# Patient Record
Sex: MALE | Race: WHITE | NOT HISPANIC OR LATINO | URBAN - METROPOLITAN AREA
[De-identification: names, ages, dates, MRNs, and addresses within clinical notes are randomized per-mention and may not be internally consistent; named-entity substitution may affect disease eponyms.]

---

## 2020-01-15 ENCOUNTER — HISTORICAL (OUTPATIENT)
Dept: INTENSIVE CARE | Facility: HOSPITAL | Age: 70
End: 2020-01-15

## 2022-04-10 ENCOUNTER — HISTORICAL (OUTPATIENT)
Dept: ADMINISTRATIVE | Facility: HOSPITAL | Age: 72
End: 2022-04-10

## 2022-04-25 VITALS
HEIGHT: 73 IN | DIASTOLIC BLOOD PRESSURE: 78 MMHG | BODY MASS INDEX: 29.08 KG/M2 | SYSTOLIC BLOOD PRESSURE: 143 MMHG | WEIGHT: 219.38 LBS

## 2023-02-14 DIAGNOSIS — M54.50 LUMBAR PAIN: Primary | ICD-10-CM

## 2023-02-15 ENCOUNTER — TELEPHONE (OUTPATIENT)
Dept: NEUROSURGERY | Facility: CLINIC | Age: 73
End: 2023-02-15
Payer: MEDICARE

## 2023-02-15 NOTE — TELEPHONE ENCOUNTER
Received referral. Called [facility] to see if they can mail MRI disc to us, they requested I fax a request form. Sent myself reminder to f/u on this.

## 2023-02-22 ENCOUNTER — TELEPHONE (OUTPATIENT)
Dept: NEUROSURGERY | Facility: CLINIC | Age: 73
End: 2023-02-22
Payer: MEDICARE

## 2023-02-22 NOTE — TELEPHONE ENCOUNTER
Spoke with Vanita in Radiology. States she cannot tell me if the disc has been mailed out because everyone is gone for the day and usually the front girls handle that so she advised for me to call back tomorrow morning.

## 2023-02-22 NOTE — TELEPHONE ENCOUNTER
----- Message from Trish Santacruz MA sent at 2/20/2023  2:02 PM CST -----  Regarding: see if we have recieved disc  From [facility]. Faxed request on 2/15/23. Then proceed w calling pt for symptoms.

## 2023-02-23 ENCOUNTER — TELEPHONE (OUTPATIENT)
Dept: NEUROSURGERY | Facility: CLINIC | Age: 73
End: 2023-02-23
Payer: MEDICARE

## 2023-02-23 NOTE — TELEPHONE ENCOUNTER
----- Message from Trish Santacruz MA sent at 2/22/2023  4:18 PM CST -----  Regarding: see if we have received  From [facility]. Faxed request on 2/15/23. Then proceed w calling pt for symptoms. Call tmr morning and see if the girls mailed out    
Spoke with someone in radiology, she states it was mailed out on the 16th. Advised her I will continue to look out for it.  
Yes - the patient is able to be screened

## 2023-02-24 ENCOUNTER — TELEPHONE (OUTPATIENT)
Dept: NEUROSURGERY | Facility: CLINIC | Age: 73
End: 2023-02-24
Payer: MEDICARE

## 2023-02-24 NOTE — TELEPHONE ENCOUNTER
Received imaging. Tried to call patient to inquire about symptoms, he requests to call me back because he is about to go in for a MD apt.

## 2023-02-24 NOTE — TELEPHONE ENCOUNTER
Patient returned my call. He is C/O an aching, burning, and shooting lower back pain that radiates to both legs and buttocks, states is more prevalent on the R side. This pain has been going on for years and is not due to any accident or fall. The pain does not wake him up at night. He rates the pain a 6/10 on a good day and a 8/10 on a bad day. He is taking Gabapentin prescribed by Dr. Demarco. Patient has not had any recent testing besides MRI from 9/2022. Denies seeing any other doctors for this. States he has been getting injections at [facility] and has had PT with Dr. Orr. Advised I will need these notes prior to apt so I will e-mail release form and he will e-mail back.

## 2023-02-24 NOTE — TELEPHONE ENCOUNTER
Dr. Sotomayor, next avaiable.  The MRI was done in 9/2022.  If greater than 6 months when he will be seen, I need to see the patient first.  Lumbar x-rays with flex/ext views if seeing me, next available.

## 2023-02-24 NOTE — TELEPHONE ENCOUNTER
"----- Message from Trish Santacruz MA sent at 2/24/2023 11:28 AM CST -----  Regarding: REFERRAL PROCESS- lumbar  This patient is being referred to Dr. Sotomayor by Dr. Mack for lumbar pain. Revewing MRI report, "L4-5 level demonstrates severe central canal narrowing with severe left neural foraminal narrowing. The disc is contacting and compressing the L4 nerve root." Please review imaging and advise on scheduling. Patient has not had any surgeries on his back or neck.    "

## 2023-02-28 NOTE — TELEPHONE ENCOUNTER
Tried to call patient to schedule again, no answer and was unable to leave a vm due to mailbox full.

## 2023-03-02 NOTE — TELEPHONE ENCOUNTER
Tried to call patient to schedule again, no answer and was unable to leave a vm due to mailbox full

## 2023-03-06 NOTE — TELEPHONE ENCOUNTER
Spoke with patient. Scheduled him with Dr. Bran lomeli 4/27/23 at 3:45. 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 He also request I e-mail release form again because he states he never got it. Will e-mail him again and send myself a reminder to f/u on this apt.

## 2023-04-24 NOTE — TELEPHONE ENCOUNTER
Spoke with patient regarding his apt with Dr. Sotomayor on 4/27/23, he stated that date will not work. So I r/s him to 8/22/23 at 10:00. Patient verbalized understanding and requests I e-mail him the apt info.
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
## 2024-03-22 DIAGNOSIS — R41.3 MEMORY LOSS: ICD-10-CM

## 2024-04-26 ENCOUNTER — HOSPITAL ENCOUNTER (EMERGENCY)
Facility: HOSPITAL | Age: 74
Discharge: HOME | End: 2024-04-26
Attending: EMERGENCY MEDICINE
Payer: MEDICARE

## 2024-04-26 VITALS
OXYGEN SATURATION: 100 % | WEIGHT: 230 LBS | DIASTOLIC BLOOD PRESSURE: 89 MMHG | BODY MASS INDEX: 30.48 KG/M2 | TEMPERATURE: 98 F | SYSTOLIC BLOOD PRESSURE: 155 MMHG | HEIGHT: 73 IN | RESPIRATION RATE: 25 BRPM | HEART RATE: 83 BPM

## 2024-04-26 DIAGNOSIS — R73.9 HYPERGLYCEMIA: ICD-10-CM

## 2024-04-26 DIAGNOSIS — D61.818 PANCYTOPENIA: ICD-10-CM

## 2024-04-26 DIAGNOSIS — R41.0 TRANSIENT CONFUSION: ICD-10-CM

## 2024-04-26 LAB
ALBUMIN SERPL-MCNC: 3.5 G/DL (ref 3.4–4.8)
ALBUMIN/GLOB SERPL: 1.2 RATIO (ref 1.1–2)
ALP SERPL-CCNC: 153 UNIT/L (ref 40–150)
ALT SERPL-CCNC: 36 UNIT/L (ref 0–55)
AST SERPL-CCNC: 38 UNIT/L (ref 5–34)
BASOPHILS # BLD AUTO: 0.01 X10(3)/MCL
BASOPHILS NFR BLD AUTO: 0.3 %
BILIRUB SERPL-MCNC: 0.6 MG/DL
BUN SERPL-MCNC: 19.1 MG/DL (ref 8.4–25.7)
CALCIUM SERPL-MCNC: 9 MG/DL (ref 8.8–10)
CHLORIDE SERPL-SCNC: 112 MMOL/L (ref 98–107)
CO2 SERPL-SCNC: 20 MMOL/L (ref 23–31)
CREAT SERPL-MCNC: 1.16 MG/DL (ref 0.73–1.18)
EOSINOPHIL # BLD AUTO: 0.13 X10(3)/MCL (ref 0–0.9)
EOSINOPHIL NFR BLD AUTO: 4.4 %
ERYTHROCYTE [DISTWIDTH] IN BLOOD BY AUTOMATED COUNT: 13.6 % (ref 11.5–17)
GFR SERPLBLD CREATININE-BSD FMLA CKD-EPI: >60 MLS/MIN/1.73/M2
GLOBULIN SER-MCNC: 3 GM/DL (ref 2.4–3.5)
GLUCOSE SERPL-MCNC: 210 MG/DL (ref 82–115)
HCT VFR BLD AUTO: 33.2 % (ref 42–52)
HGB BLD-MCNC: 11.1 G/DL (ref 14–18)
IMM GRANULOCYTES # BLD AUTO: 0.01 X10(3)/MCL (ref 0–0.04)
IMM GRANULOCYTES NFR BLD AUTO: 0.3 %
LYMPHOCYTES # BLD AUTO: 0.62 X10(3)/MCL (ref 0.6–4.6)
LYMPHOCYTES NFR BLD AUTO: 20.9 %
MCH RBC QN AUTO: 31.3 PG (ref 27–31)
MCHC RBC AUTO-ENTMCNC: 33.4 G/DL (ref 33–36)
MCV RBC AUTO: 93.5 FL (ref 80–94)
MONOCYTES # BLD AUTO: 0.25 X10(3)/MCL (ref 0.1–1.3)
MONOCYTES NFR BLD AUTO: 8.4 %
NEUTROPHILS # BLD AUTO: 1.95 X10(3)/MCL (ref 2.1–9.2)
NEUTROPHILS NFR BLD AUTO: 65.7 %
NRBC BLD AUTO-RTO: 0 %
PLATELET # BLD AUTO: 60 X10(3)/MCL (ref 130–400)
PLATELETS.RETICULATED NFR BLD AUTO: 4.3 % (ref 0.9–11.2)
PMV BLD AUTO: 11.8 FL (ref 7.4–10.4)
POCT GLUCOSE: 314 MG/DL (ref 70–110)
POTASSIUM SERPL-SCNC: 3.6 MMOL/L (ref 3.5–5.1)
PROT SERPL-MCNC: 6.5 GM/DL (ref 5.8–7.6)
RBC # BLD AUTO: 3.55 X10(6)/MCL (ref 4.7–6.1)
SODIUM SERPL-SCNC: 142 MMOL/L (ref 136–145)
WBC # SPEC AUTO: 2.97 X10(3)/MCL (ref 4.5–11.5)

## 2024-04-26 PROCEDURE — 25000003 PHARM REV CODE 250: Performed by: EMERGENCY MEDICINE

## 2024-04-26 PROCEDURE — 93010 ELECTROCARDIOGRAM REPORT: CPT | Mod: ,,, | Performed by: INTERNAL MEDICINE

## 2024-04-26 PROCEDURE — 93005 ELECTROCARDIOGRAM TRACING: CPT

## 2024-04-26 PROCEDURE — 82962 GLUCOSE BLOOD TEST: CPT

## 2024-04-26 PROCEDURE — 80053 COMPREHEN METABOLIC PANEL: CPT

## 2024-04-26 PROCEDURE — 85025 COMPLETE CBC W/AUTO DIFF WBC: CPT

## 2024-04-26 PROCEDURE — 99285 EMERGENCY DEPT VISIT HI MDM: CPT | Mod: 25

## 2024-04-26 PROCEDURE — 96360 HYDRATION IV INFUSION INIT: CPT

## 2024-04-26 RX ORDER — SODIUM CHLORIDE 9 MG/ML
100 INJECTION, SOLUTION INTRAVENOUS CONTINUOUS
Status: DISCONTINUED | OUTPATIENT
Start: 2024-04-26 | End: 2024-04-26

## 2024-04-26 RX ADMIN — SODIUM CHLORIDE 1000 ML: 9 INJECTION, SOLUTION INTRAVENOUS at 07:04

## 2024-04-26 NOTE — ED PROVIDER NOTES
Encounter Date: 4/26/2024       History     Chief Complaint   Patient presents with    Hyperglycemia     Pt to ED with c/o hyperglycemia and weakness. GCS 14 at baseline; per family pt is at baseline. CBG initially 469 with EMS. Repeat .     Pt is a 75 yo M who was brought in to the ED via EMS for concerns of elevated blood sugar. Known h/o DM2 and dementia; currently takes metformin 1x per day. Unclear if he took metformin yesterday but did take it this time morning. Per EMS pt was feeling weak and acting confused so his wife called EMS. On arrival his sugar was in the 400s. Given 400cc bolus of fluid and sugar came down to 300s. On arrival pt is oriented to himself. Denying any current chest pain, SOB, recent fevers or chills.         Review of patient's allergies indicates:  No Known Allergies  No past medical history on file.  No past surgical history on file.  No family history on file.     Review of Systems   Constitutional:  Positive for activity change and appetite change. Negative for chills and fever.   HENT:  Negative for congestion, sore throat and trouble swallowing.    Respiratory:  Negative for cough, chest tightness and shortness of breath.    Cardiovascular:  Negative for chest pain and palpitations.   Gastrointestinal:  Negative for abdominal pain, nausea and vomiting.   Neurological:  Positive for weakness. Negative for dizziness and headaches.   Psychiatric/Behavioral:  Positive for confusion.        Physical Exam     Initial Vitals [04/26/24 1749]   BP Pulse Resp Temp SpO2   (!) 141/75 82 20 98.1 °F (36.7 °C) 100 %      MAP       --         Physical Exam    Nursing note and vitals reviewed.  Constitutional: He is not diaphoretic. No distress.   HENT:   Head: Normocephalic.   Eyes: EOM are normal. Pupils are equal, round, and reactive to light.   Neck: Neck supple.   Normal range of motion.  Cardiovascular:  Normal rate and regular rhythm.           No murmur heard.  Pulmonary/Chest:  Breath sounds normal. He has no wheezes. He has no rhonchi. He has no rales.   Abdominal: Abdomen is soft. Bowel sounds are normal. There is no abdominal tenderness. There is no rebound and no guarding.   Musculoskeletal:      Cervical back: Normal range of motion and neck supple.      Comments: Trace edema present in RLE up to mid shin; LLE no edema present     Neurological: He is alert.   Pt oriented to self; not oriented to place or year. GCS 14  4/5 Strength present in BUE and BLE. Moves extremities on demand.    Skin: Skin is warm. Capillary refill takes less than 2 seconds.         ED Course   Procedures  Labs Reviewed   COMPREHENSIVE METABOLIC PANEL - Abnormal; Notable for the following components:       Result Value    Chloride 112 (*)     Carbon Dioxide 20 (*)     Glucose Level 210 (*)     Alkaline Phosphatase 153 (*)     Aspartate Aminotransferase 38 (*)     All other components within normal limits   CBC WITH DIFFERENTIAL - Abnormal; Notable for the following components:    WBC 2.97 (*)     RBC 3.55 (*)     Hgb 11.1 (*)     Hct 33.2 (*)     MCH 31.3 (*)     Platelet 60 (*)     MPV 11.8 (*)     Neut # 1.95 (*)     All other components within normal limits   POCT GLUCOSE - Abnormal; Notable for the following components:    POCT Glucose 314 (*)     All other components within normal limits   CBC W/ AUTO DIFFERENTIAL    Narrative:     The following orders were created for panel order CBC auto differential.  Procedure                               Abnormality         Status                     ---------                               -----------         ------                     CBC with Differential[[id number]]       Abnormal            Final result                 Please view results for these tests on the individual orders.   URINALYSIS, REFLEX TO URINE CULTURE   POCT GLUCOSE, HAND-HELD DEVICE          Imaging Results              X-Ray Chest PA And Lateral (Final result)  Result time 04/26/24 18:54:33       Final result by Herbert Gaytan MD (04/26/24 18:54:33)                   Impression:      No acute cardiopulmonary process.      Electronically signed by: Herbert Gaytan  Date:    04/26/2024  Time:    18:54               Narrative:    EXAMINATION:  XR CHEST PA AND LATERAL    CLINICAL HISTORY:  Hyperglycemia;    TECHNIQUE:  Two views of the chest    COMPARISON:  No prior imaging available for comparison.    FINDINGS:  No focal opacification, pleural effusion, or pneumothorax.    The cardiomediastinal silhouette is within normal limits.    No acute osseous abnormality.                                       Medications   sodium chloride 0.9% bolus 1,000 mL 1,000 mL (1,000 mLs Intravenous New Bag 4/26/24 1919)     Medical Decision Making                        Medical Decision Making:   Initial Assessment:   Pt is a 73 yo M who presented to the ED for concern of hyperglycemia.   Differential Diagnosis:   DKA, HHS, medication noncompliance, sepsis, pneumonia,   ED Management:  Pt arrived via EMSl received 400 cc bolus in route; blood sugar dropped from 400s - 300s. He does not appear to be in acute distress at this time. BP mildly elevated, afebrile, O2 100% on room air.   Chest x-ray showed no acute pulmonary infiltrations, consolidations or cardiac concerns. Lab work showed leukopenia, normocytic anemia, elevated alk phos, and glucose in the 200s.   Wife present in room on re-eval; states she recently moved out of house and used to give him his insulin twice a day. Thinks he may have forgotten to take his insulin since she left. Pt states he feels good at this time.   He is currently medically stable for discharge at this time. Glucose has come down and pt's mentation is better and at his baseline per his wife. Encouraged medication compliance and to follow up with his PCP for his pancytopenia. Strict ED return precautions and he and his wife expressed understanding.   Other:   I discussed test(s) with the  performing physician.             Clinical Impression:  Final diagnoses:  [R73.9] Hyperglycemia          ED Disposition Condition    Discharge Stable          ED Prescriptions    None       Follow-up Information       Follow up With Specialties Details Why Contact Info    Your primary care physician  Schedule an appointment as soon as possible for a visit in 1 week  Call your primary care physician or you can call [phone] to schedule with a primary care physician    [facility] - Emergency Dept Emergency Medicine  If symptoms worsen [address] [address]  [phone]             Gustavo Mattson MD  Resident  04/26/24 2001

## 2024-04-27 LAB
OHS QRS DURATION: 90 MS
OHS QTC CALCULATION: 461 MS

## 2024-05-03 ENCOUNTER — TELEMEDICINE (OUTPATIENT)
Dept: NEUROLOGY | Facility: OTHER | Age: 74
End: 2024-05-03
Payer: MEDICARE

## 2024-05-03 DIAGNOSIS — R41.82 ALTERED MENTAL STATUS, UNSPECIFIED ALTERED MENTAL STATUS TYPE: Primary | ICD-10-CM

## 2024-05-03 PROCEDURE — 99451 NTRPROF PH1/NTRNET/EHR 5/>: CPT | Mod: ,,, | Performed by: STUDENT IN AN ORGANIZED HEALTH CARE EDUCATION/TRAINING PROGRAM

## 2024-05-03 NOTE — TELEMEDICINE CONSULT
Chief Compliant:  Altered mental status    HPI:   74-year-old male with a history of diabetes type 2, hypertension, hyperlipidemia, coronary artery disease, hepatitis-C, alcoholic cirrhosis and peripheral neuropathy presented to [facility] with altered mental status.  He was accompanied by nephew.  Most of the history is obtained  from nephew (  Per chart) as patient is confused.  Neurology was consulted  for the same.  Per nephew, confusion has been lingering for the last several months.  Patient also appears to have family situation.  Per chart, he does not speak the language of his wife who is Urdu and that is why they do not get along well and as a result he has not getting the care that he needs at home.    Past Medical History:  diabetes type 2, hypertension, hyperlipidemia, coronary artery disease, hepatitis-C, alcoholic cirrhosis and peripheral neuropathy   Past Surgical History:    Bilateral shoulder surgeries   Allergies:   NKDA    Family History:   Noncontributory per chart   Social History:    None per chart     Review of Systems:  14 point ROS was obtained and is negative except mentioned in HPI    OBJECTIVE:     Vital Signs (Most Recent):    Vital Signs (24h Range):  [unfilled]       Physical Exam:   Attempted to call on the current multiple times but it went unanswered    Labs:  CBC/Anemia Profile:    WBC 4.36, RBC 4.02, hemoglobin 12.5, platelets 63, neutrophil % 75.8, lymphocytes  %13.5     Coags:   PT 12.1 seconds, PTT 32.3 seconds, INR 1.1     Chemistries:   Ammonia 70,  anion gap 10, sodium 143, potassium 3.9, chloride 111, BUN 21.58, creatinine 1.14, eGFR >60      Urine:   Color yellow, cloudy clear, glucose negative, bilirubin negative, ketone negative, protein trace, nitrite negative, blood negative, leukocyte esterase negative     Urine microscopy:  WBC none seen, RBC none seen, epithelial cells rare, bacteria none seen, yeast none seen  Medications:  Scheduled  Meds:  Continuous Infusions:  PRN Meds:.    No current outpatient medications on file.  Prior to Admission medications    Not on File        Imaging:   Imaging: Images were reviewed remotely as they were acquired.    CTH: mild cerebral atrophy with chronic small-vessel ischemic changes.  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  No acute intracranial abnormalities  CTA H/N:  MRI:  ECHO: EF . LA size: Normal. Bubble study: +/-/not done         Assessment/Plan:   74-year-old gentleman with a history of hepatitis C and cirrhosis with confusion and altered mental status ongoing since 1-1.5 months..  patient has elevated ammonia that could be indicating hepatic encephalopathy however, other workup can be done to rule out neurological causes.    - Recommend MRI with and without contrast brain to rule out any structural abnormalities  - recommend routine EEG to rule out subclinical seizure.  If evidence of epileptiform discharges, reach back to tele neurology for further recommendations regarding starting antiseizure medications  -  recommend correction of  ammonia          I spent approximately 20 minutes on this encounter which includes chart review, imaging interpretation if available, medical decision making including triage and planning for diagnostics, management and disposition. Once this note was completed, a written copy was sent back to the provider via fax  and electronic medical record.                      John Arndt MD, MHA  Fellow, NeuroEndovascular Surgery, [facility], [facility] Allan Arce  Neurologist, [facility]

## 2024-07-19 ENCOUNTER — HOSPITAL ENCOUNTER (INPATIENT)
Facility: HOSPITAL | Age: 74
LOS: 35 days | Discharge: HOME HEALTH | DRG: 086 | End: 2024-08-23
Attending: STUDENT IN AN ORGANIZED HEALTH CARE EDUCATION/TRAINING PROGRAM | Admitting: SURGERY
Payer: MEDICARE

## 2024-07-19 DIAGNOSIS — S06.5XAA SUBDURAL HEMATOMA: ICD-10-CM

## 2024-07-19 DIAGNOSIS — S01.01XA LACERATION OF SCALP, INITIAL ENCOUNTER: ICD-10-CM
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
## 2024-07-19 DIAGNOSIS — R53.1 GENERALIZED WEAKNESS: ICD-10-CM

## 2024-07-19 LAB
AFP-TM SERPL-MCNC: <2 NG/ML
ALBUMIN SERPL-MCNC: 3 G/DL (ref 3.4–4.8)
ALBUMIN/GLOB SERPL: 1 RATIO (ref 1.1–2)
ALP SERPL-CCNC: 124 UNIT/L (ref 40–150)
ALT SERPL-CCNC: 29 UNIT/L (ref 0–55)
AMMONIA PLAS-MSCNC: 41.9 UMOL/L (ref 18–72)
ANION GAP SERPL CALC-SCNC: 6 MEQ/L
APTT PPP: 23.6 SECONDS (ref 23.2–33.7)
AST SERPL-CCNC: 38 UNIT/L (ref 5–34)
BASOPHILS # BLD AUTO: 0.01 X10(3)/MCL
BASOPHILS NFR BLD AUTO: 0.3 %
BILIRUB SERPL-MCNC: 0.8 MG/DL
BUN SERPL-MCNC: 15.1 MG/DL (ref 8.4–25.7)
CALCIUM SERPL-MCNC: 8.3 MG/DL (ref 8.8–10)
CHLORIDE SERPL-SCNC: 112 MMOL/L (ref 98–107)
CO2 SERPL-SCNC: 21 MMOL/L (ref 23–31)
CREAT SERPL-MCNC: 1.07 MG/DL (ref 0.73–1.18)
CREAT/UREA NIT SERPL: 14
EOSINOPHIL # BLD AUTO: 0.1 X10(3)/MCL (ref 0–0.9)
EOSINOPHIL NFR BLD AUTO: 3 %
ERYTHROCYTE [DISTWIDTH] IN BLOOD BY AUTOMATED COUNT: 14.9 % (ref 11.5–17)
ETHANOL SERPL-MCNC: <10 MG/DL
GFR SERPLBLD CREATININE-BSD FMLA CKD-EPI: >60 ML/MIN/1.73/M2
GLOBULIN SER-MCNC: 2.9 GM/DL (ref 2.4–3.5)
GLUCOSE SERPL-MCNC: 166 MG/DL (ref 82–115)
GROUP & RH: NORMAL
HCT VFR BLD AUTO: 30.4 % (ref 42–52)
HGB BLD-MCNC: 9.8 G/DL (ref 14–18)
IMM GRANULOCYTES # BLD AUTO: 0.01 X10(3)/MCL (ref 0–0.04)
IMM GRANULOCYTES NFR BLD AUTO: 0.3 %
INDIRECT COOMBS: NORMAL
INR PPP: 1.1
LYMPHOCYTES # BLD AUTO: 0.49 X10(3)/MCL (ref 0.6–4.6)
LYMPHOCYTES NFR BLD AUTO: 14.9 %
MAGNESIUM SERPL-MCNC: 1.7 MG/DL (ref 1.6–2.6)
MCH RBC QN AUTO: 30.4 PG (ref 27–31)
MCHC RBC AUTO-ENTMCNC: 32.2 G/DL (ref 33–36)
MCV RBC AUTO: 94.4 FL (ref 80–94)
MONOCYTES # BLD AUTO: 0.24 X10(3)/MCL (ref 0.1–1.3)
MONOCYTES NFR BLD AUTO: 7.3 %
NEUTROPHILS # BLD AUTO: 2.44 X10(3)/MCL (ref 2.1–9.2)
NEUTROPHILS NFR BLD AUTO: 74.2 %
NRBC BLD AUTO-RTO: 0 %
OHS QRS DURATION: 80 MS
OHS QTC CALCULATION: 464 MS
PLATELET # BLD AUTO: 56 X10(3)/MCL (ref 130–400)
PLATELETS.RETICULATED NFR BLD AUTO: 4.1 % (ref 0.9–11.2)
PMV BLD AUTO: 11.6 FL (ref 7.4–10.4)
POCT GLUCOSE: 166 MG/DL (ref 70–110)
POCT GLUCOSE: 190 MG/DL (ref 70–110)
POCT GLUCOSE: 71 MG/DL (ref 70–110)
POCT GLUCOSE: 71 MG/DL (ref 70–110)
POTASSIUM SERPL-SCNC: 3.7 MMOL/L (ref 3.5–5.1)
PROT SERPL-MCNC: 5.9 GM/DL (ref 5.8–7.6)
PROTHROMBIN TIME: 14.2 SECONDS (ref 12.5–14.5)
RBC # BLD AUTO: 3.22 X10(6)/MCL (ref 4.7–6.1)
SODIUM SERPL-SCNC: 139 MMOL/L (ref 136–145)
SPECIMEN OUTDATE: NORMAL
TROPONIN I SERPL-MCNC: <0.01 NG/ML (ref 0–0.04)
WBC # BLD AUTO: 3.29 X10(3)/MCL (ref 4.5–11.5)

## 2024-07-19 PROCEDURE — 83735 ASSAY OF MAGNESIUM: CPT | Performed by: STUDENT IN AN ORGANIZED HEALTH CARE EDUCATION/TRAINING PROGRAM

## 2024-07-19 PROCEDURE — 99285 EMERGENCY DEPT VISIT HI MDM: CPT | Mod: 25

## 2024-07-19 PROCEDURE — 93005 ELECTROCARDIOGRAM TRACING: CPT

## 2024-07-19 PROCEDURE — 99223 1ST HOSP IP/OBS HIGH 75: CPT | Mod: FS,,, | Performed by: NEUROLOGICAL SURGERY

## 2024-07-19 PROCEDURE — 82077 ASSAY SPEC XCP UR&BREATH IA: CPT | Performed by: STUDENT IN AN ORGANIZED HEALTH CARE EDUCATION/TRAINING PROGRAM

## 2024-07-19 PROCEDURE — 0HQ0XZZ REPAIR SCALP SKIN, EXTERNAL APPROACH: ICD-10-PCS | Performed by: STUDENT IN AN ORGANIZED HEALTH CARE EDUCATION/TRAINING PROGRAM

## 2024-07-19 PROCEDURE — 11000001 HC ACUTE MED/SURG PRIVATE ROOM

## 2024-07-19 PROCEDURE — 25500020 PHARM REV CODE 255: Performed by: STUDENT IN AN ORGANIZED HEALTH CARE EDUCATION/TRAINING PROGRAM

## 2024-07-19 PROCEDURE — 86901 BLOOD TYPING SEROLOGIC RH(D): CPT | Performed by: STUDENT IN AN ORGANIZED HEALTH CARE EDUCATION/TRAINING PROGRAM

## 2024-07-19 PROCEDURE — 82105 ALPHA-FETOPROTEIN SERUM: CPT | Performed by: NURSE PRACTITIONER

## 2024-07-19 PROCEDURE — 85610 PROTHROMBIN TIME: CPT | Performed by: STUDENT IN AN ORGANIZED HEALTH CARE EDUCATION/TRAINING PROGRAM

## 2024-07-19 PROCEDURE — 93010 ELECTROCARDIOGRAM REPORT: CPT | Mod: ,,, | Performed by: INTERNAL MEDICINE

## 2024-07-19 PROCEDURE — 82140 ASSAY OF AMMONIA: CPT | Performed by: NURSE PRACTITIONER

## 2024-07-19 PROCEDURE — 82962 GLUCOSE BLOOD TEST: CPT

## 2024-07-19 PROCEDURE — 63600175 PHARM REV CODE 636 W HCPCS: Performed by: NURSE PRACTITIONER

## 2024-07-19 PROCEDURE — 80053 COMPREHEN METABOLIC PANEL: CPT | Performed by: STUDENT IN AN ORGANIZED HEALTH CARE EDUCATION/TRAINING PROGRAM

## 2024-07-19 PROCEDURE — 25000003 PHARM REV CODE 250: Performed by: STUDENT IN AN ORGANIZED HEALTH CARE EDUCATION/TRAINING PROGRAM

## 2024-07-19 PROCEDURE — 85730 THROMBOPLASTIN TIME PARTIAL: CPT | Performed by: STUDENT IN AN ORGANIZED HEALTH CARE EDUCATION/TRAINING PROGRAM

## 2024-07-19 PROCEDURE — 25000003 PHARM REV CODE 250: Performed by: NURSE PRACTITIONER

## 2024-07-19 PROCEDURE — 21400001 HC TELEMETRY ROOM

## 2024-07-19 PROCEDURE — 85025 COMPLETE CBC W/AUTO DIFF WBC: CPT | Performed by: STUDENT IN AN ORGANIZED HEALTH CARE EDUCATION/TRAINING PROGRAM

## 2024-07-19 PROCEDURE — 12015 RPR F/E/E/N/L/M 7.6-12.5 CM: CPT

## 2024-07-19 PROCEDURE — 96365 THER/PROPH/DIAG IV INF INIT: CPT

## 2024-07-19 PROCEDURE — 12004 RPR S/N/AX/GEN/TRK7.6-12.5CM: CPT

## 2024-07-19 PROCEDURE — 84484 ASSAY OF TROPONIN QUANT: CPT | Performed by: STUDENT IN AN ORGANIZED HEALTH CARE EDUCATION/TRAINING PROGRAM

## 2024-07-19 PROCEDURE — 86850 RBC ANTIBODY SCREEN: CPT | Performed by: STUDENT IN AN ORGANIZED HEALTH CARE EDUCATION/TRAINING PROGRAM

## 2024-07-19 RX ORDER — IBUPROFEN 200 MG
16 TABLET ORAL
Status: DISCONTINUED | OUTPATIENT
Start: 2024-07-19 | End: 2024-08-23 | Stop reason: HOSPADM

## 2024-07-19 RX ORDER — ACETAMINOPHEN 325 MG/1
650 TABLET ORAL EVERY 4 HOURS
Status: DISPENSED | OUTPATIENT
Start: 2024-07-19 | End: 2024-07-24

## 2024-07-19 RX ORDER — POLYETHYLENE GLYCOL 3350 17 G/17G
17 POWDER, FOR SOLUTION ORAL 2 TIMES DAILY
Status: DISCONTINUED | OUTPATIENT
Start: 2024-07-19 | End: 2024-08-17

## 2024-07-19 RX ORDER — IBUPROFEN 200 MG
24 TABLET ORAL
Status: DISCONTINUED | OUTPATIENT
Start: 2024-07-19 | End: 2024-08-23 | Stop reason: HOSPADM

## 2024-07-19 RX ORDER — HYDRALAZINE HYDROCHLORIDE 20 MG/ML
10 INJECTION INTRAMUSCULAR; INTRAVENOUS EVERY 6 HOURS PRN
Status: DISCONTINUED | OUTPATIENT
Start: 2024-07-19 | End: 2024-08-06

## 2024-07-19 RX ORDER — OXYCODONE HYDROCHLORIDE 5 MG/1
5 TABLET ORAL EVERY 4 HOURS PRN
Status: DISCONTINUED | OUTPATIENT
Start: 2024-07-19 | End: 2024-08-23 | Stop reason: HOSPADM

## 2024-07-19 RX ORDER — DOCUSATE SODIUM 100 MG/1
100 CAPSULE, LIQUID FILLED ORAL 2 TIMES DAILY
Status: DISCONTINUED | OUTPATIENT
Start: 2024-07-19 | End: 2024-08-03

## 2024-07-19 RX ORDER — TALC
6 POWDER (GRAM) TOPICAL NIGHTLY PRN
Status: DISCONTINUED | OUTPATIENT
Start: 2024-07-19 | End: 2024-07-27

## 2024-07-19 RX ORDER — SODIUM CHLORIDE 9 MG/ML
INJECTION, SOLUTION INTRAVENOUS CONTINUOUS
Status: DISCONTINUED | OUTPATIENT
Start: 2024-07-19 | End: 2024-07-20

## 2024-07-19 RX ORDER — LEVETIRACETAM 500 MG/1
500 TABLET ORAL 2 TIMES DAILY
Status: COMPLETED | OUTPATIENT
Start: 2024-07-19 | End: 2024-07-25

## 2024-07-19 RX ORDER — METHOCARBAMOL 500 MG/1
500 TABLET, FILM COATED ORAL 2 TIMES DAILY
Status: DISCONTINUED | OUTPATIENT
Start: 2024-07-19 | End: 2024-08-23 | Stop reason: HOSPADM

## 2024-07-19 RX ORDER — LIDOCAINE 50 MG/G
1 PATCH TOPICAL
Status: DISCONTINUED | OUTPATIENT
Start: 2024-07-19 | End: 2024-08-23 | Stop reason: HOSPADM

## 2024-07-19 RX ORDER — GLUCAGON 1 MG
1 KIT INJECTION
Status: DISCONTINUED | OUTPATIENT
Start: 2024-07-19 | End: 2024-08-23 | Stop reason: HOSPADM

## 2024-07-19 RX ORDER — INSULIN ASPART 100 [IU]/ML
0-5 INJECTION, SOLUTION INTRAVENOUS; SUBCUTANEOUS
Status: DISCONTINUED | OUTPATIENT
Start: 2024-07-19 | End: 2024-08-23 | Stop reason: HOSPADM

## 2024-07-19 RX ORDER — LIDOCAINE HYDROCHLORIDE AND EPINEPHRINE 10; 10 MG/ML; UG/ML
1 INJECTION, SOLUTION INFILTRATION; PERINEURAL ONCE
Status: COMPLETED | OUTPATIENT
Start: 2024-07-19 | End: 2024-07-19

## 2024-07-19 RX ORDER — ADHESIVE BANDAGE
30 BANDAGE TOPICAL DAILY PRN
Status: DISCONTINUED | OUTPATIENT
Start: 2024-07-19 | End: 2024-08-23 | Stop reason: HOSPADM

## 2024-07-19 RX ADMIN — POLYETHYLENE GLYCOL 3350 17 G: 17 POWDER, FOR SOLUTION ORAL at 10:07

## 2024-07-19 RX ADMIN — ACETAMINOPHEN 650 MG: 325 TABLET, FILM COATED ORAL at 11:07

## 2024-07-19 RX ADMIN — HYDRALAZINE HYDROCHLORIDE 10 MG: 20 INJECTION INTRAMUSCULAR; INTRAVENOUS at 06:07

## 2024-07-19 RX ADMIN — DOCUSATE SODIUM 100 MG: 100 CAPSULE, LIQUID FILLED ORAL at 11:07

## 2024-07-19 RX ADMIN — ACETAMINOPHEN 650 MG: 325 TABLET, FILM COATED ORAL at 05:07

## 2024-07-19 RX ADMIN — LEVETIRACETAM 500 MG: 500 TABLET, FILM COATED ORAL at 11:07

## 2024-07-19 RX ADMIN — Medication 6 MG: at 10:07

## 2024-07-19 RX ADMIN — IOHEXOL 100 ML: 350 INJECTION, SOLUTION INTRAVENOUS at 06:07

## 2024-07-19 RX ADMIN — LIDOCAINE PATCH 5% 1 PATCH: 700 PATCH TOPICAL at 11:07

## 2024-07-19 RX ADMIN — METHOCARBAMOL 500 MG: 500 TABLET ORAL at 11:07

## 2024-07-19 RX ADMIN — ACETAMINOPHEN 650 MG: 325 TABLET, FILM COATED ORAL at 03:07

## 2024-07-19 RX ADMIN — SODIUM CHLORIDE: 9 INJECTION, SOLUTION INTRAVENOUS at 11:07

## 2024-07-19 RX ADMIN — METHOCARBAMOL 500 MG: 500 TABLET ORAL at 10:07

## 2024-07-19 RX ADMIN — DEXTROSE MONOHYDRATE 125 ML: 100 INJECTION, SOLUTION INTRAVENOUS at 06:07

## 2024-07-19 RX ADMIN — LIDOCAINE HYDROCHLORIDE,EPINEPHRINE BITARTRATE 1 ML: 10; .01 INJECTION, SOLUTION INFILTRATION; PERINEURAL at 09:07

## 2024-07-19 RX ADMIN — DOCUSATE SODIUM 100 MG: 100 CAPSULE, LIQUID FILLED ORAL at 10:07

## 2024-07-19 RX ADMIN — ACETAMINOPHEN 650 MG: 325 TABLET, FILM COATED ORAL at 10:07

## 2024-07-19 RX ADMIN — POLYETHYLENE GLYCOL 3350 17 G: 17 POWDER, FOR SOLUTION ORAL at 11:07

## 2024-07-19 RX ADMIN — LEVETIRACETAM 500 MG: 500 TABLET, FILM COATED ORAL at 10:07

## 2024-07-19 NOTE — ED PROVIDER NOTES
Encounter Date: 7/19/2024    SCRIBE #1 NOTE: I, Berta Venita, am scribing for, and in the presence of,  Kevin Maddox MD. I have scribed the following portions of the note - the EKG reading. Other sections scribed: HPI, ROS, PE.       History     Chief Complaint   Patient presents with    Fall     Arrives [facility] from home fell down 4 steps hit head head - denies loc or thinners, family reports normally confused to year/month which he is currently, lac post head bleeding controlled w/ kerlex dressing, [facility] reports initial cbg 45 - 250ml d5w en route improved to 101 last checked     Patient is a 74 year old male with a hx of DM presents to the ED via EMS following a fall PTA. EMS reports patient was initially hypoglycemic CBG 44 - 250ml of d5w and a c-collar administered en route. EMS reports being told that patient is normally confused to year/month by family.    Patient reports he was going down the stairs when he suddenly slipped and fell. He denies LOC but is unsure how he fell. He reports he wanted to get a slice of cake because he was hungry. He reports falling about 4 feet from the ground. He reports right hip pain. Patient reports right shoulder pain that began prior to the fall.  He denies abdominal pain. He denies hx of HTN or heart problems.    The history is provided by the patient and the EMS personnel. No  was used.     Review of patient's allergies indicates:  No Known Allergies  No past medical history on file.  No past surgical history on file.  No family history on file.     Review of Systems   Gastrointestinal:  Negative for abdominal pain.   Musculoskeletal:         Positive for right hip pain  Positive for right shoulder pain       Physical Exam     Initial Vitals [07/19/24 0614]   BP Pulse Resp Temp SpO2   (!) 150/77 72 16 98.4 °F (36.9 °C) 98 %      MAP       --         Physical Exam    Constitutional: He appears well-developed and well-nourished. He is not  diaphoretic.   HENT:   Head: Normocephalic.   Right Ear: External ear normal.   Left Ear: External ear normal.   Nose: Nose normal.   Hematoma and skin tear to the right parietal aspect of scalp; 8 cm complex laceration to the occipital region of scalp.   Eyes: EOM are normal. Pupils are equal, round, and reactive to light. Right eye exhibits no discharge. Left eye exhibits no discharge.   Pupils are 1 mm to 2 mm    Neck:   C-collar in place   Cardiovascular:  Normal rate, regular rhythm and normal heart sounds.     Exam reveals no gallop and no friction rub.       No murmur heard.  Pulmonary/Chest: Effort normal and breath sounds normal. No respiratory distress. He has no wheezes. He has no rhonchi. He has no rales. He exhibits no tenderness.   Abdominal: Abdomen is soft. Bowel sounds are normal. He exhibits no distension and no mass. There is no abdominal tenderness. There is no rebound and no guarding.   Musculoskeletal:         General: Normal range of motion.      Right lower leg: 3+ Pitting Edema present.      Left lower leg: 3+ Pitting Edema present.      Comments: No midline tenderness, stepoffs, or deformities; well healed surgical scar to the right shoulder     Neurological: He is alert and oriented to person, place, and time. No cranial nerve deficit or sensory deficit.   Skin: Skin is warm and dry. Capillary refill takes less than 2 seconds.         ED Course   Critical Care    Date/Time: 7/19/2024 10:03 AM    Performed by: Kevin Maddox MD  Authorized by: Kevin Maddox MD  Direct patient critical care time: 7 minutes  Additional history critical care time: 9 minutes  Ordering / reviewing critical care time: 8 minutes  Documentation critical care time: 5 minutes  Consulting other physicians critical care time: 6 minutes  Total critical care time (exclusive of procedural time) : 35 minutes  Critical care time was exclusive of separately billable procedures and treating other patients.  Critical  care was necessary to treat or prevent imminent or life-threatening deterioration of the following conditions: trauma.  Critical care was time spent personally by me on the following activities: discussions with consultants, discussions with primary provider, interpretation of cardiac output measurements, evaluation of patient's response to treatment, examination of patient, obtaining history from patient or surrogate, ordering and performing treatments and interventions, ordering and review of laboratory studies, ordering and review of radiographic studies, pulse oximetry, re-evaluation of patient's condition and review of old charts.      Lac Repair    Date/Time: 7/19/2024 10:04 AM    Performed by: Kevin Maddox MD  Authorized by: Kevin Maddox MD    Consent:     Consent obtained:  Verbal    Consent given by:  Patient    Risks discussed:  Infection and nerve damage    Alternatives discussed:  No treatment  Universal protocol:     Patient identity confirmed:  Verbally with patient  Anesthesia:     Anesthesia method:  Local infiltration    Local anesthetic:  Lidocaine 1% WITH epi  Laceration details:     Location:  Scalp    Scalp location:  Occipital    Length (cm):  8  Pre-procedure details:     Preparation:  Patient was prepped and draped in usual sterile fashion  Exploration:     Limited defect created (wound extended): no      Hemostasis achieved with:  Epinephrine and direct pressure    Imaging obtained comment:  CT    Wound exploration: wound explored through full range of motion      Contaminated: no    Treatment:     Area cleansed with:  Chlorhexidine    Amount of cleaning:  Extensive    Irrigation solution:  Sterile saline    Irrigation volume:  500    Irrigation method:  Pressure wash    Debridement:  None    Undermining:  None  Skin repair:     Repair method:  Staples    Number of staples:  14  Approximation:     Approximation:  Close  Repair type:     Repair type:  Simple  Post-procedure  details:     Dressing:  Bulky dressing    Procedure completion:  Tolerated well, no immediate complications    Labs Reviewed   COMPREHENSIVE METABOLIC PANEL - Abnormal; Notable for the following components:       Result Value    Chloride 112 (*)     CO2 21 (*)     Glucose 166 (*)     Calcium 8.3 (*)     Albumin 3.0 (*)     Albumin/Globulin Ratio 1.0 (*)     AST 38 (*)     All other components within normal limits   CBC WITH DIFFERENTIAL - Abnormal; Notable for the following components:    WBC 3.29 (*)     RBC 3.22 (*)     Hgb 9.8 (*)     Hct 30.4 (*)     MCV 94.4 (*)     MCHC 32.2 (*)     Platelet 56 (*)     MPV 11.6 (*)     Lymph # 0.49 (*)     All other components within normal limits   APTT - Normal   TROPONIN I - Normal   MAGNESIUM - Normal   PROTIME-INR - Normal   AMMONIA - Normal   AFP TUMOR MARKER - Normal    Narrative:     The testing method is a chemiluminescent microparticle immunoassay manufactured by [employer] and performed on the [employer] or [employer] system. Values obtained with different assay manufacturers for methods may be different and cannot be used interchangeably.   CBC W/ AUTO DIFFERENTIAL    Narrative:     The following orders were created for panel order CBC auto differential.  Procedure                               Abnormality         Status                     ---------                               -----------         ------                     CBC with Differential[[id number]]       Abnormal            Final result                 Please view results for these tests on the individual orders.   ALCOHOL,MEDICAL (ETHANOL)   TYPE & SCREEN   POCT GLUCOSE   POCT GLUCOSE     EKG Readings: (Independently Interpreted)   Initial Reading: No STEMI. Rhythm: Normal Sinus Rhythm. Heart Rate: 72. Ectopy: No Ectopy. Conduction: Normal. ST Segments: Normal ST Segments. T Waves: Normal. Clinical Impression: Normal Sinus Rhythm   Done at 7:09         Imaging Results              X-ray  Shoulder 2 or More Views Right (Final result)  Result time 07/19/24 09:19:46      Final result by David Erazo MD (07/19/24 09:19:46)                   Impression:      No osseous abnormality identified.      Electronically signed by: David Erazo  Date:    07/19/2024  Time:    09:19               Narrative:    EXAMINATION:  XR SHOULDER COMPLETE 2 OR MORE VIEWS RIGHT    CLINICAL HISTORY:  Fall;    TECHNIQUE:  Three views.    COMPARISON:  None available.    FINDINGS:  Articular surfaces alignment is preserved.  No acute fracture or dislocation identified.  Calcification adjacent to the greater tuberosity reflects calcific tendinopathy.  There is narrowed acromial head and acromion interval which raises the possibility of rotator cuff arthropathy.                                       X-Ray Pelvis Routine AP (Final result)  Result time 07/19/24 07:10:46      Final result by Surinder Danielle MD (07/19/24 07:10:46)                   Impression:      No acute findings.      Electronically signed by: Surinder Danielle  Date:    07/19/2024  Time:    07:10               Narrative:    EXAMINATION:  XR PELVIS ROUTINE AP    CLINICAL HISTORY:  Unspecified fall, initial encounter    COMPARISON:  None    FINDINGS:  Frontal image of the pelvis demonstrates no fracture or dislocation                                       X-Ray Chest 1 View (Final result)  Result time 07/19/24 06:55:57      Final result by Surinder Danielle MD (07/19/24 06:55:57)                   Impression:      Mild left basilar opacities with a small left pleural effusion.      Electronically signed by: Surinder Danielle  Date:    07/19/2024  Time:    06:55               Narrative:    EXAMINATION:  XR CHEST 1 VIEW    CLINICAL HISTORY:  Unspecified fall, initial encounter    COMPARISON:  26 April 2024    FINDINGS:  Frontal view of the chest was obtained. The heart is not significantly enlarged.  There are mild left basilar opacities with a small left pleural effusion  suspected.  There is no pneumothorax.                                       CT Head Without Contrast (Final result)  Result time 07/19/24 07:14:41      Final result by Surinder Danielle MD (07/19/24 07:14:41)                   Impression:      Question minimal subdural blood products along the falx without mass effect.    Findings discussed with Dr. Maddox at 713 on 7/19/2024.      Electronically signed by: Surinder Danielle  Date:    07/19/2024  Time:    07:14               Narrative:    EXAMINATION:  CT HEAD WITHOUT CONTRAST    CLINICAL HISTORY:  Head trauma, minor (Age >= 65y);    TECHNIQUE:  CT imaging of the head performed from the skull base to the vertex without intravenous contrast. DLP 1387 mGycm. Automatic exposure control, adjustment of mA/kV or iterative reconstruction technique was used to reduce radiation.    COMPARISON:  None Available.    FINDINGS:  Questionable minimal subpleural blood products along the falx measuring only 1-2 mm maximally.  No mass effect.  There is mild patchy hypoattenuation in the cerebral white matter which is nonspecific but most commonly associated with chronic small vessel ischemic changes.  There is left cerebellar and left occipital encephalomalacia.  The ventricles are not significantly enlarged.  There are vascular calcifications.    Visualized paranasal sinuses and mastoid air cells are clear. The calvarium is grossly intact.  There is some soft tissue swelling over the right frontal scalp.                                       CT Cervical Spine Without Contrast (Final result)  Result time 07/19/24 07:13:16      Final result by Surinder Danielle MD (07/19/24 07:13:16)                   Impression:      No acute bony injury of the cervical spine.      Electronically signed by: Surinder Danielle  Date:    07/19/2024  Time:    07:13               Narrative:    EXAMINATION:  CT CERVICAL SPINE WITHOUT CONTRAST    CLINICAL HISTORY:  Neck trauma (Age >= 65y);    TECHNIQUE:  Helical  acquisition through the cervical spine without IV contrast. Three plane reconstructions were made available for review. DLP 1387 mGycm. Automatic exposure control, adjustment of mA/kV or iterative reconstruction technique was used to reduce radiation.    COMPARISON:  None available.    FINDINGS:  No fractures identified.  There are mild degenerative alignment abnormalities.  Moderate degenerative changes.  Craniocervical junction and C1-C2 relationship are normal. The odontoid is intact. There is limited evaluation of the soft tissues. No prevertebral soft tissue swelling. Ligamentous injury cannot be excluded with CT.  There are vascular calcifications.                                       CT Chest Abdomen Pelvis With IV Contrast (XPD) NO Oral Contrast (Final result)  Result time 07/19/24 07:32:29      Final result by Surinder Danielle MD (07/19/24 07:32:29)                   Impression:      1. Right transverse process fractures L3 and L4.  2. Small left pleural effusion.  3. Cirrhotic liver with right hepatic lobe mass suspicious for HCC.  Recommend outpatient liver mass protocol CT.  4. Small volume ascites.      Electronically signed by: Surinder Danielle  Date:    07/19/2024  Time:    07:32               Narrative:    EXAMINATION:  CT CHEST ABDOMEN PELVIS WITH IV CONTRAST (XPD)    CLINICAL HISTORY:  Polytrauma, blunt;    TECHNIQUE:  Helical acquisition from the thoracic inlet through the ischia with  IV contrast. Three plane reconstructions made available for review.  mGycm. Automatic exposure control, adjustment of mA/kV or iterative reconstruction technique was used to reduce radiation.    COMPARISON:  None available.    FINDINGS:  Chest.    Heart size upper limit normal.  No pericardial effusion.  There are coronary artery calcifications.    There is no mediastinal hematoma.  No enlarged thoracic lymph nodes.    There is a small left pleural effusion.  No pneumothorax seen.  There is some atelectasis or  scarring left lung base.  Mild chronic changes of the lungs elsewhere.    Abdomen and pelvis.    Cirrhotic liver.  There is a 4 cm hyperenhancing lesion in segment 5 image 126 series 2.  The portal vein is patent.  Prominent paraumbilical vein.  There are gallstones.  No significant biliary ductal dilatation.    The spleen is mildly enlarged.  No significant abnormality of the pancreas or adrenals.  No hydronephrosis.  The low lying malrotated right kidney.    There is no bowel obstruction or free air.  No suspicious bowel wall thickening.  Small volume ascites.    Urinary bladder is unremarkable.  The abdominal aorta is normal in caliber.  Moderate atherosclerotic disease.  Left inguinal hernia containing some fluid.    There are fractures right transverse processes L3 and L4.  There are old bilateral rib fractures.  Moderate degenerative change of the spine.                                       Medications   acetaminophen tablet 650 mg (has no administration in time range)   oxyCODONE immediate release tablet 5 mg (has no administration in time range)   methocarbamoL tablet 500 mg (has no administration in time range)   melatonin tablet 6 mg (has no administration in time range)   polyethylene glycol packet 17 g (has no administration in time range)   docusate sodium capsule 100 mg (has no administration in time range)   magnesium hydroxide 400 mg/5 ml suspension 2,400 mg (has no administration in time range)   levETIRAcetam tablet 500 mg (has no administration in time range)   0.9%  NaCl infusion (has no administration in time range)   LIDOcaine 5 % patch 1 patch (has no administration in time range)   hydrALAZINE injection 10 mg (has no administration in time range)   dextrose 10% bolus 125 mL 125 mL (0 mLs Intravenous Stopped 7/19/24 0727)   iohexoL (OMNIPAQUE 350) injection 100 mL (100 mLs Intravenous Given 7/19/24 0638)   LIDOcaine-EPINEPHrine 1%-1:100,000 injection 1 mL (1 mL Intradermal Given 7/19/24 0900)      Medical Decision Making  The differential diagnosis includes, but is not limited to, abrasion, contusion, fracture, traumatic ICH, TBI, concussion, spinal injury, fracture, pneumothorax, hemothorax, intrathoracic injury, intraabdominal injury, hemorrhage, laceration     Patient is awake and alert.  GCS 14 here which he was baseline per his brother.  Reports he was acting normally at this time.  Found to have a possible small subdural hematomas.  Discussed with neurosurgery recommends admission to floor, repeat head CT in 6 hours.  Discussed with Trauma surgery.  Happy to admit.  Patient was otherwise denies any complaints.  He was asking to eat.  His CT neck is negative.  He was no midline tenderness.  Full range of motion with no pain or hesitancy.  C-collar is removed by myself.  Discussed with them that he does have a mass on his liver.  Recommended follow up as an outpatient.  They are requesting rehab.  I do not believe that this is unreasonable.  I have discussed this with Trauma surgery.  Will admit to floor with Trauma surgery.  Discussed with Selin, on-call.  Care to be transferred.    Amount and/or Complexity of Data Reviewed  Independent Historian: EMS     Details: EMS reports patient was initially hypoglycemic CBG 44 - 250ml of d5w and a c-collar administered en route. EMS reports being told that patient is normally confused to year/month by family.    External Data Reviewed: notes.     Details: See ED course  Labs: ordered. Decision-making details documented in ED Course.  Radiology: ordered and independent interpretation performed. Decision-making details documented in ED Course.  ECG/medicine tests: ordered and independent interpretation performed. Decision-making details documented in ED Course.    Risk  OTC drugs.  Prescription drug management.  Decision regarding hospitalization.            Scribe Attestation:   Scribe #1: I performed the above scribed service and the documentation accurately  describes the services I performed. I attest to the accuracy of the note.    Attending Attestation:           Physician Attestation for Scribe:  Physician Attestation Statement for Scribe #1: I, Kevin Maddox MD, reviewed documentation, as scribed by Berta Nathan in my presence, and it is both accurate and complete.             ED Course as of 07/19/24 1013   Fri Jul 19, 2024   0631 Chart review reveals note from Neurology from 05/03/2024.  History of type 2 diabetes, hypertension, hyperlipidemia, CAD, hepatitis-C, alcoholic cirrhosis and peripheral neuropathy.  At that time he had some confusion at that time confusion had been present for several months per nephew. [MM]   0634 Being as patient is of advanced age, , dementia, history of liver cirrhosis which can cause low platelets, abdomen is somewhat distended but nontender we will treat patient was like a trauma, we will obtain CT scan with contrast will not await labs. [MM]   0746 EKG done at 7:09 a.m. shows sinus rhythm rate of 72 .  Normal axis.  No ST elevation or depression [MM]   0812 INR: 1.1 [MM]   0812 POCT Glucose: 71 [MM]   0812 PTT: 23.6 [MM]   0912 Discussed with neurosurgeon, Dr. Goldstein,  recommends repeat head CT in 6 hours.  Okay for floor. [MM]   0912 Paged Trauma Surgery [MB]   0913 CBC auto differential(!)  Worsening anemia, worsening thrombocytopenia. [MM]   0915 Spoke to Selin on-call for Trauma surgery.  Will admit. [MM]   0915 Discussed with Trauma Surgery [MB]   1009 X-Ray Chest 1 View  Negative for acute [MM]   1009 X-Ray Pelvis Routine AP  Negative for acute [MM]      ED Course User Index  [MB] Berta Nathan  [MM] Kevin Maddox MD                             Clinical Impression:  Final diagnoses:  [W19.XXXA] Fall (Primary)  [R53.1] Generalized weakness  [S06.5XAA] Subdural hematoma  [S01.01XA] Laceration of scalp, initial encounter          ED Disposition Condition    Admit                 Kevin Maddox MD  07/19/24  [id number]

## 2024-07-19 NOTE — CONSULTS
[facility] - Emergency Dept  Neurosurgery  Consult Note    Inpatient consult to Neurosurgery  Consult performed by: Rafa Garcia AGACNP-BC  Consult ordered by: Kevin Maddox MD        Subjective:     Chief Complaint/Reason for Admission:  Patient fell down steps and struck head.  Denies LOC or blood thinners.  Family reports patient normally confused at baseline.    History of Present Illness:  This is a 74-year-old  male with past medical history of diabetes and baseline confusion who presented to the ED via EMS status post fall.  Patient was also initially hypoglycemic with a CABG of 44.  Patient reported he was walking downstairs attempting to get a piece of cake when he suddenly slipped and fell.  Denied LOC. reports falling approximately 4 ft from ground.  Imaging performed.    CT head without contrast: Questionable minimal subdural blood products along the falx without mass effect.    CT cervical spine without contrast: No acute bony injury of the cervical spine.      CT chest abdomen pelvis with contrast:1. Right transverse process fractures L3 and L4.   2. Small left pleural effusion.   3. Cirrhotic liver with right hepatic lobe mass suspicious for HCC.  Recommend outpatient liver mass protocol CT.   4. Small volume ascites.       On physical exam patient has stripped off his gown.  He is fidgeting with lines in IV.  He has used urinal imported all over the floor as well as flipped over his lunch tray onto floor.  He is oriented to self and states he is in the hospital.  He does not know the year.  He does not remember the details of his fall other than he is going to get a piece of cake and then fell.  He does not have any headache dizziness or other neurological findings and appears to be at his normal baseline per report.    PT INR 14.2 and 1.1   PTT 23.6  Platelets are 56    (Not in a hospital admission)      Review of patient's allergies indicates:  No Known  Allergies    No past medical history on file.  No past surgical history on file.  Family History    None       Tobacco Use    Smoking status: Not on file    Smokeless tobacco: Not on file   Substance and Sexual Activity    Alcohol use: Not on file    Drug use: Not on file    Sexual activity: Not on file     Review of Systems   Psychiatric/Behavioral:  Positive for confusion.      Objective:     Weight: 99.8 kg (220 lb)  Body mass index is 29.03 kg/m².  Vital Signs (Most Recent):  Temp: 98.4 °F (36.9 °C) (07/19/24 0614)  Pulse: 77 (07/19/24 1004)  Resp: 20 (07/19/24 1004)  BP: 132/71 (07/19/24 1004)  SpO2: 97 % (07/19/24 1004) Vital Signs (24h Range):  Temp:  [98.4 °F (36.9 °C)] 98.4 °F (36.9 °C)  Pulse:  [72-77] 77  Resp:  [16-21] 20  SpO2:  [97 %-99 %] 97 %  BP: (132-154)/(71-77) 132/71                              Physical Exam:  Nursing note and vitals reviewed.    Constitutional: He appears well-developed and well-nourished. He is not diaphoretic. He appears distressed.     Eyes: Pupils are equal, round, and reactive to light. Conjunctivae and EOM are normal.     Skin:   Head dressing in place  Some blood on dressing     Psych/Behavior: He is alert.   Some confusion which is baseline        Musculoskeletal:        Right Upper Extremities: Muscle strength is 5/5.       Right Lower Extremities: Muscle strength is 5/5.        Left Lower Extremities: Muscle strength is 5/5.     Neurological:        Sensory: There is no sensory deficit in the trunk. There is no sensory deficit in the extremities.        DTRs: He displays no Babinski's sign on the right side. He displays no Babinski's sign on the left side.   GCS 14 confusion.  Oriented to his self.  He knew he was in the hospital.  He does not know the year and gets lost easily in conversation.  Taking off clothes and has spilled urine all over himself and the floor.  He is fidgeting with lines and tubes.    Follows commands   No facial droop, no speech issues.     Moves all extremities with no lateralizing weakness.    No gross visual issues.    Cranial nerves grossly intact   No pronator drift     No headache   No dizziness   Motor strength is 5/5 throughout with sensation intact.         Significant Labs:  Recent Labs   Lab 07/19/24  0743      K 3.7   *   CO2 21*   BUN 15.1   CREATININE 1.07   CALCIUM 8.3*   MG 1.70     Recent Labs   Lab 07/19/24  0840   WBC 3.29*   HGB 9.8*   HCT 30.4*   PLT 56*     Recent Labs   Lab 07/19/24  0743   INR 1.1   APTT 23.6     Microbiology Results (last 7 days)       ** No results found for the last 168 hours. **            Assessment/Plan:    Status post fall   Questionable subdural along the falx  Past medical history:  Type 2 diabetes, hypertension, hyperlipidemia, CAD, hepatitis-C, alcoholic cirrhosis, peripheral neuropathy, confusion at baseline    Patient has a repeat CT head scheduled for 1245 today.    Continue neurological exams every 4 hours  SCDs   Fall precautions   Keppra for 7 days initiated.  Blood pressure less than 150/90.  Platelets are 56 in the setting of liver cirrhosis.  No acute neurosurgical interventions indicated at this time.    We will follow up when imaging is complete         There are no hospital problems to display for this patient.      Thank you for your consult. I will follow-up with patient. Please contact us if you have any additional questions.    Rafa Garcia [facility]-BC  Neurosurgery  [facility] - Emergency Dept

## 2024-07-19 NOTE — PLAN OF CARE
CM met with pt at bedside for initial discharge assessment. Pt  with one adult child. Pt has a POA. Pt had no current identified needs however brother stated that he would be going to rehab upon discharge possibly.    07/19/24 1102   Discharge Assessment   Assessment Type Discharge Planning Assessment   Confirmed/corrected address, phone number and insurance Yes   Confirmed Demographics Correct on Facesheet   Source of Information patient;family   When was your last doctors appointment?   (Dr. Ernst, [place] and Dr. Rose, specialist)   Communicated VALDEZ with patient/caregiver Date not available/Unable to determine   People in Home other relative(s)   Do you expect to return to your current living situation? Yes   Do you have help at home or someone to help you manage your care at home? Yes   Who are your caregiver(s) and their phone number(s)? Ministerio Kaplan   Prior to hospitilization cognitive status: Unable to Assess   Current cognitive status: Alert/Oriented   Walking or Climbing Stairs Difficulty yes   Walking or Climbing Stairs ambulation difficulty, assistance 1 person   Dressing/Bathing Difficulty yes   Dressing/Bathing bathing difficulty, assistance 1 person   Do you have any problems with: Needs other help   Specify other help Nephew and Brother assists   Home Accessibility stairs within home   Stairs, Within Home, Primary yes   Number of Stairs, Within Home, Primary   (13)   Home Layout Bedroom on 2nd floor;Bathroom on 2nd floor   Equipment Currently Used at Home cane, straight;walker, standard;commode;shower chair;glucometer   Readmission within 30 days? No   Patient currently being followed by outpatient case management? No   Do you currently have service(s) that help you manage your care at home? Yes   Name and Contact number of agency Home Health, agency unknown   Do you take prescription medications? Yes   Do you have prescription coverage? Yes   Coverage [employer] medicare   Do you have any  problems affording any of your prescribed medications? No   Is the patient taking medications as prescribed? yes   Who is going to help you get home at discharge? Brother, Javad Horan   How do you get to doctors appointments? family or friend will provide   Are you on dialysis? No   Do you take coumadin? No   Discharge Plan A   (TBD)   DME Needed Upon Discharge    (TBD)   Transition of Care Barriers None   Physical Activity   On average, how many days per week do you engage in moderate to strenuous exercise (like a brisk walk)? 7 days   On average, how many minutes do you engage in exercise at this level? 10 min   Financial Resource Strain   How hard is it for you to pay for the very basics like food, housing, medical care, and heating? Somewhat   Housing Stability   In the last 12 months, was there a time when you were not able to pay the mortgage or rent on time? N   At any time in the past 12 months, were you homeless or living in a shelter (including now)? N   Transportation Needs   Has the lack of transportation kept you from medical appointments, meetings, work or from getting things needed for daily living? No   Food Insecurity   Within the past 12 months, you worried that your food would run out before you got the money to buy more. Never true   Within the past 12 months, the food you bought just didn't last and you didn't have money to get more. Never true   Stress   Do you feel stress - tense, restless, nervous, or anxious, or unable to sleep at night because your mind is troubled all the time - these days? Very much   Social Isolation   How often do you feel lonely or isolated from those around you?  Rarely   Alcohol Use   Q1: How often do you have a drink containing alcohol? Never   Q2: How many drinks containing alcohol do you have on a typical day when you are drinking? None   Q3: How often do you have six or more drinks on one occasion? Never   Utilities   In the past 12 months has the electric,  gas, oil, or water company threatened to shut off services in your home? No   Health Literacy   How often do you need to have someone help you when you read instructions, pamphlets, or other written material from your doctor or pharmacy? Sometimes   OTHER   Name(s) of People in Home Ministerio M/Nephsrinivasa

## 2024-07-20 PROBLEM — S32.009A: Status: ACTIVE | Noted: 2024-07-20

## 2024-07-20 PROBLEM — S06.5XAA SDH (SUBDURAL HEMATOMA): Status: ACTIVE | Noted: 2024-07-20
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
## 2024-07-20 LAB
ALBUMIN SERPL-MCNC: 2.9 G/DL (ref 3.4–4.8)
ALBUMIN/GLOB SERPL: 1.2 RATIO (ref 1.1–2)
ALP SERPL-CCNC: 119 UNIT/L (ref 40–150)
ALT SERPL-CCNC: 27 UNIT/L (ref 0–55)
ANION GAP SERPL CALC-SCNC: 6 MEQ/L
AST SERPL-CCNC: 42 UNIT/L (ref 5–34)
BASOPHILS # BLD AUTO: 0.01 X10(3)/MCL
BASOPHILS NFR BLD AUTO: 0.4 %
BILIRUB SERPL-MCNC: 0.9 MG/DL
BUN SERPL-MCNC: 14.8 MG/DL (ref 8.4–25.7)
CALCIUM SERPL-MCNC: 8.3 MG/DL (ref 8.8–10)
CHLORIDE SERPL-SCNC: 116 MMOL/L (ref 98–107)
CO2 SERPL-SCNC: 20 MMOL/L (ref 23–31)
CREAT SERPL-MCNC: 0.89 MG/DL (ref 0.73–1.18)
CREAT/UREA NIT SERPL: 17
EOSINOPHIL # BLD AUTO: 0.19 X10(3)/MCL (ref 0–0.9)
EOSINOPHIL NFR BLD AUTO: 8.1 %
ERYTHROCYTE [DISTWIDTH] IN BLOOD BY AUTOMATED COUNT: 14.9 % (ref 11.5–17)
GFR SERPLBLD CREATININE-BSD FMLA CKD-EPI: >60 ML/MIN/1.73/M2
GLOBULIN SER-MCNC: 2.5 GM/DL (ref 2.4–3.5)
GLUCOSE SERPL-MCNC: 94 MG/DL (ref 82–115)
HCT VFR BLD AUTO: 30.8 % (ref 42–52)
HGB BLD-MCNC: 10.1 G/DL (ref 14–18)
IMM GRANULOCYTES # BLD AUTO: 0 X10(3)/MCL (ref 0–0.04)
IMM GRANULOCYTES NFR BLD AUTO: 0 %
LYMPHOCYTES # BLD AUTO: 0.53 X10(3)/MCL (ref 0.6–4.6)
LYMPHOCYTES NFR BLD AUTO: 22.5 %
MAGNESIUM SERPL-MCNC: 1.8 MG/DL (ref 1.6–2.6)
MCH RBC QN AUTO: 31.1 PG (ref 27–31)
MCHC RBC AUTO-ENTMCNC: 32.8 G/DL (ref 33–36)
MCV RBC AUTO: 94.8 FL (ref 80–94)
MONOCYTES # BLD AUTO: 0.27 X10(3)/MCL (ref 0.1–1.3)
MONOCYTES NFR BLD AUTO: 11.4 %
NEUTROPHILS # BLD AUTO: 1.36 X10(3)/MCL (ref 2.1–9.2)
NEUTROPHILS NFR BLD AUTO: 57.6 %
NRBC BLD AUTO-RTO: 0 %
PHOSPHATE SERPL-MCNC: 3.1 MG/DL (ref 2.3–4.7)
PLATELET # BLD AUTO: 54 X10(3)/MCL (ref 130–400)
PMV BLD AUTO: 11.7 FL (ref 7.4–10.4)
POCT GLUCOSE: 138 MG/DL (ref 70–110)
POCT GLUCOSE: 192 MG/DL (ref 70–110)
POTASSIUM SERPL-SCNC: 3.8 MMOL/L (ref 3.5–5.1)
PROT SERPL-MCNC: 5.4 GM/DL (ref 5.8–7.6)
RBC # BLD AUTO: 3.25 X10(6)/MCL (ref 4.7–6.1)
SODIUM SERPL-SCNC: 142 MMOL/L (ref 136–145)
WBC # BLD AUTO: 2.36 X10(3)/MCL (ref 4.5–11.5)

## 2024-07-20 PROCEDURE — 85025 COMPLETE CBC W/AUTO DIFF WBC: CPT | Performed by: NURSE PRACTITIONER

## 2024-07-20 PROCEDURE — 25000003 PHARM REV CODE 250: Performed by: NURSE PRACTITIONER

## 2024-07-20 PROCEDURE — 97162 PT EVAL MOD COMPLEX 30 MIN: CPT

## 2024-07-20 PROCEDURE — 36415 COLL VENOUS BLD VENIPUNCTURE: CPT | Performed by: NURSE PRACTITIONER

## 2024-07-20 PROCEDURE — 83735 ASSAY OF MAGNESIUM: CPT | Performed by: NURSE PRACTITIONER

## 2024-07-20 PROCEDURE — 84100 ASSAY OF PHOSPHORUS: CPT | Performed by: NURSE PRACTITIONER

## 2024-07-20 PROCEDURE — 11000001 HC ACUTE MED/SURG PRIVATE ROOM

## 2024-07-20 PROCEDURE — 99232 SBSQ HOSP IP/OBS MODERATE 35: CPT | Mod: ,,, | Performed by: NEUROLOGICAL SURGERY

## 2024-07-20 PROCEDURE — 21400001 HC TELEMETRY ROOM

## 2024-07-20 PROCEDURE — 80053 COMPREHEN METABOLIC PANEL: CPT | Performed by: NURSE PRACTITIONER

## 2024-07-20 RX ADMIN — SODIUM CHLORIDE: 9 INJECTION, SOLUTION INTRAVENOUS at 04:07

## 2024-07-20 RX ADMIN — METHOCARBAMOL 500 MG: 500 TABLET ORAL at 08:07

## 2024-07-20 RX ADMIN — ACETAMINOPHEN 650 MG: 325 TABLET, FILM COATED ORAL at 06:07

## 2024-07-20 RX ADMIN — ACETAMINOPHEN 650 MG: 325 TABLET, FILM COATED ORAL at 09:07

## 2024-07-20 RX ADMIN — Medication 6 MG: at 09:07

## 2024-07-20 RX ADMIN — POLYETHYLENE GLYCOL 3350 17 G: 17 POWDER, FOR SOLUTION ORAL at 08:07

## 2024-07-20 RX ADMIN — LEVETIRACETAM 500 MG: 500 TABLET, FILM COATED ORAL at 08:07

## 2024-07-20 RX ADMIN — DOCUSATE SODIUM 100 MG: 100 CAPSULE, LIQUID FILLED ORAL at 09:07

## 2024-07-20 RX ADMIN — DOCUSATE SODIUM 100 MG: 100 CAPSULE, LIQUID FILLED ORAL at 08:07

## 2024-07-20 RX ADMIN — OXYCODONE HYDROCHLORIDE 5 MG: 5 TABLET ORAL at 11:07

## 2024-07-20 RX ADMIN — METHOCARBAMOL 500 MG: 500 TABLET ORAL at 09:07

## 2024-07-20 RX ADMIN — POLYETHYLENE GLYCOL 3350 17 G: 17 POWDER, FOR SOLUTION ORAL at 09:07

## 2024-07-20 RX ADMIN — LEVETIRACETAM 500 MG: 500 TABLET, FILM COATED ORAL at 09:07

## 2024-07-20 NOTE — PROGRESS NOTES
Follow-up CT unremarkable   No specific treatment needed for lumbar transverse process fractures  We will sign off   No scheduled imaging or neurosurgical follow-up necessary

## 2024-07-20 NOTE — PT/OT/SLP EVAL
Physical Therapy Evaluation    Patient Name:  Kevin Horan Jr.   MRN:  [id number]    Recommendations:     Discharge therapy intensity: Low Intensity Therapy   Discharge Equipment Recommendations: walker, rolling   Barriers to discharge: Impaired mobility and Ongoing medical needs    Assessment:     Kevin Horan Jr. is a 74 y.o. male admitted with a medical diagnosis of fall down steps, hx confusion.  He presents with the following impairments/functional limitations: weakness, impaired endurance, gait instability, impaired functional mobility, decreased safety awareness.    Pt required Min A-CGA for all mobility. Pt alert and oriented however seems confused and needing frequently repeated cueing with mobility. Decreased safety awareness with ambulation needing assist with RW, 1 LOB with turning requiring Mod A to correct. Ambulation distance limited today due to pt began having BM. Pt states he lives with his nephew who assists him with all needs. Pt also reports he ambulates with rollator at baseline, multiple falls at home when not using AD.     Rehab Prognosis: Good; patient would benefit from acute skilled PT services to address these deficits and reach maximum level of function.    Recent Surgery: * No surgery found *      Plan:     During this hospitalization, patient would benefit from acute PT services 5 x/week to address the identified rehab impairments via gait training, therapeutic activities, therapeutic exercises, neuromuscular re-education and progress toward the following goals:    Plan of Care Expires:  08/20/24    Subjective     Chief Complaint: none  Patient/Family Comments/goals: none stated  Pain/Comfort:  Pain Rating 1: 0/10    Patients cultural, spiritual, Denominational conflicts given the current situation: no    Living Environment:  Pt lives with his nephew in [address] [facility] [address].  Prior to admission, patients level of function was nephew assists with all self care and IADLs, rollator for  ambulation.  Equipment used at home: cane, straight, walker, standard, rollator, shower chair, commode.  DME owned (not currently used): none.  Upon discharge, patient will have assistance from nephew.    Objective:     Communicated with nurse prior to session.  Patient found HOB elevated with PureWick, telemetry, peripheral IV  upon PT entry to room.    General Precautions: Standard, fall (BP < 150/90)  Orthopedic Precautions:N/A   Braces: N/A  Respiratory Status: Room air  Blood Pressure: 132/65, HR 66      Exams:  Cognitive Exam:  Patient is oriented to Person, Place, and Situation  RLE ROM: WNL  RLE Strength: WFL  LLE ROM: WNL  LLE Strength: WFL  Skin integrity: Visible skin intact. Gauze wrap on head.      Functional Mobility:  Bed Mobility:     Supine to Sit: minimum assistance  Sit to Supine: minimum assistance  Transfers:     Sit to Stand:  contact guard assistance with rolling walker  Gait: 22' w/ RW CGA, cueing and assist needed for use of AD      AM-PAC 6 CLICK MOBILITY  Total Score:19       Treatment & Education:  Education Provided:  Role and goals of PT, transfer training, bed mobility, gait training, balance training, safety awareness, assistive device, strengthening exercises, and importance of participating in PT to return to PLOF.        Patient left HOB elevated with all lines intact, call button in reach, and bed alarm on.    GOALS:   Multidisciplinary Problems       Physical Therapy Goals          Problem: Physical Therapy    Goal Priority Disciplines Outcome Goal Variances Interventions   Physical Therapy Goal     PT, PT/OT Progressing     Description: Goals to be met by: 24     Patient will increase functional independence with mobility by performin. Supine to sit with supervision  2. Sit to stand transfer with Supervision  3. Gait  x 200 feet with Supervision using Rolling Walker.                          History:     No past medical history on file.    No past surgical history on  file.    Time Tracking:     PT Received On: 07/20/24  PT Start Time: 1050     PT Stop Time: 1115  PT Total Time (min): 25 min     Billable Minutes: Evaluation 25 07/20/2024

## 2024-07-20 NOTE — NURSING
Nurses Note -- 4 Eyes      7/20/2024   5:45 AM      Skin assessed during: Admit      [] No Altered Skin Integrity Present    []Prevention Measures Documented      [x] Yes- Altered Skin Integrity Present or Discovered   [x] LDA Added if Not in Epic (Describe Wound)   [x] New Altered Skin Integrity was Present on Admit and Documented in LDA   [] Wound Image Taken    Wound Care Consulted? No    Attending Nurse:  Velma Bennett RN/Staff Member:   Gretta Yang

## 2024-07-20 NOTE — PLAN OF CARE
Problem: Physical Therapy  Goal: Physical Therapy Goal  Description: Goals to be met by: 24     Patient will increase functional independence with mobility by performin. Supine to sit with supervision  2. Sit to stand transfer with Supervision  3. Gait  x 200 feet with Supervision using Rolling Walker.     Outcome: Progressing

## 2024-07-20 NOTE — TERTIARY TRAUMA SURVEY NOTE
TERTIARY TRAUMA SURVEY (TTS)    List Injuries Identified to Date:   1. Rt L3, L4 TP Fx   2. 1 mm SDH   3. Scalp laceration     [x]Problems list reviewed  List Operations and Procedures:   1. None    No past surgical history on file.    Incidental findings:   1. None     Past Medical History:   1. DM, neuropathy, CVA, alcohol cirrhosis, Hep C treated, possible dementia       Tertiary Physical Exam:     Physical Exam  Vitals reviewed.   HENT:      Head: Normocephalic.      Comments: Dressing to head. CDI      Nose: Nose normal.      Mouth/Throat:      Mouth: Mucous membranes are moist.      Pharynx: Oropharynx is clear.   Eyes:      Pupils: Pupils are equal, round, and reactive to light.   Cardiovascular:      Rate and Rhythm: Normal rate.   Pulmonary:      Effort: Pulmonary effort is normal.   Abdominal:      Palpations: Abdomen is soft.      Tenderness: There is no abdominal tenderness. There is no guarding.   Musculoskeletal:         General: No tenderness, deformity or signs of injury. Normal range of motion.      Cervical back: Normal range of motion.      Comments: Scant edema BLE    Skin:     General: Skin is warm and dry.   Neurological:      General: No focal deficit present.      Mental Status: He is alert and oriented to person, place, and time.   Psychiatric:         Mood and Affect: Mood normal.         Imaging Review:     Imaging Results              CT Head Without Contrast (Final result)  Result time 07/19/24 15:00:25      Final result by Jah Ellis MD (07/19/24 15:00:25)                   Impression:      No subdural hematoma seen today's examination.      Electronically signed by: Robin Ellis  Date:    07/19/2024  Time:    15:00               Narrative:    EXAMINATION:  CT HEAD WITHOUT CONTRAST    CLINICAL HISTORY:  fu SDH;    TECHNIQUE:  Multiple axial images were obtained from the base of the brain to the vertex without contrast administration.  Sagittal and coronal  reconstructions were performed. .Automatic exposure control  (AEC) is utilized to reduce patient radiation exposure.    COMPARISON:  None    FINDINGS:  There is no intracranial mass or lesion seen.  No hemorrhage is seen.  No subdural hemorrhage is seen along the falx on today's examination.  No infarct is seen.  The ventricles and basilar cisterns appear normal.  Brain parenchyma appears grossly unremarkable.    Posterior fossa appears normal.  The calvarium is intact.  The paranasal sinuses appear grossly unremarkable.                                       X-ray Shoulder 2 or More Views Right (Final result)  Result time 07/19/24 09:19:46      Final result by David Erazo MD (07/19/24 09:19:46)                   Impression:      No osseous abnormality identified.      Electronically signed by: David Erazo  Date:    07/19/2024  Time:    09:19               Narrative:    EXAMINATION:  XR SHOULDER COMPLETE 2 OR MORE VIEWS RIGHT    CLINICAL HISTORY:  Fall;    TECHNIQUE:  Three views.    COMPARISON:  None available.    FINDINGS:  Articular surfaces alignment is preserved.  No acute fracture or dislocation identified.  Calcification adjacent to the greater tuberosity reflects calcific tendinopathy.  There is narrowed acromial head and acromion interval which raises the possibility of rotator cuff arthropathy.                                       X-Ray Pelvis Routine AP (Final result)  Result time 07/19/24 07:10:46      Final result by Surinder Danielle MD (07/19/24 07:10:46)                   Impression:      No acute findings.      Electronically signed by: Surinder Danielle  Date:    07/19/2024  Time:    07:10               Narrative:    EXAMINATION:  XR PELVIS ROUTINE AP    CLINICAL HISTORY:  Unspecified fall, initial encounter    COMPARISON:  None    FINDINGS:  Frontal image of the pelvis demonstrates no fracture or dislocation                                       X-Ray Chest 1 View (Final result)  Result time  07/19/24 06:55:57      Final result by Surinder Danielle MD (07/19/24 06:55:57)                   Impression:      Mild left basilar opacities with a small left pleural effusion.      Electronically signed by: Surinder Danielle  Date:    07/19/2024  Time:    06:55               Narrative:    EXAMINATION:  XR CHEST 1 VIEW    CLINICAL HISTORY:  Unspecified fall, initial encounter    COMPARISON:  26 April 2024    FINDINGS:  Frontal view of the chest was obtained. The heart is not significantly enlarged.  There are mild left basilar opacities with a small left pleural effusion suspected.  There is no pneumothorax.                                       CT Head Without Contrast (Final result)  Result time 07/19/24 07:14:41      Final result by Surinder Danielle MD (07/19/24 07:14:41)                   Impression:      Question minimal subdural blood products along the falx without mass effect.    Findings discussed with Dr. Maddox at 713 on 7/19/2024.      Electronically signed by: Surinder Danielle  Date:    07/19/2024  Time:    07:14               Narrative:    EXAMINATION:  CT HEAD WITHOUT CONTRAST    CLINICAL HISTORY:  Head trauma, minor (Age >= 65y);    TECHNIQUE:  CT imaging of the head performed from the skull base to the vertex without intravenous contrast. DLP 1387 mGycm. Automatic exposure control, adjustment of mA/kV or iterative reconstruction technique was used to reduce radiation.    COMPARISON:  None Available.    FINDINGS:  Questionable minimal subpleural blood products along the falx measuring only 1-2 mm maximally.  No mass effect.  There is mild patchy hypoattenuation in the cerebral white matter which is nonspecific but most commonly associated with chronic small vessel ischemic changes.  There is left cerebellar and left occipital encephalomalacia.  The ventricles are not significantly enlarged.  There are vascular calcifications.    Visualized paranasal sinuses and mastoid air cells are clear. The calvarium is  grossly intact.  There is some soft tissue swelling over the right frontal scalp.                                       CT Cervical Spine Without Contrast (Final result)  Result time 07/19/24 07:13:16      Final result by Surinder Danielle MD (07/19/24 07:13:16)                   Impression:      No acute bony injury of the cervical spine.      Electronically signed by: Surinder Danielle  Date:    07/19/2024  Time:    07:13               Narrative:    EXAMINATION:  CT CERVICAL SPINE WITHOUT CONTRAST    CLINICAL HISTORY:  Neck trauma (Age >= 65y);    TECHNIQUE:  Helical acquisition through the cervical spine without IV contrast. Three plane reconstructions were made available for review. DLP 1387 mGycm. Automatic exposure control, adjustment of mA/kV or iterative reconstruction technique was used to reduce radiation.    COMPARISON:  None available.    FINDINGS:  No fractures identified.  There are mild degenerative alignment abnormalities.  Moderate degenerative changes.  Craniocervical junction and C1-C2 relationship are normal. The odontoid is intact. There is limited evaluation of the soft tissues. No prevertebral soft tissue swelling. Ligamentous injury cannot be excluded with CT.  There are vascular calcifications.                                       CT Chest Abdomen Pelvis With IV Contrast (XPD) NO Oral Contrast (Final result)  Result time 07/19/24 07:32:29      Final result by Surinder Danielle MD (07/19/24 07:32:29)                   Impression:      1. Right transverse process fractures L3 and L4.  2. Small left pleural effusion.  3. Cirrhotic liver with right hepatic lobe mass suspicious for HCC.  Recommend outpatient liver mass protocol CT.  4. Small volume ascites.      Electronically signed by: Surinder Danielle  Date:    07/19/2024  Time:    07:32               Narrative:    EXAMINATION:  CT CHEST ABDOMEN PELVIS WITH IV CONTRAST (XPD)    CLINICAL HISTORY:  Polytrauma, blunt;    TECHNIQUE:  Helical acquisition  from the thoracic inlet through the ischia with  IV contrast. Three plane reconstructions made available for review.  mGycm. Automatic exposure control, adjustment of mA/kV or iterative reconstruction technique was used to reduce radiation.    COMPARISON:  None available.    FINDINGS:  Chest.    Heart size upper limit normal.  No pericardial effusion.  There are coronary artery calcifications.    There is no mediastinal hematoma.  No enlarged thoracic lymph nodes.    There is a small left pleural effusion.  No pneumothorax seen.  There is some atelectasis or scarring left lung base.  Mild chronic changes of the lungs elsewhere.    Abdomen and pelvis.    Cirrhotic liver.  There is a 4 cm hyperenhancing lesion in segment 5 image 126 series 2.  The portal vein is patent.  Prominent paraumbilical vein.  There are gallstones.  No significant biliary ductal dilatation.    The spleen is mildly enlarged.  No significant abnormality of the pancreas or adrenals.  No hydronephrosis.  The low lying malrotated right kidney.    There is no bowel obstruction or free air.  No suspicious bowel wall thickening.  Small volume ascites.    Urinary bladder is unremarkable.  The abdominal aorta is normal in caliber.  Moderate atherosclerotic disease.  Left inguinal hernia containing some fluid.    There are fractures right transverse processes L3 and L4.  There are old bilateral rib fractures.  Moderate degenerative change of the spine.                                       Lab Review:   CBC:  Recent Labs   Lab Result Units 04/26/24 1832 05/04/24  0850 07/19/24  0840 07/20/24  0456   WBC x10(3)/mcL 2.97* 4.59 3.29* 2.36*   RBC x10(6)/mcL 3.55* 4.18* 3.22* 3.25*   Hgb g/dL 11.1* 13.0* 9.8* 10.1*   Hct % 33.2* 38.4* 30.4* 30.8*   Platelet x10(3)/mcL 60* 73* 56* 54*   MCV fL 93.5 91.9 94.4* 94.8*   MCH pg 31.3* 31.1* 30.4 31.1*   MCHC g/dL 33.4 33.9 32.2* 32.8*       CMP:  Recent Labs   Lab Result Units 04/26/24 1832 05/04/24  0850  "07/19/24  0743 07/20/24  0456   Calcium mg/dL 9.0 9.1 8.3* 8.3*   Albumin g/dL 3.5 3.5 3.0* 2.9*   Sodium mmol/L 142 139 139 142   Potassium mmol/L 3.6 4.1 3.7 3.8   CO2 mmol/L 20* 20* 21* 20*   Chloride mmol/L 112* 110* 112* 116*   Blood Urea Nitrogen mg/dL 19.1 22.3 15.1 14.8   Creatinine mg/dL 1.16 1.04 1.07 0.89   ALP unit/L 153* 161* 124 119   ALT unit/L 36 36 29 27   AST unit/L 38* 54* 38* 42*   Bilirubin Total mg/dL 0.6 1.3 0.8 0.9       Troponin:  Recent Labs   Lab Result Units 07/19/24  0743   Troponin-I ng/mL <0.010       ETOH:  Recent Labs     07/19/24  0743   ETHANOL <10.0        Urine Drug Screen:  No results for input(s): "COCAINE", "OPIATE", "BARBITURATE", "AMPHETAMINE", "FENTANYL", "CANNABINOIDS", "MDMA" in the last 72 hours.    Invalid input(s): "BENZODIAZEPINE", "PHENCYCLIDINE"     Plan:   74 year old male s/p fall       SDH/  R L3/4 TP Fx   No ICH on repeat scan   SBP < 150   Keppra  NSGY following     Fall   Pain control PRN   PT/OT   IS  Low salt Diabetic diet with Accuchecks and SSI   Daily labs   SCDs  Local wound care   Anticipate need for placement     Hx: CVA, cirrhosis- obtain home meds and resume as indicated     JONATHAN Castano-BC   Trauma/Acute Care Surgery  [facility]  C: [phone]    "

## 2024-07-21 LAB
ALBUMIN SERPL-MCNC: 2.9 G/DL (ref 3.4–4.8)
ALBUMIN/GLOB SERPL: 1.2 RATIO (ref 1.1–2)
ALP SERPL-CCNC: 124 UNIT/L (ref 40–150)
ALT SERPL-CCNC: 28 UNIT/L (ref 0–55)
ANION GAP SERPL CALC-SCNC: 4 MEQ/L
AST SERPL-CCNC: 43 UNIT/L (ref 5–34)
BASOPHILS # BLD AUTO: 0.02 X10(3)/MCL
BASOPHILS NFR BLD AUTO: 0.9 %
BILIRUB SERPL-MCNC: 1 MG/DL
BUN SERPL-MCNC: 15.2 MG/DL (ref 8.4–25.7)
CALCIUM SERPL-MCNC: 8.5 MG/DL (ref 8.8–10)
CHLORIDE SERPL-SCNC: 113 MMOL/L (ref 98–107)
CO2 SERPL-SCNC: 23 MMOL/L (ref 23–31)
CREAT SERPL-MCNC: 1.09 MG/DL (ref 0.73–1.18)
CREAT/UREA NIT SERPL: 14
EOSINOPHIL # BLD AUTO: 0.25 X10(3)/MCL (ref 0–0.9)
EOSINOPHIL NFR BLD AUTO: 10.9 %
ERYTHROCYTE [DISTWIDTH] IN BLOOD BY AUTOMATED COUNT: 14.9 % (ref 11.5–17)
GFR SERPLBLD CREATININE-BSD FMLA CKD-EPI: >60 ML/MIN/1.73/M2
GLOBULIN SER-MCNC: 2.5 GM/DL (ref 2.4–3.5)
GLUCOSE SERPL-MCNC: 96 MG/DL (ref 82–115)
HCT VFR BLD AUTO: 29.9 % (ref 42–52)
HGB BLD-MCNC: 9.7 G/DL (ref 14–18)
IMM GRANULOCYTES # BLD AUTO: 0.01 X10(3)/MCL (ref 0–0.04)
IMM GRANULOCYTES NFR BLD AUTO: 0.4 %
LYMPHOCYTES # BLD AUTO: 0.69 X10(3)/MCL (ref 0.6–4.6)
LYMPHOCYTES NFR BLD AUTO: 30.1 %
MCH RBC QN AUTO: 30.3 PG (ref 27–31)
MCHC RBC AUTO-ENTMCNC: 32.4 G/DL (ref 33–36)
MCV RBC AUTO: 93.4 FL (ref 80–94)
MONOCYTES # BLD AUTO: 0.25 X10(3)/MCL (ref 0.1–1.3)
MONOCYTES NFR BLD AUTO: 10.9 %
NEUTROPHILS # BLD AUTO: 1.07 X10(3)/MCL (ref 2.1–9.2)
NEUTROPHILS NFR BLD AUTO: 46.8 %
NRBC BLD AUTO-RTO: 0 %
PLATELET # BLD AUTO: 60 X10(3)/MCL (ref 130–400)
PLATELETS.RETICULATED NFR BLD AUTO: 4.1 % (ref 0.9–11.2)
PMV BLD AUTO: 11.9 FL (ref 7.4–10.4)
POCT GLUCOSE: 196 MG/DL (ref 70–110)
POTASSIUM SERPL-SCNC: 4 MMOL/L (ref 3.5–5.1)
PROT SERPL-MCNC: 5.4 GM/DL (ref 5.8–7.6)
RBC # BLD AUTO: 3.2 X10(6)/MCL (ref 4.7–6.1)
SODIUM SERPL-SCNC: 140 MMOL/L (ref 136–145)
WBC # BLD AUTO: 2.29 X10(3)/MCL (ref 4.5–11.5)

## 2024-07-21 PROCEDURE — 85025 COMPLETE CBC W/AUTO DIFF WBC: CPT | Performed by: NURSE PRACTITIONER

## 2024-07-21 PROCEDURE — 99900031 HC PATIENT EDUCATION (STAT)

## 2024-07-21 PROCEDURE — 36415 COLL VENOUS BLD VENIPUNCTURE: CPT | Performed by: NURSE PRACTITIONER

## 2024-07-21 PROCEDURE — 11000001 HC ACUTE MED/SURG PRIVATE ROOM

## 2024-07-21 PROCEDURE — 94799 UNLISTED PULMONARY SVC/PX: CPT

## 2024-07-21 PROCEDURE — 25000003 PHARM REV CODE 250: Performed by: NURSE PRACTITIONER

## 2024-07-21 PROCEDURE — 94760 N-INVAS EAR/PLS OXIMETRY 1: CPT

## 2024-07-21 PROCEDURE — 21400001 HC TELEMETRY ROOM

## 2024-07-21 PROCEDURE — 80053 COMPREHEN METABOLIC PANEL: CPT | Performed by: NURSE PRACTITIONER

## 2024-07-21 RX ADMIN — DOCUSATE SODIUM 100 MG: 100 CAPSULE, LIQUID FILLED ORAL at 09:07

## 2024-07-21 RX ADMIN — ACETAMINOPHEN 650 MG: 325 TABLET, FILM COATED ORAL at 09:07

## 2024-07-21 RX ADMIN — POLYETHYLENE GLYCOL 3350 17 G: 17 POWDER, FOR SOLUTION ORAL at 09:07

## 2024-07-21 RX ADMIN — ACETAMINOPHEN 650 MG: 325 TABLET, FILM COATED ORAL at 06:07

## 2024-07-21 RX ADMIN — LEVETIRACETAM 500 MG: 500 TABLET, FILM COATED ORAL at 09:07

## 2024-07-21 RX ADMIN — OXYCODONE HYDROCHLORIDE 5 MG: 5 TABLET ORAL at 01:07

## 2024-07-21 RX ADMIN — METHOCARBAMOL 500 MG: 500 TABLET ORAL at 09:07

## 2024-07-21 RX ADMIN — ACETAMINOPHEN 650 MG: 325 TABLET, FILM COATED ORAL at 01:07

## 2024-07-21 NOTE — NURSING
Nurses Note -- 4 Eyes      7/21/2024   0800 AM      Skin assessed during: Q Shift Change      [x] No Altered Skin Integrity Present    []Prevention Measures Documented      [] Yes- Altered Skin Integrity Present or Discovered   [] LDA Added if Not in Epic (Describe Wound)   [] New Altered Skin Integrity was Present on Admit and Documented in LDA   [] Wound Image Taken    Wound Care Consulted? No    Attending Nurse:  Omer Gutierrez RN    Second RN/Staff Member:   ERWIN Ryan

## 2024-07-21 NOTE — PROGRESS NOTES
"   Trauma Surgery   Progress Note  Admit Date: 7/19/2024  HD#2  POD#* No surgery found *    Subjective:   Interval history:  AFVSS. Remote sitter in place. Slightly confused this AM . Denies pain     Home Meds: No current outpatient medications   Scheduled Meds:   acetaminophen  650 mg Oral Q4H    docusate sodium  100 mg Oral BID    levETIRAcetam  500 mg Oral BID    LIDOcaine  1 patch Transdermal Q24H    methocarbamoL  500 mg Oral BID    polyethylene glycol  17 g Oral BID     Continuous Infusions:  PRN Meds:  Current Facility-Administered Medications:     dextrose 10%, 12.5 g, Intravenous, PRN    dextrose 10%, 25 g, Intravenous, PRN    glucagon (human recombinant), 1 mg, Intramuscular, PRN    glucose, 16 g, Oral, PRN    glucose, 24 g, Oral, PRN    hydrALAZINE, 10 mg, Intravenous, Q6H PRN    insulin aspart U-100, 0-5 Units, Subcutaneous, QID (AC + HS) PRN    magnesium hydroxide 400 mg/5 ml, 30 mL, Oral, Daily PRN    melatonin, 6 mg, Oral, Nightly PRN    oxyCODONE, 5 mg, Oral, Q4H PRN     Objective:     VITAL SIGNS: 24 HR MIN & MAX LAST   Temp  Min: 97.7 °F (36.5 °C)  Max: 98.4 °F (36.9 °C)  97.8 °F (36.6 °C)   BP  Min: 108/62  Max: 148/74  133/75    Pulse  Min: 62  Max: 68  62    Resp  Min: 16  Max: 20  18    SpO2  Min: 95 %  Max: 98 %  95 %      HT: 6' 1" (185.4 cm)  WT: 99.8 kg (220 lb)  BMI: 29     Intake/output:  Intake/Output - Last 3 Shifts         07/19 0700 07/20 0659 07/20 0700 07/21 0659 07/21 0700 07/22 0659    P.O. 0      Total Intake(mL/kg) 0 (0)      Urine (mL/kg/hr) 400 (0.2) 1100 (0.5)     Stool 0      Total Output 400 1100     Net -400 -1100            Stool Occurrence 1 x 1 x             Intake/Output Summary (Last 24 hours) at 7/21/2024 0727  Last data filed at 7/21/2024 0618  Gross per 24 hour   Intake --   Output 1100 ml   Net -1100 ml         Lines/drains/airway:       Peripheral IV - Single Lumen 07/20/24 0940 22 G Anterior;Proximal;Right Forearm (Active)   Site Assessment " COVID-19 Outpatient Progress Note    Assessment/Plan:    Problem List Items Addressed This Visit        Respiratory    Respiratory infection - Primary     Advised with fever to check for flu and COVID19 but patient refused; given risk factors and sx will cover for PNA and start abx; advised needs office evaluation if no improvement and advised face to face evaluation would be best to see what is causing her symptoms; ER guidance given should sx worsen         Relevant Medications    azithromycin (Zithromax) 250 mg tablet      Other Visit Diagnoses     Cough        Relevant Medications    benzonatate (TESSALON PERLES) 100 mg capsule    azithromycin (Zithromax) 250 mg tablet         Disposition:     Referred patient to centralized site to test for COVID-19/Influenza  Discussed symptom directed medication options with patient  I have spent 10 minutes directly with the patient  Greater than 50% of this time was spent in counseling/coordination of care regarding: risks and benefits of treatment options, instructions for management, patient and family education, importance of treatment compliance, risk factor reductions and impressions  Encounter provider Jessica Winter DO    Provider located at [place] [address]  [address] [place] [address] [address] [place] [address]  [phone]    Recent Visits  No visits were found meeting these conditions  Showing recent visits within past 7 days and meeting all other requirements  Today's Visits  Date Type Provider Dept   04/12/22 [place]icine [place] [id number] [place] today's visits and meeting all other requirements  Future Appointments  No visits were found meeting these conditions  Showing future appointments within next 150 days and meeting all other requirements     This virtual check-in was done via telephone and she agrees to proceed      Patient agrees to participate in a virtual check in via "Clean;Dry;Intact;No redness;No swelling 07/21/24 0719   Extremity Assessment Distal to IV No warmth;No swelling;No abnormal discoloration 07/20/24 2100   Line Status Saline locked 07/21/24 0719   Dressing Status Clean;Dry;Intact 07/21/24 0719   Dressing Intervention Integrity maintained 07/21/24 0719   Number of days: 0       Physical examination:  Gen: NAD, AAOx1, answering questions appropriately  HEENT: abrasion and repaired lac noted   CV: RR  Resp: NWOB  Abd: S/NT/ND  Msk: moving all extremities spontaneously and purposefully  Neuro: CN II-XII grossly intact  Skin/wounds: warm dry, pale     Labs:  Renal:  Recent Labs     07/19/24  0743 07/20/24 0456 07/21/24  0635   BUN 15.1 14.8 15.2   CREATININE 1.07 0.89 1.09     No results for input(s): "LACTIC" in the last 72 hours.  FEN/GI:  Recent Labs     07/19/24  0743 07/20/24 0456 07/21/24  0635    142 140   K 3.7 3.8 4.0   * 116* 113*   CO2 21* 20* 23   CALCIUM 8.3* 8.3* 8.5*   MG 1.70 1.80  --    PHOS  --  3.1  --    ALBUMIN 3.0* 2.9* 2.9*   BILITOT 0.8 0.9 1.0   AST 38* 42* 43*   ALKPHOS 124 119 124   ALT 29 27 28     Heme:  Recent Labs     07/19/24  0743 07/19/24  0840 07/20/24  0456 07/21/24  0635   HGB  --  9.8* 10.1* 9.7*   HCT  --  30.4* 30.8* 29.9*   PLT  --  56* 54* 60*   INR 1.1  --   --   --      ID:  Recent Labs     07/19/24  0840 07/20/24 0456 07/21/24  0635   WBC 3.29* 2.36* 2.29*     CBG:  Recent Labs     07/19/24  0743 07/20/24  0456 07/21/24  0635   GLUCOSE 166* 94 96      Recent Labs     07/19/24  0618 07/19/24  0747 07/19/24  1823 07/19/24  2049 07/20/24  1121 07/20/24  1612 07/20/24  2157   POCTGLUCOSE 71 71 166* 190* 138* 192* 196*      Cardiovascular:  Recent Labs   Lab 07/19/24  0743   TROPONINI <0.010     I have reviewed all pertinent lab results within the past 24 hours.    Imaging:  CT Head Without Contrast   Final Result      No subdural hematoma seen today's examination.         Electronically signed by: Robin Ellis " telephone or video visit instead of presenting to the office to address urgent/immediate medical needs  Patient is aware this is a billable service  After connecting through Telephone, the patient was identified by name and date of birth  Hector Montague was informed that this was a telemedicine visit and that the exam was being conducted confidentially over secure lines  My office door was closed  No one else was in the room  Hector Montague acknowledged consent and understanding of privacy and security of the telemedicine visit  I informed the patient that I have reviewed her record in Epic and presented the opportunity for her to ask any questions regarding the visit today  The patient agreed to participate  Verification of patient location:  Patient is located in the following state in which I hold an active license: [place]    Subjective:   Hector Montague is a 63 y o  female who is concerned about COVID-19  Patient's symptoms include fever, chills, nasal congestion, cough and nausea   Patient denies shortness of breath and chest tightness      - Date of symptom onset: 4/8/2022      COVID-19 vaccination status: Fully vaccinated (primary series)    Exposure:   Contact with a person who is under investigation (PUI) for or who is positive for COVID-19 within the last 14 days?: No    Hospitalized recently for fever and/or lower respiratory symptoms?: No      Currently a healthcare worker that is involved in direct patient care?: No      Works in a special setting where the risk of COVID-19 transmission may be high? (this may include long-term care, correctional and nursing home facilities; homeless shelters; assisted-living facilities and group homes ): No      Resident in a special setting where the risk of COVID-19 transmission may be high? (this may include long-term care, correctional and nursing home facilities; homeless shelters; assisted-living facilities and group homes ): No      No results found for: Dee Renteria   Date:    07/19/2024   Time:    15:00      X-ray Shoulder 2 or More Views Right   Final Result      No osseous abnormality identified.         Electronically signed by: David Erazo   Date:    07/19/2024   Time:    09:19      X-Ray Pelvis Routine AP   Final Result      No acute findings.         Electronically signed by: Surinder Danielle   Date:    07/19/2024   Time:    07:10      X-Ray Chest 1 View   Final Result      Mild left basilar opacities with a small left pleural effusion.         Electronically signed by: Surinder Danielle   Date:    07/19/2024   Time:    06:55      CT Head Without Contrast   Final Result      Question minimal subdural blood products along the falx without mass effect.      Findings discussed with Dr. Maddox at 713 on 7/19/2024.         Electronically signed by: Surinder Danielle   Date:    07/19/2024   Time:    07:14      CT Cervical Spine Without Contrast   Final Result      No acute bony injury of the cervical spine.         Electronically signed by: Surinder Danielle   Date:    07/19/2024   Time:    07:13      CT Chest Abdomen Pelvis With IV Contrast (XPD) NO Oral Contrast   Final Result      1. Right transverse process fractures L3 and L4.   2. Small left pleural effusion.   3. Cirrhotic liver with right hepatic lobe mass suspicious for HCC.  Recommend outpatient liver mass protocol CT.   4. Small volume ascites.         Electronically signed by: Surinder Danielle   Date:    07/19/2024   Time:    07:32         I have reviewed all pertinent imaging results/findings within the past 24 hours.    Micro/Path/Other:  Microbiology Results (last 7 days)       ** No results found for the last 168 hours. **           Pathology Results  (Last 7 days)      None             Problems list:  Active Problem List with Overview Notes    Diagnosis Date Noted    Fall 07/20/2024    SDH (subdural hematoma) 07/20/2024    Fracture of transverse process of lumbar vertebra, closed, initial encounter 07/20/2024        Assessment &  [facility], [address] [facility], [address], [facility], [facility]  Past Medical History:   Diagnosis Date    Diabetes mellitus ([facility] )     External hemorrhoids     last assessed 16, resolved 16    Hernia, ventral     last assessed 12/14/15, resolved 16    Hypertension     Incarcerated incisional hernia     last assessed 12/21/15, resolved 16    Insomnia     last assessed 16, resolved 10/9/17    Lyme disease     Morbid obesity with BMI of 45 0-49 9, adult ([facility] ) 5/3/2017    MS (multiple sclerosis) ([facility] )     Stroke (cerebrum) ([place] [address] ) 2020    Type 2 diabetes mellitus with hyperglycemia, without long-term current use of insulin (HCC)      Past Surgical History:   Procedure Laterality Date    ABCESS DRAINAGE       SECTION      x3    COLONOSCOPY N/A 2017    Procedure: COLONOSCOPY with biopsy;  Surgeon: Jaoxn Renteria DO;  Location: [facility]; Service: Complete    HYSTERECTOMY      IR BIOPSY ABDOMEN  2021    IR LUMBAR PUNCTURE  3/13/2020    US GUIDED THYROID BIOPSY  2021     Current Outpatient Medications   Medication Sig Dispense Refill    acetaminophen (TYLENOL) 500 mg tablet Take 2 tablets (1,000 mg total) by mouth every 6 (six) hours as needed for moderate pain 60 tablet 1    Alcohol Swabs PADS by Does not apply route 4 (four) times a day *Latex Free* 120 each 3    aspirin 81 mg chewable tablet Chew 1 tablet (81 mg total) daily 90 tablet 1    atorvastatin (LIPITOR) 40 mg tablet TAKE 1 TABLET BY MOUTH EVERY  EVENING 90 tablet 0    azithromycin (Zithromax) 250 mg tablet Take 2 tablets (500 mg total) by mouth daily for 1 day, THEN 1 tablet (250 mg total) daily for 4 days   6 tablet 0    benzonatate (TESSALON PERLES) 100 mg capsule Take 1 capsule (100 mg total) by mouth 3 (three) times a day as needed for cough 20 capsule 0    Blood Pressure KIT by Does not apply route daily 1 each 0    cholecalciferol (VITAMIN D3) 1,000 units tablet Take 1,000 Units Plan:   74 year old male s/p fall         SDH/  R L3/4 TP Fx   No ICH on repeat scan   SBP < 150   Keppra  NSGY following      Fall   Pain control PRN   PT/OT   IS  Low salt Diabetic diet with Accuchecks and SSI   Daily labs   SCDs  Local wound care   Anticipate need for placement , will follow PT/OT/CM recs      Hx: CVA, cirrhosis- obtain home meds and resume as indicated      JONATHAN Castano-BC   Trauma/Acute Care Surgery  [facility]  C: [phone]      by mouth daily      cinacalcet (SENSIPAR) 60 MG tablet Take 1 tablet (60 mg total) by mouth daily 30 tablet 5    diazepam (VALIUM) 5 mg tablet Take 1 tablet 30 minutes prior to MRI  May take 1 tablet just immediately before MRI if needed (Patient not taking: Reported on 3/18/2022 ) 2 tablet 0    DULoxetine (CYMBALTA) 60 mg delayed release capsule Take 1 capsule by mouth once daily 30 capsule 3    gabapentin (NEURONTIN) 300 mg capsule TAKE 2 CAPSULES BY MOUTH IN THE MORNING, 2 CAPSULES IN THE AFTERNOON AND 3 CAPSULES AT BEDTIME 210 capsule 0    glucose blood (ReliOn Prime Test) test strip Use 1 each 2 (two) times a day 200 each 1    glucose blood test strip Check blood glucose levels three times per day before meals (Patient not taking: Reported on 3/18/2022 ) 300 each 2    hydrochlorothiazide (HYDRODIURIL) 25 mg tablet Take 1 tablet (25 mg total) by mouth daily 90 tablet 1    Incontinence Supply Disposable (Prevail Women Underwear XL) MISC Use every 6 (six) hours as needed (incontinence) 120 each 11    Insulin Pen Needle (BD Pen Needle Izzy U/F) 32G X 4 MM MISC Use in the morning 100 each 1    Interferon Beta-1b (Betaseron) 0 3 MG KIT Inject 1 ml (0 25 mg) subcutaneously every other day 14 kit 10    liraglutide (VICTOZA) injection Inject 0 1 mL (0 6 mg total) under the skin daily 3 mL 2    lisinopril (ZESTRIL) 40 mg tablet Take 1 tablet (40 mg total) by mouth daily 90 tablet 0    metFORMIN (GLUCOPHAGE-XR) 500 mg 24 hr tablet Take 2 tablets (1,000 mg total) by mouth 2 (two) times a day with meals 360 tablet 0    metoprolol succinate (TOPROL-XL) 50 mg 24 hr tablet Take 1 tablet (50 mg total) by mouth daily 90 tablet 0    Misc   Devices (DIGITAL GLASS SCALE) MISC by Does not apply route daily (Patient not taking: Reported on 11/23/2021 ) 1 each 0    vitamin B-12 (VITAMIN B-12) 500 mcg tablet Take 2 tablets (1,000 mcg total) by mouth daily 60 tablet 5     No current facility-administered medications for this visit  Allergies   Allergen Reactions    Sorbitan Diarrhea, Vomiting and Abdominal Pain    Ambien [Zolpidem Tartrate]     Demerol [Meperidine]     Insulin     Insulin Glargine      Annotation - [id number]: memory loss    Latex     Oxycodone-Acetaminophen Vomiting    Zolpidem Hallucinations and Irritability       Review of Systems   Constitutional: Positive for chills and fever  HENT: Positive for congestion  Respiratory: Positive for cough  Negative for chest tightness and shortness of breath  Gastrointestinal: Positive for nausea  Objective: There were no vitals filed for this visit  Physical Exam    VIRTUAL VISIT DISCLAIMER    Giovanni Mayes verbally agrees to participate in [employer]  Pt is aware that [employer] could be limited without vital signs or the ability to perform a full hands-on physical Sujata Cardenas understands she or the provider may request at any time to terminate the video visit and request the patient to seek care or treatment in person

## 2024-07-22 LAB
POCT GLUCOSE: 115 MG/DL (ref 70–110)
POCT GLUCOSE: 135 MG/DL (ref 70–110)
POCT GLUCOSE: 164 MG/DL (ref 70–110)

## 2024-07-22 PROCEDURE — 11000001 HC ACUTE MED/SURG PRIVATE ROOM

## 2024-07-22 PROCEDURE — 94760 N-INVAS EAR/PLS OXIMETRY 1: CPT

## 2024-07-22 PROCEDURE — 97535 SELF CARE MNGMENT TRAINING: CPT

## 2024-07-22 PROCEDURE — 97116 GAIT TRAINING THERAPY: CPT

## 2024-07-22 PROCEDURE — 63600175 PHARM REV CODE 636 W HCPCS: Performed by: NURSE PRACTITIONER

## 2024-07-22 PROCEDURE — 99900031 HC PATIENT EDUCATION (STAT)

## 2024-07-22 PROCEDURE — 21400001 HC TELEMETRY ROOM

## 2024-07-22 PROCEDURE — 94799 UNLISTED PULMONARY SVC/PX: CPT | Mod: XB

## 2024-07-22 PROCEDURE — 25000003 PHARM REV CODE 250: Performed by: NURSE PRACTITIONER

## 2024-07-22 PROCEDURE — 97166 OT EVAL MOD COMPLEX 45 MIN: CPT

## 2024-07-22 RX ORDER — ENOXAPARIN SODIUM 100 MG/ML
40 INJECTION SUBCUTANEOUS EVERY 12 HOURS
Status: DISCONTINUED | OUTPATIENT
Start: 2024-07-22 | End: 2024-07-22

## 2024-07-22 RX ORDER — ENOXAPARIN SODIUM 100 MG/ML
30 INJECTION SUBCUTANEOUS EVERY 12 HOURS
Status: DISCONTINUED | OUTPATIENT
Start: 2024-07-22 | End: 2024-07-23

## 2024-07-22 RX ADMIN — LEVETIRACETAM 500 MG: 500 TABLET, FILM COATED ORAL at 08:07

## 2024-07-22 RX ADMIN — Medication 6 MG: at 08:07

## 2024-07-22 RX ADMIN — DOCUSATE SODIUM 100 MG: 100 CAPSULE, LIQUID FILLED ORAL at 08:07

## 2024-07-22 RX ADMIN — ACETAMINOPHEN 650 MG: 325 TABLET, FILM COATED ORAL at 10:07

## 2024-07-22 RX ADMIN — ENOXAPARIN SODIUM 30 MG: 30 INJECTION SUBCUTANEOUS at 10:07

## 2024-07-22 RX ADMIN — METHOCARBAMOL 500 MG: 500 TABLET ORAL at 08:07

## 2024-07-22 RX ADMIN — ENOXAPARIN SODIUM 30 MG: 30 INJECTION SUBCUTANEOUS at 08:07

## 2024-07-22 RX ADMIN — LIDOCAINE PATCH 5% 1 PATCH: 700 PATCH TOPICAL at 10:07

## 2024-07-22 NOTE — PT/OT/SLP PROGRESS
Physical Therapy Treatment    Patient Name:  Kevin Horan Jr.   MRN:  [id number]    Recommendations:     Discharge therapy intensity: Moderate Intensity Therapy   Discharge Equipment Recommendations: walker, rolling  Barriers to discharge: Impaired mobility and Ongoing medical needs    Assessment:     Kevin Horan Jr. is a 74 y.o. male admitted with a medical diagnosis of fall down steps, R L3 and L4 TP fx, SDH, scalp lac, cirrhotic liver with R hepatic lobe mass. No surgery or brace needed for L  spine TP fxs.  He presents with the following impairments/functional limitations: weakness, impaired endurance, gait instability, impaired functional mobility, decreased safety awareness.     Pt with roll belt and video monitor in room. Pleasant and followed all commands. Pt able to perform transfers and gait with Tereza-CGA, and breaks as needed. Would require continuous supervision due to impaired mobility and safety concerns. Changing recs to moderate intensity for d/c.     Rehab Prognosis: Good; patient would benefit from acute skilled PT services to address these deficits and reach maximum level of function.    Recent Surgery: * No surgery found *      Plan:     During this hospitalization, patient would benefit from acute PT services 5 x/week to address the identified rehab impairments via gait training, therapeutic activities, therapeutic exercises and progress toward the following goals:    Plan of Care Expires:  08/20/24    Subjective     Chief Complaint: none  Patient/Family Comments/goals: to leave  Pain/Comfort:  Pain Rating 1: 0/10      Objective:     Communicated with nurse prior to session.  Patient found supine with PureWick, telemetry, peripheral IV upon PT entry to room.     General Precautions: Standard, fall  Orthopedic Precautions: N/A  Braces: N/A  Respiratory Status: Room air  Blood Pressure: 134/75  Skin Integrity: Visible skin intact      Functional Mobility:  Bed Mobility:     Scooting:  contact guard assistance  Supine to Sit: contact guard assistance  Sit to Supine: contact guard assistance  Transfers:     Sit to Stand:  minimum assistance with rolling walker  Gait: 75ft x 3 with standing rest breaks, slow pace, VC for getting closer to RW. CGA    Therapeutic Activities/Exercises:      Education:  Patient provided with verbal education education regarding PT role/goals/POC, fall prevention, and safety awareness.  Understanding was verbalized.     Patient left supine with all lines intact, call button in reach, bed alarm on, and roll belt on.    GOALS:   Multidisciplinary Problems       Physical Therapy Goals          Problem: Physical Therapy    Goal Priority Disciplines Outcome Goal Variances Interventions   Physical Therapy Goal     PT, PT/OT Progressing     Description: Goals to be met by: 24     Patient will increase functional independence with mobility by performin. Supine to sit with supervision  2. Sit to stand transfer with Supervision  3. Gait  x 200 feet with Supervision using Rolling Walker.                          Time Tracking:     PT Received On: 24  PT Start Time: 1353     PT Stop Time: 1416  PT Total Time (min): 23 min     Billable Minutes: Gait Training 13 and Therapeutic Activity 10    Treatment Type: Treatment  PT/PTA: PT     Number of PTA visits since last PT visit: 2024

## 2024-07-22 NOTE — PLAN OF CARE
update from trauma rounding this am that pt is low intensity, likely hx of demenita. Pt is ready for dc

## 2024-07-22 NOTE — PLAN OF CARE
Problem: Occupational Therapy  Goal: Occupational Therapy Goal  Description: Goals to be met by: in 1 month      Patient will increase functional independence with ADLs by performing:    LE Dressing with Supervision.  Grooming while standing at sink with Stand-by Assistance.  Toileting from toilet with Stand-by Assistance for hygiene and clothing management.   Toilet transfer to toilet with Supervision.    Outcome: Progressing

## 2024-07-22 NOTE — PT/OT/SLP EVAL
"Occupational Therapy  Evaluation    Name: Kevin Horan Jr.  MRN: [id number]  Admitting Diagnosis: SDH R L3-4 TP fx   Recent Surgery: * No surgery found *      Recommendations:     Discharge therapy intensity: Moderate Intensity   Discharge Equipment Recommendations:  walker, rolling  Barriers to discharge:       Assessment:     Kevin Horan Jr. is a 74 y.o. male with a medical diagnosis of fall down stairs with SDH R L3-4 TP fx, fall.  He presents with the following performance deficits affecting function: weakness, impaired endurance, impaired self care skills, impaired functional mobility, gait instability, impaired balance, impaired cognition, decreased safety awareness.   Upon arrival, pt attempting to get out of bed, impulsive, demanding to take a shower. Nrsg approved shower. Pt required min A with RW for t/f in and out of shower using grab bar, min A while standing to clean buttocks in shower. Recommend mod intensity or 24/7 care with HH. Pt states he fell down the stairs and will be moving his bedroom downstairs upon dc home.     Rehab Prognosis: Good; patient would benefit from acute skilled OT services to address these deficits and reach maximum level of function.       Plan:     Patient to be seen   to address the above listed problems via self-care/home management, therapeutic activities, therapeutic exercises  Plan of Care Expires: 08/22/24  Plan of Care Reviewed with: patient    Subjective     Chief Complaint: "I want to shave and take a shower"   Patient/Family Comments/goals: to go home     Occupational Profile:  Living Environment: pt lives with nephew, 2 story home, his bedroom is on the 2nd floor but he will be moving to the first floor, walk in shower   Previous level of function: pt reports indep with ADLs   Roles and Routines: does not drive or work   Equipment Used at Home: rollator, cane, straight, shower chair  Assistance upon Discharge: nephew; pt states nephew will be home 24/7 "     Pain/Comfort:  Pain Rating 1: 0/10    Patients cultural, spiritual, Nondenominational conflicts given the current situation:      Objective:     OT communicated with nrsg prior to session.      Patient was found sitting edge of bed with    and roll belt upon OT entry to room.    General Precautions: Standard, fall  Orthopedic Precautions:    Braces:    /73   Parameters <150       Bed Mobility:    Patient completed Supine to Sit with supervision  Patient completed Sit to Supine with supervision    Functional Mobility/Transfers:  Patient completed Toilet Transfer Step Transfer technique with contact guard assistance with  rolling walker  Patient completed Tub Transfer Step Transfer technique with contact guard assistance with rolling walker  Functional Mobility: no LOB; safety and cues required     Activities of Daily Living:  Bathing: contact guard assistance for standing to clean back/buttocks   Upper Body Dressing: supervision    Lower Body Dressing: supervision    Toileting: contact guard assistance using grab bar and RW     Functional Cognition:  Orientation: oriented to Person, Place, and not oriented to month/year   Safety Awareness: Impaired.      Visual Perceptual Skills:  Glasses     Upper Extremity Function:  Right Upper Extremity:   WFL    Left Upper Extremity:  WFL    Balance:   Dynamic standing balance:CGA with RW     Therapeutic Positioning  Risk for acquired pressure injuries is decreased due to ability to get to BSC/toilet with assist.    OT interventions performed during the course of today's session:   Therapeutic positioning was provided at the conclusion of session to offload all bony prominences for the prevention and/or reduction of pressure injuries    Skin assessment: all bony prominences were assessed    Findings: known area of altered skin integrity at posterior scalp     OT recommendations for therapeutic positioning throughout hospitalization:   Follow [facility] Pressure Injury  Prevention Protocol    Additional Treatment:  ADL Training: Shower t/f with min A/CGA using grab bar, shower chair and RW; pt required CGA during standing to clean buttocks; cues for safety throughout     Patient Education:  Patient provided with verbal education education regarding OT role/goals/POC, fall prevention, safety awareness, and Discharge/DME recommendations.  Understanding was verbalized.     Patient left right sidelying with all lines intact, call button in reach, wedge under L side, bed alarm on, restraints reapplied at end of session, and nursing notified.    GOALS:   Multidisciplinary Problems       Occupational Therapy Goals          Problem: Occupational Therapy    Goal Priority Disciplines Outcome Interventions   Occupational Therapy Goal     OT, PT/OT Progressing    Description: Goals to be met by: in 1 month      Patient will increase functional independence with ADLs by performing:    LE Dressing with Supervision.  Grooming while standing at sink with Stand-by Assistance.  Toileting from toilet with Stand-by Assistance for hygiene and clothing management.   Toilet transfer to toilet with Supervision.                         History:     No past medical history on file.    No past surgical history on file.    Time Tracking:     OT Date of Treatment:    OT Start Time: 0920  OT Stop Time: 0953  OT Total Time (min): 33 min    Billable Minutes:Evaluation 20  Self Care/Home Management 13    7/22/2024

## 2024-07-22 NOTE — PLAN OF CARE
Caregivers are requesting that patient be placed in SNF until arrangements are made for Nursing home-group home care.  Mass referral sent out.  PASRR completed and handed off to BERKLEY Klein.  Will continue to follow.

## 2024-07-22 NOTE — NURSING
Nurses Note -- 4 Eyes      7/22/2024   7:34 AM      Skin assessed during: Q Shift Change      [x] No Altered Skin Integrity Present    []Prevention Measures Documented      [] Yes- Altered Skin Integrity Present or Discovered   [] LDA Added if Not in Epic (Describe Wound)   [] New Altered Skin Integrity was Present on Admit and Documented in LDA   [] Wound Image Taken    Wound Care Consulted? No    Attending Nurse:  Emeka BERG RN    Second RN/Staff Member:   Zoraida THOMAS LPN

## 2024-07-22 NOTE — PROGRESS NOTES
"   Trauma Surgery   Progress Note  Admit Date: 7/19/2024  HD#3  POD#* No surgery found *    Subjective:   Interval history:  AFVSS. Remote sitter in place. Wants to go home     Home Meds: No current outpatient medications   Scheduled Meds:   acetaminophen  650 mg Oral Q4H    docusate sodium  100 mg Oral BID    levETIRAcetam  500 mg Oral BID    LIDOcaine  1 patch Transdermal Q24H    methocarbamoL  500 mg Oral BID    polyethylene glycol  17 g Oral BID     Continuous Infusions:  PRN Meds:  Current Facility-Administered Medications:     dextrose 10%, 12.5 g, Intravenous, PRN    dextrose 10%, 25 g, Intravenous, PRN    glucagon (human recombinant), 1 mg, Intramuscular, PRN    glucose, 16 g, Oral, PRN    glucose, 24 g, Oral, PRN    hydrALAZINE, 10 mg, Intravenous, Q6H PRN    insulin aspart U-100, 0-5 Units, Subcutaneous, QID (AC + HS) PRN    magnesium hydroxide 400 mg/5 ml, 30 mL, Oral, Daily PRN    melatonin, 6 mg, Oral, Nightly PRN    oxyCODONE, 5 mg, Oral, Q4H PRN     Objective:     VITAL SIGNS: 24 HR MIN & MAX LAST   Temp  Min: 97.6 °F (36.4 °C)  Max: 98.9 °F (37.2 °C)  98.9 °F (37.2 °C)   BP  Min: 125/73  Max: 152/78  134/67    Pulse  Min: 61  Max: 71  68    Resp  Min: 17  Max: 18  17    SpO2  Min: 94 %  Max: 97 %  (!) 94 %      HT: 6' 1" (185.4 cm)  WT: 99.8 kg (220 lb)  BMI: 29     Intake/output:  Intake/Output - Last 3 Shifts         07/20 0700 07/21 0659 07/21 0700 07/22 0659    Urine (mL/kg/hr) 1100 (0.5) 500 (0.2)    Total Output 1100 500    Net -1100 -500          Stool Occurrence 1 x             Intake/Output Summary (Last 24 hours) at 7/22/2024 0624  Last data filed at 7/22/2024 0217  Gross per 24 hour   Intake --   Output 500 ml   Net -500 ml         Lines/drains/airway:       Peripheral IV - Single Lumen 07/20/24 0940 22 G Anterior;Proximal;Right Forearm (Active)   Site Assessment Clean;Dry;Intact;No redness;No swelling 07/21/24 0719   Extremity Assessment Distal to IV No warmth;No swelling;No abnormal " "discoloration 07/20/24 2100   Line Status Saline locked 07/21/24 0719   Dressing Status Clean;Dry;Intact 07/21/24 0719   Dressing Intervention Integrity maintained 07/21/24 0719   Number of days: 0       Physical examination:  Gen: NAD, AAOx1, answering questions appropriately  HEENT: abrasion and repaired lac noted   CV: RR  Resp: NWOB  Abd: S/NT/ND  Msk: moving all extremities spontaneously and purposefully  Neuro: CN II-XII grossly intact  Skin/wounds: warm dry, pale     Labs:  Renal:  Recent Labs     07/19/24  0743 07/20/24  0456 07/21/24  0635   BUN 15.1 14.8 15.2   CREATININE 1.07 0.89 1.09     No results for input(s): "LACTIC" in the last 72 hours.  FEN/GI:  Recent Labs     07/19/24  0743 07/20/24  0456 07/21/24  0635    142 140   K 3.7 3.8 4.0   * 116* 113*   CO2 21* 20* 23   CALCIUM 8.3* 8.3* 8.5*   MG 1.70 1.80  --    PHOS  --  3.1  --    ALBUMIN 3.0* 2.9* 2.9*   BILITOT 0.8 0.9 1.0   AST 38* 42* 43*   ALKPHOS 124 119 124   ALT 29 27 28     Heme:  Recent Labs     07/19/24  0743 07/19/24  0840 07/20/24  0456 07/21/24  0635   HGB  --  9.8* 10.1* 9.7*   HCT  --  30.4* 30.8* 29.9*   PLT  --  56* 54* 60*   INR 1.1  --   --   --      ID:  Recent Labs     07/19/24  0840 07/20/24  0456 07/21/24  0635   WBC 3.29* 2.36* 2.29*     CBG:  Recent Labs     07/19/24  0743 07/20/24  0456 07/21/24  0635   GLUCOSE 166* 94 96      Recent Labs     07/19/24  0747 07/19/24  1823 07/19/24  2049 07/20/24  1121 07/20/24  1612 07/20/24  2157   POCTGLUCOSE 71 166* 190* 138* 192* 196*      Cardiovascular:  Recent Labs   Lab 07/19/24  0743   TROPONINI <0.010     I have reviewed all pertinent lab results within the past 24 hours.    Imaging:  CT Head Without Contrast   Final Result      No subdural hematoma seen today's examination.         Electronically signed by: Robin Ellis   Date:    07/19/2024   Time:    15:00      X-ray Shoulder 2 or More Views Right   Final Result      No osseous abnormality identified.       "   Electronically signed by: David Erazo   Date:    07/19/2024   Time:    09:19      X-Ray Pelvis Routine AP   Final Result      No acute findings.         Electronically signed by: Surinder Danielle   Date:    07/19/2024   Time:    07:10      X-Ray Chest 1 View   Final Result      Mild left basilar opacities with a small left pleural effusion.         Electronically signed by: Surinder Danielle   Date:    07/19/2024   Time:    06:55      CT Head Without Contrast   Final Result      Question minimal subdural blood products along the falx without mass effect.      Findings discussed with Dr. Maddox at 713 on 7/19/2024.         Electronically signed by: Surinder Danielle   Date:    07/19/2024   Time:    07:14      CT Cervical Spine Without Contrast   Final Result      No acute bony injury of the cervical spine.         Electronically signed by: Surinder Danielle   Date:    07/19/2024   Time:    07:13      CT Chest Abdomen Pelvis With IV Contrast (XPD) NO Oral Contrast   Final Result      1. Right transverse process fractures L3 and L4.   2. Small left pleural effusion.   3. Cirrhotic liver with right hepatic lobe mass suspicious for HCC.  Recommend outpatient liver mass protocol CT.   4. Small volume ascites.         Electronically signed by: Surinder Danielle   Date:    07/19/2024   Time:    07:32         I have reviewed all pertinent imaging results/findings within the past 24 hours.    Micro/Path/Other:  Microbiology Results (last 7 days)       ** No results found for the last 168 hours. **           Pathology Results  (Last 7 days)      None             Problems list:  Active Problem List with Overview Notes    Diagnosis Date Noted    Fall 07/20/2024    SDH (subdural hematoma) 07/20/2024    Fracture of transverse process of lumbar vertebra, closed, initial encounter 07/20/2024        Assessment & Plan:   74 year old male s/p fall         SDH/  R L3/4 TP Fx   No ICH on repeat scan   SBP < 150   Keppra  NSGY following      Fall   Pain  control PRN   PT/OT   IS  Low salt Diabetic diet with Accuchecks and SSI   Daily labs   SCDs  Local wound care   Anticipate need for placement , will discuss with  PT/OT/CM  today      Hx: CVA, cirrhosis- obtain home meds and resume as indicated . Still awaiting home med list      LAUREL Castano-BC   Trauma/Acute Care Surgery  [facility]  C: [phone]

## 2024-07-23 LAB
ALBUMIN SERPL-MCNC: 3 G/DL (ref 3.4–4.8)
ALBUMIN/GLOB SERPL: 1 RATIO (ref 1.1–2)
ALP SERPL-CCNC: 137 UNIT/L (ref 40–150)
ALT SERPL-CCNC: 26 UNIT/L (ref 0–55)
AMMONIA PLAS-MSCNC: 46.5 UMOL/L (ref 18–72)
ANION GAP SERPL CALC-SCNC: 4 MEQ/L
AST SERPL-CCNC: 42 UNIT/L (ref 5–34)
BASOPHILS # BLD AUTO: 0.01 X10(3)/MCL
BASOPHILS NFR BLD AUTO: 0.4 %
BILIRUB SERPL-MCNC: 0.8 MG/DL
BUN SERPL-MCNC: 18.1 MG/DL (ref 8.4–25.7)
CALCIUM SERPL-MCNC: 8.8 MG/DL (ref 8.8–10)
CHLORIDE SERPL-SCNC: 111 MMOL/L (ref 98–107)
CO2 SERPL-SCNC: 25 MMOL/L (ref 23–31)
CREAT SERPL-MCNC: 1.11 MG/DL (ref 0.73–1.18)
CREAT/UREA NIT SERPL: 16
EOSINOPHIL # BLD AUTO: 0.15 X10(3)/MCL (ref 0–0.9)
EOSINOPHIL NFR BLD AUTO: 6.4 %
ERYTHROCYTE [DISTWIDTH] IN BLOOD BY AUTOMATED COUNT: 14.8 % (ref 11.5–17)
GFR SERPLBLD CREATININE-BSD FMLA CKD-EPI: >60 ML/MIN/1.73/M2
GLOBULIN SER-MCNC: 3 GM/DL (ref 2.4–3.5)
GLUCOSE SERPL-MCNC: 156 MG/DL (ref 82–115)
HCT VFR BLD AUTO: 31.2 % (ref 42–52)
HGB BLD-MCNC: 10.4 G/DL (ref 14–18)
IMM GRANULOCYTES # BLD AUTO: 0.01 X10(3)/MCL (ref 0–0.04)
IMM GRANULOCYTES NFR BLD AUTO: 0.4 %
LYMPHOCYTES # BLD AUTO: 0.7 X10(3)/MCL (ref 0.6–4.6)
LYMPHOCYTES NFR BLD AUTO: 29.8 %
MAGNESIUM SERPL-MCNC: 1.7 MG/DL (ref 1.6–2.6)
MCH RBC QN AUTO: 31.1 PG (ref 27–31)
MCHC RBC AUTO-ENTMCNC: 33.3 G/DL (ref 33–36)
MCV RBC AUTO: 93.4 FL (ref 80–94)
MONOCYTES # BLD AUTO: 0.24 X10(3)/MCL (ref 0.1–1.3)
MONOCYTES NFR BLD AUTO: 10.2 %
NEUTROPHILS # BLD AUTO: 1.24 X10(3)/MCL (ref 2.1–9.2)
NEUTROPHILS NFR BLD AUTO: 52.8 %
NRBC BLD AUTO-RTO: 0 %
PHOSPHATE SERPL-MCNC: 3.5 MG/DL (ref 2.3–4.7)
PLATELET # BLD AUTO: 66 X10(3)/MCL (ref 130–400)
PLATELETS.RETICULATED NFR BLD AUTO: 4 % (ref 0.9–11.2)
PMV BLD AUTO: 11 FL (ref 7.4–10.4)
POCT GLUCOSE: 155 MG/DL (ref 70–110)
POCT GLUCOSE: 176 MG/DL (ref 70–110)
POTASSIUM SERPL-SCNC: 4.1 MMOL/L (ref 3.5–5.1)
PROT SERPL-MCNC: 6 GM/DL (ref 5.8–7.6)
RBC # BLD AUTO: 3.34 X10(6)/MCL (ref 4.7–6.1)
SODIUM SERPL-SCNC: 140 MMOL/L (ref 136–145)
WBC # BLD AUTO: 2.35 X10(3)/MCL (ref 4.5–11.5)

## 2024-07-23 PROCEDURE — 63600175 PHARM REV CODE 636 W HCPCS: Performed by: NURSE PRACTITIONER

## 2024-07-23 PROCEDURE — 21400001 HC TELEMETRY ROOM

## 2024-07-23 PROCEDURE — 63600175 PHARM REV CODE 636 W HCPCS: Performed by: STUDENT IN AN ORGANIZED HEALTH CARE EDUCATION/TRAINING PROGRAM

## 2024-07-23 PROCEDURE — 99900035 HC TECH TIME PER 15 MIN (STAT)

## 2024-07-23 PROCEDURE — 97530 THERAPEUTIC ACTIVITIES: CPT | Mod: CQ

## 2024-07-23 PROCEDURE — 25000003 PHARM REV CODE 250: Performed by: NURSE PRACTITIONER

## 2024-07-23 PROCEDURE — 11000001 HC ACUTE MED/SURG PRIVATE ROOM

## 2024-07-23 PROCEDURE — 94799 UNLISTED PULMONARY SVC/PX: CPT

## 2024-07-23 PROCEDURE — 85025 COMPLETE CBC W/AUTO DIFF WBC: CPT | Performed by: STUDENT IN AN ORGANIZED HEALTH CARE EDUCATION/TRAINING PROGRAM

## 2024-07-23 PROCEDURE — 82140 ASSAY OF AMMONIA: CPT | Performed by: STUDENT IN AN ORGANIZED HEALTH CARE EDUCATION/TRAINING PROGRAM

## 2024-07-23 PROCEDURE — 36415 COLL VENOUS BLD VENIPUNCTURE: CPT | Performed by: STUDENT IN AN ORGANIZED HEALTH CARE EDUCATION/TRAINING PROGRAM

## 2024-07-23 PROCEDURE — 84100 ASSAY OF PHOSPHORUS: CPT | Performed by: STUDENT IN AN ORGANIZED HEALTH CARE EDUCATION/TRAINING PROGRAM

## 2024-07-23 PROCEDURE — 83735 ASSAY OF MAGNESIUM: CPT | Performed by: STUDENT IN AN ORGANIZED HEALTH CARE EDUCATION/TRAINING PROGRAM

## 2024-07-23 PROCEDURE — 63600175 PHARM REV CODE 636 W HCPCS

## 2024-07-23 PROCEDURE — 97535 SELF CARE MNGMENT TRAINING: CPT

## 2024-07-23 PROCEDURE — 80053 COMPREHEN METABOLIC PANEL: CPT | Performed by: STUDENT IN AN ORGANIZED HEALTH CARE EDUCATION/TRAINING PROGRAM

## 2024-07-23 PROCEDURE — 25000003 PHARM REV CODE 250: Performed by: STUDENT IN AN ORGANIZED HEALTH CARE EDUCATION/TRAINING PROGRAM

## 2024-07-23 RX ORDER — ATORVASTATIN CALCIUM 40 MG/1
40 TABLET, FILM COATED ORAL NIGHTLY
COMMUNITY
Start: 2024-06-19

## 2024-07-23 RX ORDER — TRAMADOL HYDROCHLORIDE 50 MG/1
50 TABLET ORAL EVERY 12 HOURS PRN
COMMUNITY
Start: 2024-03-04

## 2024-07-23 RX ORDER — HALOPERIDOL 2 MG/1
2 TABLET ORAL ONCE
Status: COMPLETED | OUTPATIENT
Start: 2024-07-23 | End: 2024-07-23

## 2024-07-23 RX ORDER — INSULIN DEGLUDEC 100 U/ML
30 INJECTION, SOLUTION SUBCUTANEOUS DAILY
Status: ON HOLD | COMMUNITY
Start: 2024-07-15 | End: 2024-08-23 | Stop reason: HOSPADM

## 2024-07-23 RX ORDER — HALOPERIDOL 5 MG/ML
5 INJECTION INTRAMUSCULAR EVERY 6 HOURS PRN
Status: DISCONTINUED | OUTPATIENT
Start: 2024-07-23 | End: 2024-07-23

## 2024-07-23 RX ORDER — SODIUM CHLORIDE 1 G/1
1000 TABLET ORAL 3 TIMES DAILY
Status: ON HOLD | COMMUNITY
Start: 2024-04-29 | End: 2024-08-23 | Stop reason: HOSPADM

## 2024-07-23 RX ORDER — FLUDROCORTISONE ACETATE 0.1 MG/1
100 TABLET ORAL DAILY
Status: ON HOLD | COMMUNITY
Start: 2024-06-20 | End: 2024-08-23 | Stop reason: HOSPADM

## 2024-07-23 RX ORDER — HALOPERIDOL 5 MG/ML
5 INJECTION INTRAMUSCULAR EVERY 6 HOURS PRN
Status: DISCONTINUED | OUTPATIENT
Start: 2024-07-23 | End: 2024-07-28

## 2024-07-23 RX ORDER — ENOXAPARIN SODIUM 100 MG/ML
40 INJECTION SUBCUTANEOUS EVERY 12 HOURS
Status: DISCONTINUED | OUTPATIENT
Start: 2024-07-23 | End: 2024-08-06

## 2024-07-23 RX ORDER — OMEPRAZOLE 40 MG/1
40 CAPSULE, DELAYED RELEASE ORAL DAILY
COMMUNITY
Start: 2024-05-03

## 2024-07-23 RX ORDER — PIOGLITAZONEHYDROCHLORIDE 30 MG/1
30 TABLET ORAL DAILY
Status: ON HOLD | COMMUNITY
End: 2024-08-23 | Stop reason: HOSPADM

## 2024-07-23 RX ORDER — GLIPIZIDE 2.5 MG/1
2.5 TABLET, EXTENDED RELEASE ORAL DAILY
Status: ON HOLD | COMMUNITY
Start: 2024-05-13 | End: 2024-08-23 | Stop reason: HOSPADM

## 2024-07-23 RX ADMIN — METHOCARBAMOL 500 MG: 500 TABLET ORAL at 09:07

## 2024-07-23 RX ADMIN — DOCUSATE SODIUM 100 MG: 100 CAPSULE, LIQUID FILLED ORAL at 09:07

## 2024-07-23 RX ADMIN — ENOXAPARIN SODIUM 30 MG: 30 INJECTION SUBCUTANEOUS at 09:07

## 2024-07-23 RX ADMIN — LEVETIRACETAM 500 MG: 500 TABLET, FILM COATED ORAL at 09:07

## 2024-07-23 RX ADMIN — ACETAMINOPHEN 650 MG: 325 TABLET, FILM COATED ORAL at 09:07

## 2024-07-23 RX ADMIN — ACETAMINOPHEN 650 MG: 325 TABLET, FILM COATED ORAL at 01:07

## 2024-07-23 RX ADMIN — HALOPERIDOL LACTATE 5 MG: 5 INJECTION, SOLUTION INTRAMUSCULAR at 01:07

## 2024-07-23 RX ADMIN — HALOPERIDOL LACTATE 5 MG: 5 INJECTION, SOLUTION INTRAMUSCULAR at 05:07

## 2024-07-23 RX ADMIN — POLYETHYLENE GLYCOL 3350 17 G: 17 POWDER, FOR SOLUTION ORAL at 09:07

## 2024-07-23 RX ADMIN — ENOXAPARIN SODIUM 40 MG: 40 INJECTION SUBCUTANEOUS at 09:07

## 2024-07-23 RX ADMIN — HALOPERIDOL 2 MG: 2 TABLET ORAL at 01:07

## 2024-07-23 RX ADMIN — LIDOCAINE PATCH 5% 1 PATCH: 700 PATCH TOPICAL at 10:07

## 2024-07-23 NOTE — PLAN OF CARE
[facility]-Placed a call to Wander at [facility] regarding referral status. Patient has to be off one-to-one 24-48 hours to be ready for discharge.

## 2024-07-23 NOTE — PT/OT/SLP PROGRESS
Occupational Therapy   Treatment    Name: Kevin Horan Jr.  MRN: [id number]  Admitting Diagnosis:  Fall       Recommendations:     Recommended therapy intensity at discharge: Moderate Intensity Therapy   Discharge Equipment Recommendations:  walker, rolling  Barriers to discharge:       Assessment:     Kevin Horan Jr. is a 74 y.o. male with a medical diagnosis of Fall.  He presents with the following performance deficits affecting function are weakness, impaired endurance, impaired self care skills, impaired functional mobility, gait instability, impaired balance, impaired cognition, decreased safety awareness.   Pt with increased lethargy today, however, cooperative. Nrsg advised no mobility 2/2 lethargy. Bedside activities completed with good participation.     Rehab Prognosis:  Fair; patient would benefit from acute skilled OT services to address these deficits and reach maximum level of function.       Plan:     Patient to be seen 3 x/week to address the above listed problems via self-care/home management, therapeutic activities, therapeutic exercises  Plan of Care Expires: 08/22/24  Plan of Care Reviewed with: patient    Subjective     Pain/Comfort:  Pain Rating 1: 0/10    Objective:     Communicated with: nrsg prior to session.  Patient found right sidelying with   upon OT entry to room.    General Precautions: Standard, fall    Orthopedic Precautions:   Braces:    Respiratory Status: Room air  Vital Signs: Blood Pressure: 152/73  HR: 66     Occupational Performance:     Bed Mobility:    Patient completed Supine to Sit with minimum assistance  Patient completed Sit to Supine with minimum assistance     Functional Mobility/Transfers:  Patient completed Sit <> Stand Transfer with minimum assistance  with  rolling walker   Functional Mobility: completed several sit<> stands at RW at EOB with min A/CGA ; marches in place and 3 side steps along bed     Activities of Daily Living:  Feeding:  stand by  assistance ; assist to open packages; pt sat EOB x20 min while eating breakfast; cues to use appropriate utensils, small bites and setup required 2/2 lethargy     Therapeutic Activities:  SBA for sitting balance EOB x20min    Pt not oriented to month/year       Patient Education:  Patient provided with verbal education education regarding OT role/goals/POC, fall prevention, and safety awareness.  Understanding was verbalized.      Patient left right sidelying with all lines intact, call button in reach, bed alarm on, and sitter present.    GOALS:   Multidisciplinary Problems       Occupational Therapy Goals          Problem: Occupational Therapy    Goal Priority Disciplines Outcome Interventions   Occupational Therapy Goal     OT, PT/OT Progressing    Description: Goals to be met by: in 1 month      Patient will increase functional independence with ADLs by performing:    LE Dressing with Supervision.  Grooming while standing at sink with Stand-by Assistance.  Toileting from toilet with Stand-by Assistance for hygiene and clothing management.   Toilet transfer to toilet with Supervision.                         Time Tracking:     OT Date of Treatment:    OT Start Time: 0950  OT Stop Time: 1017  OT Total Time (min): 27 min    Billable Minutes:Self Care/Home Management 27min     OT/CHUN: OT     Number of CHUN visits since last OT visit: 1 7/23/2024

## 2024-07-23 NOTE — PROGRESS NOTES
"   Trauma Surgery   Progress Note  Admit Date: 7/19/2024  HD#4  POD#* No surgery found *    Subjective:   Interval history:  AFVSS. Remote sitter in place.   Agitated overnight, required restraints and haldol  SNF placement   Lab holiday     Home Meds: No current outpatient medications   Scheduled Meds:   acetaminophen  650 mg Oral Q4H    docusate sodium  100 mg Oral BID    enoxparin  30 mg Subcutaneous Q12H (prophylaxis, 0900/2100)    levETIRAcetam  500 mg Oral BID    LIDOcaine  1 patch Transdermal Q24H    methocarbamoL  500 mg Oral BID    polyethylene glycol  17 g Oral BID     Continuous Infusions:  PRN Meds:  Current Facility-Administered Medications:     dextrose 10%, 12.5 g, Intravenous, PRN    dextrose 10%, 25 g, Intravenous, PRN    glucagon (human recombinant), 1 mg, Intramuscular, PRN    glucose, 16 g, Oral, PRN    glucose, 24 g, Oral, PRN    hydrALAZINE, 10 mg, Intravenous, Q6H PRN    insulin aspart U-100, 0-5 Units, Subcutaneous, QID (AC + HS) PRN    magnesium hydroxide 400 mg/5 ml, 30 mL, Oral, Daily PRN    melatonin, 6 mg, Oral, Nightly PRN    oxyCODONE, 5 mg, Oral, Q4H PRN     Objective:     VITAL SIGNS: 24 HR MIN & MAX LAST   Temp  Min: 97.6 °F (36.4 °C)  Max: 98.6 °F (37 °C)  98.6 °F (37 °C)   BP  Min: 119/66  Max: 144/71  (!) 144/71    Pulse  Min: 59  Max: 68  68    Resp  Min: 17  Max: 20  20    SpO2  Min: 96 %  Max: 98 %  96 %      HT: 6' 1" (185.4 cm)  WT: 99.8 kg (220 lb)  BMI: 29     Intake/output:  Intake/Output - Last 3 Shifts         07/21 0700 07/22 0659 07/22 0700 07/23 0659    P.O.  480    Total Intake(mL/kg)  480 (4.8)    Urine (mL/kg/hr) 500 (0.2) 1950 (0.8)    Stool  0    Total Output 500 1950    Net -500 -1470          Stool Occurrence  1 x            Intake/Output Summary (Last 24 hours) at 7/23/2024 0542  Last data filed at 7/23/2024 0503  Gross per 24 hour   Intake 480 ml   Output 1950 ml   Net -1470 ml         Lines/drains/airway:       Peripheral IV - Single Lumen 07/20/24 0940 " "22 G Anterior;Proximal;Right Forearm (Active)   Site Assessment Clean;Dry;Intact;No redness;No swelling 07/21/24 0719   Extremity Assessment Distal to IV No warmth;No swelling;No abnormal discoloration 07/20/24 2100   Line Status Saline locked 07/21/24 0719   Dressing Status Clean;Dry;Intact 07/21/24 0719   Dressing Intervention Integrity maintained 07/21/24 0719   Number of days: 0       Physical examination:  Gen: NAD, AAOx1, answering questions appropriately  HEENT: abrasion and repaired lac noted   CV: RR  Resp: NWOB  Abd: S/NT/ND  Msk: moving all extremities spontaneously and purposefully  Neuro: CN II-XII grossly intact  Skin/wounds: warm dry, pale     Labs:  Renal:  Recent Labs     07/21/24  0635   BUN 15.2   CREATININE 1.09     No results for input(s): "LACTIC" in the last 72 hours.  FEN/GI:  Recent Labs     07/21/24  0635      K 4.0   *   CO2 23   CALCIUM 8.5*   ALBUMIN 2.9*   BILITOT 1.0   AST 43*   ALKPHOS 124   ALT 28     Heme:  Recent Labs     07/21/24  0635   HGB 9.7*   HCT 29.9*   PLT 60*     ID:  Recent Labs     07/21/24  0635   WBC 2.29*     CBG:  Recent Labs     07/21/24  0635   GLUCOSE 96      Recent Labs     07/20/24  1121 07/20/24  1612 07/20/24  2157 07/22/24  0637 07/22/24  1121 07/22/24  1642   POCTGLUCOSE 138* 192* 196* 115* 135* 164*      Cardiovascular:  Recent Labs   Lab 07/19/24  0743   TROPONINI <0.010     I have reviewed all pertinent lab results within the past 24 hours.    Imaging:  CT Head Without Contrast   Final Result      No subdural hematoma seen today's examination.         Electronically signed by: Robin Ellis   Date:    07/19/2024   Time:    15:00      X-ray Shoulder 2 or More Views Right   Final Result      No osseous abnormality identified.         Electronically signed by: David Erazo   Date:    07/19/2024   Time:    09:19      X-Ray Pelvis Routine AP   Final Result      No acute findings.         Electronically signed by: Surinder Danielle   Date:    07/19/2024 "   Time:    07:10      X-Ray Chest 1 View   Final Result      Mild left basilar opacities with a small left pleural effusion.         Electronically signed by: Surinder Danielle   Date:    07/19/2024   Time:    06:55      CT Head Without Contrast   Final Result      Question minimal subdural blood products along the falx without mass effect.      Findings discussed with Dr. Maddox at 713 on 7/19/2024.         Electronically signed by: Surinder Danielle   Date:    07/19/2024   Time:    07:14      CT Cervical Spine Without Contrast   Final Result      No acute bony injury of the cervical spine.         Electronically signed by: Surinder Danielle   Date:    07/19/2024   Time:    07:13      CT Chest Abdomen Pelvis With IV Contrast (XPD) NO Oral Contrast   Final Result      1. Right transverse process fractures L3 and L4.   2. Small left pleural effusion.   3. Cirrhotic liver with right hepatic lobe mass suspicious for HCC.  Recommend outpatient liver mass protocol CT.   4. Small volume ascites.         Electronically signed by: Surinder Danielle   Date:    07/19/2024   Time:    07:32         I have reviewed all pertinent imaging results/findings within the past 24 hours.    Micro/Path/Other:  Microbiology Results (last 7 days)       ** No results found for the last 168 hours. **           Pathology Results  (Last 7 days)      None             Problems list:  Active Problem List with Overview Notes    Diagnosis Date Noted    Fall 07/20/2024    SDH (subdural hematoma) 07/20/2024    Fracture of transverse process of lumbar vertebra, closed, initial encounter 07/20/2024        Assessment & Plan:   74 year old male s/p fall         SDH/  R L3/4 TP Fx   No ICH on repeat scan   SBP < 150   Keppra  NSGY following      Fall   Pain control PRN   PT/OT   IS  Low salt Diabetic diet with Accuchecks and SSI   MTH labs   SCDs  Local wound care   Anticipate need for placement , will discuss with  PT/OT/CM  today      Hx: CVA, cirrhosis- obtain home meds  and resume as indicated . Still awaiting home med list       7/23/2024 5:44 AM     The above findings, diagnostics, and treatment plan were discussed with Dr. Aries Peña  who will follow with further assessments and recommendations. Please call with any questions, concerns, or clinical status changes.  This note/OR report was created with the assistance of  voice recognition software or phone  dictation.  There may be transcription errors as a result of using this technology however minimal. Effort has been made to assure accuracy of transcription but any obvious errors or omissions should be clarified with the author of the document.

## 2024-07-23 NOTE — PT/OT/SLP PROGRESS
Physical Therapy Treatment    Patient Name:  Kevin Horan Jr.   MRN:  [id number]    Recommendations:     Discharge therapy intensity: Moderate Intensity Therapy   Discharge Equipment Recommendations: walker, rolling  Barriers to discharge: Impaired mobility and Ongoing medical needs    Assessment:     Kevin Horan Jr. is a 74 y.o. male admitted with a medical diagnosis of fall down steps, R L3 and L4 TP fx, SDH, scalp lac, cirrhotic liver with R hepatic lobe mass. No surgery or brace needed for L spine TP fxs.  He presents with the following impairments/functional limitations: weakness, impaired endurance, gait instability, impaired functional mobility, decreased safety awareness. Pt with increased lethargy today, however, cooperative. Nrsg advised no mobility 2/2 lethargy. Pt not oriented to month & year.    Rehab Prognosis: Good; patient would benefit from acute skilled PT services to address these deficits and reach maximum level of function.    Recent Surgery: * No surgery found *      Plan:     During this hospitalization, patient would benefit from acute PT services 5 x/week to address the identified rehab impairments via gait training, therapeutic activities, therapeutic exercises and progress toward the following goals:    Plan of Care Expires:  08/20/24    Subjective     Chief Complaint: none stated  Patient/Family Comments/goals: none stated  Pain/Comfort:         Objective:     Communicated with nursing prior to session.  Patient found right sidelying with PureWick, telemetry, peripheral IV upon PT entry to room.     General Precautions: Standard, fall  Orthopedic Precautions: N/A  Braces: N/A  Respiratory Status: Room air  Blood Pressure: 152/73    Functional Mobility:  Bed Mobility:     Supine to Sit: minimum assistance  Sit to Supine: minimum assistance  Transfers:     Sit to Stand:  minimum assistance with rolling walker  Gait: lateral steps toward [facility], RN requested pt not  ambulate    Therapeutic Activities/Exercises:  Pt sat EOB ~20 mins while eating breakfast, pt then stood from EOB x5 resp, performed standing marches, x5-8 reps, CGA & took lateral steps toward HOB. Pt then laid back down.    Education:  Patient provided with verbal education education regarding PT role/goals/POC, fall prevention, and safety awareness.  Additional teaching is warranted.     Patient left right sidelying with all lines intact, call button in reach, nurse notified, 1:1 x2 present, and roll belt donned    GOALS:   Multidisciplinary Problems       Physical Therapy Goals          Problem: Physical Therapy    Goal Priority Disciplines Outcome Goal Variances Interventions   Physical Therapy Goal     PT, PT/OT Progressing     Description: Goals to be met by: 24     Patient will increase functional independence with mobility by performin. Supine to sit with supervision  2. Sit to stand transfer with Supervision  3. Gait  x 200 feet with Supervision using Rolling Walker.                          Time Tracking:     PT Received On: 24  PT Start Time: 0949     PT Stop Time: 1018  PT Total Time (min): 29 min     Billable Minutes: Therapeutic Activity 29    Treatment Type: Treatment  PT/PTA: PTA     Number of PTA visits since last PT visit: 2     2024

## 2024-07-23 NOTE — H&P
[facility] Hospital Medicine History & Physical Examination       Patient Name: Kevin Horan Jr.  MRN: [id number]  Patient Class: IP- Inpatient   Admission Date: 07/23/2024   Admitting Service: Hospital Medicine   Length of Stay: 4  Attending Physician: Leon Elkins MD   Primary Care Provider: Dimitrios Mack MD (Inactive)  Face-to-Face encounter date: 07/23/2024  Code Status: Full code   Chief Complaint: Fall (Arrives [facility] unit [facility] from home fell down 4 steps hit head head - denies loc or thinners, family reports normally confused to year/month which he is currently, lac post head bleeding controlled w/ kerlex dressing, [facility] reports initial cbg 45 - 250ml d5w en route improved to 101 last checked)        Patient information was obtained from patient, patient's family, past medical records and ER records.      HISTORY OF PRESENT ILLNESS:   Kevin Horan Jr. is a 74 y.o. male with a past medical history of alcoholic cirrhosis, hepatitis-C, diabetes mellitus type 2, and CVA who presented to [facility] on 7/19/2024 via EMS following fall.  EMS reported patient was hypoglycemic with CBG of 44.  C-collar was placed en route and 250 mL of D5W was given.  Patient reportedly slipped and fell, striking his head. Initial vital signs in ED were /77, pulse 72, respirations 16, temperature 36.9° C, and SpO2 90% on room air.  Labs revealed WBC 3.29, RBC 3.22, hemoglobin 9.8, hematocrit 30.4, MCV 94.4, chloride 112, CO2 21, glucose 166, and undetectable troponin.  EKG revealed normal sinus rhythm with heart rate is 92 beats per minute.  CT head revealed questionable minimal subdural blood products along the phalanx without mass effect.  CT cervical spine revealed no acute bony injury of the cervical spine.  CT chest abdomen and pelvis with IV contrast revealed right transverse process fractures of L3 and L4, small left pleural effusion, cirrhotic liver with right hepatic lobe mass  suspicious for HCC, and small volume ascites.  Pelvis x-ray revealed no acute findings.  Chest x-ray revealed mild left basilar opacities with a small left pleural effusion. Scalp laceration was repaired in ED.  Neurosurgery was consulted.  Patient was admitted to trauma services.  Neurosurgery recommended no surgical intervention, Keppra for seizure prophylaxis, and blood pressure less than 150/90.  Follow-up CT head on 07/19 revealed no subdural hematoma.  Therapy services were consulted.  Hospital medicine was consulted for transition of care and further medical management.     PAST MEDICAL HISTORY:   Alcoholic cirrhosis   Hepatitis-C   Diabetes mellitus type 2   CVA    PAST SURGICAL HISTORY:   Right shoulder surgery    FAMILY HISTORY:   Reviewed and negative    SOCIAL HISTORY:   Denied alcohol, tobacco or illicit drug use.     Screening for Social Drivers for health:  Patient screened for food insecurity, housing instability, transportation needs, utility difficulties, and interpersonal safety (select all that apply as identified as concern)  []Housing or Food  []Transportation Needs  []Utility Difficulties  []Interpersonal safety  [x]None    ALLERGIES:   Patient has no known allergies.    HOME MEDICATIONS:     Prior to Admission medications    Not on File     ________________________________________________________________________  INPATIENT LIST OF MEDICATIONS     Current Facility-Administered Medications:     acetaminophen tablet 650 mg, 650 mg, Oral, Q4H, Selin Brumfield, AGACNP-BC, 650 mg at 07/23/24 0943    dextrose 10% bolus 125 mL 125 mL, 12.5 g, Intravenous, PRN, Selin Brumfield, AGACNP-BC    dextrose 10% bolus 250 mL 250 mL, 25 g, Intravenous, PRN, Selin Brumfield, AGACNP-BC    docusate sodium capsule 100 mg, 100 mg, Oral, BID, Selin Brumfield, AGACNP-BC, 100 mg at 07/23/24 0943    enoxaparin injection 30 mg, 30 mg, Subcutaneous, Q12H (prophylaxis, 0900/2100), Selin Brumfield AGACNP-BC, 30 mg at  07/23/24 0943    glucagon (human recombinant) injection 1 mg, 1 mg, Intramuscular, PRN, Selin Brumfield, JONATHAN-BC    glucose chewable tablet 16 g, 16 g, Oral, PRN, Selin Brumfield, AGACNP-BC    glucose chewable tablet 24 g, 24 g, Oral, PRN, Selin Brumfield, LAURELP-BC    haloperidoL tablet 2 mg, 2 mg, Oral, Once, Leon Elkins MD    hydrALAZINE injection 10 mg, 10 mg, Intravenous, Q6H PRN, Selin Brumfield, AGAEASTONP-BC, 10 mg at 07/19/24 1829    insulin aspart U-100 injection 0-5 Units, 0-5 Units, Subcutaneous, QID (AC + HS) PRN, Selin Brumfield, LAURELP-BC    levETIRAcetam tablet 500 mg, 500 mg, Oral, BID, Selin Brumfield, AGACNP-BC, 500 mg at 07/23/24 0943    LIDOcaine 5 % patch 1 patch, 1 patch, Transdermal, Q24H, Selin Brumfield, AGACNP-BC, 1 patch at 07/23/24 1045    magnesium hydroxide 400 mg/5 ml suspension 2,400 mg, 30 mL, Oral, Daily PRN, Selin Brumfield, JONATHAN-BC    melatonin tablet 6 mg, 6 mg, Oral, Nightly PRN, Selin Brumfield, LAURELP-BC, 6 mg at 07/22/24 2040    methocarbamoL tablet 500 mg, 500 mg, Oral, BID, Selin Brumfield, AGACNP-BC, 500 mg at 07/23/24 0943    oxyCODONE immediate release tablet 5 mg, 5 mg, Oral, Q4H PRN, Selin Brumfield, AGACNP-BC, 5 mg at 07/21/24 0127    polyethylene glycol packet 17 g, 17 g, Oral, BID, Selin Brumfield, AGACNP-BC, 17 g at 07/23/24 0943    Scheduled Meds:   acetaminophen  650 mg Oral Q4H    docusate sodium  100 mg Oral BID    enoxparin  30 mg Subcutaneous Q12H (prophylaxis, 0900/2100)    haloperidoL  2 mg Oral Once    levETIRAcetam  500 mg Oral BID    LIDOcaine  1 patch Transdermal Q24H    methocarbamoL  500 mg Oral BID    polyethylene glycol  17 g Oral BID     Continuous Infusions:  PRN Meds:.  Current Facility-Administered Medications:     dextrose 10%, 12.5 g, Intravenous, PRN    dextrose 10%, 25 g, Intravenous, PRN    glucagon (human recombinant), 1 mg, Intramuscular, PRN    glucose, 16 g, Oral, PRN    glucose, 24 g, Oral, PRN    hydrALAZINE, 10 mg,  Intravenous, Q6H PRN    insulin aspart U-100, 0-5 Units, Subcutaneous, QID (AC + HS) PRN    magnesium hydroxide 400 mg/5 ml, 30 mL, Oral, Daily PRN    melatonin, 6 mg, Oral, Nightly PRN    oxyCODONE, 5 mg, Oral, Q4H PRN      REVIEW OF SYSTEMS:   Except as documented, all other systems reviewed and negative.    PHYSICAL EXAM:     VITAL SIGNS: 24 HRS MIN & MAX LAST   Temp  Min: 96.9 °F (36.1 °C)  Max: 98.7 °F (37.1 °C) 98.7 °F (37.1 °C)   BP  Min: 119/66  Max: 144/71 128/72   Pulse  Min: 62  Max: 68  65   Resp  Min: 17  Max: 20 18   SpO2  Min: 95 %  Max: 98 % 95 %       General appearance: Male in no apparent distress.  HENT: Atraumatic head.   Eyes: Normal extraocular movements.   Neck: Supple.   Lungs: Clear to auscultation bilaterally.   Heart: Regular rate and rhythm.  Abdomen: Soft, non-distended, non-tender.  Extremities: No cyanosis, clubbing, or deformities. Roll belt in place   Skin: No Rash.   Neuro: Awake, alert, and oriented to self  Psych/mental status: Flat affect      LABS AND IMAGING:     Recent Labs   Lab 07/19/24  0840 07/20/24  0456 07/21/24  0635   WBC 3.29* 2.36* 2.29*   RBC 3.22* 3.25* 3.20*   HGB 9.8* 10.1* 9.7*   HCT 30.4* 30.8* 29.9*   MCV 94.4* 94.8* 93.4   MCH 30.4 31.1* 30.3   MCHC 32.2* 32.8* 32.4*   RDW 14.9 14.9 14.9   PLT 56* 54* 60*   MPV 11.6* 11.7* 11.9*       Recent Labs   Lab 07/19/24  0743 07/20/24  0456 07/21/24  0635    142 140   K 3.7 3.8 4.0   * 116* 113*   CO2 21* 20* 23   BUN 15.1 14.8 15.2   CREATININE 1.07 0.89 1.09   CALCIUM 8.3* 8.3* 8.5*   MG 1.70 1.80  --    ALBUMIN 3.0* 2.9* 2.9*   ALKPHOS 124 119 124   ALT 29 27 28   AST 38* 42* 43*   BILITOT 0.8 0.9 1.0       Microbiology Results (last 7 days)       ** No results found for the last 168 hours. **             CT Head Without Contrast  Narrative: EXAMINATION:  CT HEAD WITHOUT CONTRAST    CLINICAL HISTORY:  fu SDH;    TECHNIQUE:  Multiple axial images were obtained from the base of the brain to the vertex  without contrast administration.  Sagittal and coronal reconstructions were performed. .Automatic exposure control  (AEC) is utilized to reduce patient radiation exposure.    COMPARISON:  None    FINDINGS:  There is no intracranial mass or lesion seen.  No hemorrhage is seen.  No subdural hemorrhage is seen along the falx on today's examination.  No infarct is seen.  The ventricles and basilar cisterns appear normal.  Brain parenchyma appears grossly unremarkable.    Posterior fossa appears normal.  The calvarium is intact.  The paranasal sinuses appear grossly unremarkable.  Impression: No subdural hematoma seen today's examination.    Electronically signed by: Robin Ellis  Date:    07/19/2024  Time:    15:00  X-ray Shoulder 2 or More Views Right  Narrative: EXAMINATION:  XR SHOULDER COMPLETE 2 OR MORE VIEWS RIGHT    CLINICAL HISTORY:  Fall;    TECHNIQUE:  Three views.    COMPARISON:  None available.    FINDINGS:  Articular surfaces alignment is preserved.  No acute fracture or dislocation identified.  Calcification adjacent to the greater tuberosity reflects calcific tendinopathy.  There is narrowed acromial head and acromion interval which raises the possibility of rotator cuff arthropathy.  Impression: No osseous abnormality identified.    Electronically signed by: David Erazo  Date:    07/19/2024  Time:    09:19  CT Chest Abdomen Pelvis With IV Contrast (XPD) NO Oral Contrast  Narrative: EXAMINATION:  CT CHEST ABDOMEN PELVIS WITH IV CONTRAST (XPD)    CLINICAL HISTORY:  Polytrauma, blunt;    TECHNIQUE:  Helical acquisition from the thoracic inlet through the ischia with  IV contrast. Three plane reconstructions made available for review.  mGycm. Automatic exposure control, adjustment of mA/kV or iterative reconstruction technique was used to reduce radiation.    COMPARISON:  None available.    FINDINGS:  Chest.    Heart size upper limit normal.  No pericardial effusion.  There are coronary artery  calcifications.    There is no mediastinal hematoma.  No enlarged thoracic lymph nodes.    There is a small left pleural effusion.  No pneumothorax seen.  There is some atelectasis or scarring left lung base.  Mild chronic changes of the lungs elsewhere.    Abdomen and pelvis.    Cirrhotic liver.  There is a 4 cm hyperenhancing lesion in segment 5 image 126 series 2.  The portal vein is patent.  Prominent paraumbilical vein.  There are gallstones.  No significant biliary ductal dilatation.    The spleen is mildly enlarged.  No significant abnormality of the pancreas or adrenals.  No hydronephrosis.  The low lying malrotated right kidney.    There is no bowel obstruction or free air.  No suspicious bowel wall thickening.  Small volume ascites.    Urinary bladder is unremarkable.  The abdominal aorta is normal in caliber.  Moderate atherosclerotic disease.  Left inguinal hernia containing some fluid.    There are fractures right transverse processes L3 and L4.  There are old bilateral rib fractures.  Moderate degenerative change of the spine.  Impression: 1. Right transverse process fractures L3 and L4.  2. Small left pleural effusion.  3. Cirrhotic liver with right hepatic lobe mass suspicious for HCC.  Recommend outpatient liver mass protocol CT.  4. Small volume ascites.    Electronically signed by: Surinder Danielle  Date:    07/19/2024  Time:    07:32  CT Head Without Contrast  Narrative: EXAMINATION:  CT HEAD WITHOUT CONTRAST    CLINICAL HISTORY:  Head trauma, minor (Age >= 65y);    TECHNIQUE:  CT imaging of the head performed from the skull base to the vertex without intravenous contrast. DLP 1387 mGycm. Automatic exposure control, adjustment of mA/kV or iterative reconstruction technique was used to reduce radiation.    COMPARISON:  None Available.    FINDINGS:  Questionable minimal subpleural blood products along the falx measuring only 1-2 mm maximally.  No mass effect.  There is mild patchy hypoattenuation in  the cerebral white matter which is nonspecific but most commonly associated with chronic small vessel ischemic changes.  There is left cerebellar and left occipital encephalomalacia.  The ventricles are not significantly enlarged.  There are vascular calcifications.    Visualized paranasal sinuses and mastoid air cells are clear. The calvarium is grossly intact.  There is some soft tissue swelling over the right frontal scalp.  Impression: Question minimal subdural blood products along the falx without mass effect.    Findings discussed with Dr. Maddox at 713 on 7/19/2024.    Electronically signed by: Surinder Danielle  Date:    07/19/2024  Time:    07:14  CT Cervical Spine Without Contrast  Narrative: EXAMINATION:  CT CERVICAL SPINE WITHOUT CONTRAST    CLINICAL HISTORY:  Neck trauma (Age >= 65y);    TECHNIQUE:  Helical acquisition through the cervical spine without IV contrast. Three plane reconstructions were made available for review. DLP 1387 mGycm. Automatic exposure control, adjustment of mA/kV or iterative reconstruction technique was used to reduce radiation.    COMPARISON:  None available.    FINDINGS:  No fractures identified.  There are mild degenerative alignment abnormalities.  Moderate degenerative changes.  Craniocervical junction and C1-C2 relationship are normal. The odontoid is intact. There is limited evaluation of the soft tissues. No prevertebral soft tissue swelling. Ligamentous injury cannot be excluded with CT.  There are vascular calcifications.  Impression: No acute bony injury of the cervical spine.    Electronically signed by: Surinder Danielle  Date:    07/19/2024  Time:    07:13  X-Ray Pelvis Routine AP  Narrative: EXAMINATION:  XR PELVIS ROUTINE AP    CLINICAL HISTORY:  Unspecified fall, initial encounter    COMPARISON:  None    FINDINGS:  Frontal image of the pelvis demonstrates no fracture or dislocation  Impression: No acute findings.    Electronically signed by: Surinder  Shashi  Date:    07/19/2024  Time:    07:10  X-Ray Chest 1 View  Narrative: EXAMINATION:  XR CHEST 1 VIEW    CLINICAL HISTORY:  Unspecified fall, initial encounter    COMPARISON:  26 April 2024    FINDINGS:  Frontal view of the chest was obtained. The heart is not significantly enlarged.  There are mild left basilar opacities with a small left pleural effusion suspected.  There is no pneumothorax.  Impression: Mild left basilar opacities with a small left pleural effusion.    Electronically signed by: Surinder Danielle  Date:    07/19/2024  Time:    06:55        ASSESSMENT & PLAN:   Assessment:   S/p ground level fall  Questionable subdural along the phalanx-no ICH on repeat scan  Right transverse process fractures of L3 and L4   Small left pleural effusion   Cirrhotic liver with right hepatic lobe mass suspicious for HCC  Agitation  Dementia?  History of alcoholic cirrhosis, hepatitis-C, diabetes mellitus type 2, and CVA      Plan:  Q.4 hours neurochecks   SBP less than 150 per neurosurgery   No surgical intervention needed-neurosurgery signed off   Kera for seizure prophylaxis   Fall precautions   Seizure precautions   PT/OT  Insulin sliding scale with Accu-checks  Delirium precautions  Resume home medications as deemed appropriate once medication reconciliation is updated  Labs in AM    VTE Prophylaxis:  Already on Lovenox    Discharge Planning and Disposition: [facility]    I, Tu Almanzar PA-C have reviewed and discussed the case with Leon Elkins MD   Please see the attending MD's addendum for further assessment and plan.    Tu Almanzar PA-C  Department of Hospital Medicine   [facility]   07/23/2024    This note was created with the assistance of [employer] voice recognition software. There may be transcription errors as a result of using this technology, however minimal. Effort has been made to assure accuracy of transcription, but any obvious errors or omissions should be clarified  with the author of the document.    _______________________________________________________________________________  MD Addendum:  I , assumed care of this patient today at --am/pm  For the patient encounter, I performed the substantive portion of the visit, I reviewed the NP/PA documentation, treatment plan, and medical decision making.  I had face to face time with this patient     A. History:    B. Physical exam:    C. Medical decision making:      All diagnosis and differential diagnosis have been reviewed; assessment and plan has been documented; I have personally reviewed the labs and test results that are presently available; I have reviewed the patients medication list; I have reviewed the consulting providers response and recommendations. I have reviewed or attempted to review medical records based upon their availability.    All of the patient and family questions have been addressed and answered. Patient's is agreeable to the above stated plan. I will continue to monitor closely and make adjustments to medical management as needed.      07/23/2024

## 2024-07-24 LAB
POCT GLUCOSE: 154 MG/DL (ref 70–110)
POCT GLUCOSE: 263 MG/DL (ref 70–110)
POCT GLUCOSE: 99 MG/DL (ref 70–110)

## 2024-07-24 PROCEDURE — 97530 THERAPEUTIC ACTIVITIES: CPT | Mod: CQ

## 2024-07-24 PROCEDURE — 97535 SELF CARE MNGMENT TRAINING: CPT

## 2024-07-24 PROCEDURE — 97116 GAIT TRAINING THERAPY: CPT | Mod: CQ

## 2024-07-24 PROCEDURE — 25000003 PHARM REV CODE 250: Performed by: STUDENT IN AN ORGANIZED HEALTH CARE EDUCATION/TRAINING PROGRAM

## 2024-07-24 PROCEDURE — 63600175 PHARM REV CODE 636 W HCPCS

## 2024-07-24 PROCEDURE — 21400001 HC TELEMETRY ROOM

## 2024-07-24 PROCEDURE — 25000003 PHARM REV CODE 250: Performed by: NURSE PRACTITIONER

## 2024-07-24 PROCEDURE — 94799 UNLISTED PULMONARY SVC/PX: CPT | Mod: XB

## 2024-07-24 PROCEDURE — 11000001 HC ACUTE MED/SURG PRIVATE ROOM

## 2024-07-24 PROCEDURE — 63600175 PHARM REV CODE 636 W HCPCS: Performed by: STUDENT IN AN ORGANIZED HEALTH CARE EDUCATION/TRAINING PROGRAM

## 2024-07-24 PROCEDURE — 99900031 HC PATIENT EDUCATION (STAT)

## 2024-07-24 RX ORDER — AMLODIPINE BESYLATE 5 MG/1
5 TABLET ORAL DAILY
Status: DISCONTINUED | OUTPATIENT
Start: 2024-07-24 | End: 2024-07-25

## 2024-07-24 RX ADMIN — LEVETIRACETAM 500 MG: 500 TABLET, FILM COATED ORAL at 09:07

## 2024-07-24 RX ADMIN — HALOPERIDOL LACTATE 5 MG: 5 INJECTION, SOLUTION INTRAMUSCULAR at 03:07

## 2024-07-24 RX ADMIN — METHOCARBAMOL 500 MG: 500 TABLET ORAL at 09:07

## 2024-07-24 RX ADMIN — LIDOCAINE PATCH 5% 1 PATCH: 700 PATCH TOPICAL at 09:07

## 2024-07-24 RX ADMIN — ENOXAPARIN SODIUM 40 MG: 40 INJECTION SUBCUTANEOUS at 09:07

## 2024-07-24 RX ADMIN — AMLODIPINE BESYLATE 5 MG: 5 TABLET ORAL at 11:07

## 2024-07-24 RX ADMIN — DOCUSATE SODIUM 100 MG: 100 CAPSULE, LIQUID FILLED ORAL at 09:07

## 2024-07-24 RX ADMIN — ACETAMINOPHEN 650 MG: 325 TABLET, FILM COATED ORAL at 02:07

## 2024-07-24 RX ADMIN — POLYETHYLENE GLYCOL 3350 17 G: 17 POWDER, FOR SOLUTION ORAL at 09:07

## 2024-07-24 NOTE — PT/OT/SLP PROGRESS
Physical Therapy Treatment    Patient Name:  Kevin Horan Jr.   MRN:  [id number]    Recommendations:     Discharge therapy intensity: Moderate Intensity Therapy   Discharge Equipment Recommendations: walker, rolling  Barriers to discharge: Impaired mobility and Ongoing medical needs    Assessment:     Kevin Horan Jr. is a 74 y.o. male admitted with a medical diagnosis of  fall down steps, R L3 and L4 TP fx, SDH, scalp lac, cirrhotic liver with R hepatic lobe mass. No surgery or brace needed for L spine TP fxs.  He presents with the following impairments/functional limitations: weakness, impaired endurance, gait instability, impaired functional mobility, decreased safety awareness.    Rehab Prognosis: Good; patient would benefit from acute skilled PT services to address these deficits and reach maximum level of function.    Recent Surgery: * No surgery found *      Plan:     During this hospitalization, patient would benefit from acute PT services 5 x/week to address the identified rehab impairments via gait training, therapeutic activities, therapeutic exercises and progress toward the following goals:    Plan of Care Expires:  08/20/24    Subjective     Chief Complaint: none stated  Patient/Family Comments/goals: none stated  Pain/Comfort:         Objective:     Communicated with nursing prior to session.  Patient found supine with PureWick, pulse ox (continuous), telemetry upon PT entry to room.     General Precautions: Standard, fall  Orthopedic Precautions: N/A  Braces: N/A  Respiratory Status: Room air  Blood Pressure: 156/69    Functional Mobility:  Bed Mobility:     Supine to Sit: contact guard assistance  Sit to Supine: minimum assistance  Transfers:     Sit to Stand:  contact guard assistance with rolling walker  Gait: 15' x2/85' w/RW, CGA    Therapeutic Activities/Exercises:  Pt ambulated to sink for grooming activities then back to bed for brief don/doff. Pt then ambulated out into chase and  back to bed.     Education:  Patient provided with verbal education education regarding PT role/goals/POC, fall prevention, and safety awareness. Understanding was verbalized, however additional teaching warranted.     Patient left HOB elevated with all lines intact, call button in reach, nurse notified, and 1:1 present    GOALS:   Multidisciplinary Problems       Physical Therapy Goals          Problem: Physical Therapy    Goal Priority Disciplines Outcome Goal Variances Interventions   Physical Therapy Goal     PT, PT/OT Progressing     Description: Goals to be met by: 24     Patient will increase functional independence with mobility by performin. Supine to sit with supervision  2. Sit to stand transfer with Supervision  3. Gait  x 200 feet with Supervision using Rolling Walker.                          Time Tracking:     PT Received On: 24  PT Start Time: 09     PT Stop Time: 1003  PT Total Time (min): 26 min     Billable Minutes: Gait Training 10 and Therapeutic Activity 16    Treatment Type: Treatment  PT/PTA: PTA     Number of PTA visits since last PT visit: 3     2024

## 2024-07-24 NOTE — PLAN OF CARE
[facility] denied    [facility] is accepting facility   [facility] sent updates via [facility]    [facility] spoke to Dora at [facility] who medically accepted the pt & will submit for auth 48 hours after pt is not a 1:1

## 2024-07-24 NOTE — PLAN OF CARE
Problem: Restraint, Nonviolent  Goal: Absence of Harm or Injury  7/24/2024 1014 by Jacey Martins, RN  Outcome: Met  7/24/2024 0826 by Jacey Martins, RN  Outcome: Progressing

## 2024-07-24 NOTE — PLAN OF CARE
Problem: Adult Inpatient Plan of Care  Goal: Plan of Care Review  Outcome: Progressing  Goal: Patient-Specific Goal (Individualized)  Outcome: Progressing  Goal: Absence of Hospital-Acquired Illness or Injury  Outcome: Progressing  Goal: Optimal Comfort and Wellbeing  Outcome: Progressing  Goal: Readiness for Transition of Care  Outcome: Progressing     Problem: Wound  Goal: Optimal Coping  Outcome: Progressing  Goal: Optimal Functional Ability  Outcome: Progressing  Goal: Absence of Infection Signs and Symptoms  Outcome: Progressing  Goal: Improved Oral Intake  Outcome: Progressing  Goal: Optimal Pain Control and Function  Outcome: Progressing  Goal: Skin Health and Integrity  Outcome: Progressing  Goal: Optimal Wound Healing  Outcome: Progressing     Problem: Fall Injury Risk  Goal: Absence of Fall and Fall-Related Injury  Outcome: Progressing     Problem: Skin Injury Risk Increased  Goal: Skin Health and Integrity  Outcome: Progressing     Problem: Restraint, Nonviolent  Goal: Absence of Harm or Injury  Outcome: Progressing

## 2024-07-24 NOTE — PT/OT/SLP PROGRESS
Occupational Therapy   Treatment    Name: Kevin Horan Jr.  MRN: [id number]  Admitting Diagnosis:  Fall       Recommendations:     Recommended therapy intensity at discharge: Moderate Intensity Therapy   Discharge Equipment Recommendations:  walker, rolling  Barriers to discharge:       Assessment:     Kevin Horan Jr. is a 74 y.o. male with a medical diagnosis of Fall.  He presents the following performance deficits affecting function are impaired endurance, weakness, impaired self care skills, impaired functional mobility, gait instability, impaired balance, impaired cognition.     Rehab Prognosis:  Good; patient would benefit from acute skilled OT services to address these deficits and reach maximum level of function.       Plan:     Patient to be seen 3 x/week to address the above listed problems via self-care/home management, therapeutic activities, therapeutic exercises  Plan of Care Expires: 08/22/24  Plan of Care Reviewed with: patient    Subjective     Pain/Comfort:  Pain Rating 1: 0/10    Objective:     Communicated with: nrsg prior to session.  Patient found HOB elevated with   restraints upon OT entry to room.    General Precautions: Standard, fall    Orthopedic Precautions:   Braces:    Respiratory Status: Room air  Vital Signs: Blood Pressure: 156/69  HR: 69     Occupational Performance:     Bed Mobility:    Patient completed Supine to Sit with minimum assistance  Patient completed Sit to Supine with minimum assistance     Functional Mobility/Transfers:  Patient completed Sit <> Stand Transfer with minimum assistance  with  rolling walker   Functional Mobility: pt ambulated to bathroom sink for oral care; no LOB; assist for steering and VC for hand placement     Activities of Daily Living:  Grooming: minimum assistance for balance standing at sink ; no assist for oral care   Lower Body Dressing: contact guard assistance sitting EOB for donning socks via figure 4    Very slow to process  during all commands     Patient Education:  Patient provided with verbal education education regarding OT role/goals/POC, fall prevention, and safety awareness.  Understanding was verbalized.      Patient left HOB elevated with all lines intact, call button in reach, bed alarm on, restraints reapplied at end of session, and sitter 1:1  present.    GOALS:   Multidisciplinary Problems       Occupational Therapy Goals          Problem: Occupational Therapy    Goal Priority Disciplines Outcome Interventions   Occupational Therapy Goal     OT, PT/OT Progressing    Description: Goals to be met by: in 1 month      Patient will increase functional independence with ADLs by performing:    LE Dressing with Supervision.  Grooming while standing at sink with Stand-by Assistance.  Toileting from toilet with Stand-by Assistance for hygiene and clothing management.   Toilet transfer to toilet with Supervision.                         Time Tracking:     OT Date of Treatment:    OT Start Time: 0937  OT Stop Time: 1000  OT Total Time (min): 23 min    Billable Minutes:Self Care/Home Management 23min     OT/CHUN: OT     Number of CHUN visits since last OT visit: 2    7/24/2024

## 2024-07-24 NOTE — NURSING
Nurses Note -- 4 Eyes      7/24/2024   11:22 AM      Skin assessed during: Q Shift Change      [] No Altered Skin Integrity Present    []Prevention Measures Documented      [x] Yes- Altered Skin Integrity Present or Discovered   [x] LDA Added if Not in Epic (Describe Wound)   [] New Altered Skin Integrity was Present on Admit and Documented in LDA   [x] Wound Image Taken    Wound Care Consulted? No    Attending Nurse:  Jacey Bennett RN/Staff Member:   joe

## 2024-07-25 LAB
ALBUMIN SERPL-MCNC: 3 G/DL (ref 3.4–4.8)
ALBUMIN/GLOB SERPL: 1.1 RATIO (ref 1.1–2)
ALP SERPL-CCNC: 145 UNIT/L (ref 40–150)
ALT SERPL-CCNC: 30 UNIT/L (ref 0–55)
ANION GAP SERPL CALC-SCNC: 6 MEQ/L
AST SERPL-CCNC: 51 UNIT/L (ref 5–34)
BASOPHILS # BLD AUTO: 0.01 X10(3)/MCL
BASOPHILS NFR BLD AUTO: 0.4 %
BILIRUB SERPL-MCNC: 0.8 MG/DL
BUN SERPL-MCNC: 20.8 MG/DL (ref 8.4–25.7)
CALCIUM SERPL-MCNC: 8.5 MG/DL (ref 8.8–10)
CHLORIDE SERPL-SCNC: 113 MMOL/L (ref 98–107)
CO2 SERPL-SCNC: 19 MMOL/L (ref 23–31)
CREAT SERPL-MCNC: 1.06 MG/DL (ref 0.73–1.18)
CREAT/UREA NIT SERPL: 20
CRP SERPL-MCNC: 4.5 MG/L
EOSINOPHIL # BLD AUTO: 0.19 X10(3)/MCL (ref 0–0.9)
EOSINOPHIL NFR BLD AUTO: 6.7 %
ERYTHROCYTE [DISTWIDTH] IN BLOOD BY AUTOMATED COUNT: 14.7 % (ref 11.5–17)
GFR SERPLBLD CREATININE-BSD FMLA CKD-EPI: >60 ML/MIN/1.73/M2
GLOBULIN SER-MCNC: 2.7 GM/DL (ref 2.4–3.5)
GLUCOSE SERPL-MCNC: 110 MG/DL (ref 82–115)
HCT VFR BLD AUTO: 31.9 % (ref 42–52)
HGB BLD-MCNC: 10 G/DL (ref 14–18)
IMM GRANULOCYTES # BLD AUTO: 0.01 X10(3)/MCL (ref 0–0.04)
IMM GRANULOCYTES NFR BLD AUTO: 0.4 %
LYMPHOCYTES # BLD AUTO: 0.82 X10(3)/MCL (ref 0.6–4.6)
LYMPHOCYTES NFR BLD AUTO: 29.1 %
MCH RBC QN AUTO: 30.4 PG (ref 27–31)
MCHC RBC AUTO-ENTMCNC: 31.3 G/DL (ref 33–36)
MCV RBC AUTO: 97 FL (ref 80–94)
MONOCYTES # BLD AUTO: 0.34 X10(3)/MCL (ref 0.1–1.3)
MONOCYTES NFR BLD AUTO: 12.1 %
NEUTROPHILS # BLD AUTO: 1.45 X10(3)/MCL (ref 2.1–9.2)
NEUTROPHILS NFR BLD AUTO: 51.3 %
NRBC BLD AUTO-RTO: 0 %
PLATELET # BLD AUTO: 69 X10(3)/MCL (ref 130–400)
PMV BLD AUTO: 11.5 FL (ref 7.4–10.4)
POCT GLUCOSE: 113 MG/DL (ref 70–110)
POCT GLUCOSE: 151 MG/DL (ref 70–110)
POCT GLUCOSE: 280 MG/DL (ref 70–110)
POTASSIUM SERPL-SCNC: 4.1 MMOL/L (ref 3.5–5.1)
PREALB SERPL-MCNC: 11.9 MG/DL (ref 16–42)
PROT SERPL-MCNC: 5.7 GM/DL (ref 5.8–7.6)
RBC # BLD AUTO: 3.29 X10(6)/MCL (ref 4.7–6.1)
SODIUM SERPL-SCNC: 138 MMOL/L (ref 136–145)
WBC # BLD AUTO: 2.82 X10(3)/MCL (ref 4.5–11.5)

## 2024-07-25 PROCEDURE — 63600175 PHARM REV CODE 636 W HCPCS: Performed by: NURSE PRACTITIONER

## 2024-07-25 PROCEDURE — 25000003 PHARM REV CODE 250: Performed by: NURSE PRACTITIONER

## 2024-07-25 PROCEDURE — 25000003 PHARM REV CODE 250: Performed by: STUDENT IN AN ORGANIZED HEALTH CARE EDUCATION/TRAINING PROGRAM

## 2024-07-25 PROCEDURE — 84134 ASSAY OF PREALBUMIN: CPT | Performed by: NURSE PRACTITIONER

## 2024-07-25 PROCEDURE — 97530 THERAPEUTIC ACTIVITIES: CPT | Mod: CQ

## 2024-07-25 PROCEDURE — 21400001 HC TELEMETRY ROOM

## 2024-07-25 PROCEDURE — 85025 COMPLETE CBC W/AUTO DIFF WBC: CPT | Performed by: NURSE PRACTITIONER

## 2024-07-25 PROCEDURE — 97116 GAIT TRAINING THERAPY: CPT | Mod: CQ

## 2024-07-25 PROCEDURE — 63600175 PHARM REV CODE 636 W HCPCS

## 2024-07-25 PROCEDURE — 80053 COMPREHEN METABOLIC PANEL: CPT | Performed by: NURSE PRACTITIONER

## 2024-07-25 PROCEDURE — 11000001 HC ACUTE MED/SURG PRIVATE ROOM

## 2024-07-25 PROCEDURE — 36415 COLL VENOUS BLD VENIPUNCTURE: CPT | Performed by: NURSE PRACTITIONER

## 2024-07-25 PROCEDURE — 86140 C-REACTIVE PROTEIN: CPT | Performed by: NURSE PRACTITIONER

## 2024-07-25 RX ORDER — AMLODIPINE BESYLATE 5 MG/1
5 TABLET ORAL ONCE
Status: DISCONTINUED | OUTPATIENT
Start: 2024-07-25 | End: 2024-07-26

## 2024-07-25 RX ORDER — AMLODIPINE BESYLATE 5 MG/1
10 TABLET ORAL DAILY
Status: DISCONTINUED | OUTPATIENT
Start: 2024-07-26 | End: 2024-08-16

## 2024-07-25 RX ADMIN — AMLODIPINE BESYLATE 5 MG: 5 TABLET ORAL at 09:07

## 2024-07-25 RX ADMIN — LEVETIRACETAM 500 MG: 500 TABLET, FILM COATED ORAL at 09:07

## 2024-07-25 RX ADMIN — METHOCARBAMOL 500 MG: 500 TABLET ORAL at 09:07

## 2024-07-25 RX ADMIN — ENOXAPARIN SODIUM 40 MG: 40 INJECTION SUBCUTANEOUS at 09:07

## 2024-07-25 RX ADMIN — DOCUSATE SODIUM 100 MG: 100 CAPSULE, LIQUID FILLED ORAL at 09:07

## 2024-07-25 RX ADMIN — LIDOCAINE PATCH 5% 1 PATCH: 700 PATCH TOPICAL at 09:07

## 2024-07-25 RX ADMIN — INSULIN ASPART 2 UNITS: 100 INJECTION, SOLUTION INTRAVENOUS; SUBCUTANEOUS at 04:07

## 2024-07-25 NOTE — PROGRESS NOTES
Patient is on service with [facility]: [place] branch: [phone].  Patient's HH episode ended on 7/19 while he was in the hospital.  If attending wishes to order HH at time of discharge, we will need to send the referral as a new referral as the HH will have to dc the patient due to being in the hospital at the end of his HH episode.

## 2024-07-25 NOTE — PROGRESS NOTES
[facility]  Hospital Medicine Progress Note        Chief Complaint: Inpatient Follow-up     HPI:   74 y.o. male with a past medical history of alcoholic cirrhosis, hepatitis-C, diabetes mellitus type 2, and CVA who presented to [facility] on 7/19/2024 via EMS following fall.  EMS reported patient was hypoglycemic with CBG of 44.  C-collar was placed en route and 250 mL of D5W was given.  Patient reportedly slipped and fell, striking his head. Initial vital signs in ED were /77, pulse 72, respirations 16, temperature 36.9° C, and SpO2 90% on room air.  Labs revealed WBC 3.29, RBC 3.22, hemoglobin 9.8, hematocrit 30.4, MCV 94.4, chloride 112, CO2 21, glucose 166, and undetectable troponin.  EKG revealed normal sinus rhythm with heart rate is 92 beats per minute.  CT head revealed questionable minimal subdural blood products along the phalanx without mass effect.  CT cervical spine revealed no acute bony injury of the cervical spine.  CT chest abdomen and pelvis with IV contrast revealed right transverse process fractures of L3 and L4, small left pleural effusion, cirrhotic liver with right hepatic lobe mass suspicious for HCC, and small volume ascites.  Pelvis x-ray revealed no acute findings.  Chest x-ray revealed mild left basilar opacities with a small left pleural effusion. Scalp laceration was repaired in ED.  Neurosurgery was consulted.  Patient was admitted to trauma services.  Neurosurgery recommended no surgical intervention, Keppra for seizure prophylaxis, and blood pressure less than 150/90.  Follow-up CT head on 07/19 revealed no subdural hematoma.  Therapy services were consulted.  Hospital medicine was consulted for transition of care and further medical management.  PT/OT consulted; recommending moderate intensity therapy.  Patient was noted to be agitated and restless attempting to climb out of bed for which p.r.n. IV Haldol was ordered.    Interval Hx:   Today, patient  stated he was doing well and had no new complaints.  Noted to be awake, oriented x3.  Remains on one-to-one observation; may consider discontinuing tomorrow to attempt placement.    Case was discussed with patient's nurse on the floor.    Objective/physical exam:  General: alert male lying comfortably in bed, in no acute distress  HENT: oral and oropharyngeal mucosa moist, pink, with no erythema or exudates, no ear pain or discharge  Neck: normal neck movement, no lymph nodes or swellings, no JVD or Carotid bruit  Respiratory: clear breathing sounds bilaterally, no crackles, rales, ronchi or wheezes  Cardiovascular: clear S1 and S2, no murmurs, rubs or gallops  Peripheral Vascular: no lesions, ulcers or erosions, normal peripheral pulses and no pedal edema  Gastrointestinal: soft, non-tender, non-distended abdomen, no guarding, rigidity or rebound tenderness, normal bowel sounds  Integumentary: normal skin color, no rashes or lesions  Neuro: AAO x 3; motor strength 5/5 in B/L UEs & LEs; sensation intact to gross and fine touch B/L; CN II-XII grossly intact    VITAL SIGNS: 24 HRS MIN & MAX LAST   Temp  Min: 96.6 °F (35.9 °C)  Max: 98.3 °F (36.8 °C) 98.3 °F (36.8 °C)   BP  Min: 128/63  Max: 165/84 (!) 155/70   Pulse  Min: 60  Max: 74  71   Resp  Min: 18  Max: 18 18   SpO2  Min: 95 %  Max: 99 % 95 %     I have reviewed the following labs:  Recent Labs   Lab 07/21/24  0635 07/23/24  1307 07/25/24  0522   WBC 2.29* 2.35* 2.82*   RBC 3.20* 3.34* 3.29*   HGB 9.7* 10.4* 10.0*   HCT 29.9* 31.2* 31.9*   MCV 93.4 93.4 97.0*   MCH 30.3 31.1* 30.4   MCHC 32.4* 33.3 31.3*   RDW 14.9 14.8 14.7   PLT 60* 66* 69*   MPV 11.9* 11.0* 11.5*     Recent Labs   Lab 07/19/24  0743 07/20/24  0456 07/21/24  0635 07/23/24  1307 07/25/24  0522    142 140 140 138   K 3.7 3.8 4.0 4.1 4.1   * 116* 113* 111* 113*   CO2 21* 20* 23 25 19*   BUN 15.1 14.8 15.2 18.1 20.8   CREATININE 1.07 0.89 1.09 1.11 1.06   CALCIUM 8.3* 8.3* 8.5* 8.8  8.5*   MG 1.70 1.80  --  1.70  --    ALBUMIN 3.0* 2.9* 2.9* 3.0* 3.0*   ALKPHOS 124 119 124 137 145   ALT 29 27 28 26 30   AST 38* 42* 43* 42* 51*   BILITOT 0.8 0.9 1.0 0.8 0.8     Assessment/Plan:  S/p ground level fall  Questionable subdural along the phalanx-no ICH on repeat scan  Right transverse process fractures of L3 and L4   Small left pleural effusion   Cirrhotic liver with right hepatic lobe mass suspicious for HCC  Agitation  Dementia?  History of alcoholic cirrhosis, hepatitis-C, diabetes mellitus type 2, and CVA     Continues to be admitted   Reporting no new complaints   Neurosurgery was on board; no plans for surgical intervention  PT/OT on board; recommending moderate intensity therapy   Case management on board for SNF placement   Delirium precautions   On 1-1 observation due to agitation/restlessness; p.r.n. Haldol ordered   Continued on amlodipine 5 mg, docusate 100 mg b.i.d., Keppra 500 mg b.i.d., Robaxin 500 mg b.i.d., MiraLax 17 g b.i.d.  Continue monitoring patient's symptoms    VTE prophylaxis:  Lovenox    Patient condition:  Stable    Anticipated discharge and Disposition:     Pending    All diagnosis and differential diagnosis have been reviewed; assessment and plan has been documented; I have personally reviewed the labs and test results that are presently available; I have reviewed the patients medication list; I have reviewed the consulting providers response and recommendations. I have reviewed or attempted to review medical records based upon their availability    All of the patient's questions have been  addressed and answered. Patient's is agreeable to the above stated plan. I will continue to monitor closely and make adjustments to medical management as needed.    Portions of this note dictated using EMR integrated voice recognition software, and may be subject to voice recognition errors not corrected at proofreading. Please contact writer for clarification if needed.    _____________________________________________________________________      Radiology:  I have personally reviewed the following imaging and agree with the radiologist.     CT Head Without Contrast  Narrative: EXAMINATION:  CT HEAD WITHOUT CONTRAST    CLINICAL HISTORY:  fu SDH;    TECHNIQUE:  Multiple axial images were obtained from the base of the brain to the vertex without contrast administration.  Sagittal and coronal reconstructions were performed. .Automatic exposure control  (AEC) is utilized to reduce patient radiation exposure.    COMPARISON:  None    FINDINGS:  There is no intracranial mass or lesion seen.  No hemorrhage is seen.  No subdural hemorrhage is seen along the falx on today's examination.  No infarct is seen.  The ventricles and basilar cisterns appear normal.  Brain parenchyma appears grossly unremarkable.    Posterior fossa appears normal.  The calvarium is intact.  The paranasal sinuses appear grossly unremarkable.  Impression: No subdural hematoma seen today's examination.    Electronically signed by: Robin Ellis  Date:    07/19/2024  Time:    15:00  X-ray Shoulder 2 or More Views Right  Narrative: EXAMINATION:  XR SHOULDER COMPLETE 2 OR MORE VIEWS RIGHT    CLINICAL HISTORY:  Fall;    TECHNIQUE:  Three views.    COMPARISON:  None available.    FINDINGS:  Articular surfaces alignment is preserved.  No acute fracture or dislocation identified.  Calcification adjacent to the greater tuberosity reflects calcific tendinopathy.  There is narrowed acromial head and acromion interval which raises the possibility of rotator cuff arthropathy.  Impression: No osseous abnormality identified.    Electronically signed by: David Erazo  Date:    07/19/2024  Time:    09:19  CT Chest Abdomen Pelvis With IV Contrast (XPD) NO Oral Contrast  Narrative: EXAMINATION:  CT CHEST ABDOMEN PELVIS WITH IV CONTRAST (XPD)    CLINICAL HISTORY:  Polytrauma, blunt;    TECHNIQUE:  Helical acquisition from the thoracic  inlet through the ischia with  IV contrast. Three plane reconstructions made available for review.  mGycm. Automatic exposure control, adjustment of mA/kV or iterative reconstruction technique was used to reduce radiation.    COMPARISON:  None available.    FINDINGS:  Chest.    Heart size upper limit normal.  No pericardial effusion.  There are coronary artery calcifications.    There is no mediastinal hematoma.  No enlarged thoracic lymph nodes.    There is a small left pleural effusion.  No pneumothorax seen.  There is some atelectasis or scarring left lung base.  Mild chronic changes of the lungs elsewhere.    Abdomen and pelvis.    Cirrhotic liver.  There is a 4 cm hyperenhancing lesion in segment 5 image 126 series 2.  The portal vein is patent.  Prominent paraumbilical vein.  There are gallstones.  No significant biliary ductal dilatation.    The spleen is mildly enlarged.  No significant abnormality of the pancreas or adrenals.  No hydronephrosis.  The low lying malrotated right kidney.    There is no bowel obstruction or free air.  No suspicious bowel wall thickening.  Small volume ascites.    Urinary bladder is unremarkable.  The abdominal aorta is normal in caliber.  Moderate atherosclerotic disease.  Left inguinal hernia containing some fluid.    There are fractures right transverse processes L3 and L4.  There are old bilateral rib fractures.  Moderate degenerative change of the spine.  Impression: 1. Right transverse process fractures L3 and L4.  2. Small left pleural effusion.  3. Cirrhotic liver with right hepatic lobe mass suspicious for HCC.  Recommend outpatient liver mass protocol CT.  4. Small volume ascites.    Electronically signed by: Surinder Danielle  Date:    07/19/2024  Time:    07:32  CT Head Without Contrast  Narrative: EXAMINATION:  CT HEAD WITHOUT CONTRAST    CLINICAL HISTORY:  Head trauma, minor (Age >= 65y);    TECHNIQUE:  CT imaging of the head performed from the skull base to the  vertex without intravenous contrast. DLP 1387 mGycm. Automatic exposure control, adjustment of mA/kV or iterative reconstruction technique was used to reduce radiation.    COMPARISON:  None Available.    FINDINGS:  Questionable minimal subpleural blood products along the falx measuring only 1-2 mm maximally.  No mass effect.  There is mild patchy hypoattenuation in the cerebral white matter which is nonspecific but most commonly associated with chronic small vessel ischemic changes.  There is left cerebellar and left occipital encephalomalacia.  The ventricles are not significantly enlarged.  There are vascular calcifications.    Visualized paranasal sinuses and mastoid air cells are clear. The calvarium is grossly intact.  There is some soft tissue swelling over the right frontal scalp.  Impression: Question minimal subdural blood products along the falx without mass effect.    Findings discussed with Dr. Maddox at 713 on 7/19/2024.    Electronically signed by: Surinder Danielle  Date:    07/19/2024  Time:    07:14  CT Cervical Spine Without Contrast  Narrative: EXAMINATION:  CT CERVICAL SPINE WITHOUT CONTRAST    CLINICAL HISTORY:  Neck trauma (Age >= 65y);    TECHNIQUE:  Helical acquisition through the cervical spine without IV contrast. Three plane reconstructions were made available for review. DLP 1387 mGycm. Automatic exposure control, adjustment of mA/kV or iterative reconstruction technique was used to reduce radiation.    COMPARISON:  None available.    FINDINGS:  No fractures identified.  There are mild degenerative alignment abnormalities.  Moderate degenerative changes.  Craniocervical junction and C1-C2 relationship are normal. The odontoid is intact. There is limited evaluation of the soft tissues. No prevertebral soft tissue swelling. Ligamentous injury cannot be excluded with CT.  There are vascular calcifications.  Impression: No acute bony injury of the cervical spine.    Electronically signed  by: Surinder Danielle  Date:    07/19/2024  Time:    07:13  X-Ray Pelvis Routine AP  Narrative: EXAMINATION:  XR PELVIS ROUTINE AP    CLINICAL HISTORY:  Unspecified fall, initial encounter    COMPARISON:  None    FINDINGS:  Frontal image of the pelvis demonstrates no fracture or dislocation  Impression: No acute findings.    Electronically signed by: Surinder Danielle  Date:    07/19/2024  Time:    07:10  X-Ray Chest 1 View  Narrative: EXAMINATION:  XR CHEST 1 VIEW    CLINICAL HISTORY:  Unspecified fall, initial encounter    COMPARISON:  26 April 2024    FINDINGS:  Frontal view of the chest was obtained. The heart is not significantly enlarged.  There are mild left basilar opacities with a small left pleural effusion suspected.  There is no pneumothorax.  Impression: Mild left basilar opacities with a small left pleural effusion.    Electronically signed by: Surinder Danielle  Date:    07/19/2024  Time:    06:55      Leon Elkins MD  Department of Hospital Medicine   [facility]

## 2024-07-25 NOTE — PROGRESS NOTES
[facility]  Hospital Medicine Progress Note        Chief Complaint: Inpatient Follow-up     HPI:   74 y.o. male with a past medical history of alcoholic cirrhosis, hepatitis-C, diabetes mellitus type 2, and CVA who presented to [facility] on 7/19/2024 via EMS following fall.  EMS reported patient was hypoglycemic with CBG of 44.  C-collar was placed en route and 250 mL of D5W was given.  Patient reportedly slipped and fell, striking his head. Initial vital signs in ED were /77, pulse 72, respirations 16, temperature 36.9° C, and SpO2 90% on room air.  Labs revealed WBC 3.29, RBC 3.22, hemoglobin 9.8, hematocrit 30.4, MCV 94.4, chloride 112, CO2 21, glucose 166, and undetectable troponin.  EKG revealed normal sinus rhythm with heart rate is 92 beats per minute.  CT head revealed questionable minimal subdural blood products along the phalanx without mass effect.  CT cervical spine revealed no acute bony injury of the cervical spine.  CT chest abdomen and pelvis with IV contrast revealed right transverse process fractures of L3 and L4, small left pleural effusion, cirrhotic liver with right hepatic lobe mass suspicious for HCC, and small volume ascites.  Pelvis x-ray revealed no acute findings.  Chest x-ray revealed mild left basilar opacities with a small left pleural effusion. Scalp laceration was repaired in ED.  Neurosurgery was consulted.  Patient was admitted to trauma services.  Neurosurgery recommended no surgical intervention, Keppra for seizure prophylaxis, and blood pressure less than 150/90.  Follow-up CT head on 07/19 revealed no subdural hematoma.  Therapy services were consulted.  Hospital medicine was consulted for transition of care and further medical management.  PT/OT consulted; recommending moderate intensity therapy.  Patient was noted to be agitated and restless attempting to climb out of bed for which p.r.n. IV Haldol was ordered.    Interval Hx:   No acute events  reported overnight, 1:1 sitter at bedside,RN present   Today, patient reported doing well, voiced no complaints  Case was discussed with patient's nurse reported improved agitation  Pt hds stable , bp was in 150s,160s, increased norvasc       Objective/physical exam:  General:  no acute distress  Respiratory: unlabored breathing   Cardiovascular: normal rate  Gastrointestinal: soft, non-tender  Neuro: Alert, responding appropriately, following commands, moving all ext       VITAL SIGNS: 24 HRS MIN & MAX LAST   Temp  Min: 96.6 °F (35.9 °C)  Max: 98.3 °F (36.8 °C) 98.3 °F (36.8 °C)   BP  Min: 128/63  Max: 164/83 (!) 155/70   Pulse  Min: 63  Max: 74  71   Resp  Min: 18  Max: 18 18   SpO2  Min: 95 %  Max: 99 % 95 %     I have reviewed the following labs:  Recent Labs   Lab 07/21/24  0635 07/23/24  1307 07/25/24  0522   WBC 2.29* 2.35* 2.82*   RBC 3.20* 3.34* 3.29*   HGB 9.7* 10.4* 10.0*   HCT 29.9* 31.2* 31.9*   MCV 93.4 93.4 97.0*   MCH 30.3 31.1* 30.4   MCHC 32.4* 33.3 31.3*   RDW 14.9 14.8 14.7   PLT 60* 66* 69*   MPV 11.9* 11.0* 11.5*     Recent Labs   Lab 07/19/24  0743 07/20/24  0456 07/21/24  0635 07/23/24  1307 07/25/24  0522    142 140 140 138   K 3.7 3.8 4.0 4.1 4.1   * 116* 113* 111* 113*   CO2 21* 20* 23 25 19*   BUN 15.1 14.8 15.2 18.1 20.8   CREATININE 1.07 0.89 1.09 1.11 1.06   CALCIUM 8.3* 8.3* 8.5* 8.8 8.5*   MG 1.70 1.80  --  1.70  --    ALBUMIN 3.0* 2.9* 2.9* 3.0* 3.0*   ALKPHOS 124 119 124 137 145   ALT 29 27 28 26 30   AST 38* 42* 43* 42* 51*   BILITOT 0.8 0.9 1.0 0.8 0.8     Assessment/Plan:  S/p ground level fall  Questionable subdural along the phalanx-no ICH on repeat scan  Right transverse process fractures of L3 and L4   Small left pleural effusion   Cirrhotic liver with right hepatic lobe mass suspicious for HCC  Agitation  Dementia?  History of alcoholic cirrhosis, hepatitis-C, diabetes mellitus type 2, and CVA       Neurosurgery signed off; no plans for surgical  intervention  Monitor bp  PT/OT -recommending moderate intensity therapy   Case management working on SNF placement   Maintain Delirium precautions   1-1 observation due to agitation/restlessness; p.r.n. Haldol   Attempt to dc 1:1    Labs this AM showed hb 10, plt 69k, bicarb 19      VTE prophylaxis:  Lovenox    Patient condition:  fair    Anticipated discharge and Disposition:     Placement         Portions of this note dictated using EMR integrated voice recognition software, and may be subject to voice recognition errors not corrected at proofreading. Please contact writer for clarification if needed.   _____________________________________________________________________      Radiology:  I have personally reviewed the following imaging and agree with the radiologist.     CT Head Without Contrast  Narrative: EXAMINATION:  CT HEAD WITHOUT CONTRAST    CLINICAL HISTORY:  fu SDH;    TECHNIQUE:  Multiple axial images were obtained from the base of the brain to the vertex without contrast administration.  Sagittal and coronal reconstructions were performed. .Automatic exposure control  (AEC) is utilized to reduce patient radiation exposure.    COMPARISON:  None    FINDINGS:  There is no intracranial mass or lesion seen.  No hemorrhage is seen.  No subdural hemorrhage is seen along the falx on today's examination.  No infarct is seen.  The ventricles and basilar cisterns appear normal.  Brain parenchyma appears grossly unremarkable.    Posterior fossa appears normal.  The calvarium is intact.  The paranasal sinuses appear grossly unremarkable.  Impression: No subdural hematoma seen today's examination.    Electronically signed by: Robin Ellis  Date:    07/19/2024  Time:    15:00  X-ray Shoulder 2 or More Views Right  Narrative: EXAMINATION:  XR SHOULDER COMPLETE 2 OR MORE VIEWS RIGHT    CLINICAL HISTORY:  Fall;    TECHNIQUE:  Three views.    COMPARISON:  None available.    FINDINGS:  Articular surfaces alignment is  preserved.  No acute fracture or dislocation identified.  Calcification adjacent to the greater tuberosity reflects calcific tendinopathy.  There is narrowed acromial head and acromion interval which raises the possibility of rotator cuff arthropathy.  Impression: No osseous abnormality identified.    Electronically signed by: David Erazo  Date:    07/19/2024  Time:    09:19  CT Chest Abdomen Pelvis With IV Contrast (XPD) NO Oral Contrast  Narrative: EXAMINATION:  CT CHEST ABDOMEN PELVIS WITH IV CONTRAST (XPD)    CLINICAL HISTORY:  Polytrauma, blunt;    TECHNIQUE:  Helical acquisition from the thoracic inlet through the ischia with  IV contrast. Three plane reconstructions made available for review.  mGycm. Automatic exposure control, adjustment of mA/kV or iterative reconstruction technique was used to reduce radiation.    COMPARISON:  None available.    FINDINGS:  Chest.    Heart size upper limit normal.  No pericardial effusion.  There are coronary artery calcifications.    There is no mediastinal hematoma.  No enlarged thoracic lymph nodes.    There is a small left pleural effusion.  No pneumothorax seen.  There is some atelectasis or scarring left lung base.  Mild chronic changes of the lungs elsewhere.    Abdomen and pelvis.    Cirrhotic liver.  There is a 4 cm hyperenhancing lesion in segment 5 image 126 series 2.  The portal vein is patent.  Prominent paraumbilical vein.  There are gallstones.  No significant biliary ductal dilatation.    The spleen is mildly enlarged.  No significant abnormality of the pancreas or adrenals.  No hydronephrosis.  The low lying malrotated right kidney.    There is no bowel obstruction or free air.  No suspicious bowel wall thickening.  Small volume ascites.    Urinary bladder is unremarkable.  The abdominal aorta is normal in caliber.  Moderate atherosclerotic disease.  Left inguinal hernia containing some fluid.    There are fractures right transverse processes L3 and  L4.  There are old bilateral rib fractures.  Moderate degenerative change of the spine.  Impression: 1. Right transverse process fractures L3 and L4.  2. Small left pleural effusion.  3. Cirrhotic liver with right hepatic lobe mass suspicious for HCC.  Recommend outpatient liver mass protocol CT.  4. Small volume ascites.    Electronically signed by: Surinder Danielle  Date:    07/19/2024  Time:    07:32  CT Head Without Contrast  Narrative: EXAMINATION:  CT HEAD WITHOUT CONTRAST    CLINICAL HISTORY:  Head trauma, minor (Age >= 65y);    TECHNIQUE:  CT imaging of the head performed from the skull base to the vertex without intravenous contrast. DLP 1387 mGycm. Automatic exposure control, adjustment of mA/kV or iterative reconstruction technique was used to reduce radiation.    COMPARISON:  None Available.    FINDINGS:  Questionable minimal subpleural blood products along the falx measuring only 1-2 mm maximally.  No mass effect.  There is mild patchy hypoattenuation in the cerebral white matter which is nonspecific but most commonly associated with chronic small vessel ischemic changes.  There is left cerebellar and left occipital encephalomalacia.  The ventricles are not significantly enlarged.  There are vascular calcifications.    Visualized paranasal sinuses and mastoid air cells are clear. The calvarium is grossly intact.  There is some soft tissue swelling over the right frontal scalp.  Impression: Question minimal subdural blood products along the falx without mass effect.    Findings discussed with Dr. Maddox at 713 on 7/19/2024.    Electronically signed by: Surinder Danielle  Date:    07/19/2024  Time:    07:14  CT Cervical Spine Without Contrast  Narrative: EXAMINATION:  CT CERVICAL SPINE WITHOUT CONTRAST    CLINICAL HISTORY:  Neck trauma (Age >= 65y);    TECHNIQUE:  Helical acquisition through the cervical spine without IV contrast. Three plane reconstructions were made available for review. DLP 1387 mGycm.  Automatic exposure control, adjustment of mA/kV or iterative reconstruction technique was used to reduce radiation.    COMPARISON:  None available.    FINDINGS:  No fractures identified.  There are mild degenerative alignment abnormalities.  Moderate degenerative changes.  Craniocervical junction and C1-C2 relationship are normal. The odontoid is intact. There is limited evaluation of the soft tissues. No prevertebral soft tissue swelling. Ligamentous injury cannot be excluded with CT.  There are vascular calcifications.  Impression: No acute bony injury of the cervical spine.    Electronically signed by: Surinder Danielle  Date:    07/19/2024  Time:    07:13  X-Ray Pelvis Routine AP  Narrative: EXAMINATION:  XR PELVIS ROUTINE AP    CLINICAL HISTORY:  Unspecified fall, initial encounter    COMPARISON:  None    FINDINGS:  Frontal image of the pelvis demonstrates no fracture or dislocation  Impression: No acute findings.    Electronically signed by: Surinder Danielle  Date:    07/19/2024  Time:    07:10  X-Ray Chest 1 View  Narrative: EXAMINATION:  XR CHEST 1 VIEW    CLINICAL HISTORY:  Unspecified fall, initial encounter    COMPARISON:  26 April 2024    FINDINGS:  Frontal view of the chest was obtained. The heart is not significantly enlarged.  There are mild left basilar opacities with a small left pleural effusion suspected.  There is no pneumothorax.  Impression: Mild left basilar opacities with a small left pleural effusion.    Electronically signed by: Surinder Danielle  Date:    07/19/2024  Time:    06:55      Shameka Keller MD  Department of Hospital Medicine   [facility]

## 2024-07-25 NOTE — PT/OT/SLP PROGRESS
Physical Therapy Treatment    Patient Name:  Kevin Horan Jr.   MRN:  [id number]    Recommendations:     Discharge therapy intensity: Moderate Intensity Therapy   Discharge Equipment Recommendations: walker, rolling  Barriers to discharge: Impaired mobility and Ongoing medical needs    Assessment:     Kevin Horan Jr. is a 74 y.o. male admitted with a medical diagnosis of  fall down steps, R L3 and L4 TP fx, SDH, scalp lac, cirrhotic liver with R hepatic lobe mass. No surgery or brace needed for L spine TP fxs.  He presents with the following impairments/functional limitations: weakness, impaired endurance, gait instability, impaired functional mobility, decreased safety awareness.    Rehab Prognosis: Good; patient would benefit from acute skilled PT services to address these deficits and reach maximum level of function.    Recent Surgery: * No surgery found *      Plan:     During this hospitalization, patient would benefit from acute PT services 5 x/week to address the identified rehab impairments via gait training, therapeutic activities, therapeutic exercises and progress toward the following goals:    Plan of Care Expires:  08/20/24    Subjective     Chief Complaint:   Patient/Family Comments/goals:   Pain/Comfort:         Objective:     Communicated with nursing prior to session.  Patient found supine with pulse ox (continuous), telemetry upon PT entry to room.     General Precautions: Standard, fall  Orthopedic Precautions: N/A  Braces: N/A  Respiratory Status: Room air  Blood Pressure: 129/66    Functional Mobility:  Bed Mobility:     Scooting: stand by assistance  Supine to Sit: contact guard assistance  Transfers:     Sit to Stand:  minimum assistance with rolling walker  X10 reps  Gait: 200' w/RW, CGA  VC for directions, downward gaze & to step into RW  Noted NBOS    Education:  Patient provided with verbal education education regarding PT role/goals/POC, fall prevention, and safety awareness.  Understanding was verbalized, however additional teaching warranted.     Patient left up in chair with all lines intact, call button in reach, geomat cushion, nurse notified, and 1:1 present    GOALS:   Multidisciplinary Problems       Physical Therapy Goals          Problem: Physical Therapy    Goal Priority Disciplines Outcome Goal Variances Interventions   Physical Therapy Goal     PT, PT/OT Progressing     Description: Goals to be met by: 24     Patient will increase functional independence with mobility by performin. Supine to sit with supervision  2. Sit to stand transfer with Supervision  3. Gait  x 200 feet with Supervision using Rolling Walker.                          Time Tracking:     PT Received On: 24  PT Start Time: 1124     PT Stop Time: 1147  PT Total Time (min): 23 min     Billable Minutes: Gait Training 13 and Therapeutic Activity 10    Treatment Type: Treatment  PT/PTA: PTA     Number of PTA visits since last PT visit: 4     2024

## 2024-07-25 NOTE — PLAN OF CARE
[facility] denied placement after their team reviewed this morning per Dora-staff

## 2024-07-25 NOTE — PLAN OF CARE
Spoke to pt's POA Javad (brother) to update on denial from [facility].  Gave update re: 1:1  with pt at this time.

## 2024-07-26 LAB
POCT GLUCOSE: 135 MG/DL (ref 70–110)
POCT GLUCOSE: 149 MG/DL (ref 70–110)
POCT GLUCOSE: 187 MG/DL (ref 70–110)
POCT GLUCOSE: 268 MG/DL (ref 70–110)
POCT GLUCOSE: 334 MG/DL (ref 70–110)

## 2024-07-26 PROCEDURE — 11000001 HC ACUTE MED/SURG PRIVATE ROOM

## 2024-07-26 PROCEDURE — 97116 GAIT TRAINING THERAPY: CPT

## 2024-07-26 PROCEDURE — 25000003 PHARM REV CODE 250: Performed by: NURSE PRACTITIONER

## 2024-07-26 PROCEDURE — 63600175 PHARM REV CODE 636 W HCPCS

## 2024-07-26 PROCEDURE — 63600175 PHARM REV CODE 636 W HCPCS: Performed by: STUDENT IN AN ORGANIZED HEALTH CARE EDUCATION/TRAINING PROGRAM

## 2024-07-26 PROCEDURE — 25000003 PHARM REV CODE 250: Performed by: INTERNAL MEDICINE

## 2024-07-26 PROCEDURE — 63600175 PHARM REV CODE 636 W HCPCS: Performed by: NURSE PRACTITIONER

## 2024-07-26 PROCEDURE — 94799 UNLISTED PULMONARY SVC/PX: CPT | Mod: XB

## 2024-07-26 PROCEDURE — 21400001 HC TELEMETRY ROOM

## 2024-07-26 RX ADMIN — INSULIN ASPART 3 UNITS: 100 INJECTION, SOLUTION INTRAVENOUS; SUBCUTANEOUS at 09:07

## 2024-07-26 RX ADMIN — LIDOCAINE PATCH 5% 1 PATCH: 700 PATCH TOPICAL at 10:07

## 2024-07-26 RX ADMIN — INSULIN ASPART 3 UNITS: 100 INJECTION, SOLUTION INTRAVENOUS; SUBCUTANEOUS at 05:07

## 2024-07-26 RX ADMIN — METHOCARBAMOL 500 MG: 500 TABLET ORAL at 07:07

## 2024-07-26 RX ADMIN — HALOPERIDOL LACTATE 5 MG: 5 INJECTION, SOLUTION INTRAMUSCULAR at 09:07

## 2024-07-26 RX ADMIN — ENOXAPARIN SODIUM 40 MG: 40 INJECTION SUBCUTANEOUS at 08:07

## 2024-07-26 RX ADMIN — DOCUSATE SODIUM 100 MG: 100 CAPSULE, LIQUID FILLED ORAL at 08:07

## 2024-07-26 RX ADMIN — AMLODIPINE BESYLATE 10 MG: 5 TABLET ORAL at 08:07

## 2024-07-26 RX ADMIN — POLYETHYLENE GLYCOL 3350 17 G: 17 POWDER, FOR SOLUTION ORAL at 08:07

## 2024-07-26 RX ADMIN — METHOCARBAMOL 500 MG: 500 TABLET ORAL at 08:07

## 2024-07-26 RX ADMIN — Medication 6 MG: at 07:07

## 2024-07-26 RX ADMIN — ENOXAPARIN SODIUM 40 MG: 40 INJECTION SUBCUTANEOUS at 07:07

## 2024-07-26 NOTE — PT/OT/SLP PROGRESS
Physical Therapy Treatment    Patient Name:  Kevin Horan Jr.   MRN:  [id number]    Recommendations:     Discharge therapy intensity: Moderate Intensity Therapy   Discharge Equipment Recommendations: walker, rolling  Barriers to discharge: Pending placement    Assessment:     Kevin Horan Jr. is a 74 y.o. male admitted with a medical diagnosis of  fall down steps, R L3 and L4 TP fx, SDH, scalp lac, cirrhotic liver with R hepatic lobe mass. No surgery or brace needed for L spine TP fxs.  He presents with the following impairments/functional limitations: weakness, impaired endurance, gait instability, impaired functional mobility, decreased safety awareness.      Rehab Prognosis: Good; patient would benefit from acute skilled PT services to address these deficits and reach maximum level of function.    Recent Surgery: * No surgery found *      Plan:     During this hospitalization, patient would benefit from acute PT services 5 x/week to address the identified rehab impairments via gait training, therapeutic activities, therapeutic exercises and progress toward the following goals:    Plan of Care Expires:  08/20/24    Subjective     Chief Complaint: no complaints  Patient/Family Comments/goals: none stated  Pain/Comfort:  Pain Rating 1: 0/10      Objective:     Communicated with  prior to session.  Patient found HOB elevated with telemetry upon PT entry to room.     General Precautions: Standard, fall  Orthopedic Precautions: N/A  Braces: N/A  Respiratory Status: Room air  Blood Pressure: NT  Skin Integrity:  Healing laceration posterior head      Functional Mobility:  Bed Mobility:     Supine to Sit: contact guard assistance  Transfers:     Sit to Stand:  contact guard assistance with rolling walker  Gait: Pt ambulated ~180' w/ RW and CGA. Cueing for forward gaze and use of AD throughout ambulation as pt attempted to get rid of RW    Therapeutic Activities/Exercises:        Patient left up in chair with  all lines intact, call button in reach, and 1:1 present    GOALS:   Multidisciplinary Problems       Physical Therapy Goals          Problem: Physical Therapy    Goal Priority Disciplines Outcome Goal Variances Interventions   Physical Therapy Goal     PT, PT/OT Progressing     Description: Goals to be met by: 24     Patient will increase functional independence with mobility by performin. Supine to sit with supervision  2. Sit to stand transfer with Supervision  3. Gait  x 200 feet with Supervision using Rolling Walker.                          Time Tracking:     PT Received On: 24  PT Start Time: 851     PT Stop Time: 907  PT Total Time (min): 16 min     Billable Minutes: Gait Training 16    Treatment Type: Treatment  PT/PTA: PT     Number of PTA visits since last PT visit: 5     2024

## 2024-07-26 NOTE — PROGRESS NOTES
Inpatient Nutrition Evaluation    Admit Date: 7/19/2024   Total duration of encounter: 7 days    Nutrition Recommendation/Prescription     Continue oral diet as tolerated; currently Diet diabetic Low Sodium,2gm; 2800 Calorie     Prosource with meals provides 60 kcal, 15 gm protein per packet    Nutrition Assessment     Chart Review    Reason Seen: length of stay    Malnutrition Screening Tool Results   Have you recently lost weight without trying?: No  Have you been eating poorly because of a decreased appetite?: No   MST Score: 0     Diagnosis:  S/p ground level fall  Questionable subdural along the phalanx-no ICH on repeat scan  Right transverse process fractures of L3 and L4   Small left pleural effusion   Cirrhotic liver with right hepatic lobe mass suspicious for HCC  Agitation  Dementia?    Relevant Medical History: alcoholic cirrhosis, hepatitis-C, diabetes mellitus type 2, and CVA     Nutrition-Related Medications: Scheduled Medications:  amLODIPine, 10 mg, Daily  docusate sodium, 100 mg, BID  enoxparin, 40 mg, Q12H (prophylaxis, 0900/2100)  LIDOcaine, 1 patch, Q24H  methocarbamoL, 500 mg, BID  polyethylene glycol, 17 g, BID    Continuous Infusions:   PRN Medications:    amLODIPine tablet 10 mg    docusate sodium capsule 100 mg    enoxaparin injection 40 mg    LIDOcaine 5 % patch 1 patch    methocarbamoL tablet 500 mg    polyethylene glycol packet 17 g        Nutrition-Related Labs:  Recent Labs   Lab 07/20/24  0456 07/21/24  0635 07/23/24  1307 07/25/24  0522    140 140 138   K 3.8 4.0 4.1 4.1   CALCIUM 8.3* 8.5* 8.8 8.5*   PHOS 3.1  --  3.5  --    MG 1.80  --  1.70  --    CO2 20* 23 25 19*   BUN 14.8 15.2 18.1 20.8   CREATININE 0.89 1.09 1.11 1.06   EGFRNORACEVR >60 >60 >60 >60   GLUCOSE 94 96 156* 110   BILITOT 0.9 1.0 0.8 0.8   ALKPHOS 119 124 137 145   ALT 27 28 26 30   AST 42* 43* 42* 51*   ALBUMIN 2.9* 2.9* 3.0* 3.0*   PREALB  --   --   --  11.9*   CRP  --   --   --  4.50   AMMONIA  --   --   "46.5  --    WBC 2.36* 2.29* 2.35* 2.82*   HGB 10.1* 9.7* 10.4* 10.0*   HCT 30.8* 29.9* 31.2* 31.9*        Diet Order: Diet diabetic Low Sodium,2gm; 2800 Calorie  Oral Supplement Order: Prosource No Carb  Appetite/Oral Intake: good/% of meals  Factors Affecting Nutritional Intake: none identified  Food/Jehovah's witness/Cultural Preferences: none reported  Food Allergies: no known food allergies    Skin Integrity: abrasion, bruised (ecchymotic)  Wound(s):      Wound 07/20/24 0800 Laceration Occipital region-Tissue loss description: Full thickness     Comments    7/26/24 pt tolerating oral diet, eating % of most meals. Some weight loss noted since April, will monitor. Increased calorie limit to allow for adequate protein intake for wound healing. Will add Prosource for additional protein    Anthropometrics    Height: 6' 1" (185.4 cm) Height Method: Stated  Last Weight: 99.8 kg (220 lb) (07/19/24 0614) Weight Method: Stated  BMI (Calculated): 29  BMI Classification: overweight (BMI 25-29.9)        Ideal Body Weight (IBW), Male: 184 lb     % Ideal Body Weight, Male (lb): 119.57 %                          Usual Weight Provided By: EMR weight history    Wt Readings from Last 5 Encounters:   07/19/24 99.8 kg (220 lb)   04/26/24 104.3 kg (230 lb)   01/15/20 99.5 kg (219 lb 6.1 oz)     Weight Change(s) Since Admission:  Admit Weight: 99.8 kg (220 lb) (07/19/24 0614)      Patient Education    Not applicable.    Monitoring & Evaluation     Dietitian will monitor food and beverage intake and weight change.  Nutrition Risk/Follow-Up: low (follow-up in 5-7 days)  Patients assigned 'low nutrition risk' status do not qualify for a full nutritional assessment but will be monitored and re-evaluated in a 5-7 day time period. Please consult if re-evaluation needed sooner.   "

## 2024-07-26 NOTE — PROGRESS NOTES
[facility]  Hospital Medicine Progress Note        Chief Complaint: Inpatient Follow-up     HPI:   74 y.o. male with a past medical history of alcoholic cirrhosis, hepatitis-C, diabetes mellitus type 2, and CVA who presented to [facility] on 7/19/2024 via EMS following fall.  EMS reported patient was hypoglycemic with CBG of 44.  C-collar was placed en route and 250 mL of D5W was given.  Patient reportedly slipped and fell, striking his head. Initial vital signs in ED were /77, pulse 72, respirations 16, temperature 36.9° C, and SpO2 90% on room air.  Labs revealed WBC 3.29, RBC 3.22, hemoglobin 9.8, hematocrit 30.4, MCV 94.4, chloride 112, CO2 21, glucose 166, and undetectable troponin.  EKG revealed normal sinus rhythm with heart rate is 92 beats per minute.  CT head revealed questionable minimal subdural blood products along the phalanx without mass effect.  CT cervical spine revealed no acute bony injury of the cervical spine.  CT chest abdomen and pelvis with IV contrast revealed right transverse process fractures of L3 and L4, small left pleural effusion, cirrhotic liver with right hepatic lobe mass suspicious for HCC, and small volume ascites.  Pelvis x-ray revealed no acute findings.  Chest x-ray revealed mild left basilar opacities with a small left pleural effusion. Scalp laceration was repaired in ED.  Neurosurgery was consulted.  Patient was admitted to trauma services.  Neurosurgery recommended no surgical intervention, Keppra for seizure prophylaxis, and blood pressure less than 150/90.  Follow-up CT head on 07/19 revealed no subdural hematoma.  Therapy services were consulted.  Hospital medicine was consulted for transition of care and further medical management.  PT/OT consulted; recommending moderate intensity therapy.  Patient was noted to be agitated and restless attempting to climb out of bed for which p.r.n. IV Haldol was ordered.     Interval Hx:   Patient remains  1 to 1. Some agitation overnight. Vitals stable     Objective/physical exam:  General:  no acute distress  Respiratory: unlabored breathing   Cardiovascular: normal rate  Gastrointestinal: soft, non-tender  Neuro: Alert, responding appropriately, following commands, moving all ext     VITAL SIGNS: 24 HRS MIN & MAX LAST   Temp  Min: 97.3 °F (36.3 °C)  Max: 98.1 °F (36.7 °C) 97.9 °F (36.6 °C)   BP  Min: 114/57  Max: 146/67 (!) 114/57   Pulse  Min: 65  Max: 75  68   Resp  Min: 18  Max: 18 18   SpO2  Min: 96 %  Max: 99 % 96 %       Recent Labs   Lab 07/21/24  0635 07/23/24  1307 07/25/24  0522   WBC 2.29* 2.35* 2.82*   RBC 3.20* 3.34* 3.29*   HGB 9.7* 10.4* 10.0*   HCT 29.9* 31.2* 31.9*   MCV 93.4 93.4 97.0*   MCH 30.3 31.1* 30.4   MCHC 32.4* 33.3 31.3*   RDW 14.9 14.8 14.7   PLT 60* 66* 69*   MPV 11.9* 11.0* 11.5*       Recent Labs   Lab 07/19/24  0743 07/20/24  0456 07/21/24  0635 07/23/24  1307 07/25/24  0522    142 140 140 138   K 3.7 3.8 4.0 4.1 4.1   * 116* 113* 111* 113*   CO2 21* 20* 23 25 19*   BUN 15.1 14.8 15.2 18.1 20.8   CREATININE 1.07 0.89 1.09 1.11 1.06   CALCIUM 8.3* 8.3* 8.5* 8.8 8.5*   MG 1.70 1.80  --  1.70  --    ALBUMIN 3.0* 2.9* 2.9* 3.0* 3.0*   ALKPHOS 124 119 124 137 145   ALT 29 27 28 26 30   AST 38* 42* 43* 42* 51*   BILITOT 0.8 0.9 1.0 0.8 0.8          Microbiology Results (last 7 days)       ** No results found for the last 168 hours. **             Radiology:  CT Head Without Contrast  Narrative: EXAMINATION:  CT HEAD WITHOUT CONTRAST    CLINICAL HISTORY:  fu SDH;    TECHNIQUE:  Multiple axial images were obtained from the base of the brain to the vertex without contrast administration.  Sagittal and coronal reconstructions were performed. .Automatic exposure control  (AEC) is utilized to reduce patient radiation exposure.    COMPARISON:  None    FINDINGS:  There is no intracranial mass or lesion seen.  No hemorrhage is seen.  No subdural hemorrhage is seen along the falx on  today's examination.  No infarct is seen.  The ventricles and basilar cisterns appear normal.  Brain parenchyma appears grossly unremarkable.    Posterior fossa appears normal.  The calvarium is intact.  The paranasal sinuses appear grossly unremarkable.  Impression: No subdural hematoma seen today's examination.    Electronically signed by: Robin Ellis  Date:    07/19/2024  Time:    15:00  X-ray Shoulder 2 or More Views Right  Narrative: EXAMINATION:  XR SHOULDER COMPLETE 2 OR MORE VIEWS RIGHT    CLINICAL HISTORY:  Fall;    TECHNIQUE:  Three views.    COMPARISON:  None available.    FINDINGS:  Articular surfaces alignment is preserved.  No acute fracture or dislocation identified.  Calcification adjacent to the greater tuberosity reflects calcific tendinopathy.  There is narrowed acromial head and acromion interval which raises the possibility of rotator cuff arthropathy.  Impression: No osseous abnormality identified.    Electronically signed by: David Erazo  Date:    07/19/2024  Time:    09:19  CT Chest Abdomen Pelvis With IV Contrast (XPD) NO Oral Contrast  Narrative: EXAMINATION:  CT CHEST ABDOMEN PELVIS WITH IV CONTRAST (XPD)    CLINICAL HISTORY:  Polytrauma, blunt;    TECHNIQUE:  Helical acquisition from the thoracic inlet through the ischia with  IV contrast. Three plane reconstructions made available for review.  mGycm. Automatic exposure control, adjustment of mA/kV or iterative reconstruction technique was used to reduce radiation.    COMPARISON:  None available.    FINDINGS:  Chest.    Heart size upper limit normal.  No pericardial effusion.  There are coronary artery calcifications.    There is no mediastinal hematoma.  No enlarged thoracic lymph nodes.    There is a small left pleural effusion.  No pneumothorax seen.  There is some atelectasis or scarring left lung base.  Mild chronic changes of the lungs elsewhere.    Abdomen and pelvis.    Cirrhotic liver.  There is a 4 cm hyperenhancing  lesion in segment 5 image 126 series 2.  The portal vein is patent.  Prominent paraumbilical vein.  There are gallstones.  No significant biliary ductal dilatation.    The spleen is mildly enlarged.  No significant abnormality of the pancreas or adrenals.  No hydronephrosis.  The low lying malrotated right kidney.    There is no bowel obstruction or free air.  No suspicious bowel wall thickening.  Small volume ascites.    Urinary bladder is unremarkable.  The abdominal aorta is normal in caliber.  Moderate atherosclerotic disease.  Left inguinal hernia containing some fluid.    There are fractures right transverse processes L3 and L4.  There are old bilateral rib fractures.  Moderate degenerative change of the spine.  Impression: 1. Right transverse process fractures L3 and L4.  2. Small left pleural effusion.  3. Cirrhotic liver with right hepatic lobe mass suspicious for HCC.  Recommend outpatient liver mass protocol CT.  4. Small volume ascites.    Electronically signed by: Surinder Danielle  Date:    07/19/2024  Time:    07:32  CT Head Without Contrast  Narrative: EXAMINATION:  CT HEAD WITHOUT CONTRAST    CLINICAL HISTORY:  Head trauma, minor (Age >= 65y);    TECHNIQUE:  CT imaging of the head performed from the skull base to the vertex without intravenous contrast. DLP 1387 mGycm. Automatic exposure control, adjustment of mA/kV or iterative reconstruction technique was used to reduce radiation.    COMPARISON:  None Available.    FINDINGS:  Questionable minimal subpleural blood products along the falx measuring only 1-2 mm maximally.  No mass effect.  There is mild patchy hypoattenuation in the cerebral white matter which is nonspecific but most commonly associated with chronic small vessel ischemic changes.  There is left cerebellar and left occipital encephalomalacia.  The ventricles are not significantly enlarged.  There are vascular calcifications.    Visualized paranasal sinuses and mastoid air cells are clear.  The calvarium is grossly intact.  There is some soft tissue swelling over the right frontal scalp.  Impression: Question minimal subdural blood products along the falx without mass effect.    Findings discussed with Dr. Maddox at 713 on 7/19/2024.    Electronically signed by: Surinder Danielle  Date:    07/19/2024  Time:    07:14  CT Cervical Spine Without Contrast  Narrative: EXAMINATION:  CT CERVICAL SPINE WITHOUT CONTRAST    CLINICAL HISTORY:  Neck trauma (Age >= 65y);    TECHNIQUE:  Helical acquisition through the cervical spine without IV contrast. Three plane reconstructions were made available for review. DLP 1387 mGycm. Automatic exposure control, adjustment of mA/kV or iterative reconstruction technique was used to reduce radiation.    COMPARISON:  None available.    FINDINGS:  No fractures identified.  There are mild degenerative alignment abnormalities.  Moderate degenerative changes.  Craniocervical junction and C1-C2 relationship are normal. The odontoid is intact. There is limited evaluation of the soft tissues. No prevertebral soft tissue swelling. Ligamentous injury cannot be excluded with CT.  There are vascular calcifications.  Impression: No acute bony injury of the cervical spine.    Electronically signed by: Surinder Danielle  Date:    07/19/2024  Time:    07:13  X-Ray Pelvis Routine AP  Narrative: EXAMINATION:  XR PELVIS ROUTINE AP    CLINICAL HISTORY:  Unspecified fall, initial encounter    COMPARISON:  None    FINDINGS:  Frontal image of the pelvis demonstrates no fracture or dislocation  Impression: No acute findings.    Electronically signed by: Surinder Danielle  Date:    07/19/2024  Time:    07:10  X-Ray Chest 1 View  Narrative: EXAMINATION:  XR CHEST 1 VIEW    CLINICAL HISTORY:  Unspecified fall, initial encounter    COMPARISON:  26 April 2024    FINDINGS:  Frontal view of the chest was obtained. The heart is not significantly enlarged.  There are mild left basilar opacities with a small left pleural  effusion suspected.  There is no pneumothorax.  Impression: Mild left basilar opacities with a small left pleural effusion.    Electronically signed by: Surinder Danielle  Date:    07/19/2024  Time:    06:55        Medications:  Scheduled Meds:   amLODIPine  10 mg Oral Daily    amLODIPine  5 mg Oral Once    docusate sodium  100 mg Oral BID    enoxparin  40 mg Subcutaneous Q12H (prophylaxis, 0900/2100)    LIDOcaine  1 patch Transdermal Q24H    methocarbamoL  500 mg Oral BID    polyethylene glycol  17 g Oral BID     Continuous Infusions:  PRN Meds:.  Current Facility-Administered Medications:     dextrose 10%, 12.5 g, Intravenous, PRN    dextrose 10%, 25 g, Intravenous, PRN    glucagon (human recombinant), 1 mg, Intramuscular, PRN    glucose, 16 g, Oral, PRN    glucose, 24 g, Oral, PRN    haloperidol lactate, 5 mg, Intramuscular, Q6H PRN    hydrALAZINE, 10 mg, Intravenous, Q6H PRN    insulin aspart U-100, 0-5 Units, Subcutaneous, QID (AC + HS) PRN    magnesium hydroxide 400 mg/5 ml, 30 mL, Oral, Daily PRN    melatonin, 6 mg, Oral, Nightly PRN    oxyCODONE, 5 mg, Oral, Q4H PRN        Assessment/Plan:   S/p ground level fall  Questionable subdural along the phalanx-no ICH on repeat scan  Right transverse process fractures of L3 and L4   Small left pleural effusion   Cirrhotic liver with right hepatic lobe mass suspicious for HCC  Agitation  Dementia?  History of alcoholic cirrhosis, hepatitis-C, diabetes mellitus type 2, and CVA    DC 1:1. OK for   Prn anxiolytics/sedative  NSY signed off.  No planned intervention  Continue PT/OT  CM working on SNF placement.  Monitor vitals. Labs stable.  Repeat Monday unless any acute changes.  There is mention of possible home with home health.  Will follow up further CM discussions with family.      Osmel Thompson MD   07/26/2024     All diagnosis and differential diagnosis have been reviewed; assessment and plan has been documented; I have personally reviewed the labs and test results  that are presently available; I have reviewed the patients medication list; I have reviewed the consulting providers response and recommendations. I have reviewed or attempted to review medical records based upon their availability    All of the patient's questions have been  addressed and answered. Patient's is agreeable to the above stated plan. I will continue to monitor closely and make adjustments to medical management as needed.  _____________________________________________________________________

## 2024-07-27 LAB — POCT GLUCOSE: 234 MG/DL (ref 70–110)

## 2024-07-27 PROCEDURE — 63600175 PHARM REV CODE 636 W HCPCS

## 2024-07-27 PROCEDURE — 63600175 PHARM REV CODE 636 W HCPCS: Performed by: NURSE PRACTITIONER

## 2024-07-27 PROCEDURE — 25000003 PHARM REV CODE 250: Performed by: INTERNAL MEDICINE

## 2024-07-27 PROCEDURE — 25000003 PHARM REV CODE 250: Performed by: STUDENT IN AN ORGANIZED HEALTH CARE EDUCATION/TRAINING PROGRAM

## 2024-07-27 PROCEDURE — 25000003 PHARM REV CODE 250: Performed by: NURSE PRACTITIONER

## 2024-07-27 PROCEDURE — 11000001 HC ACUTE MED/SURG PRIVATE ROOM

## 2024-07-27 PROCEDURE — 21400001 HC TELEMETRY ROOM

## 2024-07-27 PROCEDURE — 63600175 PHARM REV CODE 636 W HCPCS: Performed by: STUDENT IN AN ORGANIZED HEALTH CARE EDUCATION/TRAINING PROGRAM

## 2024-07-27 RX ORDER — TALC
9 POWDER (GRAM) TOPICAL NIGHTLY
Status: DISCONTINUED | OUTPATIENT
Start: 2024-07-27 | End: 2024-08-23 | Stop reason: HOSPADM

## 2024-07-27 RX ORDER — QUETIAPINE FUMARATE 100 MG/1
100 TABLET, FILM COATED ORAL NIGHTLY
Status: DISCONTINUED | OUTPATIENT
Start: 2024-07-27 | End: 2024-07-27

## 2024-07-27 RX ORDER — HALOPERIDOL 2 MG/1
2 TABLET ORAL NIGHTLY
Status: DISCONTINUED | OUTPATIENT
Start: 2024-07-27 | End: 2024-07-28

## 2024-07-27 RX ADMIN — QUETIAPINE FUMARATE 100 MG: 100 TABLET ORAL at 08:07

## 2024-07-27 RX ADMIN — Medication 9 MG: at 09:07

## 2024-07-27 RX ADMIN — DOCUSATE SODIUM 100 MG: 100 CAPSULE, LIQUID FILLED ORAL at 08:07

## 2024-07-27 RX ADMIN — LIDOCAINE PATCH 5% 1 PATCH: 700 PATCH TOPICAL at 11:07

## 2024-07-27 RX ADMIN — POLYETHYLENE GLYCOL 3350 17 G: 17 POWDER, FOR SOLUTION ORAL at 09:07

## 2024-07-27 RX ADMIN — ENOXAPARIN SODIUM 40 MG: 40 INJECTION SUBCUTANEOUS at 09:07

## 2024-07-27 RX ADMIN — METHOCARBAMOL 500 MG: 500 TABLET ORAL at 08:07

## 2024-07-27 RX ADMIN — ENOXAPARIN SODIUM 40 MG: 40 INJECTION SUBCUTANEOUS at 08:07

## 2024-07-27 RX ADMIN — HALOPERIDOL 2 MG: 2 TABLET ORAL at 09:07

## 2024-07-27 RX ADMIN — AMLODIPINE BESYLATE 10 MG: 5 TABLET ORAL at 08:07

## 2024-07-27 RX ADMIN — INSULIN ASPART 2 UNITS: 100 INJECTION, SOLUTION INTRAVENOUS; SUBCUTANEOUS at 11:07

## 2024-07-27 RX ADMIN — HALOPERIDOL LACTATE 5 MG: 5 INJECTION, SOLUTION INTRAMUSCULAR at 01:07

## 2024-07-27 RX ADMIN — DOCUSATE SODIUM 100 MG: 100 CAPSULE, LIQUID FILLED ORAL at 09:07

## 2024-07-27 RX ADMIN — METHOCARBAMOL 500 MG: 500 TABLET ORAL at 09:07

## 2024-07-27 RX ADMIN — HALOPERIDOL LACTATE 5 MG: 5 INJECTION, SOLUTION INTRAMUSCULAR at 03:07

## 2024-07-27 NOTE — PROGRESS NOTES
[facility]  Hospital Medicine Progress Note        Chief Complaint: Inpatient Follow-up     HPI:   74 y.o. male with a past medical history of alcoholic cirrhosis, hepatitis-C, diabetes mellitus type 2, and CVA who presented to [facility] on 7/19/2024 via EMS following fall.  EMS reported patient was hypoglycemic with CBG of 44.  C-collar was placed en route and 250 mL of D5W was given.  Patient reportedly slipped and fell, striking his head. Initial vital signs in ED were /77, pulse 72, respirations 16, temperature 36.9° C, and SpO2 90% on room air.  Labs revealed WBC 3.29, RBC 3.22, hemoglobin 9.8, hematocrit 30.4, MCV 94.4, chloride 112, CO2 21, glucose 166, and undetectable troponin.  EKG revealed normal sinus rhythm with heart rate is 92 beats per minute.  CT head revealed questionable minimal subdural blood products along the phalanx without mass effect.  CT cervical spine revealed no acute bony injury of the cervical spine.  CT chest abdomen and pelvis with IV contrast revealed right transverse process fractures of L3 and L4, small left pleural effusion, cirrhotic liver with right hepatic lobe mass suspicious for HCC, and small volume ascites.  Pelvis x-ray revealed no acute findings.  Chest x-ray revealed mild left basilar opacities with a small left pleural effusion. Scalp laceration was repaired in ED.  Neurosurgery was consulted.  Patient was admitted to trauma services.  Neurosurgery recommended no surgical intervention, Keppra for seizure prophylaxis, and blood pressure less than 150/90.  Follow-up CT head on 07/19 revealed no subdural hematoma.  Therapy services were consulted.  Hospital medicine was consulted for transition of care and further medical management.  PT/OT consulted; recommending moderate intensity therapy.  Patient was noted to be agitated and restless attempting to climb out of bed for which p.r.n. IV Haldol was ordered.     Interval Hx:   Patient remains  1 to 1. Required restraints overnight.  He is alert and oriented to self, place, time.  Denies any complaints today.  Can remove restraints this morning so that the patient may eat    Objective/physical exam:  General:  no acute distress, staples in place over occipital lack  Respiratory: unlabored breathing   Cardiovascular: normal rate  Gastrointestinal: soft, non-tender  Neuro: Alert, responding appropriately, following commands, moving all ext     VITAL SIGNS: 24 HRS MIN & MAX LAST   Temp  Min: 97.6 °F (36.4 °C)  Max: 98 °F (36.7 °C) 97.6 °F (36.4 °C)   BP  Min: 128/63  Max: 162/74 (!) 162/74   Pulse  Min: 73  Max: 86  86   Resp  Min: 17  Max: 20 18   SpO2  Min: 96 %  Max: 99 % 96 %       Recent Labs   Lab 07/21/24  0635 07/23/24  1307 07/25/24  0522   WBC 2.29* 2.35* 2.82*   RBC 3.20* 3.34* 3.29*   HGB 9.7* 10.4* 10.0*   HCT 29.9* 31.2* 31.9*   MCV 93.4 93.4 97.0*   MCH 30.3 31.1* 30.4   MCHC 32.4* 33.3 31.3*   RDW 14.9 14.8 14.7   PLT 60* 66* 69*   MPV 11.9* 11.0* 11.5*       Recent Labs   Lab 07/21/24  0635 07/23/24  1307 07/25/24  0522    140 138   K 4.0 4.1 4.1   * 111* 113*   CO2 23 25 19*   BUN 15.2 18.1 20.8   CREATININE 1.09 1.11 1.06   CALCIUM 8.5* 8.8 8.5*   MG  --  1.70  --    ALBUMIN 2.9* 3.0* 3.0*   ALKPHOS 124 137 145   ALT 28 26 30   AST 43* 42* 51*   BILITOT 1.0 0.8 0.8          Microbiology Results (last 7 days)       ** No results found for the last 168 hours. **             Radiology:  CT Head Without Contrast  Narrative: EXAMINATION:  CT HEAD WITHOUT CONTRAST    CLINICAL HISTORY:  fu SDH;    TECHNIQUE:  Multiple axial images were obtained from the base of the brain to the vertex without contrast administration.  Sagittal and coronal reconstructions were performed. .Automatic exposure control  (AEC) is utilized to reduce patient radiation exposure.    COMPARISON:  None    FINDINGS:  There is no intracranial mass or lesion seen.  No hemorrhage is seen.  No subdural hemorrhage is  seen along the falx on today's examination.  No infarct is seen.  The ventricles and basilar cisterns appear normal.  Brain parenchyma appears grossly unremarkable.    Posterior fossa appears normal.  The calvarium is intact.  The paranasal sinuses appear grossly unremarkable.  Impression: No subdural hematoma seen today's examination.    Electronically signed by: Robin Ellis  Date:    07/19/2024  Time:    15:00  X-ray Shoulder 2 or More Views Right  Narrative: EXAMINATION:  XR SHOULDER COMPLETE 2 OR MORE VIEWS RIGHT    CLINICAL HISTORY:  Fall;    TECHNIQUE:  Three views.    COMPARISON:  None available.    FINDINGS:  Articular surfaces alignment is preserved.  No acute fracture or dislocation identified.  Calcification adjacent to the greater tuberosity reflects calcific tendinopathy.  There is narrowed acromial head and acromion interval which raises the possibility of rotator cuff arthropathy.  Impression: No osseous abnormality identified.    Electronically signed by: David Erazo  Date:    07/19/2024  Time:    09:19  CT Chest Abdomen Pelvis With IV Contrast (XPD) NO Oral Contrast  Narrative: EXAMINATION:  CT CHEST ABDOMEN PELVIS WITH IV CONTRAST (XPD)    CLINICAL HISTORY:  Polytrauma, blunt;    TECHNIQUE:  Helical acquisition from the thoracic inlet through the ischia with  IV contrast. Three plane reconstructions made available for review.  mGycm. Automatic exposure control, adjustment of mA/kV or iterative reconstruction technique was used to reduce radiation.    COMPARISON:  None available.    FINDINGS:  Chest.    Heart size upper limit normal.  No pericardial effusion.  There are coronary artery calcifications.    There is no mediastinal hematoma.  No enlarged thoracic lymph nodes.    There is a small left pleural effusion.  No pneumothorax seen.  There is some atelectasis or scarring left lung base.  Mild chronic changes of the lungs elsewhere.    Abdomen and pelvis.    Cirrhotic liver.  There is a  4 cm hyperenhancing lesion in segment 5 image 126 series 2.  The portal vein is patent.  Prominent paraumbilical vein.  There are gallstones.  No significant biliary ductal dilatation.    The spleen is mildly enlarged.  No significant abnormality of the pancreas or adrenals.  No hydronephrosis.  The low lying malrotated right kidney.    There is no bowel obstruction or free air.  No suspicious bowel wall thickening.  Small volume ascites.    Urinary bladder is unremarkable.  The abdominal aorta is normal in caliber.  Moderate atherosclerotic disease.  Left inguinal hernia containing some fluid.    There are fractures right transverse processes L3 and L4.  There are old bilateral rib fractures.  Moderate degenerative change of the spine.  Impression: 1. Right transverse process fractures L3 and L4.  2. Small left pleural effusion.  3. Cirrhotic liver with right hepatic lobe mass suspicious for HCC.  Recommend outpatient liver mass protocol CT.  4. Small volume ascites.    Electronically signed by: Surinder Danielle  Date:    07/19/2024  Time:    07:32  CT Head Without Contrast  Narrative: EXAMINATION:  CT HEAD WITHOUT CONTRAST    CLINICAL HISTORY:  Head trauma, minor (Age >= 65y);    TECHNIQUE:  CT imaging of the head performed from the skull base to the vertex without intravenous contrast. DLP 1387 mGycm. Automatic exposure control, adjustment of mA/kV or iterative reconstruction technique was used to reduce radiation.    COMPARISON:  None Available.    FINDINGS:  Questionable minimal subpleural blood products along the falx measuring only 1-2 mm maximally.  No mass effect.  There is mild patchy hypoattenuation in the cerebral white matter which is nonspecific but most commonly associated with chronic small vessel ischemic changes.  There is left cerebellar and left occipital encephalomalacia.  The ventricles are not significantly enlarged.  There are vascular calcifications.    Visualized paranasal sinuses and mastoid  air cells are clear. The calvarium is grossly intact.  There is some soft tissue swelling over the right frontal scalp.  Impression: Question minimal subdural blood products along the falx without mass effect.    Findings discussed with Dr. Maddox at 713 on 7/19/2024.    Electronically signed by: Surinder Danielle  Date:    07/19/2024  Time:    07:14  CT Cervical Spine Without Contrast  Narrative: EXAMINATION:  CT CERVICAL SPINE WITHOUT CONTRAST    CLINICAL HISTORY:  Neck trauma (Age >= 65y);    TECHNIQUE:  Helical acquisition through the cervical spine without IV contrast. Three plane reconstructions were made available for review. DLP 1387 mGycm. Automatic exposure control, adjustment of mA/kV or iterative reconstruction technique was used to reduce radiation.    COMPARISON:  None available.    FINDINGS:  No fractures identified.  There are mild degenerative alignment abnormalities.  Moderate degenerative changes.  Craniocervical junction and C1-C2 relationship are normal. The odontoid is intact. There is limited evaluation of the soft tissues. No prevertebral soft tissue swelling. Ligamentous injury cannot be excluded with CT.  There are vascular calcifications.  Impression: No acute bony injury of the cervical spine.    Electronically signed by: Surinder Danielle  Date:    07/19/2024  Time:    07:13  X-Ray Pelvis Routine AP  Narrative: EXAMINATION:  XR PELVIS ROUTINE AP    CLINICAL HISTORY:  Unspecified fall, initial encounter    COMPARISON:  None    FINDINGS:  Frontal image of the pelvis demonstrates no fracture or dislocation  Impression: No acute findings.    Electronically signed by: Surinder Danielle  Date:    07/19/2024  Time:    07:10  X-Ray Chest 1 View  Narrative: EXAMINATION:  XR CHEST 1 VIEW    CLINICAL HISTORY:  Unspecified fall, initial encounter    COMPARISON:  26 April 2024    FINDINGS:  Frontal view of the chest was obtained. The heart is not significantly enlarged.  There are mild left basilar opacities with  a small left pleural effusion suspected.  There is no pneumothorax.  Impression: Mild left basilar opacities with a small left pleural effusion.    Electronically signed by: Surinder Danielle  Date:    07/19/2024  Time:    06:55        Medications:  Scheduled Meds:   amLODIPine  10 mg Oral Daily    docusate sodium  100 mg Oral BID    enoxparin  40 mg Subcutaneous Q12H (prophylaxis, 0900/2100)    haloperidoL  2 mg Oral QHS    LIDOcaine  1 patch Transdermal Q24H    melatonin  9 mg Oral Nightly    methocarbamoL  500 mg Oral BID    polyethylene glycol  17 g Oral BID     Continuous Infusions:  PRN Meds:.  Current Facility-Administered Medications:     dextrose 10%, 12.5 g, Intravenous, PRN    dextrose 10%, 25 g, Intravenous, PRN    glucagon (human recombinant), 1 mg, Intramuscular, PRN    glucose, 16 g, Oral, PRN    glucose, 24 g, Oral, PRN    haloperidol lactate, 5 mg, Intramuscular, Q6H PRN    hydrALAZINE, 10 mg, Intravenous, Q6H PRN    insulin aspart U-100, 0-5 Units, Subcutaneous, QID (AC + HS) PRN    magnesium hydroxide 400 mg/5 ml, 30 mL, Oral, Daily PRN    oxyCODONE, 5 mg, Oral, Q4H PRN        Assessment/Plan:   S/p ground level fall  Questionable subdural along the phalanx-no ICH on repeat scan  Right transverse process fractures of L3 and L4   Small left pleural effusion   Cirrhotic liver with right hepatic lobe mass suspicious for HCC  Agitation vs Dementia vs Delirium   History of alcoholic cirrhosis, hepatitis-C, diabetes mellitus type 2, and CVA    Currently 1:1  Seroquel not working overnight, discontinue and schedule Haldol and melatonin q.h.s.  Pending UA to evaluate for pathology contributing to delirium  Prn anxiolytics/sedative  NSY signed off.  No planned intervention  Continue PT/OT  CM working on SNF placement.  Labs stable.  Repeat Monday unless any acute changes.  There is mention of possible home with home health.  Will follow up further CM discussions with family.      Thairy G Reyes,    07/27/2024      All diagnosis and differential diagnosis have been reviewed; assessment and plan has been documented; I have personally reviewed the labs and test results that are presently available; I have reviewed the patients medication list; I have reviewed the consulting providers response and recommendations. I have reviewed or attempted to review medical records based upon their availability    All of the patient's questions have been  addressed and answered. Patient's is agreeable to the above stated plan. I will continue to monitor closely and make adjustments to medical management as needed.  _____________________________________________________________________

## 2024-07-27 NOTE — PLAN OF CARE
Problem: Adult Inpatient Plan of Care  Goal: Plan of Care Review  Outcome: Progressing     Problem: Wound  Goal: Absence of Infection Signs and Symptoms  Outcome: Progressing     Problem: Fall Injury Risk  Goal: Absence of Fall and Fall-Related Injury  Outcome: Progressing     Problem: Skin Injury Risk Increased  Goal: Skin Health and Integrity  Outcome: Progressing

## 2024-07-27 NOTE — NURSING
Pt repeatedly climbing out of bed despite education from multiple staff members not to get up without assistance due to fall risk. Placed back on 1:1 d/t nonrediractable confusion & aggitation. Becoming verbally and physically aggressive with staff. PRN haldol minimally effective.     Dr. Licea rounding on unit- verbal order received for 2 point SWR.

## 2024-07-27 NOTE — NURSING
Nurses Note -- 4 Eyes      7/27/2024   12:22 AM      Skin assessed during: Q Shift Change      [] No Altered Skin Integrity Present    []Prevention Measures Documented      [x] Yes- Altered Skin Integrity Present or Discovered   [] LDA Added if Not in Epic (Describe Wound)   [] New Altered Skin Integrity was Present on Admit and Documented in LDA   [] Wound Image Taken    Wound Care Consulted? No    Attending Nurse:  Berna Bennett RN/Staff Member:   Meera

## 2024-07-28 LAB
BACTERIA #/AREA URNS AUTO: ABNORMAL /HPF
BILIRUB UR QL STRIP.AUTO: NEGATIVE
CLARITY UR: CLEAR
COLOR UR AUTO: ABNORMAL
GLUCOSE UR QL STRIP: NORMAL
HGB UR QL STRIP: NEGATIVE
KETONES UR QL STRIP: NEGATIVE
LEUKOCYTE ESTERASE UR QL STRIP: NEGATIVE
NITRITE UR QL STRIP: NEGATIVE
PH UR STRIP: 6.5 [PH]
POCT GLUCOSE: 125 MG/DL (ref 70–110)
POCT GLUCOSE: 151 MG/DL (ref 70–110)
POCT GLUCOSE: 211 MG/DL (ref 70–110)
PROT UR QL STRIP: NEGATIVE
RBC #/AREA URNS AUTO: ABNORMAL /HPF
SP GR UR STRIP.AUTO: 1.02 (ref 1–1.03)
SQUAMOUS #/AREA URNS LPF: ABNORMAL /HPF
UROBILINOGEN UR STRIP-ACNC: 2
WBC #/AREA URNS AUTO: ABNORMAL /HPF

## 2024-07-28 PROCEDURE — 81001 URINALYSIS AUTO W/SCOPE: CPT | Performed by: STUDENT IN AN ORGANIZED HEALTH CARE EDUCATION/TRAINING PROGRAM

## 2024-07-28 PROCEDURE — 94799 UNLISTED PULMONARY SVC/PX: CPT

## 2024-07-28 PROCEDURE — 25000003 PHARM REV CODE 250: Performed by: INTERNAL MEDICINE

## 2024-07-28 PROCEDURE — 63600175 PHARM REV CODE 636 W HCPCS

## 2024-07-28 PROCEDURE — 63600175 PHARM REV CODE 636 W HCPCS: Performed by: NURSE PRACTITIONER

## 2024-07-28 PROCEDURE — 25000003 PHARM REV CODE 250: Performed by: NURSE PRACTITIONER

## 2024-07-28 PROCEDURE — 21400001 HC TELEMETRY ROOM

## 2024-07-28 PROCEDURE — 25000003 PHARM REV CODE 250: Performed by: STUDENT IN AN ORGANIZED HEALTH CARE EDUCATION/TRAINING PROGRAM

## 2024-07-28 PROCEDURE — 11000001 HC ACUTE MED/SURG PRIVATE ROOM

## 2024-07-28 PROCEDURE — 51701 INSERT BLADDER CATHETER: CPT

## 2024-07-28 RX ORDER — HALOPERIDOL 5 MG/ML
5 INJECTION INTRAMUSCULAR ONCE
Status: COMPLETED | OUTPATIENT
Start: 2024-07-28 | End: 2024-07-29

## 2024-07-28 RX ORDER — HALOPERIDOL 1 MG/1
1 TABLET ORAL NIGHTLY
Status: DISCONTINUED | OUTPATIENT
Start: 2024-07-28 | End: 2024-07-29

## 2024-07-28 RX ADMIN — METHOCARBAMOL 500 MG: 500 TABLET ORAL at 09:07

## 2024-07-28 RX ADMIN — DOCUSATE SODIUM 100 MG: 100 CAPSULE, LIQUID FILLED ORAL at 10:07

## 2024-07-28 RX ADMIN — LIDOCAINE PATCH 5% 1 PATCH: 700 PATCH TOPICAL at 10:07

## 2024-07-28 RX ADMIN — AMLODIPINE BESYLATE 10 MG: 5 TABLET ORAL at 10:07

## 2024-07-28 RX ADMIN — HALOPERIDOL 1 MG: 1 TABLET ORAL at 09:07

## 2024-07-28 RX ADMIN — METHOCARBAMOL 500 MG: 500 TABLET ORAL at 10:07

## 2024-07-28 RX ADMIN — Medication 9 MG: at 09:07

## 2024-07-28 RX ADMIN — INSULIN ASPART 2 UNITS: 100 INJECTION, SOLUTION INTRAVENOUS; SUBCUTANEOUS at 05:07

## 2024-07-28 RX ADMIN — POLYETHYLENE GLYCOL 3350 17 G: 17 POWDER, FOR SOLUTION ORAL at 10:07

## 2024-07-28 RX ADMIN — DOCUSATE SODIUM 100 MG: 100 CAPSULE, LIQUID FILLED ORAL at 09:07

## 2024-07-28 RX ADMIN — ENOXAPARIN SODIUM 40 MG: 40 INJECTION SUBCUTANEOUS at 10:07

## 2024-07-28 RX ADMIN — ENOXAPARIN SODIUM 40 MG: 40 INJECTION SUBCUTANEOUS at 09:07

## 2024-07-28 NOTE — PROGRESS NOTES
[facility]  Hospital Medicine Progress Note      Chief Complaint: Inpatient Follow-up     HPI:   74 y.o. male with a past medical history of alcoholic cirrhosis, hepatitis-C, diabetes mellitus type 2, and CVA who presented to [facility] on 7/19/2024 via EMS following fall.  EMS reported patient was hypoglycemic with CBG of 44.  C-collar was placed en route and 250 mL of D5W was given.  Patient reportedly slipped and fell, striking his head. Initial vital signs in ED were /77, pulse 72, respirations 16, temperature 36.9° C, and SpO2 90% on room air.  Labs revealed WBC 3.29, RBC 3.22, hemoglobin 9.8, hematocrit 30.4, MCV 94.4, chloride 112, CO2 21, glucose 166, and undetectable troponin.  EKG revealed normal sinus rhythm with heart rate is 92 beats per minute.  CT head revealed questionable minimal subdural blood products along the phalanx without mass effect.  CT cervical spine revealed no acute bony injury of the cervical spine.  CT chest abdomen and pelvis with IV contrast revealed right transverse process fractures of L3 and L4, small left pleural effusion, cirrhotic liver with right hepatic lobe mass suspicious for HCC, and small volume ascites.  Pelvis x-ray revealed no acute findings.  Chest x-ray revealed mild left basilar opacities with a small left pleural effusion. Scalp laceration was repaired in ED.  Neurosurgery was consulted.  Patient was admitted to trauma services.  Neurosurgery recommended no surgical intervention, Keppra for seizure prophylaxis, and blood pressure less than 150/90.  Follow-up CT head on 07/19 revealed no subdural hematoma.  Therapy services were consulted.  Hospital medicine was consulted for transition of care and further medical management.  PT/OT consulted; recommending moderate intensity therapy.  Patient was noted to be agitated and restless attempting to climb out of bed for which p.r.n. IV Haldol was ordered.     Interval Hx:   Patient in  restraints on exam. However he is calm and re-directable. Discussed with charge nurse for restraints to be removed and / 1:1 to be placed instead. If pt does well with a 1:1 thru lunch pt can remain in room with  and no restraints. If any changes to his ability to follow direction, needs 1:1.     Objective/physical exam:  General:  no acute distress, staples in place over occipital lack  Respiratory: unlabored breathing   Cardiovascular: normal rate  Gastrointestinal: soft, non-tender  Neuro: Alert, responding appropriately, following commands, moving all ext     VITAL SIGNS: 24 HRS MIN & MAX LAST   Temp  Min: 97.5 °F (36.4 °C)  Max: 99.2 °F (37.3 °C) 97.8 °F (36.6 °C)   BP  Min: 142/75  Max: 166/74 (!) 147/76   Pulse  Min: 71  Max: 100  71   Resp  Min: 18  Max: 20 18   SpO2  Min: 95 %  Max: 97 % 97 %       Recent Labs   Lab 07/23/24  1307 07/25/24  0522   WBC 2.35* 2.82*   RBC 3.34* 3.29*   HGB 10.4* 10.0*   HCT 31.2* 31.9*   MCV 93.4 97.0*   MCH 31.1* 30.4   MCHC 33.3 31.3*   RDW 14.8 14.7   PLT 66* 69*   MPV 11.0* 11.5*       Recent Labs   Lab 07/23/24  1307 07/25/24  0522    138   K 4.1 4.1   * 113*   CO2 25 19*   BUN 18.1 20.8   CREATININE 1.11 1.06   CALCIUM 8.8 8.5*   MG 1.70  --    ALBUMIN 3.0* 3.0*   ALKPHOS 137 145   ALT 26 30   AST 42* 51*   BILITOT 0.8 0.8          Microbiology Results (last 7 days)       ** No results found for the last 168 hours. **             Radiology:  CT Head Without Contrast  Narrative: EXAMINATION:  CT HEAD WITHOUT CONTRAST    CLINICAL HISTORY:  fu SDH;    TECHNIQUE:  Multiple axial images were obtained from the base of the brain to the vertex without contrast administration.  Sagittal and coronal reconstructions were performed. .Automatic exposure control  (AEC) is utilized to reduce patient radiation exposure.    COMPARISON:  None    FINDINGS:  There is no intracranial mass or lesion seen.  No hemorrhage is seen.  No subdural hemorrhage is seen along the falx  on today's examination.  No infarct is seen.  The ventricles and basilar cisterns appear normal.  Brain parenchyma appears grossly unremarkable.    Posterior fossa appears normal.  The calvarium is intact.  The paranasal sinuses appear grossly unremarkable.  Impression: No subdural hematoma seen today's examination.    Electronically signed by: Robin Ellis  Date:    07/19/2024  Time:    15:00  X-ray Shoulder 2 or More Views Right  Narrative: EXAMINATION:  XR SHOULDER COMPLETE 2 OR MORE VIEWS RIGHT    CLINICAL HISTORY:  Fall;    TECHNIQUE:  Three views.    COMPARISON:  None available.    FINDINGS:  Articular surfaces alignment is preserved.  No acute fracture or dislocation identified.  Calcification adjacent to the greater tuberosity reflects calcific tendinopathy.  There is narrowed acromial head and acromion interval which raises the possibility of rotator cuff arthropathy.  Impression: No osseous abnormality identified.    Electronically signed by: David Erazo  Date:    07/19/2024  Time:    09:19  CT Chest Abdomen Pelvis With IV Contrast (XPD) NO Oral Contrast  Narrative: EXAMINATION:  CT CHEST ABDOMEN PELVIS WITH IV CONTRAST (XPD)    CLINICAL HISTORY:  Polytrauma, blunt;    TECHNIQUE:  Helical acquisition from the thoracic inlet through the ischia with  IV contrast. Three plane reconstructions made available for review.  mGycm. Automatic exposure control, adjustment of mA/kV or iterative reconstruction technique was used to reduce radiation.    COMPARISON:  None available.    FINDINGS:  Chest.    Heart size upper limit normal.  No pericardial effusion.  There are coronary artery calcifications.    There is no mediastinal hematoma.  No enlarged thoracic lymph nodes.    There is a small left pleural effusion.  No pneumothorax seen.  There is some atelectasis or scarring left lung base.  Mild chronic changes of the lungs elsewhere.    Abdomen and pelvis.    Cirrhotic liver.  There is a 4 cm hyperenhancing  lesion in segment 5 image 126 series 2.  The portal vein is patent.  Prominent paraumbilical vein.  There are gallstones.  No significant biliary ductal dilatation.    The spleen is mildly enlarged.  No significant abnormality of the pancreas or adrenals.  No hydronephrosis.  The low lying malrotated right kidney.    There is no bowel obstruction or free air.  No suspicious bowel wall thickening.  Small volume ascites.    Urinary bladder is unremarkable.  The abdominal aorta is normal in caliber.  Moderate atherosclerotic disease.  Left inguinal hernia containing some fluid.    There are fractures right transverse processes L3 and L4.  There are old bilateral rib fractures.  Moderate degenerative change of the spine.  Impression: 1. Right transverse process fractures L3 and L4.  2. Small left pleural effusion.  3. Cirrhotic liver with right hepatic lobe mass suspicious for HCC.  Recommend outpatient liver mass protocol CT.  4. Small volume ascites.    Electronically signed by: Surinder Danielle  Date:    07/19/2024  Time:    07:32  CT Head Without Contrast  Narrative: EXAMINATION:  CT HEAD WITHOUT CONTRAST    CLINICAL HISTORY:  Head trauma, minor (Age >= 65y);    TECHNIQUE:  CT imaging of the head performed from the skull base to the vertex without intravenous contrast. DLP 1387 mGycm. Automatic exposure control, adjustment of mA/kV or iterative reconstruction technique was used to reduce radiation.    COMPARISON:  None Available.    FINDINGS:  Questionable minimal subpleural blood products along the falx measuring only 1-2 mm maximally.  No mass effect.  There is mild patchy hypoattenuation in the cerebral white matter which is nonspecific but most commonly associated with chronic small vessel ischemic changes.  There is left cerebellar and left occipital encephalomalacia.  The ventricles are not significantly enlarged.  There are vascular calcifications.    Visualized paranasal sinuses and mastoid air cells are clear.  The calvarium is grossly intact.  There is some soft tissue swelling over the right frontal scalp.  Impression: Question minimal subdural blood products along the falx without mass effect.    Findings discussed with Dr. Maddox at 713 on 7/19/2024.    Electronically signed by: Surinder Danielle  Date:    07/19/2024  Time:    07:14  CT Cervical Spine Without Contrast  Narrative: EXAMINATION:  CT CERVICAL SPINE WITHOUT CONTRAST    CLINICAL HISTORY:  Neck trauma (Age >= 65y);    TECHNIQUE:  Helical acquisition through the cervical spine without IV contrast. Three plane reconstructions were made available for review. DLP 1387 mGycm. Automatic exposure control, adjustment of mA/kV or iterative reconstruction technique was used to reduce radiation.    COMPARISON:  None available.    FINDINGS:  No fractures identified.  There are mild degenerative alignment abnormalities.  Moderate degenerative changes.  Craniocervical junction and C1-C2 relationship are normal. The odontoid is intact. There is limited evaluation of the soft tissues. No prevertebral soft tissue swelling. Ligamentous injury cannot be excluded with CT.  There are vascular calcifications.  Impression: No acute bony injury of the cervical spine.    Electronically signed by: Surinder Danielle  Date:    07/19/2024  Time:    07:13  X-Ray Pelvis Routine AP  Narrative: EXAMINATION:  XR PELVIS ROUTINE AP    CLINICAL HISTORY:  Unspecified fall, initial encounter    COMPARISON:  None    FINDINGS:  Frontal image of the pelvis demonstrates no fracture or dislocation  Impression: No acute findings.    Electronically signed by: Surinder Danielle  Date:    07/19/2024  Time:    07:10  X-Ray Chest 1 View  Narrative: EXAMINATION:  XR CHEST 1 VIEW    CLINICAL HISTORY:  Unspecified fall, initial encounter    COMPARISON:  26 April 2024    FINDINGS:  Frontal view of the chest was obtained. The heart is not significantly enlarged.  There are mild left basilar opacities with a small left pleural  effusion suspected.  There is no pneumothorax.  Impression: Mild left basilar opacities with a small left pleural effusion.    Electronically signed by: Surinder Danielle  Date:    07/19/2024  Time:    06:55        Medications:  Scheduled Meds:   amLODIPine  10 mg Oral Daily    docusate sodium  100 mg Oral BID    enoxparin  40 mg Subcutaneous Q12H (prophylaxis, 0900/2100)    haloperidoL  1 mg Oral QHS    LIDOcaine  1 patch Transdermal Q24H    melatonin  9 mg Oral Nightly    methocarbamoL  500 mg Oral BID    polyethylene glycol  17 g Oral BID     Continuous Infusions:  PRN Meds:.  Current Facility-Administered Medications:     dextrose 10%, 12.5 g, Intravenous, PRN    dextrose 10%, 25 g, Intravenous, PRN    glucagon (human recombinant), 1 mg, Intramuscular, PRN    glucose, 16 g, Oral, PRN    glucose, 24 g, Oral, PRN    hydrALAZINE, 10 mg, Intravenous, Q6H PRN    insulin aspart U-100, 0-5 Units, Subcutaneous, QID (AC + HS) PRN    magnesium hydroxide 400 mg/5 ml, 30 mL, Oral, Daily PRN    oxyCODONE, 5 mg, Oral, Q4H PRN        Assessment/Plan:   S/p ground level fall  Questionable subdural along the phalanx-no ICH on repeat scan  Right transverse process fractures of L3 and L4   Small left pleural effusion   Cirrhotic liver with right hepatic lobe mass suspicious for HCC  Agitation vs Dementia vs Delirium   History of alcoholic cirrhosis, hepatitis-C, diabetes mellitus type 2, and CVA    Currently 1:1  Doing well with scheduled Haldol and melatonin q.h.s.--> reduced haldol from 2mg to 1 on 7/28  Pending UA to evaluate for pathology contributing to delirium--> needs in and out x1  Prn anxiolytics/sedative  NSY signed off.  No planned intervention  Continue PT/OT  CM working on SNF placement.  Labs stable.  Repeat Monday unless any acute changes.  There is mention of possible home with home health.  Will follow up further CM discussions with family.      Thairy G Reyes,    07/28/2024     All diagnosis and differential  diagnosis have been reviewed; assessment and plan has been documented; I have personally reviewed the labs and test results that are presently available; I have reviewed the patients medication list; I have reviewed the consulting providers response and recommendations. I have reviewed or attempted to review medical records based upon their availability    All of the patient's questions have been  addressed and answered. Patient's is agreeable to the above stated plan. I will continue to monitor closely and make adjustments to medical management as needed.  _____________________________________________________________________

## 2024-07-29 LAB
ANION GAP SERPL CALC-SCNC: 7 MEQ/L
BASOPHILS # BLD AUTO: 0.01 X10(3)/MCL
BASOPHILS NFR BLD AUTO: 0.4 %
BUN SERPL-MCNC: 21 MG/DL (ref 8.4–25.7)
CALCIUM SERPL-MCNC: 9.1 MG/DL (ref 8.8–10)
CHLORIDE SERPL-SCNC: 112 MMOL/L (ref 98–107)
CO2 SERPL-SCNC: 22 MMOL/L (ref 23–31)
CREAT SERPL-MCNC: 0.94 MG/DL (ref 0.73–1.18)
CREAT/UREA NIT SERPL: 22
EOSINOPHIL # BLD AUTO: 0.2 X10(3)/MCL (ref 0–0.9)
EOSINOPHIL NFR BLD AUTO: 7 %
ERYTHROCYTE [DISTWIDTH] IN BLOOD BY AUTOMATED COUNT: 14.6 % (ref 11.5–17)
GFR SERPLBLD CREATININE-BSD FMLA CKD-EPI: >60 ML/MIN/1.73/M2
GLUCOSE SERPL-MCNC: 134 MG/DL (ref 82–115)
HCT VFR BLD AUTO: 34.7 % (ref 42–52)
HGB BLD-MCNC: 11.4 G/DL (ref 14–18)
IMM GRANULOCYTES # BLD AUTO: 0 X10(3)/MCL (ref 0–0.04)
IMM GRANULOCYTES NFR BLD AUTO: 0 %
LYMPHOCYTES # BLD AUTO: 0.79 X10(3)/MCL (ref 0.6–4.6)
LYMPHOCYTES NFR BLD AUTO: 27.8 %
MCH RBC QN AUTO: 30.5 PG (ref 27–31)
MCHC RBC AUTO-ENTMCNC: 32.9 G/DL (ref 33–36)
MCV RBC AUTO: 92.8 FL (ref 80–94)
MONOCYTES # BLD AUTO: 0.36 X10(3)/MCL (ref 0.1–1.3)
MONOCYTES NFR BLD AUTO: 12.7 %
NEUTROPHILS # BLD AUTO: 1.48 X10(3)/MCL (ref 2.1–9.2)
NEUTROPHILS NFR BLD AUTO: 52.1 %
NRBC BLD AUTO-RTO: 0 %
PLATELET # BLD AUTO: 79 X10(3)/MCL (ref 130–400)
PMV BLD AUTO: 11.4 FL (ref 7.4–10.4)
POCT GLUCOSE: 146 MG/DL (ref 70–110)
POCT GLUCOSE: 147 MG/DL (ref 70–110)
POCT GLUCOSE: 228 MG/DL (ref 70–110)
POCT GLUCOSE: 256 MG/DL (ref 70–110)
POTASSIUM SERPL-SCNC: 4 MMOL/L (ref 3.5–5.1)
RBC # BLD AUTO: 3.74 X10(6)/MCL (ref 4.7–6.1)
SODIUM SERPL-SCNC: 141 MMOL/L (ref 136–145)
WBC # BLD AUTO: 2.84 X10(3)/MCL (ref 4.5–11.5)

## 2024-07-29 PROCEDURE — 25000003 PHARM REV CODE 250: Performed by: INTERNAL MEDICINE

## 2024-07-29 PROCEDURE — 25000003 PHARM REV CODE 250: Performed by: STUDENT IN AN ORGANIZED HEALTH CARE EDUCATION/TRAINING PROGRAM

## 2024-07-29 PROCEDURE — 63600175 PHARM REV CODE 636 W HCPCS: Performed by: NURSE PRACTITIONER

## 2024-07-29 PROCEDURE — 63600175 PHARM REV CODE 636 W HCPCS

## 2024-07-29 PROCEDURE — 85025 COMPLETE CBC W/AUTO DIFF WBC: CPT | Performed by: STUDENT IN AN ORGANIZED HEALTH CARE EDUCATION/TRAINING PROGRAM

## 2024-07-29 PROCEDURE — 11000001 HC ACUTE MED/SURG PRIVATE ROOM

## 2024-07-29 PROCEDURE — 97164 PT RE-EVAL EST PLAN CARE: CPT

## 2024-07-29 PROCEDURE — 21400001 HC TELEMETRY ROOM

## 2024-07-29 PROCEDURE — 25000003 PHARM REV CODE 250: Performed by: NURSE PRACTITIONER

## 2024-07-29 PROCEDURE — 80048 BASIC METABOLIC PNL TOTAL CA: CPT | Performed by: STUDENT IN AN ORGANIZED HEALTH CARE EDUCATION/TRAINING PROGRAM

## 2024-07-29 PROCEDURE — 36415 COLL VENOUS BLD VENIPUNCTURE: CPT | Performed by: STUDENT IN AN ORGANIZED HEALTH CARE EDUCATION/TRAINING PROGRAM

## 2024-07-29 PROCEDURE — 25000003 PHARM REV CODE 250

## 2024-07-29 RX ORDER — RISPERIDONE 0.25 MG/1
0.5 TABLET ORAL 2 TIMES DAILY
Status: DISCONTINUED | OUTPATIENT
Start: 2024-07-29 | End: 2024-08-21

## 2024-07-29 RX ADMIN — INSULIN ASPART 1 UNITS: 100 INJECTION, SOLUTION INTRAVENOUS; SUBCUTANEOUS at 10:07

## 2024-07-29 RX ADMIN — METHOCARBAMOL 500 MG: 500 TABLET ORAL at 08:07

## 2024-07-29 RX ADMIN — POLYETHYLENE GLYCOL 3350 17 G: 17 POWDER, FOR SOLUTION ORAL at 08:07

## 2024-07-29 RX ADMIN — RISPERIDONE 0.5 MG: 0.25 TABLET, FILM COATED ORAL at 08:07

## 2024-07-29 RX ADMIN — HALOPERIDOL LACTATE 5 MG: 5 INJECTION, SOLUTION INTRAMUSCULAR at 12:07

## 2024-07-29 RX ADMIN — AMLODIPINE BESYLATE 10 MG: 5 TABLET ORAL at 09:07

## 2024-07-29 RX ADMIN — Medication 9 MG: at 08:07

## 2024-07-29 RX ADMIN — DOCUSATE SODIUM 100 MG: 100 CAPSULE, LIQUID FILLED ORAL at 08:07

## 2024-07-29 RX ADMIN — POLYETHYLENE GLYCOL 3350 17 G: 17 POWDER, FOR SOLUTION ORAL at 09:07

## 2024-07-29 RX ADMIN — METHOCARBAMOL 500 MG: 500 TABLET ORAL at 09:07

## 2024-07-29 RX ADMIN — ENOXAPARIN SODIUM 40 MG: 40 INJECTION SUBCUTANEOUS at 08:07

## 2024-07-29 RX ADMIN — OXYCODONE HYDROCHLORIDE 5 MG: 5 TABLET ORAL at 07:07

## 2024-07-29 RX ADMIN — ENOXAPARIN SODIUM 40 MG: 40 INJECTION SUBCUTANEOUS at 09:07

## 2024-07-29 NOTE — PT/OT/SLP RE-EVAL
Physical Therapy Re-Evaluation    Patient Name:  Kevin Horan Jr.   MRN:  [id number]    Recommendations:     Discharge therapy intensity: Moderate Intensity Therapy   Discharge Equipment Recommendations: walker, rolling   Barriers to discharge: None    Assessment:     Kevin Horan Jr. is a 74 y.o. male admitted following fall down steps, R L3 and L4 TP fx, SDH, scalp lac, cirrhotic liver with R hepatic lobe mass. No surgery or brace needed for L spine TP fxs.   He presents with the following impairments/functional limitations: weakness, impaired endurance, impaired self care skills, impaired functional mobility, gait instability, impaired balance, decreased safety awareness, impaired cognition. Patient tolerated session well.  Very poor safety awareness during session. Ambulated 165 ft with RW & CGA-MIN A. Several LOB.  Recommending MOD intensity therapy at discharge. Continue to progress patient as tolerated.     Rehab Prognosis: Good; patient would benefit from acute skilled PT services to address these deficits and reach maximum level of function.    Recent Surgery: * No surgery found *      Plan:     During this hospitalization, patient would benefit from acute PT services 5 x/week to address the identified rehab impairments via gait training, therapeutic activities, therapeutic exercises, neuromuscular re-education and progress toward the following goals:    Plan of Care Expires:  08/20/24    PT/PTA conference to discuss PT POC and patient's progression towards goals held with Xochitl Archibald PTA.     Subjective     Chief Complaint: none  Patient/Family Comments/goals: none  Pain/Comfort:  Pain Rating 1: 0/10    Patients cultural, spiritual, Holiness conflicts given the current situation: no    Objective:     Communicated with nurse prior to session.  Patient found supine with telemetry  upon PT entry to room.    General Precautions: Standard, fall (BP<150/90)  Orthopedic Precautions:N/A   Braces:  N/A  Respiratory Status: Room air  Blood Pressure: 143/72    Exams:  Cognitive Exam:  Patient is oriented to Person and Place  Sensation: -       Intact  RLE ROM: WFL  RLE Strength: WFL  LLE ROM: WFL  LLE Strength: WFL  Skin integrity: Visible skin intact      Functional Mobility:  Bed Mobility:  Supine to Sit: minimum assistance  Transfers:  Sit to Stand:  contact guard assistance with rolling walker  Gait: Ambulated 165 ft with RW & CGA-MIN A. Several LOB.  Balance: fair/poor      AM-PAC 6 CLICK MOBILITY  Total Score:17     Education Provided:  Role and goals of PT, transfer training, bed mobility, gait training, balance training, safety awareness, assistive device, strengthening exercises, and importance of participating in PT to return to PLOF.      Patient left supine with restraints reapplied at end of session.    GOALS:   Multidisciplinary Problems       Physical Therapy Goals          Problem: Physical Therapy    Goal Priority Disciplines Outcome Goal Variances Interventions   Physical Therapy Goal     PT, PT/OT Progressing     Description: Goals to be met by: 24     Patient will increase functional independence with mobility by performin. Supine to sit with supervision  2. Sit to stand transfer with Supervision  3. Gait  x 200 feet with Supervision using Rolling Walker.                          History:     No past medical history on file.    No past surgical history on file.    Time Tracking:     PT Received On: 24  PT Start Time: 1342     PT Stop Time: 1400  PT Total Time (min): 18 min     Billable Minutes: Re-paresh 18 minutes      2024

## 2024-07-29 NOTE — PLAN OF CARE
Problem: Adult Inpatient Plan of Care  Goal: Plan of Care Review  7/28/2024 2043 by Stefani Ramirez RN  Outcome: Progressing  7/28/2024 0709 by Stefani Ramirez RN  Outcome: Progressing  Goal: Patient-Specific Goal (Individualized)  7/28/2024 2043 by Stefani Ramirez RN  Outcome: Progressing  7/28/2024 0709 by Stefani Ramirez RN  Outcome: Progressing  Goal: Absence of Hospital-Acquired Illness or Injury  7/28/2024 2043 by Stefani Ramirez RN  Outcome: Progressing  7/28/2024 0709 by Stefani Ramirez RN  Outcome: Progressing  Goal: Optimal Comfort and Wellbeing  7/28/2024 2043 by Stefani Ramirez RN  Outcome: Progressing  7/28/2024 0709 by Stefani Ramirez RN  Outcome: Progressing  Goal: Readiness for Transition of Care  7/28/2024 2043 by Stefani Ramirez RN  Outcome: Progressing  7/28/2024 0709 by Stefani Ramirez RN  Outcome: Progressing     Problem: Wound  Goal: Optimal Coping  7/28/2024 2043 by Stefani Ramirez RN  Outcome: Progressing  7/28/2024 0709 by Stefani Ramirez RN  Outcome: Progressing  Goal: Optimal Functional Ability  7/28/2024 2043 by Stefani Ramirez RN  Outcome: Progressing  7/28/2024 0709 by Stefani Ramirez RN  Outcome: Progressing  Goal: Absence of Infection Signs and Symptoms  7/28/2024 2043 by Stefani Ramirez RN  Outcome: Progressing  7/28/2024 0709 by Stefani Ramirez RN  Outcome: Progressing  Goal: Improved Oral Intake  7/28/2024 2043 by Stefani Ramirez RN  Outcome: Progressing  7/28/2024 0709 by Stefani Ramirez RN  Outcome: Progressing  Goal: Optimal Pain Control and Function  7/28/2024 2043 by Stefani Ramirez RN  Outcome: Progressing  7/28/2024 0709 by Stefani Ramirez RN  Outcome: Progressing  Goal: Skin Health and Integrity  7/28/2024 2043 by Stefani Ramirez RN  Outcome: Progressing  7/28/2024 0709 by Stefani Ramirez RN  Outcome: Progressing  Goal: Optimal Wound Healing  7/28/2024 2043 by Stefani Ramirez, RN  Outcome: Progressing  7/28/2024 0709 by Stefani Ramirez, RN  Outcome:  Progressing     Problem: Fall Injury Risk  Goal: Absence of Fall and Fall-Related Injury  7/28/2024 2043 by Stefani Ramirez, RN  Outcome: Progressing  7/28/2024 0709 by Stefani Ramirez, RN  Outcome: Progressing     Problem: Skin Injury Risk Increased  Goal: Skin Health and Integrity  7/28/2024 2043 by Stefani Ramirez, RN  Outcome: Progressing  7/28/2024 0709 by Stefani Ramirez, RN  Outcome: Progressing

## 2024-07-29 NOTE — PLAN OF CARE
Problem: Adult Inpatient Plan of Care  Goal: Plan of Care Review  7/28/2024 2043 by Stefani Ramirez RN  Outcome: Progressing  7/28/2024 2043 by Stefani Ramirez RN  Outcome: Progressing  7/28/2024 0709 by Stefani Ramirez RN  Outcome: Progressing  Goal: Patient-Specific Goal (Individualized)  7/28/2024 2043 by Stefani Ramirez RN  Outcome: Progressing  7/28/2024 2043 by Stefani Ramirez RN  Outcome: Progressing  7/28/2024 0709 by Stefani Ramirez RN  Outcome: Progressing  Goal: Absence of Hospital-Acquired Illness or Injury  7/28/2024 2043 by Stefani Ramirez RN  Outcome: Progressing  7/28/2024 2043 by Stefani Ramirez RN  Outcome: Progressing  7/28/2024 0709 by Stefani Ramirez RN  Outcome: Progressing  Goal: Optimal Comfort and Wellbeing  7/28/2024 2043 by Stefani Ramirez RN  Outcome: Progressing  7/28/2024 2043 by Stefani Ramirez RN  Outcome: Progressing  7/28/2024 0709 by Stefani Ramirez RN  Outcome: Progressing  Goal: Readiness for Transition of Care  7/28/2024 2043 by Stefani Ramirez RN  Outcome: Progressing  7/28/2024 2043 by Stefani Ramirez RN  Outcome: Progressing  7/28/2024 0709 by Stefani Ramirez RN  Outcome: Progressing     Problem: Wound  Goal: Optimal Coping  7/28/2024 2043 by Stefani Ramirez RN  Outcome: Progressing  7/28/2024 2043 by Stefani Ramirez RN  Outcome: Progressing  7/28/2024 0709 by Stefani Ramirez RN  Outcome: Progressing  Goal: Optimal Functional Ability  7/28/2024 2043 by Stefani Ramirez RN  Outcome: Progressing  7/28/2024 2043 by Stefani Ramirez RN  Outcome: Progressing  7/28/2024 0709 by Stefani Ramirez RN  Outcome: Progressing  Goal: Absence of Infection Signs and Symptoms  7/28/2024 2043 by Stefani Ramirez RN  Outcome: Progressing  7/28/2024 2043 by Stefani Ramirez RN  Outcome: Progressing  7/28/2024 0709 by Stefani Ramirez, RN  Outcome: Progressing  Goal: Improved Oral Intake  7/28/2024 2043 by Stefani Ramirez, RN  Outcome: Progressing  7/28/2024 2043 by James,  ERWIN Ko  Outcome: Progressing  7/28/2024 0709 by Stefani Ramirez RN  Outcome: Progressing  Goal: Optimal Pain Control and Function  7/28/2024 2043 by Stefani Ramirez, RN  Outcome: Progressing  7/28/2024 2043 by Stefani Ramirez RN  Outcome: Progressing  7/28/2024 0709 by Stefani Ramirez RN  Outcome: Progressing  Goal: Skin Health and Integrity  7/28/2024 2043 by Stefani Ramirez, RN  Outcome: Progressing  7/28/2024 2043 by Steafni Ramirez RN  Outcome: Progressing  7/28/2024 0709 by Stefani Ramirez RN  Outcome: Progressing  Goal: Optimal Wound Healing  7/28/2024 2043 by Stefani Ramirez, RN  Outcome: Progressing  7/28/2024 2043 by Stefani Ramirez RN  Outcome: Progressing  7/28/2024 0709 by Stefani Ramirez RN  Outcome: Progressing     Problem: Fall Injury Risk  Goal: Absence of Fall and Fall-Related Injury  7/28/2024 2043 by Stefani Ramirez RN  Outcome: Progressing  7/28/2024 2043 by Stefani Ramirez RN  Outcome: Progressing  7/28/2024 0709 by Stefani Ramirez RN  Outcome: Progressing     Problem: Skin Injury Risk Increased  Goal: Skin Health and Integrity  7/28/2024 2043 by Stefani Ramirez, RN  Outcome: Progressing  7/28/2024 2043 by Stefani Ramirez, RN  Outcome: Progressing  7/28/2024 0709 by Stefani Ramirez RN  Outcome: Progressing

## 2024-07-29 NOTE — PLAN OF CARE
Problem: Adult Inpatient Plan of Care  Goal: Plan of Care Review  7/28/2024 2044 by Stefani Ramirez RN  Outcome: Progressing  7/28/2024 2043 by Stefani Ramirez RN  Outcome: Progressing  7/28/2024 2043 by Stefani Ramirez RN  Outcome: Progressing  7/28/2024 0709 by Stefani Ramirez RN  Outcome: Progressing  Goal: Patient-Specific Goal (Individualized)  7/28/2024 2044 by Stefani Ramirez RN  Outcome: Progressing  7/28/2024 2043 by Stefani Ramirez RN  Outcome: Progressing  7/28/2024 2043 by Stfeani Ramirez RN  Outcome: Progressing  7/28/2024 0709 by Stefani Ramirez RN  Outcome: Progressing  Goal: Absence of Hospital-Acquired Illness or Injury  7/28/2024 2044 by Stefani Ramirez RN  Outcome: Progressing  7/28/2024 2043 by Stefani Ramirez RN  Outcome: Progressing  7/28/2024 2043 by Stefani Ramirez RN  Outcome: Progressing  7/28/2024 0709 by Stefani Ramirez RN  Outcome: Progressing  Goal: Optimal Comfort and Wellbeing  7/28/2024 2044 by Stefani Ramirez RN  Outcome: Progressing  7/28/2024 2043 by Stefani Ramirez RN  Outcome: Progressing  7/28/2024 2043 by Stefani Ramirez RN  Outcome: Progressing  7/28/2024 0709 by Stefani Ramirez RN  Outcome: Progressing  Goal: Readiness for Transition of Care  7/28/2024 2044 by Stefani Ramirez RN  Outcome: Progressing  7/28/2024 2043 by Stefani Ramirez RN  Outcome: Progressing  7/28/2024 2043 by Stefani Ramirez RN  Outcome: Progressing  7/28/2024 0709 by Stefani Ramirez RN  Outcome: Progressing     Problem: Wound  Goal: Optimal Coping  7/28/2024 2044 by Stefani Ramirez, RN  Outcome: Progressing  7/28/2024 2043 by Stefani Ramirez RN  Outcome: Progressing  7/28/2024 2043 by Stefani Ramirez RN  Outcome: Progressing  7/28/2024 0709 by Stefani Ramirez RN  Outcome: Progressing  Goal: Optimal Functional Ability  7/28/2024 2044 by Stefani Ramirez RN  Outcome: Progressing  7/28/2024 2043 by Stefani Ramirez RN  Outcome: Progressing  7/28/2024 2043 by Stefani Ramirez,  RN  Outcome: Progressing  7/28/2024 0709 by Stefani Ramirez RN  Outcome: Progressing  Goal: Absence of Infection Signs and Symptoms  7/28/2024 2044 by Stefani Ramirez, RN  Outcome: Progressing  7/28/2024 2043 by Stefani Ramirez RN  Outcome: Progressing  7/28/2024 2043 by Stefani Ramirez RN  Outcome: Progressing  7/28/2024 0709 by Stefani Ramirez RN  Outcome: Progressing  Goal: Improved Oral Intake  7/28/2024 2044 by Stefani Ramirez, RN  Outcome: Progressing  7/28/2024 2043 by Stefani Ramirez RN  Outcome: Progressing  7/28/2024 2043 by Stefani Ramirez RN  Outcome: Progressing  7/28/2024 0709 by Stefani Ramirez RN  Outcome: Progressing  Goal: Optimal Pain Control and Function  7/28/2024 2044 by Stefani Ramirez, RN  Outcome: Progressing  7/28/2024 2043 by Stefani Ramirez, RN  Outcome: Progressing  7/28/2024 2043 by Stefani Ramirez, RN  Outcome: Progressing  7/28/2024 0709 by Stefani Ramirez RN  Outcome: Progressing  Goal: Skin Health and Integrity  7/28/2024 2044 by Stefani Ramirez RN  Outcome: Progressing  7/28/2024 2043 by Stefani Ramirez, RN  Outcome: Progressing  7/28/2024 2043 by Stefani Ramirez, RN  Outcome: Progressing  7/28/2024 0709 by Stefani Ramirez RN  Outcome: Progressing  Goal: Optimal Wound Healing  7/28/2024 2044 by Stefani Ramirez, RN  Outcome: Progressing  7/28/2024 2043 by Stefani Ramirez RN  Outcome: Progressing  7/28/2024 2043 by Stefani Ramirez, RN  Outcome: Progressing  7/28/2024 0709 by Stefani Ramirez RN  Outcome: Progressing     Problem: Fall Injury Risk  Goal: Absence of Fall and Fall-Related Injury  7/28/2024 2044 by Stefani Ramirez, RN  Outcome: Progressing  7/28/2024 2043 by Stefani Ramirez, RN  Outcome: Progressing  7/28/2024 2043 by Stefani Ramirez RN  Outcome: Progressing  7/28/2024 0709 by Stefani Ramirez RN  Outcome: Progressing     Problem: Skin Injury Risk Increased  Goal: Skin Health and Integrity  7/28/2024 2044 by Stefani Ramirez RN  Outcome: Progressing  7/28/2024  2043 by James, Stefani, RN  Outcome: Progressing  7/28/2024 2043 by Stefani Ramirez RN  Outcome: Progressing  7/28/2024 0709 by Stefani Ramirez RN  Outcome: Progressing

## 2024-07-29 NOTE — PROGRESS NOTES
[facility]  Hospital Medicine Progress Note      Chief Complaint: Inpatient Follow-up     HPI:   74 y.o. male with a past medical history of alcoholic cirrhosis, hepatitis-C, diabetes mellitus type 2, and CVA who presented to [facility] on 7/19/2024 via EMS following fall.  EMS reported patient was hypoglycemic with CBG of 44.  C-collar was placed en route and 250 mL of D5W was given.  Patient reportedly slipped and fell, striking his head. Initial vital signs in ED were /77, pulse 72, respirations 16, temperature 36.9° C, and SpO2 90% on room air.  Labs revealed WBC 3.29, RBC 3.22, hemoglobin 9.8, hematocrit 30.4, MCV 94.4, chloride 112, CO2 21, glucose 166, and undetectable troponin.  EKG revealed normal sinus rhythm with heart rate is 92 beats per minute.  CT head revealed questionable minimal subdural blood products along the phalanx without mass effect.  CT cervical spine revealed no acute bony injury of the cervical spine.  CT chest abdomen and pelvis with IV contrast revealed right transverse process fractures of L3 and L4, small left pleural effusion, cirrhotic liver with right hepatic lobe mass suspicious for HCC, and small volume ascites.  Pelvis x-ray revealed no acute findings.  Chest x-ray revealed mild left basilar opacities with a small left pleural effusion. Scalp laceration was repaired in ED.  Neurosurgery was consulted.  Patient was admitted to trauma services.  Neurosurgery recommended no surgical intervention, Keppra for seizure prophylaxis, and blood pressure less than 150/90.  Follow-up CT head on 07/19 revealed no subdural hematoma.  Therapy services were consulted.  Hospital medicine was consulted for transition of care and further medical management.  PT/OT consulted; recommending moderate intensity therapy.  Patient was noted to be agitated and restless attempting to climb out of bed for which p.r.n. IV Haldol was ordered.     Interval Hx:   Patient has a  restrained seen and examined.  However he is calm and redirectable.  One-to-one at this time.    Objective/physical exam:  General:  no acute distress, staples in place over occipital lack  Respiratory: unlabored breathing   Cardiovascular: normal rate  Gastrointestinal: soft, non-tender  Neuro: Alert, responding appropriately, following commands, moving all ext     VITAL SIGNS: 24 HRS MIN & MAX LAST   Temp  Min: 97.5 °F (36.4 °C)  Max: 98.9 °F (37.2 °C) 98.9 °F (37.2 °C)   BP  Min: 128/65  Max: 170/81 (!) 170/81   Pulse  Min: 71  Max: 89  81   Resp  Min: 18  Max: 18 18   SpO2  Min: 96 %  Max: 97 % 97 %       Recent Labs   Lab 07/23/24  1307 07/25/24  0522 07/29/24  0529   WBC 2.35* 2.82* 2.84*   RBC 3.34* 3.29* 3.74*   HGB 10.4* 10.0* 11.4*   HCT 31.2* 31.9* 34.7*   MCV 93.4 97.0* 92.8   MCH 31.1* 30.4 30.5   MCHC 33.3 31.3* 32.9*   RDW 14.8 14.7 14.6   PLT 66* 69* 79*   MPV 11.0* 11.5* 11.4*       Recent Labs   Lab 07/23/24  1307 07/25/24  0522 07/29/24  0529    138 141   K 4.1 4.1 4.0   * 113* 112*   CO2 25 19* 22*   BUN 18.1 20.8 21.0   CREATININE 1.11 1.06 0.94   CALCIUM 8.8 8.5* 9.1   MG 1.70  --   --    ALBUMIN 3.0* 3.0*  --    ALKPHOS 137 145  --    ALT 26 30  --    AST 42* 51*  --    BILITOT 0.8 0.8  --           Microbiology Results (last 7 days)       ** No results found for the last 168 hours. **             Radiology:  CT Head Without Contrast  Narrative: EXAMINATION:  CT HEAD WITHOUT CONTRAST    CLINICAL HISTORY:  fu SDH;    TECHNIQUE:  Multiple axial images were obtained from the base of the brain to the vertex without contrast administration.  Sagittal and coronal reconstructions were performed. .Automatic exposure control  (AEC) is utilized to reduce patient radiation exposure.    COMPARISON:  None    FINDINGS:  There is no intracranial mass or lesion seen.  No hemorrhage is seen.  No subdural hemorrhage is seen along the falx on today's examination.  No infarct is seen.  The ventricles  and basilar cisterns appear normal.  Brain parenchyma appears grossly unremarkable.    Posterior fossa appears normal.  The calvarium is intact.  The paranasal sinuses appear grossly unremarkable.  Impression: No subdural hematoma seen today's examination.    Electronically signed by: Robin Ellis  Date:    07/19/2024  Time:    15:00  X-ray Shoulder 2 or More Views Right  Narrative: EXAMINATION:  XR SHOULDER COMPLETE 2 OR MORE VIEWS RIGHT    CLINICAL HISTORY:  Fall;    TECHNIQUE:  Three views.    COMPARISON:  None available.    FINDINGS:  Articular surfaces alignment is preserved.  No acute fracture or dislocation identified.  Calcification adjacent to the greater tuberosity reflects calcific tendinopathy.  There is narrowed acromial head and acromion interval which raises the possibility of rotator cuff arthropathy.  Impression: No osseous abnormality identified.    Electronically signed by: David Erazo  Date:    07/19/2024  Time:    09:19  CT Chest Abdomen Pelvis With IV Contrast (XPD) NO Oral Contrast  Narrative: EXAMINATION:  CT CHEST ABDOMEN PELVIS WITH IV CONTRAST (XPD)    CLINICAL HISTORY:  Polytrauma, blunt;    TECHNIQUE:  Helical acquisition from the thoracic inlet through the ischia with  IV contrast. Three plane reconstructions made available for review.  mGycm. Automatic exposure control, adjustment of mA/kV or iterative reconstruction technique was used to reduce radiation.    COMPARISON:  None available.    FINDINGS:  Chest.    Heart size upper limit normal.  No pericardial effusion.  There are coronary artery calcifications.    There is no mediastinal hematoma.  No enlarged thoracic lymph nodes.    There is a small left pleural effusion.  No pneumothorax seen.  There is some atelectasis or scarring left lung base.  Mild chronic changes of the lungs elsewhere.    Abdomen and pelvis.    Cirrhotic liver.  There is a 4 cm hyperenhancing lesion in segment 5 image 126 series 2.  The portal vein is  patent.  Prominent paraumbilical vein.  There are gallstones.  No significant biliary ductal dilatation.    The spleen is mildly enlarged.  No significant abnormality of the pancreas or adrenals.  No hydronephrosis.  The low lying malrotated right kidney.    There is no bowel obstruction or free air.  No suspicious bowel wall thickening.  Small volume ascites.    Urinary bladder is unremarkable.  The abdominal aorta is normal in caliber.  Moderate atherosclerotic disease.  Left inguinal hernia containing some fluid.    There are fractures right transverse processes L3 and L4.  There are old bilateral rib fractures.  Moderate degenerative change of the spine.  Impression: 1. Right transverse process fractures L3 and L4.  2. Small left pleural effusion.  3. Cirrhotic liver with right hepatic lobe mass suspicious for HCC.  Recommend outpatient liver mass protocol CT.  4. Small volume ascites.    Electronically signed by: Surinder Danielle  Date:    07/19/2024  Time:    07:32  CT Head Without Contrast  Narrative: EXAMINATION:  CT HEAD WITHOUT CONTRAST    CLINICAL HISTORY:  Head trauma, minor (Age >= 65y);    TECHNIQUE:  CT imaging of the head performed from the skull base to the vertex without intravenous contrast. DLP 1387 mGycm. Automatic exposure control, adjustment of mA/kV or iterative reconstruction technique was used to reduce radiation.    COMPARISON:  None Available.    FINDINGS:  Questionable minimal subpleural blood products along the falx measuring only 1-2 mm maximally.  No mass effect.  There is mild patchy hypoattenuation in the cerebral white matter which is nonspecific but most commonly associated with chronic small vessel ischemic changes.  There is left cerebellar and left occipital encephalomalacia.  The ventricles are not significantly enlarged.  There are vascular calcifications.    Visualized paranasal sinuses and mastoid air cells are clear. The calvarium is grossly intact.  There is some soft tissue  swelling over the right frontal scalp.  Impression: Question minimal subdural blood products along the falx without mass effect.    Findings discussed with Dr. Maddox at 713 on 7/19/2024.    Electronically signed by: Surinder Danielle  Date:    07/19/2024  Time:    07:14  CT Cervical Spine Without Contrast  Narrative: EXAMINATION:  CT CERVICAL SPINE WITHOUT CONTRAST    CLINICAL HISTORY:  Neck trauma (Age >= 65y);    TECHNIQUE:  Helical acquisition through the cervical spine without IV contrast. Three plane reconstructions were made available for review. DLP 1387 mGycm. Automatic exposure control, adjustment of mA/kV or iterative reconstruction technique was used to reduce radiation.    COMPARISON:  None available.    FINDINGS:  No fractures identified.  There are mild degenerative alignment abnormalities.  Moderate degenerative changes.  Craniocervical junction and C1-C2 relationship are normal. The odontoid is intact. There is limited evaluation of the soft tissues. No prevertebral soft tissue swelling. Ligamentous injury cannot be excluded with CT.  There are vascular calcifications.  Impression: No acute bony injury of the cervical spine.    Electronically signed by: Surinder Danielle  Date:    07/19/2024  Time:    07:13  X-Ray Pelvis Routine AP  Narrative: EXAMINATION:  XR PELVIS ROUTINE AP    CLINICAL HISTORY:  Unspecified fall, initial encounter    COMPARISON:  None    FINDINGS:  Frontal image of the pelvis demonstrates no fracture or dislocation  Impression: No acute findings.    Electronically signed by: Surinder Danielle  Date:    07/19/2024  Time:    07:10  X-Ray Chest 1 View  Narrative: EXAMINATION:  XR CHEST 1 VIEW    CLINICAL HISTORY:  Unspecified fall, initial encounter    COMPARISON:  26 April 2024    FINDINGS:  Frontal view of the chest was obtained. The heart is not significantly enlarged.  There are mild left basilar opacities with a small left pleural effusion suspected.  There is no pneumothorax.  Impression:  Mild left basilar opacities with a small left pleural effusion.    Electronically signed by: Surinder Danielle  Date:    07/19/2024  Time:    06:55        Medications:  Scheduled Meds:   amLODIPine  10 mg Oral Daily    docusate sodium  100 mg Oral BID    enoxparin  40 mg Subcutaneous Q12H (prophylaxis, 0900/2100)    LIDOcaine  1 patch Transdermal Q24H    melatonin  9 mg Oral Nightly    methocarbamoL  500 mg Oral BID    polyethylene glycol  17 g Oral BID    risperiDONE  0.5 mg Oral BID     Continuous Infusions:  PRN Meds:.  Current Facility-Administered Medications:     dextrose 10%, 12.5 g, Intravenous, PRN    dextrose 10%, 25 g, Intravenous, PRN    glucagon (human recombinant), 1 mg, Intramuscular, PRN    glucose, 16 g, Oral, PRN    glucose, 24 g, Oral, PRN    hydrALAZINE, 10 mg, Intravenous, Q6H PRN    insulin aspart U-100, 0-5 Units, Subcutaneous, QID (AC + HS) PRN    magnesium hydroxide 400 mg/5 ml, 30 mL, Oral, Daily PRN    oxyCODONE, 5 mg, Oral, Q4H PRN        Assessment/Plan:   S/p ground level fall  Questionable subdural along the phalanx-no ICH on repeat scan  Right transverse process fractures of L3 and L4   Small left pleural effusion   Cirrhotic liver with right hepatic lobe mass suspicious for HCC  Agitation vs Dementia vs Delirium   History of alcoholic cirrhosis, hepatitis-C, diabetes mellitus type 2, and CVA    Currently 1:1  Per case management patient will need to have all chemical and physical restraints removed prior to any acceptance to nursing home   We will reconsult psych for alternatives to Haldol   UA unremarkable  Prn anxiolytics/sedative  NSY signed off.  No planned intervention  Continue PT/OT  CM working on SNF placement.  Labs stable.  Repeat Monday unless any acute changes.    Rowan Mathias DO  Department of Hospital Medicine  [facility]  07/29/2024     All diagnosis and differential diagnosis have been reviewed; assessment and plan has been documented; I have  personally reviewed the labs and test results that are presently available; I have reviewed the patients medication list; I have reviewed the consulting providers response and recommendations. I have reviewed or attempted to review medical records based upon their availability    All of the patient's questions have been  addressed and answered. Patient's is agreeable to the above stated plan. I will continue to monitor closely and make adjustments to medical management as needed.  _____________________________________________________________________

## 2024-07-29 NOTE — PLAN OF CARE
Spoke to pts brother Javad about snf choices, they would like to send referrals to [facility] with [facility] first choice.

## 2024-07-29 NOTE — CONSULTS
"7/29/2024  Kevin Horan Jr.   1950   [id number]            Psychiatry Initial Consult Note    Date of Admission: 7/19/2024  6:18 AM    Chief Complaint: Psychiatric consult for "medication alternatives to haldol"    SUBJECTIVE:   History of Present Illness:   Kevin Horan Jr. is a 74 y.o. male with a past medical history that includes alcoholic cirrhosis, hepatitis-C, T2DM, and CVA who presented to [facility] on 07/19/24 followin g a fall. EMS had reported a CBG of 44. Was reported to have slipped and fell and struck his head. CT head revealed questionable minimal subdural blood products along the phalanx without mass effect. CT cervical spine revealed no acute bony injury of the cervical spine. CT chest abdomen and pelvis with IV contrast revealed right transverse process fractures of L3 and L4, small left pleural effusion, cirrhotic liver with right hepatic lobe mass suspicious for HCC, and small volume ascites. Pelvis x-ray revealed no acute findings. Chest x-ray revealed mild left basilar opacities with a small left pleural effusion. Follow-up CT head on 07/19 revealed no subdural hematoma. Has been agitated and restless requiring PRN haloperidol. Has been documented to be confused and agitated with verbal and physical aggression towards staff. Psychiatry consulted for medication recommendations.    Seen at the bedside with 1:1 sitter present. Currently in bilateral wrist restraints and sitter reports he has been restless and agitated. He reports irritable mood and blames it on staff at this time. States he has been increasingly irritable since finding out he is going through a divorce which he states he found out in "July". Reports current date as "August, 2024". Goal-directed thought process. Not verbalizing any delusional thought content. Denies suicidal ideations, homicidal ideations, hallucinations, or paranoia. Poor judgement noted.        Past Psychiatric History:   Previous Psychiatric " Hospitalizations: Denies  Previous Medication Trials: Denies  Previous Suicide Attempts: Denies   Outpatient psychiatrist: None    Current Medications:   Home Psychiatric Meds: None    Past Medical/Surgical History:   Cirrhosis   Hepatitis C  Type 2 diabetes mellitus  CVA    Family Psychiatric History:   Denies     Allergies:   Review of patient's allergies indicates:  No Known Allergies    Substance Abuse History:   Tobacco: Reports cigar use approximately once a week  Alcohol: Former, reports last use one year ago  Illicit Substances: Denies  Treatment: Denies        Scheduled Meds:    amLODIPine  10 mg Oral Daily    docusate sodium  100 mg Oral BID    enoxparin  40 mg Subcutaneous Q12H (prophylaxis, 0900/2100)    haloperidoL  1 mg Oral QHS    LIDOcaine  1 patch Transdermal Q24H    melatonin  9 mg Oral Nightly    methocarbamoL  500 mg Oral BID    polyethylene glycol  17 g Oral BID      PRN Meds:   Current Facility-Administered Medications:     dextrose 10%, 12.5 g, Intravenous, PRN    dextrose 10%, 25 g, Intravenous, PRN    glucagon (human recombinant), 1 mg, Intramuscular, PRN    glucose, 16 g, Oral, PRN    glucose, 24 g, Oral, PRN    hydrALAZINE, 10 mg, Intravenous, Q6H PRN    insulin aspart U-100, 0-5 Units, Subcutaneous, QID (AC + HS) PRN    magnesium hydroxide 400 mg/5 ml, 30 mL, Oral, Daily PRN    oxyCODONE, 5 mg, Oral, Q4H PRN   Psychotherapeutics (From admission, onward)      Start     Stop Route Frequency Ordered    07/28/24 2100  haloperidoL tablet 1 mg         -- Oral Nightly 07/28/24 1030              Social History:  Housing Status: Lives with nephew  Relationship Status/Sexual Orientation:    Children: 1  Education: Completed high school   Employment Status/Info: Retired    history: Denies  History of physical/sexual abuse: Denies   Access to gun: Denies       Legal History:   Past Charges/Incarcerations: Denies   Pending charges: Denies      OBJECTIVE:       Vitals   Vitals:     07/29/24 1100   BP: (!) 150/68   Pulse: 80   Resp: 18   Temp: 97.5 °F (36.4 °C)        Labs/Imaging/Studies:   Recent Results (from the past 48 hour(s))   POCT glucose    Collection Time: 07/27/24 11:39 AM   Result Value Ref Range    POCT Glucose 234 (H) 70 - 110 mg/dL   POCT glucose    Collection Time: 07/28/24  6:03 AM   Result Value Ref Range    POCT Glucose 125 (H) 70 - 110 mg/dL   Urinalysis, Reflex to Urine Culture    Collection Time: 07/28/24 10:25 AM    Specimen: Urine   Result Value Ref Range    Color, UA Light-Yellow Yellow, Light-Yellow, Colorless, Straw, Dark-Yellow    Appearance, UA Clear Clear    Specific Gravity, UA 1.017 1.005 - 1.030    pH, UA 6.5 5.0 - 8.5    Protein, UA Negative Negative    Glucose, UA Normal Negative, Normal    Ketones, UA Negative Negative    Blood, UA Negative Negative    Bilirubin, UA Negative Negative    Urobilinogen, UA 2.0 (A) 0.2, 1.0, Normal    Nitrites, UA Negative Negative    Leukocyte Esterase, UA Negative Negative    RBC, UA 0-5 None Seen, 0-2, 3-5, 0-5 /HPF    WBC, UA 0-5 None Seen, 0-2, 3-5, 0-5 /HPF    Bacteria, UA None Seen None Seen, Trace /HPF    Squamous Epithelial Cells, UA Trace None Seen /HPF   POCT glucose    Collection Time: 07/28/24 11:56 AM   Result Value Ref Range    POCT Glucose 151 (H) 70 - 110 mg/dL   POCT glucose    Collection Time: 07/28/24  4:28 PM   Result Value Ref Range    POCT Glucose 211 (H) 70 - 110 mg/dL   POCT glucose    Collection Time: 07/28/24 10:00 PM   Result Value Ref Range    POCT Glucose 228 (H) 70 - 110 mg/dL   Basic Metabolic Panel    Collection Time: 07/29/24  5:29 AM   Result Value Ref Range    Sodium 141 136 - 145 mmol/L    Potassium 4.0 3.5 - 5.1 mmol/L    Chloride 112 (H) 98 - 107 mmol/L    CO2 22 (L) 23 - 31 mmol/L    Glucose 134 (H) 82 - 115 mg/dL    Blood Urea Nitrogen 21.0 8.4 - 25.7 mg/dL    Creatinine 0.94 0.73 - 1.18 mg/dL    BUN/Creatinine Ratio 22     Calcium 9.1 8.8 - 10.0 mg/dL    Anion Gap 7.0 mEq/L    eGFR >60  "mL/min/1.73/m2   CBC with Differential    Collection Time: 07/29/24  5:29 AM   Result Value Ref Range    WBC 2.84 (L) 4.50 - 11.50 x10(3)/mcL    RBC 3.74 (L) 4.70 - 6.10 x10(6)/mcL    Hgb 11.4 (L) 14.0 - 18.0 g/dL    Hct 34.7 (L) 42.0 - 52.0 %    MCV 92.8 80.0 - 94.0 fL    MCH 30.5 27.0 - 31.0 pg    MCHC 32.9 (L) 33.0 - 36.0 g/dL    RDW 14.6 11.5 - 17.0 %    Platelet 79 (L) 130 - 400 x10(3)/mcL    MPV 11.4 (H) 7.4 - 10.4 fL    Neut % 52.1 %    Lymph % 27.8 %    Mono % 12.7 %    Eos % 7.0 %    Basophil % 0.4 %    Lymph # 0.79 0.6 - 4.6 x10(3)/mcL    Neut # 1.48 (L) 2.1 - 9.2 x10(3)/mcL    Mono # 0.36 0.1 - 1.3 x10(3)/mcL    Eos # 0.20 0 - 0.9 x10(3)/mcL    Baso # 0.01 <=0.2 x10(3)/mcL    IG# 0.00 0 - 0.04 x10(3)/mcL    IG% 0.0 %    NRBC% 0.0 %   POCT glucose    Collection Time: 07/29/24  6:00 AM   Result Value Ref Range    POCT Glucose 146 (H) 70 - 110 mg/dL      No results found for: "PHENYTOIN", "PHENOBARB", "VALPROATE", "CBMZ"        Psychiatric Mental Status Exam:  General Appearance: appears stated age, dressed in hospital garb, lying in bed, wrist restraints  Arousal: alert  Behavior: cooperative, appropriate eye-contact, restless and fidgety  Movements and Motor Activity: no tics, no tremors, no akathisia, no dystonia, no evidence of tardive dyskinesia  Orientation: oriented to person and place  Speech: normal rate, rhythm, volume, tone and pitch  Mood: Irritable  Affect: mood-congruent  Thought Process: linear, goal-directed  Associations: no loosening of associations  Thought Content and Perceptions: no suicidal or homicidal ideation, no auditory or visual hallucinations, no paranoid ideation, no ideas of reference, no evidence of delusions or psychosis  Recent and Remote Memory: mild impairments noted; per interview/observation with patient  Attention and Concentration: attentive to conversation; per interview/observation with patient  Fund of Knowledge: vocabulary appropriate; based on history, vocabulary, " fund of knowledge, syntax, grammar, and content  Insight: questionable; based on understanding of severity of illness and HPI  Judgment: poor; based on patient's behavior and HPI        ASSESSMENT/PLAN:   Diagnoses:  DELIRIUM, DEMENTIA, AND AMNESTIC AND OTHER COGNITIVE DISORDERS; Delirium NOS (R41.0)         Problem lists and Management Plans:  Medication Management  Discontinue haloperidol  Risperidone 0.5mg PO BID  Will continue to follow      Manuel Bird

## 2024-07-30 LAB
POCT GLUCOSE: 154 MG/DL (ref 70–110)
POCT GLUCOSE: 190 MG/DL (ref 70–110)
POCT GLUCOSE: 206 MG/DL (ref 70–110)
POCT GLUCOSE: 254 MG/DL (ref 70–110)

## 2024-07-30 PROCEDURE — 99900035 HC TECH TIME PER 15 MIN (STAT)

## 2024-07-30 PROCEDURE — 25000003 PHARM REV CODE 250

## 2024-07-30 PROCEDURE — 25000003 PHARM REV CODE 250: Performed by: NURSE PRACTITIONER

## 2024-07-30 PROCEDURE — 63600175 PHARM REV CODE 636 W HCPCS: Performed by: STUDENT IN AN ORGANIZED HEALTH CARE EDUCATION/TRAINING PROGRAM

## 2024-07-30 PROCEDURE — 63600175 PHARM REV CODE 636 W HCPCS

## 2024-07-30 PROCEDURE — 21400001 HC TELEMETRY ROOM

## 2024-07-30 PROCEDURE — 97530 THERAPEUTIC ACTIVITIES: CPT | Mod: CQ

## 2024-07-30 PROCEDURE — 11000001 HC ACUTE MED/SURG PRIVATE ROOM

## 2024-07-30 PROCEDURE — 25000003 PHARM REV CODE 250: Performed by: STUDENT IN AN ORGANIZED HEALTH CARE EDUCATION/TRAINING PROGRAM

## 2024-07-30 PROCEDURE — 97535 SELF CARE MNGMENT TRAINING: CPT

## 2024-07-30 PROCEDURE — 97116 GAIT TRAINING THERAPY: CPT | Mod: CQ

## 2024-07-30 PROCEDURE — 63600175 PHARM REV CODE 636 W HCPCS: Performed by: NURSE PRACTITIONER

## 2024-07-30 PROCEDURE — 25000003 PHARM REV CODE 250: Performed by: INTERNAL MEDICINE

## 2024-07-30 RX ORDER — DIPHENHYDRAMINE HYDROCHLORIDE 50 MG/ML
25 INJECTION INTRAMUSCULAR; INTRAVENOUS EVERY 4 HOURS PRN
Status: DISCONTINUED | OUTPATIENT
Start: 2024-07-30 | End: 2024-08-06

## 2024-07-30 RX ADMIN — OXYCODONE HYDROCHLORIDE 5 MG: 5 TABLET ORAL at 04:07

## 2024-07-30 RX ADMIN — ENOXAPARIN SODIUM 40 MG: 40 INJECTION SUBCUTANEOUS at 08:07

## 2024-07-30 RX ADMIN — METHOCARBAMOL 500 MG: 500 TABLET ORAL at 08:07

## 2024-07-30 RX ADMIN — RISPERIDONE 0.5 MG: 0.25 TABLET, FILM COATED ORAL at 08:07

## 2024-07-30 RX ADMIN — AMLODIPINE BESYLATE 10 MG: 5 TABLET ORAL at 08:07

## 2024-07-30 RX ADMIN — POLYETHYLENE GLYCOL 3350 17 G: 17 POWDER, FOR SOLUTION ORAL at 08:07

## 2024-07-30 RX ADMIN — DOCUSATE SODIUM 100 MG: 100 CAPSULE, LIQUID FILLED ORAL at 08:07

## 2024-07-30 RX ADMIN — INSULIN ASPART 3 UNITS: 100 INJECTION, SOLUTION INTRAVENOUS; SUBCUTANEOUS at 05:07

## 2024-07-30 RX ADMIN — INSULIN ASPART 2 UNITS: 100 INJECTION, SOLUTION INTRAVENOUS; SUBCUTANEOUS at 12:07

## 2024-07-30 RX ADMIN — DIPHENHYDRAMINE HYDROCHLORIDE 25 MG: 50 INJECTION INTRAMUSCULAR; INTRAVENOUS at 02:07

## 2024-07-30 RX ADMIN — LIDOCAINE PATCH 5% 1 PATCH: 700 PATCH TOPICAL at 11:07

## 2024-07-30 RX ADMIN — Medication 9 MG: at 08:07

## 2024-07-30 NOTE — PLAN OF CARE
[facility] [place] denied on 7/24    [facility] resent to [employer] [facility] [place] & to Erik via [employer]    Pt is still a 1:1   Will revisit when pt is not a 1:1

## 2024-07-30 NOTE — PT/OT/SLP PROGRESS
Occupational Therapy   Treatment    Name: Kevin Horan Jr.  MRN: [id number]  Admitting Diagnosis:  Fall       Recommendations:     Recommended therapy intensity at discharge: Moderate Intensity Therapy   Discharge Equipment Recommendations:  walker, rolling  Barriers to discharge:       Assessment:     Kevin Horan Jr. is a 74 y.o. male with a medical diagnosis of Fall.  He presents with the following performance deficits affecting function are impaired endurance, weakness, impaired self care skills, impaired functional mobility, gait instability, impaired balance, impaired cognition.   Pt impulsive but cooperative throughout. Ambulated to bathroom with RW and min A. Brushed teeth and toileted with cues throughout for safety.     Rehab Prognosis:  Good; patient would benefit from acute skilled OT services to address these deficits and reach maximum level of function.       Plan:     Patient to be seen 3 x/week to address the above listed problems via self-care/home management, therapeutic activities, therapeutic exercises  Plan of Care Expires: 08/22/24  Plan of Care Reviewed with: patient    Subjective     Pain/Comfort:  Pain Rating 1: 0/10    Objective:     Communicated with: nrsg prior to session.  Patient found HOB elevated with restraints and 1:1  upon OT entry to room.    General Precautions: Standard, fall    Orthopedic Precautions:   Braces:    Respiratory Status: Room air     Occupational Performance:     Bed Mobility:    Patient completed Supine to Sit with contact guard assistance  Patient completed Sit to Supine with minimum assistance     Functional Mobility/Transfers:  Patient completed Toilet Transfer Step Transfer technique with minimum assistance with  rolling walker  Functional Mobility: assist to steer 2/2 poor cognition    Activities of Daily Living:  Grooming: minimum assistance for balance at sink ; setup for toothpaste on brush 2/2 poor cognition    Toileting: minimum assistance 2/2  safety ; using grab bar and RW   Impulsive, required cues throughout for attention to task and safety       Therapeutic Positioning    OT interventions performed during the course of today's session in an effort to prevent and/or reduce acquired pressure injuries:   Therapeutic positioning was provided at the conclusion of session to offload all bony prominences for the prevention and/or reduction of pressure injuries      Patient Education:  Patient provided with verbal education education regarding OT role/goals/POC, fall prevention, and safety awareness.  Understanding was verbalized, however additional teaching warranted.      Patient left HOB elevated with all lines intact, call button in reach, pressure relief boots, restraints reapplied at end of session, and sitter present.    GOALS:   Multidisciplinary Problems       Occupational Therapy Goals          Problem: Occupational Therapy    Goal Priority Disciplines Outcome Interventions   Occupational Therapy Goal     OT, PT/OT Progressing    Description: Goals to be met by: in 1 month      Patient will increase functional independence with ADLs by performing:    LE Dressing with Supervision.  Grooming while standing at sink with Stand-by Assistance.  Toileting from toilet with Stand-by Assistance for hygiene and clothing management.   Toilet transfer to toilet with Supervision.                         Time Tracking:     OT Date of Treatment:    OT Start Time: 1055  OT Stop Time: 1118  OT Total Time (min): 23 min    Billable Minutes:Self Care/Home Management 23min     OT/CHUN: OT     Number of CHUN visits since last OT visit: 3    7/30/2024

## 2024-07-30 NOTE — PLAN OF CARE
07/30/24 1244   Discharge Reassessment   Assessment Type Discharge Planning Reassessment   Did the patient's condition or plan change since previous assessment? Yes   Discharge Plan discussed with: Patient;Sibling   Name(s) and Number(s) MarlineJavad (Brother)  [phone]   Communicated VALDEZ with patient/caregiver Date not available/Unable to determine   Discharge Plan A Skilled Nursing Facility   Discharge Plan B New Nursing Home placement - shelter care facility   DME Needed Upon Discharge  none   Post-Acute Status   Discharge Delays None known at this time

## 2024-07-30 NOTE — NURSING
Nurses Note -- 4 Eyes      7/30/2024   12:29 PM      Skin assessed during: Q Shift Change      [] No Altered Skin Integrity Present    []Prevention Measures Documented      [x] Yes- Altered Skin Integrity Present or Discovered   [] LDA Added if Not in Epic (Describe Wound)   [] New Altered Skin Integrity was Present on Admit and Documented in LDA   [x] Wound Image Taken    Wound Care Consulted? No    Attending Nurse:  Bob Bennett RN/Staff Member:  Rolando

## 2024-07-30 NOTE — PROGRESS NOTES
"7/30/2024  Kevin Horan Jr.   1950   [id number]        Psychiatry Progress Note       SUBJECTIVE:   Kevin Horan Jr. is a 74 y.o. male with a past medical history that includes alcoholic cirrhosis, hepatitis-C, T2DM, and CVA who presented to [facility] on 07/19/24 followin g a fall. EMS had reported a CBG of 44. Was reported to have slipped and fell and struck his head. CT head revealed questionable minimal subdural blood products along the phalanx without mass effect. CT cervical spine revealed no acute bony injury of the cervical spine. CT chest abdomen and pelvis with IV contrast revealed right transverse process fractures of L3 and L4, small left pleural effusion, cirrhotic liver with right hepatic lobe mass suspicious for HCC, and small volume ascites. Pelvis x-ray revealed no acute findings. Chest x-ray revealed mild left basilar opacities with a small left pleural effusion. Follow-up CT head on 07/19 revealed no subdural hematoma. Has been agitated and restless requiring PRN haloperidol. Has been documented to be confused and agitated with verbal and physical aggression towards staff. Psychiatry consulted for medication recommendations.     Seen at bedside where he is up in a chair and is pleasant, polite, and cooperative. Oriented to person and place but not time. Reports month as "November" and year as "2020". Reports mood as euthymic at this time and states he was having "temper tantrums" in the past "week". Reports euthymic mood and denies feelings of depression, anxiety, or anger/irritability. Displays a euthymic and reactive affect. Sitter at the bedside reports that he reportedly had a good night, but had some agitation this morning requiring restraints. States he has behaved well since she took over this morning. He denies issues with sleep or appetite. Denies suicidal ideations, homicidal ideations, hallucinations, or paranoia.        Current Medications:   Scheduled Meds:    amLODIPine  " 10 mg Oral Daily    docusate sodium  100 mg Oral BID    enoxparin  40 mg Subcutaneous Q12H (prophylaxis, 0900/2100)    LIDOcaine  1 patch Transdermal Q24H    melatonin  9 mg Oral Nightly    methocarbamoL  500 mg Oral BID    polyethylene glycol  17 g Oral BID    risperiDONE  0.5 mg Oral BID      PRN Meds:   Current Facility-Administered Medications:     dextrose 10%, 12.5 g, Intravenous, PRN    dextrose 10%, 25 g, Intravenous, PRN    glucagon (human recombinant), 1 mg, Intramuscular, PRN    glucose, 16 g, Oral, PRN    glucose, 24 g, Oral, PRN    hydrALAZINE, 10 mg, Intravenous, Q6H PRN    insulin aspart U-100, 0-5 Units, Subcutaneous, QID (AC + HS) PRN    magnesium hydroxide 400 mg/5 ml, 30 mL, Oral, Daily PRN    oxyCODONE, 5 mg, Oral, Q4H PRN   Psychotherapeutics (From admission, onward)      Start     Stop Route Frequency Ordered    07/29/24 2100  risperiDONE tablet 0.5 mg         -- Oral 2 times daily 07/29/24 1228            Allergies:   Review of patient's allergies indicates:  No Known Allergies     OBJECTIVE:   Vitals   Vitals:    07/30/24 1100   BP: 137/75   Pulse: 85   Resp: 18   Temp: 96.7 °F (35.9 °C)        Labs/Imaging/Studies:   Recent Results (from the past 36 hour(s))   Basic Metabolic Panel    Collection Time: 07/29/24  5:29 AM   Result Value Ref Range    Sodium 141 136 - 145 mmol/L    Potassium 4.0 3.5 - 5.1 mmol/L    Chloride 112 (H) 98 - 107 mmol/L    CO2 22 (L) 23 - 31 mmol/L    Glucose 134 (H) 82 - 115 mg/dL    Blood Urea Nitrogen 21.0 8.4 - 25.7 mg/dL    Creatinine 0.94 0.73 - 1.18 mg/dL    BUN/Creatinine Ratio 22     Calcium 9.1 8.8 - 10.0 mg/dL    Anion Gap 7.0 mEq/L    eGFR >60 mL/min/1.73/m2   CBC with Differential    Collection Time: 07/29/24  5:29 AM   Result Value Ref Range    WBC 2.84 (L) 4.50 - 11.50 x10(3)/mcL    RBC 3.74 (L) 4.70 - 6.10 x10(6)/mcL    Hgb 11.4 (L) 14.0 - 18.0 g/dL    Hct 34.7 (L) 42.0 - 52.0 %    MCV 92.8 80.0 - 94.0 fL    MCH 30.5 27.0 - 31.0 pg    MCHC 32.9 (L) 33.0  - 36.0 g/dL    RDW 14.6 11.5 - 17.0 %    Platelet 79 (L) 130 - 400 x10(3)/mcL    MPV 11.4 (H) 7.4 - 10.4 fL    Neut % 52.1 %    Lymph % 27.8 %    Mono % 12.7 %    Eos % 7.0 %    Basophil % 0.4 %    Lymph # 0.79 0.6 - 4.6 x10(3)/mcL    Neut # 1.48 (L) 2.1 - 9.2 x10(3)/mcL    Mono # 0.36 0.1 - 1.3 x10(3)/mcL    Eos # 0.20 0 - 0.9 x10(3)/mcL    Baso # 0.01 <=0.2 x10(3)/mcL    IG# 0.00 0 - 0.04 x10(3)/mcL    IG% 0.0 %    NRBC% 0.0 %   POCT glucose    Collection Time: 07/29/24  6:00 AM   Result Value Ref Range    POCT Glucose 146 (H) 70 - 110 mg/dL   POCT glucose    Collection Time: 07/29/24  5:06 PM   Result Value Ref Range    POCT Glucose 147 (H) 70 - 110 mg/dL   POCT glucose    Collection Time: 07/29/24  9:27 PM   Result Value Ref Range    POCT Glucose 256 (H) 70 - 110 mg/dL   POCT glucose    Collection Time: 07/30/24  6:06 AM   Result Value Ref Range    POCT Glucose 154 (H) 70 - 110 mg/dL   POCT glucose    Collection Time: 07/30/24 11:17 AM   Result Value Ref Range    POCT Glucose 206 (H) 70 - 110 mg/dL          Psychiatric Mental Status Exam:  General Appearance: appears stated age, dressed in hospital garb, in no acute distress, sitting in chair  Arousal: alert  Behavior: cooperative, pleasant, polite, appropriate eye-contact, under good behavioral control  Movements and Motor Activity: no tics, no tremors, no akathisia, no dystonia, no evidence of tardive dyskinesia  Orientation: oriented to person and place  Speech: normal rate, rhythm, volume, tone and pitch  Mood: Euthymic  Affect: euthymic, reactive, full-range, mood-congruent  Thought Process: linear, goal-directed  Associations: no loosening of associations  Thought Content and Perceptions: no suicidal or homicidal ideation, no auditory or visual hallucinations, no paranoid ideation, no ideas of reference, no evidence of delusions or psychosis  Recent and Remote Memory: mild impairments noted; per interview/observation with patient  Attention and  Concentration: grossly intact; per interview/observation with patient  Fund of Knowledge: grossly intact; based on history, vocabulary, fund of knowledge, syntax, grammar, and content  Insight: questionable; based on understanding of severity of illness and HPI  Judgment: questionable; based on patient's behavior and HPI    ASSESSMENT/PLAN:   Problems Addressed/Diagnoses:  Delirium NOS (R41.0)       Plan:  Medication management  Risperidone 0.5mg PO BID  PEC not recommended at this time.  Will continue to follow.      Manuel Bird

## 2024-07-30 NOTE — PT/OT/SLP PROGRESS
Physical Therapy Treatment    Patient Name:  Kevin Horan Jr.   MRN:  [id number]    Recommendations:     Discharge therapy intensity: Moderate Intensity Therapy   Discharge Equipment Recommendations: walker, rolling  Barriers to discharge: Impaired mobility    Assessment:     Kevin Horan Jr. is a 74 y.o. male admitted after fall down steps, R L3 and L4 TP fx, SDH, scalp lac, cirrhotic liver with R hepatic lobe mass. No surgery or brace needed for L spine TP fxs. .  He presents with the following impairments/functional limitations: weakness, impaired endurance, impaired self care skills, impaired functional mobility, gait instability, impaired balance, decreased safety awareness, impaired cognition .    Rehab Prognosis: Good; patient would benefit from acute skilled PT services to address these deficits and reach maximum level of function.    Recent Surgery: * No surgery found *      Plan:     During this hospitalization, patient would benefit from acute PT services 5 x/week to address the identified rehab impairments via gait training, therapeutic activities, therapeutic exercises, neuromuscular re-education and progress toward the following goals:    Plan of Care Expires:  08/20/24    Subjective     Chief Complaint: tired of being in bed  Patient/Family Comments/goals: to get better  Pain/Comfort:  Pain Rating 1: 0/10      Objective:     Communicated with pts nurse prior to session.  Patient found HOB elevated with sitter, telemetry upon PT entry to room.     General Precautions: Standard, fall  Orthopedic Precautions: N/A  Braces: N/A  Respiratory Status: Room air  Skin Integrity: Visible skin intact      Functional Mobility:  Bed Mobility:     Scooting: minimum assistance  Supine to Sit: minimum assistance  Transfers:     Sit to Stand:  contact guard assistance with rolling walker  Bed to Chair: contact guard assistance with  rolling walker  using  Step Transfer  Toilet Transfer: contact guard  assistance with  rolling walker  using  Step Transfer and cues for walker management in small spaces.  Gait: The pt ambulated 90 ft w/ RW, demonstrating slow but steady gait, decrease step length and heel strike.  Pt ambulated chair <>toilet  w/ RW demonstrating fair safety, requiring assistance w/ walker to negotiate obstacle in small spaces.   Balance: No LOB during t/f's and gait.   Education:  Patient provided with verbal education and demonstrations education regarding fall prevention and safety awareness.  Understanding was verbalized, however additional teaching warranted.     Patient left up in chair with all lines intact, call button in reach, geomat cushion, nurse notified, and sitter present    GOALS:   Multidisciplinary Problems       Physical Therapy Goals          Problem: Physical Therapy    Goal Priority Disciplines Outcome Goal Variances Interventions   Physical Therapy Goal     PT, PT/OT Progressing     Description: Goals to be met by: 24     Patient will increase functional independence with mobility by performin. Supine to sit with supervision  2. Sit to stand transfer with Supervision  3. Gait  x 200 feet with Supervision using Rolling Walker.                          Time Tracking:     PT Received On: 24  PT Start Time: 1134     PT Stop Time: 1200  PT Total Time (min): 26 min     Billable Minutes: Gait Training 12 and Therapeutic Activity 14    Treatment Type: Treatment  PT/PTA: PTA     Number of PTA visits since last PT visit: 0     2024

## 2024-07-31 LAB
POCT GLUCOSE: 134 MG/DL (ref 70–110)
POCT GLUCOSE: 149 MG/DL (ref 70–110)
POCT GLUCOSE: 233 MG/DL (ref 70–110)
POCT GLUCOSE: 276 MG/DL (ref 70–110)

## 2024-07-31 PROCEDURE — 97530 THERAPEUTIC ACTIVITIES: CPT | Mod: CQ

## 2024-07-31 PROCEDURE — 25000003 PHARM REV CODE 250

## 2024-07-31 PROCEDURE — 94799 UNLISTED PULMONARY SVC/PX: CPT

## 2024-07-31 PROCEDURE — 25000003 PHARM REV CODE 250: Performed by: INTERNAL MEDICINE

## 2024-07-31 PROCEDURE — 25000003 PHARM REV CODE 250: Performed by: NURSE PRACTITIONER

## 2024-07-31 PROCEDURE — 97116 GAIT TRAINING THERAPY: CPT | Mod: CQ

## 2024-07-31 PROCEDURE — 25000003 PHARM REV CODE 250: Performed by: STUDENT IN AN ORGANIZED HEALTH CARE EDUCATION/TRAINING PROGRAM

## 2024-07-31 PROCEDURE — 11000001 HC ACUTE MED/SURG PRIVATE ROOM

## 2024-07-31 PROCEDURE — 99900031 HC PATIENT EDUCATION (STAT)

## 2024-07-31 PROCEDURE — 21400001 HC TELEMETRY ROOM

## 2024-07-31 PROCEDURE — 63600175 PHARM REV CODE 636 W HCPCS: Performed by: NURSE PRACTITIONER

## 2024-07-31 PROCEDURE — 63600175 PHARM REV CODE 636 W HCPCS

## 2024-07-31 PROCEDURE — 99900035 HC TECH TIME PER 15 MIN (STAT)

## 2024-07-31 RX ADMIN — RISPERIDONE 0.5 MG: 0.25 TABLET, FILM COATED ORAL at 11:07

## 2024-07-31 RX ADMIN — AMLODIPINE BESYLATE 10 MG: 5 TABLET ORAL at 11:07

## 2024-07-31 RX ADMIN — POLYETHYLENE GLYCOL 3350 17 G: 17 POWDER, FOR SOLUTION ORAL at 11:07

## 2024-07-31 RX ADMIN — POLYETHYLENE GLYCOL 3350 17 G: 17 POWDER, FOR SOLUTION ORAL at 10:07

## 2024-07-31 RX ADMIN — RISPERIDONE 0.5 MG: 0.25 TABLET, FILM COATED ORAL at 10:07

## 2024-07-31 RX ADMIN — DOCUSATE SODIUM 100 MG: 100 CAPSULE, LIQUID FILLED ORAL at 11:07

## 2024-07-31 RX ADMIN — DOCUSATE SODIUM 100 MG: 100 CAPSULE, LIQUID FILLED ORAL at 10:07

## 2024-07-31 RX ADMIN — METHOCARBAMOL 500 MG: 500 TABLET ORAL at 11:07

## 2024-07-31 RX ADMIN — Medication 9 MG: at 10:07

## 2024-07-31 RX ADMIN — ENOXAPARIN SODIUM 40 MG: 40 INJECTION SUBCUTANEOUS at 10:07

## 2024-07-31 RX ADMIN — ENOXAPARIN SODIUM 40 MG: 40 INJECTION SUBCUTANEOUS at 11:07

## 2024-07-31 RX ADMIN — INSULIN ASPART 2 UNITS: 100 INJECTION, SOLUTION INTRAVENOUS; SUBCUTANEOUS at 11:07

## 2024-07-31 RX ADMIN — METHOCARBAMOL 500 MG: 500 TABLET ORAL at 10:07

## 2024-07-31 RX ADMIN — LIDOCAINE PATCH 5% 1 PATCH: 700 PATCH TOPICAL at 11:07

## 2024-07-31 NOTE — PT/OT/SLP PROGRESS
Occupational Therapy      Patient Name:  Kevin Lintonluciana Obando   MRN:  [id number]    Patient not seen today secondary to patient lethargic and unable to arouse. Will follow-up as schedule allows.    7/31/2024

## 2024-07-31 NOTE — NURSING
Nurses Note -- 4 Eyes      7/31/2024   10:32 AM      Skin assessed during: Q Shift Change      [] No Altered Skin Integrity Present    []Prevention Measures Documented      [x] Yes- Altered Skin Integrity Present or Discovered   [] LDA Added if Not in Epic (Describe Wound)   [] New Altered Skin Integrity was Present on Admit and Documented in LDA   [] Wound Image Taken    Wound Care Consulted? Yes    Attending Nurse:  Rhoda Bennett RN/Staff Member:  jones Marshall

## 2024-07-31 NOTE — PROGRESS NOTES
[facility]  Hospital Medicine Progress Note      Chief Complaint: Inpatient Follow-up     HPI:   74 y.o. male with a past medical history of alcoholic cirrhosis, hepatitis-C, diabetes mellitus type 2, and CVA who presented to [facility] on 7/19/2024 via EMS following fall.  EMS reported patient was hypoglycemic with CBG of 44.  C-collar was placed en route and 250 mL of D5W was given.  Patient reportedly slipped and fell, striking his head. Initial vital signs in ED were /77, pulse 72, respirations 16, temperature 36.9° C, and SpO2 90% on room air.  Labs revealed WBC 3.29, RBC 3.22, hemoglobin 9.8, hematocrit 30.4, MCV 94.4, chloride 112, CO2 21, glucose 166, and undetectable troponin.  EKG revealed normal sinus rhythm with heart rate is 92 beats per minute.  CT head revealed questionable minimal subdural blood products along the phalanx without mass effect.  CT cervical spine revealed no acute bony injury of the cervical spine.  CT chest abdomen and pelvis with IV contrast revealed right transverse process fractures of L3 and L4, small left pleural effusion, cirrhotic liver with right hepatic lobe mass suspicious for HCC, and small volume ascites.  Pelvis x-ray revealed no acute findings.  Chest x-ray revealed mild left basilar opacities with a small left pleural effusion. Scalp laceration was repaired in ED.  Neurosurgery was consulted.  Patient was admitted to trauma services.  Neurosurgery recommended no surgical intervention, Keppra for seizure prophylaxis, and blood pressure less than 150/90.  Follow-up CT head on 07/19 revealed no subdural hematoma.  Therapy services were consulted.  Hospital medicine was consulted for transition of care and further medical management.  PT/OT consulted; recommending moderate intensity therapy.  Patient was noted to be agitated and restless attempting to climb out of bed for which p.r.n. IV Haldol was ordered.     Interval Hx:   Patient seen and  examined by bedside.  Per bed sitter, patient has been agitated all night and has not slept well.  Currently sleeping at this time.    Objective/physical exam:  General:  no acute distress, staples in place over occipital lack  Respiratory: unlabored breathing   Cardiovascular: normal rate  Gastrointestinal: soft, non-tender  Neuro: Alert, responding appropriately, following commands, moving all ext     VITAL SIGNS: 24 HRS MIN & MAX LAST   Temp  Min: 97.2 °F (36.2 °C)  Max: 98.6 °F (37 °C) 97.8 °F (36.6 °C)   BP  Min: 130/77  Max: 145/71 138/69   Pulse  Min: 72  Max: 88  72   Resp  Min: 16  Max: 20 16   SpO2  Min: 96 %  Max: 99 % 98 %       Recent Labs   Lab 07/25/24  0522 07/29/24  0529   WBC 2.82* 2.84*   RBC 3.29* 3.74*   HGB 10.0* 11.4*   HCT 31.9* 34.7*   MCV 97.0* 92.8   MCH 30.4 30.5   MCHC 31.3* 32.9*   RDW 14.7 14.6   PLT 69* 79*   MPV 11.5* 11.4*       Recent Labs   Lab 07/25/24  0522 07/29/24  0529    141   K 4.1 4.0   * 112*   CO2 19* 22*   BUN 20.8 21.0   CREATININE 1.06 0.94   CALCIUM 8.5* 9.1   ALBUMIN 3.0*  --    ALKPHOS 145  --    ALT 30  --    AST 51*  --    BILITOT 0.8  --           Microbiology Results (last 7 days)       ** No results found for the last 168 hours. **             Radiology:  CT Head Without Contrast  Narrative: EXAMINATION:  CT HEAD WITHOUT CONTRAST    CLINICAL HISTORY:  fu SDH;    TECHNIQUE:  Multiple axial images were obtained from the base of the brain to the vertex without contrast administration.  Sagittal and coronal reconstructions were performed. .Automatic exposure control  (AEC) is utilized to reduce patient radiation exposure.    COMPARISON:  None    FINDINGS:  There is no intracranial mass or lesion seen.  No hemorrhage is seen.  No subdural hemorrhage is seen along the falx on today's examination.  No infarct is seen.  The ventricles and basilar cisterns appear normal.  Brain parenchyma appears grossly unremarkable.    Posterior fossa appears normal.  The  calvarium is intact.  The paranasal sinuses appear grossly unremarkable.  Impression: No subdural hematoma seen today's examination.    Electronically signed by: Robin Ellis  Date:    07/19/2024  Time:    15:00  X-ray Shoulder 2 or More Views Right  Narrative: EXAMINATION:  XR SHOULDER COMPLETE 2 OR MORE VIEWS RIGHT    CLINICAL HISTORY:  Fall;    TECHNIQUE:  Three views.    COMPARISON:  None available.    FINDINGS:  Articular surfaces alignment is preserved.  No acute fracture or dislocation identified.  Calcification adjacent to the greater tuberosity reflects calcific tendinopathy.  There is narrowed acromial head and acromion interval which raises the possibility of rotator cuff arthropathy.  Impression: No osseous abnormality identified.    Electronically signed by: David Erazo  Date:    07/19/2024  Time:    09:19  CT Chest Abdomen Pelvis With IV Contrast (XPD) NO Oral Contrast  Narrative: EXAMINATION:  CT CHEST ABDOMEN PELVIS WITH IV CONTRAST (XPD)    CLINICAL HISTORY:  Polytrauma, blunt;    TECHNIQUE:  Helical acquisition from the thoracic inlet through the ischia with  IV contrast. Three plane reconstructions made available for review.  mGycm. Automatic exposure control, adjustment of mA/kV or iterative reconstruction technique was used to reduce radiation.    COMPARISON:  None available.    FINDINGS:  Chest.    Heart size upper limit normal.  No pericardial effusion.  There are coronary artery calcifications.    There is no mediastinal hematoma.  No enlarged thoracic lymph nodes.    There is a small left pleural effusion.  No pneumothorax seen.  There is some atelectasis or scarring left lung base.  Mild chronic changes of the lungs elsewhere.    Abdomen and pelvis.    Cirrhotic liver.  There is a 4 cm hyperenhancing lesion in segment 5 image 126 series 2.  The portal vein is patent.  Prominent paraumbilical vein.  There are gallstones.  No significant biliary ductal dilatation.    The spleen is  mildly enlarged.  No significant abnormality of the pancreas or adrenals.  No hydronephrosis.  The low lying malrotated right kidney.    There is no bowel obstruction or free air.  No suspicious bowel wall thickening.  Small volume ascites.    Urinary bladder is unremarkable.  The abdominal aorta is normal in caliber.  Moderate atherosclerotic disease.  Left inguinal hernia containing some fluid.    There are fractures right transverse processes L3 and L4.  There are old bilateral rib fractures.  Moderate degenerative change of the spine.  Impression: 1. Right transverse process fractures L3 and L4.  2. Small left pleural effusion.  3. Cirrhotic liver with right hepatic lobe mass suspicious for HCC.  Recommend outpatient liver mass protocol CT.  4. Small volume ascites.    Electronically signed by: Surinder Danielle  Date:    07/19/2024  Time:    07:32  CT Head Without Contrast  Narrative: EXAMINATION:  CT HEAD WITHOUT CONTRAST    CLINICAL HISTORY:  Head trauma, minor (Age >= 65y);    TECHNIQUE:  CT imaging of the head performed from the skull base to the vertex without intravenous contrast. DLP 1387 mGycm. Automatic exposure control, adjustment of mA/kV or iterative reconstruction technique was used to reduce radiation.    COMPARISON:  None Available.    FINDINGS:  Questionable minimal subpleural blood products along the falx measuring only 1-2 mm maximally.  No mass effect.  There is mild patchy hypoattenuation in the cerebral white matter which is nonspecific but most commonly associated with chronic small vessel ischemic changes.  There is left cerebellar and left occipital encephalomalacia.  The ventricles are not significantly enlarged.  There are vascular calcifications.    Visualized paranasal sinuses and mastoid air cells are clear. The calvarium is grossly intact.  There is some soft tissue swelling over the right frontal scalp.  Impression: Question minimal subdural blood products along the falx without mass  effect.    Findings discussed with Dr. Maddox at 713 on 7/19/2024.    Electronically signed by: Surinder Danielle  Date:    07/19/2024  Time:    07:14  CT Cervical Spine Without Contrast  Narrative: EXAMINATION:  CT CERVICAL SPINE WITHOUT CONTRAST    CLINICAL HISTORY:  Neck trauma (Age >= 65y);    TECHNIQUE:  Helical acquisition through the cervical spine without IV contrast. Three plane reconstructions were made available for review. DLP 1387 mGycm. Automatic exposure control, adjustment of mA/kV or iterative reconstruction technique was used to reduce radiation.    COMPARISON:  None available.    FINDINGS:  No fractures identified.  There are mild degenerative alignment abnormalities.  Moderate degenerative changes.  Craniocervical junction and C1-C2 relationship are normal. The odontoid is intact. There is limited evaluation of the soft tissues. No prevertebral soft tissue swelling. Ligamentous injury cannot be excluded with CT.  There are vascular calcifications.  Impression: No acute bony injury of the cervical spine.    Electronically signed by: Surinder Danielle  Date:    07/19/2024  Time:    07:13  X-Ray Pelvis Routine AP  Narrative: EXAMINATION:  XR PELVIS ROUTINE AP    CLINICAL HISTORY:  Unspecified fall, initial encounter    COMPARISON:  None    FINDINGS:  Frontal image of the pelvis demonstrates no fracture or dislocation  Impression: No acute findings.    Electronically signed by: Surinder Danielle  Date:    07/19/2024  Time:    07:10  X-Ray Chest 1 View  Narrative: EXAMINATION:  XR CHEST 1 VIEW    CLINICAL HISTORY:  Unspecified fall, initial encounter    COMPARISON:  26 April 2024    FINDINGS:  Frontal view of the chest was obtained. The heart is not significantly enlarged.  There are mild left basilar opacities with a small left pleural effusion suspected.  There is no pneumothorax.  Impression: Mild left basilar opacities with a small left pleural effusion.    Electronically signed by: Surinder  Shashi  Date:    07/19/2024  Time:    06:55        Medications:  Scheduled Meds:   amLODIPine  10 mg Oral Daily    docusate sodium  100 mg Oral BID    enoxparin  40 mg Subcutaneous Q12H (prophylaxis, 0900/2100)    LIDOcaine  1 patch Transdermal Q24H    melatonin  9 mg Oral Nightly    methocarbamoL  500 mg Oral BID    polyethylene glycol  17 g Oral BID    risperiDONE  0.5 mg Oral BID     Continuous Infusions:  PRN Meds:.  Current Facility-Administered Medications:     dextrose 10%, 12.5 g, Intravenous, PRN    dextrose 10%, 25 g, Intravenous, PRN    diphenhydrAMINE, 25 mg, Intramuscular, Q4H PRN    glucagon (human recombinant), 1 mg, Intramuscular, PRN    glucose, 16 g, Oral, PRN    glucose, 24 g, Oral, PRN    hydrALAZINE, 10 mg, Intravenous, Q6H PRN    insulin aspart U-100, 0-5 Units, Subcutaneous, QID (AC + HS) PRN    magnesium hydroxide 400 mg/5 ml, 30 mL, Oral, Daily PRN    oxyCODONE, 5 mg, Oral, Q4H PRN        Assessment/Plan:   S/p ground level fall  Questionable subdural along the phalanx-no ICH on repeat scan  Right transverse process fractures of L3 and L4   Small left pleural effusion   Cirrhotic liver with right hepatic lobe mass suspicious for HCC  Agitation vs Dementia vs Delirium   History of alcoholic cirrhosis, hepatitis-C, diabetes mellitus type 2, and CVA    Currently 1:1  Per case management patient will need to have all chemical and physical restraints removed prior to any acceptance to nursing home   We will reconsult psych for alternatives to Haldol   Haldol discontinued and was started on risperidone 0.5 mg b.i.d.   Pec not recommended at this time per psych   Appreciate recommendations  UA unremarkable  Prn anxiolytics/sedative  NSY signed off.  No planned intervention  Continue PT/OT  CM working on SNF placement. Will be a difficult placement until one-to-one sitter was removed along with all restraints  Labs stable.  Repeat Monday unless any acute changes.    Rowan Mathias DO  Department of  [facility] Medicine  [facility]  07/31/2024     All diagnosis and differential diagnosis have been reviewed; assessment and plan has been documented; I have personally reviewed the labs and test results that are presently available; I have reviewed the patients medication list; I have reviewed the consulting providers response and recommendations. I have reviewed or attempted to review medical records based upon their availability    All of the patient's questions have been  addressed and answered. Patient's is agreeable to the above stated plan. I will continue to monitor closely and make adjustments to medical management as needed.  _____________________________________________________________________

## 2024-07-31 NOTE — PROGRESS NOTES
[facility]  Hospital Medicine Progress Note      Chief Complaint: Inpatient Follow-up     HPI:   74 y.o. male with a past medical history of alcoholic cirrhosis, hepatitis-C, diabetes mellitus type 2, and CVA who presented to [facility] on 7/19/2024 via EMS following fall.  EMS reported patient was hypoglycemic with CBG of 44.  C-collar was placed en route and 250 mL of D5W was given.  Patient reportedly slipped and fell, striking his head. Initial vital signs in ED were /77, pulse 72, respirations 16, temperature 36.9° C, and SpO2 90% on room air.  Labs revealed WBC 3.29, RBC 3.22, hemoglobin 9.8, hematocrit 30.4, MCV 94.4, chloride 112, CO2 21, glucose 166, and undetectable troponin.  EKG revealed normal sinus rhythm with heart rate is 92 beats per minute.  CT head revealed questionable minimal subdural blood products along the phalanx without mass effect.  CT cervical spine revealed no acute bony injury of the cervical spine.  CT chest abdomen and pelvis with IV contrast revealed right transverse process fractures of L3 and L4, small left pleural effusion, cirrhotic liver with right hepatic lobe mass suspicious for HCC, and small volume ascites.  Pelvis x-ray revealed no acute findings.  Chest x-ray revealed mild left basilar opacities with a small left pleural effusion. Scalp laceration was repaired in ED.  Neurosurgery was consulted.  Patient was admitted to trauma services.  Neurosurgery recommended no surgical intervention, Keppra for seizure prophylaxis, and blood pressure less than 150/90.  Follow-up CT head on 07/19 revealed no subdural hematoma.  Therapy services were consulted.  Hospital medicine was consulted for transition of care and further medical management.  PT/OT consulted; recommending moderate intensity therapy.  Patient was noted to be agitated and restless attempting to climb out of bed for which p.r.n. IV Haldol was ordered.     Interval Hx:   Patient seen and  examined by bedside.  Is alert and oriented.  Is one-to-one and continues with restraints.    Objective/physical exam:  General:  no acute distress, staples in place over occipital lack  Respiratory: unlabored breathing   Cardiovascular: normal rate  Gastrointestinal: soft, non-tender  Neuro: Alert, responding appropriately, following commands, moving all ext     VITAL SIGNS: 24 HRS MIN & MAX LAST   Temp  Min: 96.7 °F (35.9 °C)  Max: 98.8 °F (37.1 °C) 98.5 °F (36.9 °C)   BP  Min: 134/73  Max: 159/77 (!) 145/71   Pulse  Min: 75  Max: 86  83   Resp  Min: 18  Max: 18 18   SpO2  Min: 96 %  Max: 98 % 96 %       Recent Labs   Lab 07/25/24  0522 07/29/24  0529   WBC 2.82* 2.84*   RBC 3.29* 3.74*   HGB 10.0* 11.4*   HCT 31.9* 34.7*   MCV 97.0* 92.8   MCH 30.4 30.5   MCHC 31.3* 32.9*   RDW 14.7 14.6   PLT 69* 79*   MPV 11.5* 11.4*       Recent Labs   Lab 07/25/24  0522 07/29/24  0529    141   K 4.1 4.0   * 112*   CO2 19* 22*   BUN 20.8 21.0   CREATININE 1.06 0.94   CALCIUM 8.5* 9.1   ALBUMIN 3.0*  --    ALKPHOS 145  --    ALT 30  --    AST 51*  --    BILITOT 0.8  --           Microbiology Results (last 7 days)       ** No results found for the last 168 hours. **             Radiology:  CT Head Without Contrast  Narrative: EXAMINATION:  CT HEAD WITHOUT CONTRAST    CLINICAL HISTORY:  fu SDH;    TECHNIQUE:  Multiple axial images were obtained from the base of the brain to the vertex without contrast administration.  Sagittal and coronal reconstructions were performed. .Automatic exposure control  (AEC) is utilized to reduce patient radiation exposure.    COMPARISON:  None    FINDINGS:  There is no intracranial mass or lesion seen.  No hemorrhage is seen.  No subdural hemorrhage is seen along the falx on today's examination.  No infarct is seen.  The ventricles and basilar cisterns appear normal.  Brain parenchyma appears grossly unremarkable.    Posterior fossa appears normal.  The calvarium is intact.  The  paranasal sinuses appear grossly unremarkable.  Impression: No subdural hematoma seen today's examination.    Electronically signed by: Robin Ellis  Date:    07/19/2024  Time:    15:00  X-ray Shoulder 2 or More Views Right  Narrative: EXAMINATION:  XR SHOULDER COMPLETE 2 OR MORE VIEWS RIGHT    CLINICAL HISTORY:  Fall;    TECHNIQUE:  Three views.    COMPARISON:  None available.    FINDINGS:  Articular surfaces alignment is preserved.  No acute fracture or dislocation identified.  Calcification adjacent to the greater tuberosity reflects calcific tendinopathy.  There is narrowed acromial head and acromion interval which raises the possibility of rotator cuff arthropathy.  Impression: No osseous abnormality identified.    Electronically signed by: David Erazo  Date:    07/19/2024  Time:    09:19  CT Chest Abdomen Pelvis With IV Contrast (XPD) NO Oral Contrast  Narrative: EXAMINATION:  CT CHEST ABDOMEN PELVIS WITH IV CONTRAST (XPD)    CLINICAL HISTORY:  Polytrauma, blunt;    TECHNIQUE:  Helical acquisition from the thoracic inlet through the ischia with  IV contrast. Three plane reconstructions made available for review.  mGycm. Automatic exposure control, adjustment of mA/kV or iterative reconstruction technique was used to reduce radiation.    COMPARISON:  None available.    FINDINGS:  Chest.    Heart size upper limit normal.  No pericardial effusion.  There are coronary artery calcifications.    There is no mediastinal hematoma.  No enlarged thoracic lymph nodes.    There is a small left pleural effusion.  No pneumothorax seen.  There is some atelectasis or scarring left lung base.  Mild chronic changes of the lungs elsewhere.    Abdomen and pelvis.    Cirrhotic liver.  There is a 4 cm hyperenhancing lesion in segment 5 image 126 series 2.  The portal vein is patent.  Prominent paraumbilical vein.  There are gallstones.  No significant biliary ductal dilatation.    The spleen is mildly enlarged.  No  significant abnormality of the pancreas or adrenals.  No hydronephrosis.  The low lying malrotated right kidney.    There is no bowel obstruction or free air.  No suspicious bowel wall thickening.  Small volume ascites.    Urinary bladder is unremarkable.  The abdominal aorta is normal in caliber.  Moderate atherosclerotic disease.  Left inguinal hernia containing some fluid.    There are fractures right transverse processes L3 and L4.  There are old bilateral rib fractures.  Moderate degenerative change of the spine.  Impression: 1. Right transverse process fractures L3 and L4.  2. Small left pleural effusion.  3. Cirrhotic liver with right hepatic lobe mass suspicious for HCC.  Recommend outpatient liver mass protocol CT.  4. Small volume ascites.    Electronically signed by: Surinder Danielle  Date:    07/19/2024  Time:    07:32  CT Head Without Contrast  Narrative: EXAMINATION:  CT HEAD WITHOUT CONTRAST    CLINICAL HISTORY:  Head trauma, minor (Age >= 65y);    TECHNIQUE:  CT imaging of the head performed from the skull base to the vertex without intravenous contrast. DLP 1387 mGycm. Automatic exposure control, adjustment of mA/kV or iterative reconstruction technique was used to reduce radiation.    COMPARISON:  None Available.    FINDINGS:  Questionable minimal subpleural blood products along the falx measuring only 1-2 mm maximally.  No mass effect.  There is mild patchy hypoattenuation in the cerebral white matter which is nonspecific but most commonly associated with chronic small vessel ischemic changes.  There is left cerebellar and left occipital encephalomalacia.  The ventricles are not significantly enlarged.  There are vascular calcifications.    Visualized paranasal sinuses and mastoid air cells are clear. The calvarium is grossly intact.  There is some soft tissue swelling over the right frontal scalp.  Impression: Question minimal subdural blood products along the falx without mass effect.    Findings  discussed with Dr. Maddox at 713 on 7/19/2024.    Electronically signed by: Surinder Danielle  Date:    07/19/2024  Time:    07:14  CT Cervical Spine Without Contrast  Narrative: EXAMINATION:  CT CERVICAL SPINE WITHOUT CONTRAST    CLINICAL HISTORY:  Neck trauma (Age >= 65y);    TECHNIQUE:  Helical acquisition through the cervical spine without IV contrast. Three plane reconstructions were made available for review. DLP 1387 mGycm. Automatic exposure control, adjustment of mA/kV or iterative reconstruction technique was used to reduce radiation.    COMPARISON:  None available.    FINDINGS:  No fractures identified.  There are mild degenerative alignment abnormalities.  Moderate degenerative changes.  Craniocervical junction and C1-C2 relationship are normal. The odontoid is intact. There is limited evaluation of the soft tissues. No prevertebral soft tissue swelling. Ligamentous injury cannot be excluded with CT.  There are vascular calcifications.  Impression: No acute bony injury of the cervical spine.    Electronically signed by: Surinder Danielle  Date:    07/19/2024  Time:    07:13  X-Ray Pelvis Routine AP  Narrative: EXAMINATION:  XR PELVIS ROUTINE AP    CLINICAL HISTORY:  Unspecified fall, initial encounter    COMPARISON:  None    FINDINGS:  Frontal image of the pelvis demonstrates no fracture or dislocation  Impression: No acute findings.    Electronically signed by: Surinder Danielle  Date:    07/19/2024  Time:    07:10  X-Ray Chest 1 View  Narrative: EXAMINATION:  XR CHEST 1 VIEW    CLINICAL HISTORY:  Unspecified fall, initial encounter    COMPARISON:  26 April 2024    FINDINGS:  Frontal view of the chest was obtained. The heart is not significantly enlarged.  There are mild left basilar opacities with a small left pleural effusion suspected.  There is no pneumothorax.  Impression: Mild left basilar opacities with a small left pleural effusion.    Electronically signed by: Surinder  Shashi  Date:    07/19/2024  Time:    06:55        Medications:  Scheduled Meds:   amLODIPine  10 mg Oral Daily    docusate sodium  100 mg Oral BID    enoxparin  40 mg Subcutaneous Q12H (prophylaxis, 0900/2100)    LIDOcaine  1 patch Transdermal Q24H    melatonin  9 mg Oral Nightly    methocarbamoL  500 mg Oral BID    polyethylene glycol  17 g Oral BID    risperiDONE  0.5 mg Oral BID     Continuous Infusions:  PRN Meds:.  Current Facility-Administered Medications:     dextrose 10%, 12.5 g, Intravenous, PRN    dextrose 10%, 25 g, Intravenous, PRN    diphenhydrAMINE, 25 mg, Intramuscular, Q4H PRN    glucagon (human recombinant), 1 mg, Intramuscular, PRN    glucose, 16 g, Oral, PRN    glucose, 24 g, Oral, PRN    hydrALAZINE, 10 mg, Intravenous, Q6H PRN    insulin aspart U-100, 0-5 Units, Subcutaneous, QID (AC + HS) PRN    magnesium hydroxide 400 mg/5 ml, 30 mL, Oral, Daily PRN    oxyCODONE, 5 mg, Oral, Q4H PRN        Assessment/Plan:   S/p ground level fall  Questionable subdural along the phalanx-no ICH on repeat scan  Right transverse process fractures of L3 and L4   Small left pleural effusion   Cirrhotic liver with right hepatic lobe mass suspicious for HCC  Agitation vs Dementia vs Delirium   History of alcoholic cirrhosis, hepatitis-C, diabetes mellitus type 2, and CVA    Currently 1:1  Per case management patient will need to have all chemical and physical restraints removed prior to any acceptance to nursing home   We will reconsult psych for alternatives to Haldol   Haldol discontinued and was started on risperidone 0.5 mg b.i.d.   Pec not recommended at this time per psych   Appreciate recommendations  UA unremarkable  Prn anxiolytics/sedative  NSY signed off.  No planned intervention  Continue PT/OT  CM working on SNF placement.  Labs stable.  Repeat Monday unless any acute changes.    Rowan Mathias DO  Department of Hospital Medicine  [facility]  07/30/2024     All diagnosis and  differential diagnosis have been reviewed; assessment and plan has been documented; I have personally reviewed the labs and test results that are presently available; I have reviewed the patients medication list; I have reviewed the consulting providers response and recommendations. I have reviewed or attempted to review medical records based upon their availability    All of the patient's questions have been  addressed and answered. Patient's is agreeable to the above stated plan. I will continue to monitor closely and make adjustments to medical management as needed.  _____________________________________________________________________

## 2024-07-31 NOTE — PT/OT/SLP PROGRESS
Physical Therapy Treatment    Patient Name:  Kevin Horan Jr.   MRN:  [id number]    Recommendations:     Discharge therapy intensity: Moderate Intensity Therapy   Discharge Equipment Recommendations: walker, rolling  Barriers to discharge: Impaired mobility and Ongoing medical needs    Assessment:     Kevin Horan Jr. is a 74 y.o. male admitted with a medical diagnosis of fall down steps, R L3 and L4 TP fx, SDH, scalp lac, cirrhotic liver with R hepatic lobe mass. No surgery or brace needed for L spine TP fxs.  He presents with the following impairments/functional limitations: weakness, impaired endurance, impaired self care skills, impaired functional mobility, gait instability, impaired balance, impaired cognition .    Rehab Prognosis: Good; patient would benefit from acute skilled PT services to address these deficits and reach maximum level of function.    Recent Surgery: * No surgery found *      Plan:     During this hospitalization, patient would benefit from acute PT services 5 x/week to address the identified rehab impairments via gait training, therapeutic activities, therapeutic exercises, neuromuscular re-education and progress toward the following goals:    Plan of Care Expires:  08/20/24    Subjective     Chief Complaint: pt did not sleep well last night, increase agitation  Patient/Family Comments/goals:   Pain/Comfort:  Pain Rating 1: 0/10      Objective:     Communicated with pts nurse prior to session.  Patient found supine with telemetry upon PT entry to room.     General Precautions: Standard, fall  Orthopedic Precautions: N/A  Braces: N/A  Respiratory Status: Room air  Skin Integrity: Visible skin intact      Functional Mobility:  Bed Mobility:     Rolling Left:  minimum assistance and moderate assistance  Rolling Right: minimum assistance, moderate assistance, and rolled sied to side for roll belt placement  Scooting: moderate assistance  Supine to Sit: moderate assistance  Sit to  Supine: moderate assistance and of 2 persons  Transfers:     Sit to Stand:  minimum assistance with rolling walker and performed x 3 trials, requiring hand over hand assistance due to lethargy   Balance: CGA for stand w/ RW and sit EOB balance    Therapeutic Activities/Exercises:  Pt sat EOB x 8 mins and stood 5 min and 3 min w/ RW.  Pt facilitated in lateral wt shift in stand and march in place.  Pt would initiate tasks but required cues/ assistance to continue and complete.  Increase cues due to lethargy.  Pt was cooperative, despite sleepiness and able to participate in conversation w/ some confusion about tasks he was doing and where he was.  Pt thought he was in music studio.    Education:  Patient provided with verbal education education regarding PT role/goals/POC.  Additional teaching is warranted.     Patient left HOB elevated with all lines intact, call button in reach, restraints reapplied at end of session, nurse notified, and PCA present    GOALS:   Multidisciplinary Problems       Physical Therapy Goals          Problem: Physical Therapy    Goal Priority Disciplines Outcome Goal Variances Interventions   Physical Therapy Goal     PT, PT/OT Progressing     Description: Goals to be met by: 24     Patient will increase functional independence with mobility by performin. Supine to sit with supervision  2. Sit to stand transfer with Supervision  3. Gait  x 200 feet with Supervision using Rolling Walker.                          Time Tracking:     PT Received On: 24  PT Start Time: 1329     PT Stop Time: 1353  PT Total Time (min): 24 min     Billable Minutes: Therapeutic Activity 24    Treatment Type: Treatment  PT/PTA: PTA     Number of PTA visits since last PT visit: 2024

## 2024-07-31 NOTE — PROGRESS NOTES
7/31/2024  Kevin Horan Jr.   1950   [id number]        Psychiatry Progress Note         SUBJECTIVE:   Kevin Horan Jr. is a 74 y.o. male with a past medical history that includes alcoholic cirrhosis, hepatitis-C, T2DM, and CVA who presented to [facility] on 07/19/24 followin g a fall. EMS had reported a CBG of 44. Was reported to have slipped and fell and struck his head. CT head revealed questionable minimal subdural blood products along the phalanx without mass effect. CT cervical spine revealed no acute bony injury of the cervical spine. CT chest abdomen and pelvis with IV contrast revealed right transverse process fractures of L3 and L4, small left pleural effusion, cirrhotic liver with right hepatic lobe mass suspicious for HCC, and small volume ascites. Pelvis x-ray revealed no acute findings. Chest x-ray revealed mild left basilar opacities with a small left pleural effusion. Follow-up CT head on 07/19 revealed no subdural hematoma. Has been agitated and restless requiring PRN haloperidol. Has been documented to be confused and agitated with verbal and physical aggression towards staff. Psychiatry consulted for medication recommendations.     Seen at the bedside with 1:1 sitter present where he is pleasant and cooperative with interview. Reports euthymic mood and displays appropriate affect at this time. Oriented to person and place, but not time. Displays impairments in memory. Staff reports no issues this morning, but that yesterday evening he did become agitated. Currently not in restraints. He denies issues with sleep or appetite. Denies suicidal ideations, homicidal ideations, or hallucinations.        Current Medications:   Scheduled Meds:    amLODIPine  10 mg Oral Daily    docusate sodium  100 mg Oral BID    enoxparin  40 mg Subcutaneous Q12H (prophylaxis, 0900/2100)    LIDOcaine  1 patch Transdermal Q24H    melatonin  9 mg Oral Nightly    methocarbamoL  500 mg Oral BID    polyethylene  glycol  17 g Oral BID    risperiDONE  0.5 mg Oral BID      PRN Meds:   Current Facility-Administered Medications:     dextrose 10%, 12.5 g, Intravenous, PRN    dextrose 10%, 25 g, Intravenous, PRN    diphenhydrAMINE, 25 mg, Intramuscular, Q4H PRN    glucagon (human recombinant), 1 mg, Intramuscular, PRN    glucose, 16 g, Oral, PRN    glucose, 24 g, Oral, PRN    hydrALAZINE, 10 mg, Intravenous, Q6H PRN    insulin aspart U-100, 0-5 Units, Subcutaneous, QID (AC + HS) PRN    magnesium hydroxide 400 mg/5 ml, 30 mL, Oral, Daily PRN    oxyCODONE, 5 mg, Oral, Q4H PRN   Psychotherapeutics (From admission, onward)      Start     Stop Route Frequency Ordered    07/29/24 2100  risperiDONE tablet 0.5 mg         -- Oral 2 times daily 07/29/24 1228            Allergies:   Review of patient's allergies indicates:  No Known Allergies     OBJECTIVE:   Vitals   Vitals:    07/31/24 1100   BP: 134/75   Pulse: 87   Resp: 18   Temp: 97.2 °F (36.2 °C)        Labs/Imaging/Studies:   Recent Results (from the past 36 hour(s))   POCT glucose    Collection Time: 07/30/24  6:06 AM   Result Value Ref Range    POCT Glucose 154 (H) 70 - 110 mg/dL   POCT glucose    Collection Time: 07/30/24 11:17 AM   Result Value Ref Range    POCT Glucose 206 (H) 70 - 110 mg/dL   POCT glucose    Collection Time: 07/30/24  4:58 PM   Result Value Ref Range    POCT Glucose 254 (H) 70 - 110 mg/dL   POCT glucose    Collection Time: 07/30/24  9:48 PM   Result Value Ref Range    POCT Glucose 190 (H) 70 - 110 mg/dL   POCT glucose    Collection Time: 07/31/24  6:24 AM   Result Value Ref Range    POCT Glucose 134 (H) 70 - 110 mg/dL   POCT glucose    Collection Time: 07/31/24 11:03 AM   Result Value Ref Range    POCT Glucose 233 (H) 70 - 110 mg/dL        Psychiatric Mental Status Exam:  General Appearance: appears stated age, dressed in hospital garb, in no acute distress, lying in bed  Arousal: alert  Behavior: cooperative, pleasant, polite, appropriate eye-contact, under  good behavioral control  Movements and Motor Activity: no tics, no tremors, no akathisia, no dystonia, no evidence of tardive dyskinesia  Orientation: oriented to person and place  Speech: normal rate, rhythm, volume, tone and pitch  Mood: Euthymic  Affect: euthymic, reactive, full-range, mood-congruent  Thought Process: linear, goal-directed  Associations: no loosening of associations  Thought Content and Perceptions: no suicidal or homicidal ideation, no auditory or visual hallucinations, no paranoid ideation, no ideas of reference, no evidence of delusions or psychosis  Recent and Remote Memory: mild impairments noted; per interview/observation with patient  Attention and Concentration: grossly intact; per interview/observation with patient  Fund of Knowledge: grossly intact; based on history, vocabulary, fund of knowledge, syntax, grammar, and content  Insight: questionable; based on understanding of severity of illness and HPI  Judgment: questionable; based on patient's behavior and HPI    ASSESSMENT/PLAN:   Problems Addressed/Diagnoses:  Delirium NOS (R41.0)     Plan:  Medication management  Risperidone 0.5mg PO BID  PEC not recommended at this time.  Will continue to follow.        Manuel Bird

## 2024-08-01 LAB
POCT GLUCOSE: 164 MG/DL (ref 70–110)
POCT GLUCOSE: 292 MG/DL (ref 70–110)
POCT GLUCOSE: 300 MG/DL (ref 70–110)
POCT GLUCOSE: 324 MG/DL (ref 70–110)

## 2024-08-01 PROCEDURE — 94799 UNLISTED PULMONARY SVC/PX: CPT

## 2024-08-01 PROCEDURE — 97530 THERAPEUTIC ACTIVITIES: CPT | Mod: CQ

## 2024-08-01 PROCEDURE — 97530 THERAPEUTIC ACTIVITIES: CPT | Mod: CO

## 2024-08-01 PROCEDURE — 25000003 PHARM REV CODE 250: Performed by: NURSE PRACTITIONER

## 2024-08-01 PROCEDURE — 99223 1ST HOSP IP/OBS HIGH 75: CPT | Mod: ,,, | Performed by: NURSE PRACTITIONER

## 2024-08-01 PROCEDURE — 63600175 PHARM REV CODE 636 W HCPCS

## 2024-08-01 PROCEDURE — 97535 SELF CARE MNGMENT TRAINING: CPT | Mod: CO

## 2024-08-01 PROCEDURE — 25000003 PHARM REV CODE 250: Performed by: INTERNAL MEDICINE

## 2024-08-01 PROCEDURE — 25000003 PHARM REV CODE 250: Performed by: STUDENT IN AN ORGANIZED HEALTH CARE EDUCATION/TRAINING PROGRAM

## 2024-08-01 PROCEDURE — 97116 GAIT TRAINING THERAPY: CPT | Mod: CQ

## 2024-08-01 PROCEDURE — 94760 N-INVAS EAR/PLS OXIMETRY 1: CPT

## 2024-08-01 PROCEDURE — 25000003 PHARM REV CODE 250

## 2024-08-01 PROCEDURE — 99900031 HC PATIENT EDUCATION (STAT)

## 2024-08-01 PROCEDURE — 63600175 PHARM REV CODE 636 W HCPCS: Performed by: NURSE PRACTITIONER

## 2024-08-01 PROCEDURE — 21400001 HC TELEMETRY ROOM

## 2024-08-01 PROCEDURE — 11000001 HC ACUTE MED/SURG PRIVATE ROOM

## 2024-08-01 RX ADMIN — AMLODIPINE BESYLATE 10 MG: 5 TABLET ORAL at 10:08

## 2024-08-01 RX ADMIN — INSULIN ASPART 4 UNITS: 100 INJECTION, SOLUTION INTRAVENOUS; SUBCUTANEOUS at 05:08

## 2024-08-01 RX ADMIN — OXYCODONE HYDROCHLORIDE 5 MG: 5 TABLET ORAL at 08:08

## 2024-08-01 RX ADMIN — DOCUSATE SODIUM 100 MG: 100 CAPSULE, LIQUID FILLED ORAL at 10:08

## 2024-08-01 RX ADMIN — Medication 9 MG: at 08:08

## 2024-08-01 RX ADMIN — ENOXAPARIN SODIUM 40 MG: 40 INJECTION SUBCUTANEOUS at 08:08

## 2024-08-01 RX ADMIN — DOCUSATE SODIUM 100 MG: 100 CAPSULE, LIQUID FILLED ORAL at 08:08

## 2024-08-01 RX ADMIN — RISPERIDONE 0.5 MG: 0.25 TABLET, FILM COATED ORAL at 10:08

## 2024-08-01 RX ADMIN — RISPERIDONE 0.5 MG: 0.25 TABLET, FILM COATED ORAL at 08:08

## 2024-08-01 RX ADMIN — LIDOCAINE PATCH 5% 1 PATCH: 700 PATCH TOPICAL at 10:08

## 2024-08-01 RX ADMIN — ENOXAPARIN SODIUM 40 MG: 40 INJECTION SUBCUTANEOUS at 10:08

## 2024-08-01 RX ADMIN — OXYCODONE HYDROCHLORIDE 5 MG: 5 TABLET ORAL at 05:08

## 2024-08-01 RX ADMIN — POLYETHYLENE GLYCOL 3350 17 G: 17 POWDER, FOR SOLUTION ORAL at 10:08

## 2024-08-01 RX ADMIN — INSULIN ASPART 3 UNITS: 100 INJECTION, SOLUTION INTRAVENOUS; SUBCUTANEOUS at 01:08

## 2024-08-01 RX ADMIN — METHOCARBAMOL 500 MG: 500 TABLET ORAL at 08:08

## 2024-08-01 RX ADMIN — METHOCARBAMOL 500 MG: 500 TABLET ORAL at 10:08

## 2024-08-01 RX ADMIN — INSULIN ASPART 2 UNITS: 100 INJECTION, SOLUTION INTRAVENOUS; SUBCUTANEOUS at 09:08

## 2024-08-01 RX ADMIN — POLYETHYLENE GLYCOL 3350 17 G: 17 POWDER, FOR SOLUTION ORAL at 08:08

## 2024-08-01 NOTE — CONSULTS
Inpatient consult to Palliative Care  Consult performed by: Bebe Carrion FNP  Consult ordered by: Armando Herrera MD      Patient Name: Kevin Horan Jr.   MRN: [id number]   Admission Date: 7/19/2024   Hospital Length of Stay: 13   Attending Provider: Armando Herrera MD   Consulting Provider: Bebe ANN  Reason for Consult: Goals of Care  Primary Care Physician: Dimitrios Mack MD (Inactive)     Principal Problem: Fall     Patient information was obtained from patient, relative(s), and ER records.      Final diagnoses:  [W19.XXXA] Fall (Primary)  [R53.1] Generalized weakness  [S06.5XAA] Subdural hematoma  [S01.01XA] Laceration of scalp, initial encounter     Assessment/Plan:     I reviewed the patient and family's understanding of the seriousness of the illness and its expected prognosis. We discussed the patient's goals of care and treatment preferences.  I clarified current code status. I identified the surrogate decision maker or health care POA.  I answered all questions and we formulated a plan including recommendations for symptom management and how to best achieve goals of care.  Advance Care Planning     Date: 08/01/2024    [facility]  I engaged the patient and family in a voluntary conversation about advance care planning and we specifically addressed what the goals of care would be moving forward, in light of the patient's change in clinical status, specifically current condition.  We did specifically address the patient's likely prognosis, which is fair .  We explored the patient's values and preferences for future care.  The patient and family endorses that what is most important right now is to focus on improvement in condition but with limits to invasive therapies    Accordingly, we have decided that the best plan to meet the patient's goals includes continuing with treatment             Met with patient who was accompanied by one-to-one sitter at this time-introduced  service and discussed patient's history and current condition.  Patient states that he has 2 brothers and that his brother Javad has power-of-.  Patient also reported that he was not  and did not have children.  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  I asked patient about discomfort to which he states that he is currently not in pain.  I asked patient about advanced directives further and code status to which he states that he would not want resuscitation.    I attempted call to brother Javad and left voicemail spoke to his brother Tony who lives in [place] at length concerning patient's history.  Brother related that he believes that patient has dementia that has progressed over the past few years.  He states that 5-6 years ago patient had an episode in which family thought that he had a stroke because he was unable to recall a past word and other items of short-term memory.  He states that patient is  but wife is seeking divorce and has a restraining order out for patient.  Brother informed that patient has a son who lives in [facility] but that brother Javad has power-of- and is communicating with son via e-mail.  Brother Tony expressed that family is aware of the severity of patient's condition and that he will most likely need nursing home placement.  I inform brother of my conversation with the patient concerning his request for DNR order to which brother states the patient was recently in the hospital 2 months ago and expressed the same wishes.  He states the family is in agreement.  I asked brother if he could have Javad e-mail POA document to me so that we could have it uploaded in chart to which he verbalized understanding and agreement.  Offered support and informed the palliative Medicine will continue to follow.        Patient's brother Javad called back I spoke with him about above conversation to which he  confirmed that patient has had memory issues for awhile and has not had declining functional status over the past few years.  He states they are working with case management to attempt to find him placement to which I informed him that patient behavior would have to improve and would have to not require one-to-one sitter for him to be accepted for placement.  He verbalized understanding.  I discuss code status with brother Javad to which he confirmed that patient had requested in the past that he would not want to receive intubation or resuscitation and the family is in agreement with DNR order.          History of Present Illness:     Patient is a 74-year-old male with PMH of alcoholic cirrhosis, hepatitis-C, DM type 2, and CVA who presented to [facility] on 07/19/2024 following a fall.  EMS reported a BG of 44 and it was reported that patient has slipped fell and struck his head.  CTA of the head revealed questionable minimal subdural blood along the phalanx without mass effect.  CT cervical spine revealed no bony injury of the cervical spine but  CT chest/abdomen/pelvis revealed right transverse process fractures of L3 and L4, small left pleural effusion, cirrhotic liver with right hepatic lobe mass suspicious for HCC and small volume ascites.  Chest x-ray revealed mild left basilar opacities with small left pleural effusion.  Neurosurgery consulted and following.  Psychiatry consulted for persistent restlessness agitation and currently with a one-to-one sitter.  Palliative Medicine consulted for discussion of goals of care.      Active Ambulatory Problems     Diagnosis Date Noted    No Active Ambulatory Problems     Resolved Ambulatory Problems     Diagnosis Date Noted    No Resolved Ambulatory Problems     No Additional Past Medical History        No past surgical history on file.     Review of patient's allergies indicates:  No Known Allergies       Current Facility-Administered Medications:     amLODIPine tablet  10 mg, 10 mg, Oral, Daily, Shameka Keller MD, 10 mg at 07/31/24 1121    dextrose 10% bolus 125 mL 125 mL, 12.5 g, Intravenous, PRN, Selin Brumfield, AGACNP-BC    dextrose 10% bolus 250 mL 250 mL, 25 g, Intravenous, PRN, Selin Brumfield, AGACNP-BC    diphenhydrAMINE injection 25 mg, 25 mg, Intramuscular, Q4H PRN, Rowan Mathias DO, 25 mg at 07/30/24 1413    docusate sodium capsule 100 mg, 100 mg, Oral, BID, Selin Brumfield, AGACNP-BC, 100 mg at 07/31/24 2240    enoxaparin injection 40 mg, 40 mg, Subcutaneous, Q12H (prophylaxis, 0900/2100), Mary Ye PA-C, 40 mg at 07/31/24 2241    glucagon (human recombinant) injection 1 mg, 1 mg, Intramuscular, PRN, Selin Brumfield, AGACNP-BC    glucose chewable tablet 16 g, 16 g, Oral, PRN, Selin Brumfield, AGACNP-BC    glucose chewable tablet 24 g, 24 g, Oral, PRN, Selin Brumfield, LAURELP-BC    hydrALAZINE injection 10 mg, 10 mg, Intravenous, Q6H PRN, Selin Brumfield, AGACNP-BC, 10 mg at 07/19/24 1829    insulin aspart U-100 injection 0-5 Units, 0-5 Units, Subcutaneous, QID (AC + HS) PRN, Selin Brumfield, AGACNP-BC, 2 Units at 07/31/24 1122    LIDOcaine 5 % patch 1 patch, 1 patch, Transdermal, Q24H, Selin Brumfield, AGACNP-BC, 1 patch at 07/31/24 1120    magnesium hydroxide 400 mg/5 ml suspension 2,400 mg, 30 mL, Oral, Daily PRN, Selin Brumfield, LAURELP-BC    melatonin tablet 9 mg, 9 mg, Oral, Nightly, Reyes, Thairy G, DO, 9 mg at 07/31/24 2240    methocarbamoL tablet 500 mg, 500 mg, Oral, BID, Selin Brumfield, JONATHAN-BC, 500 mg at 07/31/24 2240    oxyCODONE immediate release tablet 5 mg, 5 mg, Oral, Q4H PRN, Selin Brumfield, JONATHAN-BC, 5 mg at 07/30/24 0453    polyethylene glycol packet 17 g, 17 g, Oral, BID, Selin Brumfield, LAURELP-BC, 17 g at 07/31/24 2241    risperiDONE tablet 0.5 mg, 0.5 mg, Oral, BID, Manuel Bird NP, 0.5 mg at 07/31/24 2241       Current Facility-Administered Medications:     dextrose 10%, 12.5 g, Intravenous, PRN    dextrose 10%,  "25 g, Intravenous, PRN    diphenhydrAMINE, 25 mg, Intramuscular, Q4H PRN    glucagon (human recombinant), 1 mg, Intramuscular, PRN    glucose, 16 g, Oral, PRN    glucose, 24 g, Oral, PRN    hydrALAZINE, 10 mg, Intravenous, Q6H PRN    insulin aspart U-100, 0-5 Units, Subcutaneous, QID (AC + HS) PRN    magnesium hydroxide 400 mg/5 ml, 30 mL, Oral, Daily PRN    oxyCODONE, 5 mg, Oral, Q4H PRN     No family history on file.     Review of Systems   Constitutional:  Positive for fatigue.            Objective:   BP (!) 149/71 (BP Location: Left arm, Patient Position: Lying)   Pulse 76   Temp 98.7 °F (37.1 °C) (Oral)   Resp 18   Ht 6' 1" (1.854 m)   Wt 99.8 kg (220 lb 0.3 oz)   SpO2 (!) 76%   BMI 29.03 kg/m²      Physical Exam  Constitutional:       Appearance: He is ill-appearing.   Eyes:      Pupils: Pupils are equal, round, and reactive to light.   Cardiovascular:      Rate and Rhythm: Normal rate.   Pulmonary:      Effort: Pulmonary effort is normal.   Abdominal:      Palpations: Abdomen is soft.   Musculoskeletal:      Comments: sarcopenia   Skin:     General: Skin is warm.   Neurological:      Comments: To person and place             Review of Symptoms      Symptom Assessment (ESAS 0-10 Scale)  Pain:  0  Dyspnea:  0  Anxiety:  0  Nausea:  0  Depression:  0  Anorexia:  0  Fatigue:  0  Insomnia:  0  Restlessness:  0  Agitation:  0         Bowel Management Plan (BMP):  Yes      Performance Status:  40    Living Arrangements:  Lives alone    Psychosocial/Cultural:   See Palliative Psychosocial Note: Yes  Patient is currently , but wife is seeking divorce.  He has a son Mathieu who [place] in [place].  His brother Javad is POA.  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   **Primary  to Follow**  Palliative Care  Consult: No      Advance Care Planning   Advance Directives:   Do Not Resuscitate Status: Yes      Decision Making:  Patient answered questions and Family answered " questions  Goals of Care: The patient and family endorses that what is most important right now is to focus on improvement in condition but with limits to invasive therapies    Accordingly, we have decided that the best plan to meet the patient's goals includes continuing with treatment          PAINAD: NA    Caregiver burden formerly assessed: Yes        > 50% of 70 min of encounter was spent in chart review, face to face discussion of goals of care, symptom assessment, coordination of care and emotional support.         Bebe ORRP, [facility]  Palliative Medicine  [facility]

## 2024-08-01 NOTE — NURSING
Nurses Note -- 4 Eyes      8/1/2024   5:51 PM      Skin assessed during: Q Shift Change      [x] No Altered Skin Integrity Present    []Prevention Measures Documented      [] Yes- Altered Skin Integrity Present or Discovered   [] LDA Added if Not in Epic (Describe Wound)   [] New Altered Skin Integrity was Present on Admit and Documented in LDA   [] Wound Image Taken    Wound Care Consulted? No    Attending Nurse:  Jennifer Bennett RN/Staff Member:  Rolando

## 2024-08-01 NOTE — PROGRESS NOTES
[facility]  Hospital Medicine Progress Note        Chief Complaint: Inpatient Follow-up for encephalopathy     HPI:   74 y.o. male with a past medical history of alcoholic cirrhosis, hepatitis-C, diabetes mellitus type 2, and CVA who presented to [facility] on 7/19/2024 via EMS following fall.  EMS reported patient was hypoglycemic with CBG of 44.  C-collar was placed en route and 250 mL of D5W was given.  Patient reportedly slipped and fell, striking his head. Initial vital signs in ED were /77, pulse 72, respirations 16, temperature 36.9° C, and SpO2 90% on room air.  Labs revealed WBC 3.29, RBC 3.22, hemoglobin 9.8, hematocrit 30.4, MCV 94.4, chloride 112, CO2 21, glucose 166, and undetectable troponin.  EKG revealed normal sinus rhythm with heart rate is 92 beats per minute.  CT head revealed questionable minimal subdural blood products along the phalanx without mass effect.  CT cervical spine revealed no acute bony injury of the cervical spine.  CT chest abdomen and pelvis with IV contrast revealed right transverse process fractures of L3 and L4, small left pleural effusion, cirrhotic liver with right hepatic lobe mass suspicious for HCC, and small volume ascites.  Pelvis x-ray revealed no acute findings.  Chest x-ray revealed mild left basilar opacities with a small left pleural effusion. Scalp laceration was repaired in ED.  Neurosurgery was consulted.  Patient was admitted to trauma services.  Neurosurgery recommended no surgical intervention, Keppra for seizure prophylaxis, and blood pressure less than 150/90.  Follow-up CT head on 07/19 revealed no subdural hematoma.  Therapy services were consulted.  Hospital medicine was consulted for transition of care and further medical management.  PT/OT consulted; recommending moderate intensity therapy.  Patient was noted to be agitated and restless attempting to climb out of bed for which p.r.n. IV Haldol was ordered.     Patient  rubén all functionals status was declining. He was needing full assist for ADLs. His mental status was very sluggish. Consulted palliative care.     Interval Hx:   Patient today very lethargic. Opens eyes to tactile stimulus. Can say his name. Not oriented to time or place. Following few verbal commands. Per sitter he slept well last night.    Needing full assist for ADLs.     Case was discussed with patient's nurse and  on the floor.    Objective/physical exam:  General: In no acute distress, lethargic   Chest: Clear to auscultation bilaterally  Heart: RRR, +S1, S2, no appreciable murmur  Abdomen: Soft, nontender, BS +  Neurologic: Moving extremities but unable to do a full neuro exam     VITAL SIGNS: 24 HRS MIN & MAX LAST   Temp  Min: 97.2 °F (36.2 °C)  Max: 99.4 °F (37.4 °C) 98.7 °F (37.1 °C)   BP  Min: 131/74  Max: 155/78 (!) 149/71   Pulse  Min: 66  Max: 88  76   Resp  Min: 16  Max: 20 18   SpO2  Min: 76 %  Max: 99 % (!) 76 %     I have reviewed the following labs:  Recent Labs   Lab 07/29/24  0529   WBC 2.84*   RBC 3.74*   HGB 11.4*   HCT 34.7*   MCV 92.8   MCH 30.5   MCHC 32.9*   RDW 14.6   PLT 79*   MPV 11.4*     Recent Labs   Lab 07/29/24  0529      K 4.0   *   CO2 22*   BUN 21.0   CREATININE 0.94   CALCIUM 9.1     Microbiology Results (last 7 days)       ** No results found for the last 168 hours. **             See below for Radiology    Assessment/Plan:  S/p ground level fall  Questionable subdural along the phalanx-no ICH on repeat scan  Right transverse process fractures of L3 and L4   Small left pleural effusion   Cirrhotic liver with right hepatic lobe mass suspicious for HCC  Dementia with mild delirium   History of alcoholic cirrhosis, hepatitis-C, diabetes mellitus type 2, and CVA    Plan:  Patient still weak and needing full assist   Continue strict aspiration, fall and decubitus precautions    Sleep cycle is better  Continue OT/PT as tolerated     Will consult palliative  care team to discuss goals of care with family     Continue supportive care     VTE prophylaxis: Lovenox     Patient condition:  Fair    Anticipated discharge and Disposition:   SNF/ NH ? With palliative/ comfort care       All diagnosis and differential diagnosis have been reviewed; assessment and plan has been documented; I have personally reviewed the labs and test results that are presently available; I have reviewed the patients medication list; I have reviewed the consulting providers response and recommendations. I have reviewed or attempted to review medical records based upon their availability    All of the patient's questions have been  addressed and answered. Patient's is agreeable to the above stated plan. I will continue to monitor closely and make adjustments to medical management as needed.    Portions of this note dictated using EMR integrated voice recognition software, and may be subject to voice recognition errors not corrected at proofreading. Please contact writer for clarification if needed.   _____________________________________________________________________    Malnutrition Status:    Scheduled Med:   amLODIPine  10 mg Oral Daily    docusate sodium  100 mg Oral BID    enoxparin  40 mg Subcutaneous Q12H (prophylaxis, 0900/2100)    LIDOcaine  1 patch Transdermal Q24H    melatonin  9 mg Oral Nightly    methocarbamoL  500 mg Oral BID    polyethylene glycol  17 g Oral BID    risperiDONE  0.5 mg Oral BID      Continuous Infusions:     PRN Meds:    Current Facility-Administered Medications:     dextrose 10%, 12.5 g, Intravenous, PRN    dextrose 10%, 25 g, Intravenous, PRN    diphenhydrAMINE, 25 mg, Intramuscular, Q4H PRN    glucagon (human recombinant), 1 mg, Intramuscular, PRN    glucose, 16 g, Oral, PRN    glucose, 24 g, Oral, PRN    hydrALAZINE, 10 mg, Intravenous, Q6H PRN    insulin aspart U-100, 0-5 Units, Subcutaneous, QID (AC + HS) PRN    magnesium hydroxide 400 mg/5 ml, 30 mL, Oral, Daily  PRN    oxyCODONE, 5 mg, Oral, Q4H PRN     Radiology:  I have personally reviewed the following imaging and agree with the radiologist.     CT Head Without Contrast  Narrative: EXAMINATION:  CT HEAD WITHOUT CONTRAST    CLINICAL HISTORY:  fu SDH;    TECHNIQUE:  Multiple axial images were obtained from the base of the brain to the vertex without contrast administration.  Sagittal and coronal reconstructions were performed. .Automatic exposure control  (AEC) is utilized to reduce patient radiation exposure.    COMPARISON:  None    FINDINGS:  There is no intracranial mass or lesion seen.  No hemorrhage is seen.  No subdural hemorrhage is seen along the falx on today's examination.  No infarct is seen.  The ventricles and basilar cisterns appear normal.  Brain parenchyma appears grossly unremarkable.    Posterior fossa appears normal.  The calvarium is intact.  The paranasal sinuses appear grossly unremarkable.  Impression: No subdural hematoma seen today's examination.    Electronically signed by: Robin Ellis  Date:    07/19/2024  Time:    15:00  X-ray Shoulder 2 or More Views Right  Narrative: EXAMINATION:  XR SHOULDER COMPLETE 2 OR MORE VIEWS RIGHT    CLINICAL HISTORY:  Fall;    TECHNIQUE:  Three views.    COMPARISON:  None available.    FINDINGS:  Articular surfaces alignment is preserved.  No acute fracture or dislocation identified.  Calcification adjacent to the greater tuberosity reflects calcific tendinopathy.  There is narrowed acromial head and acromion interval which raises the possibility of rotator cuff arthropathy.  Impression: No osseous abnormality identified.    Electronically signed by: David Erazo  Date:    07/19/2024  Time:    09:19  CT Chest Abdomen Pelvis With IV Contrast (XPD) NO Oral Contrast  Narrative: EXAMINATION:  CT CHEST ABDOMEN PELVIS WITH IV CONTRAST (XPD)    CLINICAL HISTORY:  Polytrauma, blunt;    TECHNIQUE:  Helical acquisition from the thoracic inlet through the ischia with  IV  contrast. Three plane reconstructions made available for review.  mGycm. Automatic exposure control, adjustment of mA/kV or iterative reconstruction technique was used to reduce radiation.    COMPARISON:  None available.    FINDINGS:  Chest.    Heart size upper limit normal.  No pericardial effusion.  There are coronary artery calcifications.    There is no mediastinal hematoma.  No enlarged thoracic lymph nodes.    There is a small left pleural effusion.  No pneumothorax seen.  There is some atelectasis or scarring left lung base.  Mild chronic changes of the lungs elsewhere.    Abdomen and pelvis.    Cirrhotic liver.  There is a 4 cm hyperenhancing lesion in segment 5 image 126 series 2.  The portal vein is patent.  Prominent paraumbilical vein.  There are gallstones.  No significant biliary ductal dilatation.    The spleen is mildly enlarged.  No significant abnormality of the pancreas or adrenals.  No hydronephrosis.  The low lying malrotated right kidney.    There is no bowel obstruction or free air.  No suspicious bowel wall thickening.  Small volume ascites.    Urinary bladder is unremarkable.  The abdominal aorta is normal in caliber.  Moderate atherosclerotic disease.  Left inguinal hernia containing some fluid.    There are fractures right transverse processes L3 and L4.  There are old bilateral rib fractures.  Moderate degenerative change of the spine.  Impression: 1. Right transverse process fractures L3 and L4.  2. Small left pleural effusion.  3. Cirrhotic liver with right hepatic lobe mass suspicious for HCC.  Recommend outpatient liver mass protocol CT.  4. Small volume ascites.    Electronically signed by: Surinder Danielle  Date:    07/19/2024  Time:    07:32  CT Head Without Contrast  Narrative: EXAMINATION:  CT HEAD WITHOUT CONTRAST    CLINICAL HISTORY:  Head trauma, minor (Age >= 65y);    TECHNIQUE:  CT imaging of the head performed from the skull base to the vertex without intravenous  contrast. DLP 1387 mGycm. Automatic exposure control, adjustment of mA/kV or iterative reconstruction technique was used to reduce radiation.    COMPARISON:  None Available.    FINDINGS:  Questionable minimal subpleural blood products along the falx measuring only 1-2 mm maximally.  No mass effect.  There is mild patchy hypoattenuation in the cerebral white matter which is nonspecific but most commonly associated with chronic small vessel ischemic changes.  There is left cerebellar and left occipital encephalomalacia.  The ventricles are not significantly enlarged.  There are vascular calcifications.    Visualized paranasal sinuses and mastoid air cells are clear. The calvarium is grossly intact.  There is some soft tissue swelling over the right frontal scalp.  Impression: Question minimal subdural blood products along the falx without mass effect.    Findings discussed with Dr. Maddox at 713 on 7/19/2024.    Electronically signed by: Surinder Danielle  Date:    07/19/2024  Time:    07:14  CT Cervical Spine Without Contrast  Narrative: EXAMINATION:  CT CERVICAL SPINE WITHOUT CONTRAST    CLINICAL HISTORY:  Neck trauma (Age >= 65y);    TECHNIQUE:  Helical acquisition through the cervical spine without IV contrast. Three plane reconstructions were made available for review. DLP 1387 mGycm. Automatic exposure control, adjustment of mA/kV or iterative reconstruction technique was used to reduce radiation.    COMPARISON:  None available.    FINDINGS:  No fractures identified.  There are mild degenerative alignment abnormalities.  Moderate degenerative changes.  Craniocervical junction and C1-C2 relationship are normal. The odontoid is intact. There is limited evaluation of the soft tissues. No prevertebral soft tissue swelling. Ligamentous injury cannot be excluded with CT.  There are vascular calcifications.  Impression: No acute bony injury of the cervical spine.    Electronically signed by: Surinder  Shashi  Date:    07/19/2024  Time:    07:13  X-Ray Pelvis Routine AP  Narrative: EXAMINATION:  XR PELVIS ROUTINE AP    CLINICAL HISTORY:  Unspecified fall, initial encounter    COMPARISON:  None    FINDINGS:  Frontal image of the pelvis demonstrates no fracture or dislocation  Impression: No acute findings.    Electronically signed by: Surinder Danielle  Date:    07/19/2024  Time:    07:10  X-Ray Chest 1 View  Narrative: EXAMINATION:  XR CHEST 1 VIEW    CLINICAL HISTORY:  Unspecified fall, initial encounter    COMPARISON:  26 April 2024    FINDINGS:  Frontal view of the chest was obtained. The heart is not significantly enlarged.  There are mild left basilar opacities with a small left pleural effusion suspected.  There is no pneumothorax.  Impression: Mild left basilar opacities with a small left pleural effusion.    Electronically signed by: Surinder Danielle  Date:    07/19/2024  Time:    06:55      Armando Herrera MD  Department of Hospital Medicine   [facility]   08/01/2024

## 2024-08-01 NOTE — PLAN OF CARE
Problem: Adult Inpatient Plan of Care  Goal: Plan of Care Review  Outcome: Progressing  Goal: Patient-Specific Goal (Individualized)  Outcome: Progressing  Goal: Absence of Hospital-Acquired Illness or Injury  Outcome: Progressing  Goal: Optimal Comfort and Wellbeing  Outcome: Progressing  Goal: Readiness for Transition of Care  Outcome: Progressing     Problem: Wound  Goal: Optimal Coping  Outcome: Progressing  Goal: Optimal Functional Ability  Outcome: Progressing  Goal: Absence of Infection Signs and Symptoms  Outcome: Progressing  Goal: Improved Oral Intake  Outcome: Progressing  Goal: Optimal Pain Control and Function  Outcome: Progressing  Goal: Skin Health and Integrity  Outcome: Progressing  Goal: Optimal Wound Healing  Outcome: Progressing     Problem: Fall Injury Risk  Goal: Absence of Fall and Fall-Related Injury  Outcome: Progressing     Problem: Skin Injury Risk Increased  Goal: Skin Health and Integrity  Outcome: Progressing     Problem: Coping Ineffective  Goal: Effective Coping  Outcome: Progressing

## 2024-08-01 NOTE — NURSING
Nurses Note -- 4 Eyes      7/31/2024   11:51 PM      Skin assessed during: Q Shift Change      [x] No Altered Skin Integrity Present    []Prevention Measures Documented      [] Yes- Altered Skin Integrity Present or Discovered   [] LDA Added if Not in Epic (Describe Wound)   [] New Altered Skin Integrity was Present on Admit and Documented in LDA   [] Wound Image Taken    Wound Care Consulted? No    Attending Nurse:  Mildred Ricci LPN    Second RN/Staff Member:  Rhoda Zhou RN

## 2024-08-01 NOTE — PT/OT/SLP PROGRESS
Physical Therapy Treatment    Patient Name:  Kevin Horan Jr.   MRN:  [id number]    Recommendations:     Discharge therapy intensity: Moderate Intensity Therapy   Discharge Equipment Recommendations: walker, rolling  Barriers to discharge: Impaired mobility    Assessment:     Kevin Horan Jr. is a 74 y.o. male admitted with a medical diagnosis of fall down steps, R L3 and L4 TP fx, SDH, scalp lac, cirrhotic liver with R hepatic lobe mass. No surgery or brace needed for L spine TP fxs.  He presents with the following impairments/functional limitations: weakness, impaired endurance, impaired self care skills, impaired functional mobility, gait instability, impaired balance, impaired cognition, decreased safety awareness .    Rehab Prognosis: Good; patient would benefit from acute skilled PT services to address these deficits and reach maximum level of function.    Recent Surgery: * No surgery found *      Plan:     During this hospitalization, patient would benefit from acute PT services 5 x/week to address the identified rehab impairments via gait training, therapeutic activities, therapeutic exercises, neuromuscular re-education and progress toward the following goals:    Plan of Care Expires:  08/20/24    Subjective     Chief Complaint: none  Patient/Family Comments/goals: pt states having better day than yesterday  Pain/Comfort:  Pain Rating 1: 0/10      Objective:     Communicated with pts nurse prior to session.  Patient found up in chair with telemetry upon PT entry to room.     General Precautions: Standard, fall  Orthopedic Precautions: N/A  Braces: N/A  Respiratory Status: Room air  Skin Integrity: Visible skin intact      Functional Mobility:  Transfers:     Sit to Stand:  contact guard assistance and minimum assistance with rolling walker  Bed to Chair: contact guard assistance and minimum assistance with  rolling walker  using  Step Transfer and assistance needed w/ walker management  Gait: Pt  ambulated 200 ft w/ RW, CGA one brief stand rest break.  Pt ambulates slow, demonstrating decrease speed and step length  Balance: Pt has tendency to lose balance backwards coming to stand but w/ cues able to correct.  No LOB during gait.     Therapeutic Activities/Exercises:  Pt directed in standing balance tasks w/ and w/o RW for support, reaching various directions, crossing mid line and beyond ADRIAN.  Pt slow and cautious, movement improving with repetition.     Education:  Patient provided with verbal education and demonstrations education regarding PT role/goals/POC.  Understanding was verbalized, however additional teaching warranted.     Patient left up in chair with all lines intact, call button in reach, nurse notified, and PCA present    GOALS:   Multidisciplinary Problems       Physical Therapy Goals          Problem: Physical Therapy    Goal Priority Disciplines Outcome Goal Variances Interventions   Physical Therapy Goal     PT, PT/OT Progressing     Description: Goals to be met by: 24     Patient will increase functional independence with mobility by performin. Supine to sit with supervision  2. Sit to stand transfer with Supervision  3. Gait  x 200 feet with Supervision using Rolling Walker.                          Time Tracking:     PT Received On: 24  PT Start Time: 1126     PT Stop Time: 1152  PT Total Time (min): 26 min     Billable Minutes: Gait Training 10 and Therapeutic Activity 16    Treatment Type: Treatment  PT/PTA: PTA     Number of PTA visits since last PT visit: 2     2024

## 2024-08-01 NOTE — PROGRESS NOTES
8/1/2024  Kevin Horan Jr.   1950   [id number]        Psychiatry Progress Note       SUBJECTIVE:   Kevin Horan Jr. is a 74 y.o. male with a past medical history that includes alcoholic cirrhosis, hepatitis-C, T2DM, and CVA who presented to [facility] on 07/19/24 followin g a fall. EMS had reported a CBG of 44. Was reported to have slipped and fell and struck his head. CT head revealed questionable minimal subdural blood products along the phalanx without mass effect. CT cervical spine revealed no acute bony injury of the cervical spine. CT chest abdomen and pelvis with IV contrast revealed right transverse process fractures of L3 and L4, small left pleural effusion, cirrhotic liver with right hepatic lobe mass suspicious for HCC, and small volume ascites. Pelvis x-ray revealed no acute findings. Chest x-ray revealed mild left basilar opacities with a small left pleural effusion. Follow-up CT head on 07/19 revealed no subdural hematoma. Has been agitated and restless requiring PRN haloperidol. Has been documented to be confused and agitated with verbal and physical aggression towards staff. Psychiatry consulted for medication recommendations.     Seen at the bedside with 1:1 sitter present. Sitting in chair at this time and is pleasant and cooperative with interview. Sitters report that he slept well last night and he endorses this. Reports euthymic mood at this time without feeling depressed or anxious. Reactive affect with no evidence of irritability. Oriented to self at this time. Not verbalizing any delusional ideations. Denies suicidal ideations, homicidal ideations, hallucinations, or paranoia.        Current Medications:   Scheduled Meds:    amLODIPine  10 mg Oral Daily    docusate sodium  100 mg Oral BID    enoxparin  40 mg Subcutaneous Q12H (prophylaxis, 0900/2100)    LIDOcaine  1 patch Transdermal Q24H    melatonin  9 mg Oral Nightly    methocarbamoL  500 mg Oral BID    polyethylene glycol  17 g  Oral BID    risperiDONE  0.5 mg Oral BID      PRN Meds:   Current Facility-Administered Medications:     dextrose 10%, 12.5 g, Intravenous, PRN    dextrose 10%, 25 g, Intravenous, PRN    diphenhydrAMINE, 25 mg, Intramuscular, Q4H PRN    glucagon (human recombinant), 1 mg, Intramuscular, PRN    glucose, 16 g, Oral, PRN    glucose, 24 g, Oral, PRN    hydrALAZINE, 10 mg, Intravenous, Q6H PRN    insulin aspart U-100, 0-5 Units, Subcutaneous, QID (AC + HS) PRN    magnesium hydroxide 400 mg/5 ml, 30 mL, Oral, Daily PRN    oxyCODONE, 5 mg, Oral, Q4H PRN   Psychotherapeutics (From admission, onward)      Start     Stop Route Frequency Ordered    07/29/24 2100  risperiDONE tablet 0.5 mg         -- Oral 2 times daily 07/29/24 1228            Allergies:   Review of patient's allergies indicates:  No Known Allergies     OBJECTIVE:   Vitals   Vitals:    08/01/24 1100   BP: 131/61   Pulse: 69   Resp: 20   Temp: 98.2 °F (36.8 °C)        Labs/Imaging/Studies:   Recent Results (from the past 36 hour(s))   POCT glucose    Collection Time: 07/31/24  6:24 AM   Result Value Ref Range    POCT Glucose 134 (H) 70 - 110 mg/dL   POCT glucose    Collection Time: 07/31/24 11:03 AM   Result Value Ref Range    POCT Glucose 233 (H) 70 - 110 mg/dL   POCT glucose    Collection Time: 07/31/24  4:27 PM   Result Value Ref Range    POCT Glucose 149 (H) 70 - 110 mg/dL   POCT glucose    Collection Time: 07/31/24 10:33 PM   Result Value Ref Range    POCT Glucose 276 (H) 70 - 110 mg/dL   POCT glucose    Collection Time: 08/01/24 12:22 PM   Result Value Ref Range    POCT Glucose 292 (H) 70 - 110 mg/dL          Psychiatric Mental Status Exam:  General Appearance: appears stated age, dressed in hospital garb, in no acute distress, sitting in chair  Arousal: alert  Behavior: cooperative, pleasant, polite, appropriate eye-contact, under good behavioral control  Movements and Motor Activity: no tics, no tremors, no akathisia, no dystonia, no evidence of tardive  dyskinesia  Orientation: oriented to person   Speech: normal rate, rhythm, volume, tone and pitch  Mood: Euthymic  Affect: full-range, reactive  Thought Process: linear, goal-directed  Associations: no loosening of associations  Thought Content and Perceptions: no suicidal or homicidal ideation, no auditory or visual hallucinations, no paranoid ideation, no ideas of reference, no evidence of delusions or psychosis  Recent and Remote Memory: mild impairments noted; per interview/observation with patient  Attention and Concentration: grossly intact; per interview/observation with patient  Fund of Knowledge: grossly intact; based on history, vocabulary, fund of knowledge, syntax, grammar, and content  Insight: questionable; based on understanding of severity of illness and HPI  Judgment: questionable; based on patient's behavior and HPI    ASSESSMENT/PLAN:   Problems Addressed/Diagnoses:  Delirium NOS (R41.0)         Plan:  Medication management  Risperidone 0.5mg PO BID  PEC not recommended at this time.  Will continue to follow.        Manuel Bird

## 2024-08-02 LAB — POCT GLUCOSE: 164 MG/DL (ref 70–110)

## 2024-08-02 PROCEDURE — 25000003 PHARM REV CODE 250: Performed by: INTERNAL MEDICINE

## 2024-08-02 PROCEDURE — 25000003 PHARM REV CODE 250

## 2024-08-02 PROCEDURE — 63600175 PHARM REV CODE 636 W HCPCS

## 2024-08-02 PROCEDURE — 21400001 HC TELEMETRY ROOM

## 2024-08-02 PROCEDURE — 25000003 PHARM REV CODE 250: Performed by: STUDENT IN AN ORGANIZED HEALTH CARE EDUCATION/TRAINING PROGRAM

## 2024-08-02 PROCEDURE — 25000003 PHARM REV CODE 250: Performed by: NURSE PRACTITIONER

## 2024-08-02 PROCEDURE — 97530 THERAPEUTIC ACTIVITIES: CPT | Mod: CQ

## 2024-08-02 PROCEDURE — 97116 GAIT TRAINING THERAPY: CPT | Mod: CQ

## 2024-08-02 PROCEDURE — 63600175 PHARM REV CODE 636 W HCPCS: Performed by: NURSE PRACTITIONER

## 2024-08-02 PROCEDURE — 94799 UNLISTED PULMONARY SVC/PX: CPT

## 2024-08-02 PROCEDURE — 11000001 HC ACUTE MED/SURG PRIVATE ROOM

## 2024-08-02 RX ORDER — GLIPIZIDE 2.5 MG/1
2.5 TABLET, EXTENDED RELEASE ORAL DAILY
Status: DISCONTINUED | OUTPATIENT
Start: 2024-08-02 | End: 2024-08-05

## 2024-08-02 RX ADMIN — METHOCARBAMOL 500 MG: 500 TABLET ORAL at 09:08

## 2024-08-02 RX ADMIN — INSULIN ASPART 2 UNITS: 100 INJECTION, SOLUTION INTRAVENOUS; SUBCUTANEOUS at 12:08

## 2024-08-02 RX ADMIN — RISPERIDONE 0.5 MG: 0.25 TABLET, FILM COATED ORAL at 09:08

## 2024-08-02 RX ADMIN — METHOCARBAMOL 500 MG: 500 TABLET ORAL at 10:08

## 2024-08-02 RX ADMIN — AMLODIPINE BESYLATE 10 MG: 5 TABLET ORAL at 10:08

## 2024-08-02 RX ADMIN — DOCUSATE SODIUM 100 MG: 100 CAPSULE, LIQUID FILLED ORAL at 10:08

## 2024-08-02 RX ADMIN — RISPERIDONE 0.5 MG: 0.25 TABLET, FILM COATED ORAL at 10:08

## 2024-08-02 RX ADMIN — ENOXAPARIN SODIUM 40 MG: 40 INJECTION SUBCUTANEOUS at 10:08

## 2024-08-02 RX ADMIN — DOCUSATE SODIUM 100 MG: 100 CAPSULE, LIQUID FILLED ORAL at 09:08

## 2024-08-02 RX ADMIN — ENOXAPARIN SODIUM 40 MG: 40 INJECTION SUBCUTANEOUS at 09:08

## 2024-08-02 RX ADMIN — Medication 9 MG: at 09:08

## 2024-08-02 RX ADMIN — GLIPIZIDE 2.5 MG: 2.5 TABLET, EXTENDED RELEASE ORAL at 10:08

## 2024-08-02 RX ADMIN — POLYETHYLENE GLYCOL 3350 17 G: 17 POWDER, FOR SOLUTION ORAL at 10:08

## 2024-08-02 NOTE — PROGRESS NOTES
[facility]  Hospital Medicine Progress Note        Chief Complaint: Inpatient Follow-up for encephalopathy     HPI:   74 y.o. male with a past medical history of alcoholic cirrhosis, hepatitis-C, diabetes mellitus type 2, and CVA who presented to [facility] on 7/19/2024 via EMS following fall.  EMS reported patient was hypoglycemic with CBG of 44.  C-collar was placed en route and 250 mL of D5W was given.  Patient reportedly slipped and fell, striking his head. Initial vital signs in ED were /77, pulse 72, respirations 16, temperature 36.9° C, and SpO2 90% on room air.  Labs revealed WBC 3.29, RBC 3.22, hemoglobin 9.8, hematocrit 30.4, MCV 94.4, chloride 112, CO2 21, glucose 166, and undetectable troponin.  EKG revealed normal sinus rhythm with heart rate is 92 beats per minute.  CT head revealed questionable minimal subdural blood products along the phalanx without mass effect.  CT cervical spine revealed no acute bony injury of the cervical spine.  CT chest abdomen and pelvis with IV contrast revealed right transverse process fractures of L3 and L4, small left pleural effusion, cirrhotic liver with right hepatic lobe mass suspicious for HCC, and small volume ascites.  Pelvis x-ray revealed no acute findings.  Chest x-ray revealed mild left basilar opacities with a small left pleural effusion. Scalp laceration was repaired in ED.  Neurosurgery was consulted.  Patient was admitted to trauma services.  Neurosurgery recommended no surgical intervention, Keppra for seizure prophylaxis, and blood pressure less than 150/90.  Follow-up CT head on 07/19 revealed no subdural hematoma.  Therapy services were consulted.  Hospital medicine was consulted for transition of care and further medical management.  PT/OT consulted; recommending moderate intensity therapy.  Patient was noted to be agitated and restless attempting to climb out of bed for which p.r.n. IV Haldol was ordered.     Patient  over all functionals status was declining. He was needing full assist for ADLs. His mental status was very sluggish. Consulted palliative care. He is now DNR status.     Interval Hx:   Patient today more alert and following commands. Oriented to self. Ambulated with assist to the rest room. Ate his break fast.     Needing full assist for ADLs.     Case was discussed with patient's nurse and  on the floor.    Objective/physical exam:  General: In no acute distress, lethargic but a bit more alert today   Chest: Clear to auscultation bilaterally  Heart: RRR, +S1, S2, no appreciable murmur  Abdomen: Soft, nontender, BS +  Neurologic: Moving extremities but unable to do a full neuro exam     VITAL SIGNS: 24 HRS MIN & MAX LAST   Temp  Min: 97 °F (36.1 °C)  Max: 98.4 °F (36.9 °C) 97.9 °F (36.6 °C)   BP  Min: 131/61  Max: 152/69 (!) 143/71   Pulse  Min: 69  Max: 85  82   Resp  Min: 16  Max: 20 18   SpO2  Min: 92 %  Max: 99 % 96 %     I have reviewed the following labs:  Recent Labs   Lab 07/29/24  0529   WBC 2.84*   RBC 3.74*   HGB 11.4*   HCT 34.7*   MCV 92.8   MCH 30.5   MCHC 32.9*   RDW 14.6   PLT 79*   MPV 11.4*     Recent Labs   Lab 07/29/24  0529      K 4.0   *   CO2 22*   BUN 21.0   CREATININE 0.94   CALCIUM 9.1     Microbiology Results (last 7 days)       ** No results found for the last 168 hours. **             See below for Radiology    Assessment/Plan:  S/p ground level fall  Questionable subdural along the phalanx-no ICH on repeat scan  Right transverse process fractures of L3 and L4   Small left pleural effusion   Cirrhotic liver with right hepatic lobe mass suspicious for HCC  Dementia with mild delirium   History of alcoholic cirrhosis, hepatitis-C, diabetes mellitus type 2, and CVA    Plan:  Patient doing well today. More alert. Ambulating   State she live sin [place]  Continue strict aspiration, fall and decubitus precautions    Sleep cycle is better  Continue OT/PT as tolerated      Blood sugars moderately controlled     Palliative care team discussing goals of care with family     Continue supportive care     VTE prophylaxis: Lovenox     Patient condition:  Fair    Anticipated discharge and Disposition:   SNF/ NH ? With palliative/ comfort care       All diagnosis and differential diagnosis have been reviewed; assessment and plan has been documented; I have personally reviewed the labs and test results that are presently available; I have reviewed the patients medication list; I have reviewed the consulting providers response and recommendations. I have reviewed or attempted to review medical records based upon their availability    All of the patient's questions have been  addressed and answered. Patient's is agreeable to the above stated plan. I will continue to monitor closely and make adjustments to medical management as needed.    Portions of this note dictated using EMR integrated voice recognition software, and may be subject to voice recognition errors not corrected at proofreading. Please contact writer for clarification if needed.   _____________________________________________________________________    Malnutrition Status:    Scheduled Med:   amLODIPine  10 mg Oral Daily    docusate sodium  100 mg Oral BID    enoxparin  40 mg Subcutaneous Q12H (prophylaxis, 0900/2100)    LIDOcaine  1 patch Transdermal Q24H    melatonin  9 mg Oral Nightly    methocarbamoL  500 mg Oral BID    polyethylene glycol  17 g Oral BID    risperiDONE  0.5 mg Oral BID      Continuous Infusions:     PRN Meds:    Current Facility-Administered Medications:     dextrose 10%, 12.5 g, Intravenous, PRN    dextrose 10%, 25 g, Intravenous, PRN    diphenhydrAMINE, 25 mg, Intramuscular, Q4H PRN    glucagon (human recombinant), 1 mg, Intramuscular, PRN    glucose, 16 g, Oral, PRN    glucose, 24 g, Oral, PRN    hydrALAZINE, 10 mg, Intravenous, Q6H PRN    insulin aspart U-100, 0-5 Units, Subcutaneous, QID (AC + HS)  PRN    magnesium hydroxide 400 mg/5 ml, 30 mL, Oral, Daily PRN    oxyCODONE, 5 mg, Oral, Q4H PRN     Radiology:  I have personally reviewed the following imaging and agree with the radiologist.     CT Head Without Contrast  Narrative: EXAMINATION:  CT HEAD WITHOUT CONTRAST    CLINICAL HISTORY:  fu SDH;    TECHNIQUE:  Multiple axial images were obtained from the base of the brain to the vertex without contrast administration.  Sagittal and coronal reconstructions were performed. .Automatic exposure control  (AEC) is utilized to reduce patient radiation exposure.    COMPARISON:  None    FINDINGS:  There is no intracranial mass or lesion seen.  No hemorrhage is seen.  No subdural hemorrhage is seen along the falx on today's examination.  No infarct is seen.  The ventricles and basilar cisterns appear normal.  Brain parenchyma appears grossly unremarkable.    Posterior fossa appears normal.  The calvarium is intact.  The paranasal sinuses appear grossly unremarkable.  Impression: No subdural hematoma seen today's examination.    Electronically signed by: Robin Ellis  Date:    07/19/2024  Time:    15:00  X-ray Shoulder 2 or More Views Right  Narrative: EXAMINATION:  XR SHOULDER COMPLETE 2 OR MORE VIEWS RIGHT    CLINICAL HISTORY:  Fall;    TECHNIQUE:  Three views.    COMPARISON:  None available.    FINDINGS:  Articular surfaces alignment is preserved.  No acute fracture or dislocation identified.  Calcification adjacent to the greater tuberosity reflects calcific tendinopathy.  There is narrowed acromial head and acromion interval which raises the possibility of rotator cuff arthropathy.  Impression: No osseous abnormality identified.    Electronically signed by: David Erazo  Date:    07/19/2024  Time:    09:19  CT Chest Abdomen Pelvis With IV Contrast (XPD) NO Oral Contrast  Narrative: EXAMINATION:  CT CHEST ABDOMEN PELVIS WITH IV CONTRAST (XPD)    CLINICAL HISTORY:  Polytrauma, blunt;    TECHNIQUE:  Helical  acquisition from the thoracic inlet through the ischia with  IV contrast. Three plane reconstructions made available for review.  mGycm. Automatic exposure control, adjustment of mA/kV or iterative reconstruction technique was used to reduce radiation.    COMPARISON:  None available.    FINDINGS:  Chest.    Heart size upper limit normal.  No pericardial effusion.  There are coronary artery calcifications.    There is no mediastinal hematoma.  No enlarged thoracic lymph nodes.    There is a small left pleural effusion.  No pneumothorax seen.  There is some atelectasis or scarring left lung base.  Mild chronic changes of the lungs elsewhere.    Abdomen and pelvis.    Cirrhotic liver.  There is a 4 cm hyperenhancing lesion in segment 5 image 126 series 2.  The portal vein is patent.  Prominent paraumbilical vein.  There are gallstones.  No significant biliary ductal dilatation.    The spleen is mildly enlarged.  No significant abnormality of the pancreas or adrenals.  No hydronephrosis.  The low lying malrotated right kidney.    There is no bowel obstruction or free air.  No suspicious bowel wall thickening.  Small volume ascites.    Urinary bladder is unremarkable.  The abdominal aorta is normal in caliber.  Moderate atherosclerotic disease.  Left inguinal hernia containing some fluid.    There are fractures right transverse processes L3 and L4.  There are old bilateral rib fractures.  Moderate degenerative change of the spine.  Impression: 1. Right transverse process fractures L3 and L4.  2. Small left pleural effusion.  3. Cirrhotic liver with right hepatic lobe mass suspicious for HCC.  Recommend outpatient liver mass protocol CT.  4. Small volume ascites.    Electronically signed by: Surinder Danielle  Date:    07/19/2024  Time:    07:32  CT Head Without Contrast  Narrative: EXAMINATION:  CT HEAD WITHOUT CONTRAST    CLINICAL HISTORY:  Head trauma, minor (Age >= 65y);    TECHNIQUE:  CT imaging of the head  performed from the skull base to the vertex without intravenous contrast. DLP 1387 mGycm. Automatic exposure control, adjustment of mA/kV or iterative reconstruction technique was used to reduce radiation.    COMPARISON:  None Available.    FINDINGS:  Questionable minimal subpleural blood products along the falx measuring only 1-2 mm maximally.  No mass effect.  There is mild patchy hypoattenuation in the cerebral white matter which is nonspecific but most commonly associated with chronic small vessel ischemic changes.  There is left cerebellar and left occipital encephalomalacia.  The ventricles are not significantly enlarged.  There are vascular calcifications.    Visualized paranasal sinuses and mastoid air cells are clear. The calvarium is grossly intact.  There is some soft tissue swelling over the right frontal scalp.  Impression: Question minimal subdural blood products along the falx without mass effect.    Findings discussed with Dr. Maddox at 713 on 7/19/2024.    Electronically signed by: Surinder Danielle  Date:    07/19/2024  Time:    07:14  CT Cervical Spine Without Contrast  Narrative: EXAMINATION:  CT CERVICAL SPINE WITHOUT CONTRAST    CLINICAL HISTORY:  Neck trauma (Age >= 65y);    TECHNIQUE:  Helical acquisition through the cervical spine without IV contrast. Three plane reconstructions were made available for review. DLP 1387 mGycm. Automatic exposure control, adjustment of mA/kV or iterative reconstruction technique was used to reduce radiation.    COMPARISON:  None available.    FINDINGS:  No fractures identified.  There are mild degenerative alignment abnormalities.  Moderate degenerative changes.  Craniocervical junction and C1-C2 relationship are normal. The odontoid is intact. There is limited evaluation of the soft tissues. No prevertebral soft tissue swelling. Ligamentous injury cannot be excluded with CT.  There are vascular calcifications.  Impression: No acute bony injury of the cervical  spine.    Electronically signed by: Surinder Danielle  Date:    07/19/2024  Time:    07:13  X-Ray Pelvis Routine AP  Narrative: EXAMINATION:  XR PELVIS ROUTINE AP    CLINICAL HISTORY:  Unspecified fall, initial encounter    COMPARISON:  None    FINDINGS:  Frontal image of the pelvis demonstrates no fracture or dislocation  Impression: No acute findings.    Electronically signed by: Surinder Danielle  Date:    07/19/2024  Time:    07:10  X-Ray Chest 1 View  Narrative: EXAMINATION:  XR CHEST 1 VIEW    CLINICAL HISTORY:  Unspecified fall, initial encounter    COMPARISON:  26 April 2024    FINDINGS:  Frontal view of the chest was obtained. The heart is not significantly enlarged.  There are mild left basilar opacities with a small left pleural effusion suspected.  There is no pneumothorax.  Impression: Mild left basilar opacities with a small left pleural effusion.    Electronically signed by: Surinder Danielle  Date:    07/19/2024  Time:    06:55      Armando Herrera MD  Department of Hospital Medicine   [facility]   08/02/2024

## 2024-08-02 NOTE — NURSING
Nurses Note -- 4 Eyes      8/2/2024   7:46 AM      Skin assessed during: Q Shift Change      [x] No Altered Skin Integrity Present    []Prevention Measures Documented      [] Yes- Altered Skin Integrity Present or Discovered   [] LDA Added if Not in Epic (Describe Wound)   [] New Altered Skin Integrity was Present on Admit and Documented in LDA   [] Wound Image Taken    Wound Care Consulted? No    Attending Nurse:  Jennifer Bennett RN/Staff Member:  Rolando         oral

## 2024-08-02 NOTE — PROGRESS NOTES
Inpatient Nutrition Evaluation    Admit Date: 7/19/2024   Total duration of encounter: 14 days    Nutrition Recommendation/Prescription     Continue oral diet as tolerated; currently Diet diabetic Low Sodium,2gm; 2800 Calorie     Prosource with meals provides 60 kcal, 15 gm protein per packet    Nutrition Assessment     Chart Review    Reason Seen: length of stay    Malnutrition Screening Tool Results   Have you recently lost weight without trying?: No  Have you been eating poorly because of a decreased appetite?: No   MST Score: 0     Diagnosis:  S/p ground level fall  Questionable subdural along the phalanx-no ICH on repeat scan  Right transverse process fractures of L3 and L4   Small left pleural effusion   Cirrhotic liver with right hepatic lobe mass suspicious for HCC  Agitation  Dementia?    Relevant Medical History: alcoholic cirrhosis, hepatitis-C, diabetes mellitus type 2, and CVA     Nutrition-Related Medications: Scheduled Medications:  amLODIPine, 10 mg, Daily  docusate sodium, 100 mg, BID  enoxparin, 40 mg, Q12H (prophylaxis, 0900/2100)  glipiZIDE, 2.5 mg, Daily  LIDOcaine, 1 patch, Q24H  melatonin, 9 mg, Nightly  methocarbamoL, 500 mg, BID  polyethylene glycol, 17 g, BID  risperiDONE, 0.5 mg, BID    Continuous Infusions:   PRN Medications:    amLODIPine tablet 10 mg    docusate sodium capsule 100 mg    enoxaparin injection 40 mg    glipiZIDE 24 hr tablet 2.5 mg    LIDOcaine 5 % patch 1 patch    melatonin tablet 9 mg    methocarbamoL tablet 500 mg    polyethylene glycol packet 17 g    risperiDONE tablet 0.5 mg        Nutrition-Related Labs:  Recent Labs   Lab 07/29/24  0529      K 4.0   CALCIUM 9.1   CO2 22*   BUN 21.0   CREATININE 0.94   EGFRNORACEVR >60   GLUCOSE 134*   WBC 2.84*   HGB 11.4*   HCT 34.7*        Diet Order: Diet diabetic Low Sodium,2gm; 2800 Calorie  Oral Supplement Order: Prosource No Carb  Appetite/Oral Intake: good/% of meals  Factors Affecting Nutritional Intake: none  "identified  Food/Mormonism/Cultural Preferences: none reported  Food Allergies: no known food allergies    Skin Integrity: other (see comments) (posterior head)  Wound(s):      Wound 07/20/24 0800 Laceration Occipital region-Tissue loss description: Full thickness     Comments    7/26/24 pt tolerating oral diet, eating % of most meals. Some weight loss noted since April, will monitor. Increased calorie limit to allow for adequate protein intake for wound healing. Will add Prosource for additional protein    8/2/24 pt eating 100% of meals    Anthropometrics    Height: 6' 1" (185.4 cm) Height Method: Stated  Last Weight: 99.8 kg (220 lb 0.3 oz) (07/30/24 1443) Weight Method: Standard Scale  BMI (Calculated): 29  BMI Classification: overweight (BMI 25-29.9)        Ideal Body Weight (IBW), Male: 184 lb     % Ideal Body Weight, Male (lb): 119.58 %                          Usual Weight Provided By: EMR weight history    Wt Readings from Last 5 Encounters:   07/30/24 99.8 kg (220 lb 0.3 oz)   04/26/24 104.3 kg (230 lb)   01/15/20 99.5 kg (219 lb 6.1 oz)     Weight Change(s) Since Admission:  Admit Weight: 99.8 kg (220 lb) (07/19/24 0614)      Patient Education    Not applicable.    Monitoring & Evaluation     Dietitian will monitor food and beverage intake and weight change.  Nutrition Risk/Follow-Up: low (follow-up in 5-7 days)  Patients assigned 'low nutrition risk' status do not qualify for a full nutritional assessment but will be monitored and re-evaluated in a 5-7 day time period. Please consult if re-evaluation needed sooner.   "

## 2024-08-02 NOTE — NURSING
Nurses Note -- 4 Eyes      8/2/2024   5:45 AM      Skin assessed during: Q Shift Change      [x] No Altered Skin Integrity Present    []Prevention Measures Documented      [] Yes- Altered Skin Integrity Present or Discovered   [] LDA Added if Not in Epic (Describe Wound)   [] New Altered Skin Integrity was Present on Admit and Documented in LDA   [] Wound Image Taken    Wound Care Consulted? No    Attending Nurse:  Mildred Ricci LPN    Second RN/Staff Member:  ERWIN Yoder

## 2024-08-02 NOTE — PT/OT/SLP PROGRESS
Physical Therapy Treatment    Patient Name:  Kevin Horan Jr.   MRN:  [id number]    Recommendations:     Discharge therapy intensity: Moderate Intensity Therapy   Discharge Equipment Recommendations: walker, rolling  Barriers to discharge: Impaired mobility    Assessment:     Kevin Horan Jr. is a 74 y.o. male admitted with a medical diagnosis of all down steps, R L3 and L4 TP fx, SDH, scalp lac, cirrhotic liver with R hepatic lobe mass. No surgery or brace needed for L spine TP fxs.  He presents with the following impairments/functional limitations: weakness, impaired self care skills, impaired functional mobility, gait instability, impaired balance, impaired cognition.    Rehab Prognosis: Good; patient would benefit from acute skilled PT services to address these deficits and reach maximum level of function.    Recent Surgery: * No surgery found *      Plan:     During this hospitalization, patient would benefit from acute PT services 5 x/week to address the identified rehab impairments via gait training, therapeutic activities, therapeutic exercises, neuromuscular re-education and progress toward the following goals:    Plan of Care Expires:  08/20/24    Subjective     Chief Complaint: none  Patient/Family Comments/goals: to move more  Pain/Comfort:  Pain Rating 1: 0/10      Objective:     Communicated with pts nurse prior to session.  Patient found HOB elevated with telemetry upon PT entry to room.     General Precautions: Standard, fall  Orthopedic Precautions: N/A  Braces: N/A  Respiratory Status: Room air  Skin Integrity: Visible skin intact      Functional Mobility:  Bed Mobility:     Rolling Right: contact guard assistance  Scooting: minimum assistance  Supine to Sit: contact guard assistance  Transfers:     Sit to Stand:  contact guard assistance and minimum assistance with rolling walker and decrease assistance w/ repetition  Bed to Chair: contact guard assistance with  hand-held assist  using   Step Transfer and cues and assistance w/ walker management more so w/ turn in bathroom to position for  stand and urinating  Gait: Pt ambulated 135 ft W and short distance in room to go to bathroom  Pt requires CGA and cues for walker management.  Pt ambulated w/ walker including side stepping and stepping backwards , opening door toward him.  Balance: No LOB during gait moving forward, mild LOB stepping backwards, needing tactile cues to correct.  Pt performed static balance in various stances, EO/ EC to improve trunk stability and balance corrections.  Pt demonstrated mild trunk sway, more so in staggered stance    Education:  Patient provided with verbal education and demonstrations education regarding fall prevention and safety awareness.  Understanding was verbalized, however additional teaching warranted.     Patient left up in chair with all lines intact, call button in reach, nurse notified, and one on one sitter present    GOALS:   Multidisciplinary Problems       Physical Therapy Goals          Problem: Physical Therapy    Goal Priority Disciplines Outcome Goal Variances Interventions   Physical Therapy Goal     PT, PT/OT Progressing     Description: Goals to be met by: 24     Patient will increase functional independence with mobility by performin. Supine to sit with supervision  2. Sit to stand transfer with Supervision  3. Gait  x 200 feet with Supervision using Rolling Walker.                          Time Tracking:     PT Received On: 24  PT Start Time: 1103     PT Stop Time: 1126  PT Total Time (min): 23 min     Billable Minutes: Gait Training 10 and Therapeutic Activity 13    Treatment Type: Treatment  PT/PTA: PTA     Number of PTA visits since last PT visit: 3     2024

## 2024-08-02 NOTE — PROGRESS NOTES
"8/2/2024  Kevin Horan Jr.   1950   [id number]        Psychiatry Progress Note       SUBJECTIVE:   Kevin Horan Jr. is a 74 y.o. male with a past medical history that includes alcoholic cirrhosis, hepatitis-C, T2DM, and CVA who presented to [facility] on 07/19/24 followin g a fall. EMS had reported a CBG of 44. Was reported to have slipped and fell and struck his head. CT head revealed questionable minimal subdural blood products along the phalanx without mass effect. CT cervical spine revealed no acute bony injury of the cervical spine. CT chest abdomen and pelvis with IV contrast revealed right transverse process fractures of L3 and L4, small left pleural effusion, cirrhotic liver with right hepatic lobe mass suspicious for HCC, and small volume ascites. Pelvis x-ray revealed no acute findings. Chest x-ray revealed mild left basilar opacities with a small left pleural effusion. Follow-up CT head on 07/19 revealed no subdural hematoma. Has been agitated and restless requiring PRN haloperidol. Has been documented to be confused and agitated with verbal and physical aggression towards staff. Psychiatry consulted for medication recommendations.     Up in chair during interview with nilson at the bedside. He is oriented to person and the fact that he is in a hospital, but reports current location as "[place]". He reports a euthymic mood. Denies suicidal ideations, homicidal ideations, hallucinations, or paranoia. Displays some impairment in recent memory. Sitter reports no issues with behavior today. He reports no issues with sleep or appetite.        Current Medications:   Scheduled Meds:    amLODIPine  10 mg Oral Daily    docusate sodium  100 mg Oral BID    enoxparin  40 mg Subcutaneous Q12H (prophylaxis, 0900/2100)    glipiZIDE  2.5 mg Oral Daily    LIDOcaine  1 patch Transdermal Q24H    melatonin  9 mg Oral Nightly    methocarbamoL  500 mg Oral BID    polyethylene glycol  17 g Oral BID    risperiDONE  " 0.5 mg Oral BID      PRN Meds:   Current Facility-Administered Medications:     dextrose 10%, 12.5 g, Intravenous, PRN    dextrose 10%, 25 g, Intravenous, PRN    diphenhydrAMINE, 25 mg, Intramuscular, Q4H PRN    glucagon (human recombinant), 1 mg, Intramuscular, PRN    glucose, 16 g, Oral, PRN    glucose, 24 g, Oral, PRN    hydrALAZINE, 10 mg, Intravenous, Q6H PRN    insulin aspart U-100, 0-5 Units, Subcutaneous, QID (AC + HS) PRN    magnesium hydroxide 400 mg/5 ml, 30 mL, Oral, Daily PRN    oxyCODONE, 5 mg, Oral, Q4H PRN   Psychotherapeutics (From admission, onward)      Start     Stop Route Frequency Ordered    07/29/24 2100  risperiDONE tablet 0.5 mg         -- Oral 2 times daily 07/29/24 1228            Allergies:   Review of patient's allergies indicates:  No Known Allergies     OBJECTIVE:   Vitals   Vitals:    08/02/24 1200   BP: (!) 146/73   Pulse: 84   Resp: 20   Temp: 97.8 °F (36.6 °C)        Labs/Imaging/Studies:   Recent Results (from the past 36 hour(s))   POCT glucose    Collection Time: 08/01/24  6:19 AM   Result Value Ref Range    POCT Glucose 164 (H) 70 - 110 mg/dL   POCT glucose    Collection Time: 08/01/24 12:22 PM   Result Value Ref Range    POCT Glucose 292 (H) 70 - 110 mg/dL   POCT glucose    Collection Time: 08/01/24  3:36 PM   Result Value Ref Range    POCT Glucose 324 (H) 70 - 110 mg/dL   POCT glucose    Collection Time: 08/01/24  8:55 PM   Result Value Ref Range    POCT Glucose 300 (H) 70 - 110 mg/dL   POCT glucose    Collection Time: 08/02/24  6:06 AM   Result Value Ref Range    POCT Glucose 164 (H) 70 - 110 mg/dL          Psychiatric Mental Status Exam:  General Appearance: appears stated age, dressed in hospital garb, in no acute distress, sitting in chair  Arousal: alert  Behavior: cooperative, pleasant, polite, appropriate eye-contact, under good behavioral control  Movements and Motor Activity: no tics, no tremors, no akathisia, no dystonia, no evidence of tardive  dyskinesia  Orientation: oriented to person   Speech: normal rate, rhythm, volume, tone and pitch  Mood: Euthymic  Affect: full-range, reactive  Thought Process: linear, goal-directed  Associations: no loosening of associations  Thought Content and Perceptions: no suicidal or homicidal ideation, no auditory or visual hallucinations, no paranoid ideation, no ideas of reference, no evidence of delusions or psychosis  Recent and Remote Memory: mild impairments noted; per interview/observation with patient  Attention and Concentration: grossly intact; per interview/observation with patient  Fund of Knowledge: grossly intact; based on history, vocabulary, fund of knowledge, syntax, grammar, and content  Insight: questionable; based on understanding of severity of illness and HPI  Judgment: questionable; based on patient's behavior and HPI    ASSESSMENT/PLAN:   Problems Addressed/Diagnoses:  Delirium NOS (R41.0)        Plan:  Medication management  Risperidone 0.5mg PO BID  PEC not recommended at this time.  Will sign-off; please reconsult as needed.           Manuel Bird

## 2024-08-03 LAB
POCT GLUCOSE: 168 MG/DL (ref 70–110)
POCT GLUCOSE: 220 MG/DL (ref 70–110)
POCT GLUCOSE: 225 MG/DL (ref 70–110)

## 2024-08-03 PROCEDURE — 25000003 PHARM REV CODE 250: Performed by: STUDENT IN AN ORGANIZED HEALTH CARE EDUCATION/TRAINING PROGRAM

## 2024-08-03 PROCEDURE — 25000003 PHARM REV CODE 250: Performed by: INTERNAL MEDICINE

## 2024-08-03 PROCEDURE — 25000003 PHARM REV CODE 250: Performed by: NURSE PRACTITIONER

## 2024-08-03 PROCEDURE — 63600175 PHARM REV CODE 636 W HCPCS

## 2024-08-03 PROCEDURE — 21400001 HC TELEMETRY ROOM

## 2024-08-03 PROCEDURE — 25000003 PHARM REV CODE 250

## 2024-08-03 PROCEDURE — 93010 ELECTROCARDIOGRAM REPORT: CPT | Mod: ,,, | Performed by: INTERNAL MEDICINE

## 2024-08-03 PROCEDURE — 11000001 HC ACUTE MED/SURG PRIVATE ROOM

## 2024-08-03 PROCEDURE — 63600175 PHARM REV CODE 636 W HCPCS: Performed by: NURSE PRACTITIONER

## 2024-08-03 PROCEDURE — 93005 ELECTROCARDIOGRAM TRACING: CPT

## 2024-08-03 RX ORDER — DOCUSATE SODIUM 100 MG/1
200 CAPSULE, LIQUID FILLED ORAL 2 TIMES DAILY
Status: DISCONTINUED | OUTPATIENT
Start: 2024-08-03 | End: 2024-08-17

## 2024-08-03 RX ADMIN — RISPERIDONE 0.5 MG: 0.25 TABLET, FILM COATED ORAL at 08:08

## 2024-08-03 RX ADMIN — Medication 9 MG: at 08:08

## 2024-08-03 RX ADMIN — DOCUSATE SODIUM 200 MG: 100 CAPSULE, LIQUID FILLED ORAL at 08:08

## 2024-08-03 RX ADMIN — INSULIN ASPART 4 UNITS: 100 INJECTION, SOLUTION INTRAVENOUS; SUBCUTANEOUS at 11:08

## 2024-08-03 RX ADMIN — ENOXAPARIN SODIUM 40 MG: 40 INJECTION SUBCUTANEOUS at 08:08

## 2024-08-03 RX ADMIN — LIDOCAINE PATCH 5% 1 PATCH: 700 PATCH TOPICAL at 10:08

## 2024-08-03 RX ADMIN — DOCUSATE SODIUM 100 MG: 100 CAPSULE, LIQUID FILLED ORAL at 08:08

## 2024-08-03 RX ADMIN — LINACLOTIDE 290 MCG: 145 CAPSULE, GELATIN COATED ORAL at 12:08

## 2024-08-03 RX ADMIN — POLYETHYLENE GLYCOL 3350 17 G: 17 POWDER, FOR SOLUTION ORAL at 08:08

## 2024-08-03 RX ADMIN — GLIPIZIDE 2.5 MG: 2.5 TABLET, EXTENDED RELEASE ORAL at 08:08

## 2024-08-03 RX ADMIN — AMLODIPINE BESYLATE 10 MG: 5 TABLET ORAL at 08:08

## 2024-08-03 RX ADMIN — METHOCARBAMOL 500 MG: 500 TABLET ORAL at 08:08

## 2024-08-03 RX ADMIN — INSULIN ASPART 1 UNITS: 100 INJECTION, SOLUTION INTRAVENOUS; SUBCUTANEOUS at 08:08

## 2024-08-04 LAB
POCT GLUCOSE: 247 MG/DL (ref 70–110)
POCT GLUCOSE: 285 MG/DL (ref 70–110)
POCT GLUCOSE: 290 MG/DL (ref 70–110)
POCT GLUCOSE: 317 MG/DL (ref 70–110)
POCT GLUCOSE: 393 MG/DL (ref 70–110)

## 2024-08-04 PROCEDURE — 25000003 PHARM REV CODE 250: Performed by: NURSE PRACTITIONER

## 2024-08-04 PROCEDURE — 63600175 PHARM REV CODE 636 W HCPCS: Performed by: INTERNAL MEDICINE

## 2024-08-04 PROCEDURE — 25000003 PHARM REV CODE 250: Performed by: STUDENT IN AN ORGANIZED HEALTH CARE EDUCATION/TRAINING PROGRAM

## 2024-08-04 PROCEDURE — 25000003 PHARM REV CODE 250: Performed by: INTERNAL MEDICINE

## 2024-08-04 PROCEDURE — 21400001 HC TELEMETRY ROOM

## 2024-08-04 PROCEDURE — 63600175 PHARM REV CODE 636 W HCPCS

## 2024-08-04 PROCEDURE — 94799 UNLISTED PULMONARY SVC/PX: CPT

## 2024-08-04 PROCEDURE — 11000001 HC ACUTE MED/SURG PRIVATE ROOM

## 2024-08-04 PROCEDURE — 63600175 PHARM REV CODE 636 W HCPCS: Performed by: NURSE PRACTITIONER

## 2024-08-04 PROCEDURE — 25000003 PHARM REV CODE 250

## 2024-08-04 RX ORDER — INSULIN GLARGINE 100 [IU]/ML
7 INJECTION, SOLUTION SUBCUTANEOUS NIGHTLY
Status: DISCONTINUED | OUTPATIENT
Start: 2024-08-04 | End: 2024-08-06

## 2024-08-04 RX ORDER — CARVEDILOL 3.12 MG/1
3.12 TABLET ORAL 2 TIMES DAILY
Status: DISCONTINUED | OUTPATIENT
Start: 2024-08-04 | End: 2024-08-23 | Stop reason: HOSPADM

## 2024-08-04 RX ADMIN — INSULIN ASPART 2 UNITS: 100 INJECTION, SOLUTION INTRAVENOUS; SUBCUTANEOUS at 12:08

## 2024-08-04 RX ADMIN — INSULIN ASPART 4 UNITS: 100 INJECTION, SOLUTION INTRAVENOUS; SUBCUTANEOUS at 05:08

## 2024-08-04 RX ADMIN — CARVEDILOL 3.12 MG: 3.12 TABLET, FILM COATED ORAL at 08:08

## 2024-08-04 RX ADMIN — LIDOCAINE PATCH 5% 1 PATCH: 700 PATCH TOPICAL at 09:08

## 2024-08-04 RX ADMIN — POLYETHYLENE GLYCOL 3350 17 G: 17 POWDER, FOR SOLUTION ORAL at 09:08

## 2024-08-04 RX ADMIN — RISPERIDONE 0.5 MG: 0.25 TABLET, FILM COATED ORAL at 08:08

## 2024-08-04 RX ADMIN — POLYETHYLENE GLYCOL 3350 17 G: 17 POWDER, FOR SOLUTION ORAL at 08:08

## 2024-08-04 RX ADMIN — DOCUSATE SODIUM 200 MG: 100 CAPSULE, LIQUID FILLED ORAL at 08:08

## 2024-08-04 RX ADMIN — METHOCARBAMOL 500 MG: 500 TABLET ORAL at 09:08

## 2024-08-04 RX ADMIN — DOCUSATE SODIUM 200 MG: 100 CAPSULE, LIQUID FILLED ORAL at 09:08

## 2024-08-04 RX ADMIN — INSULIN ASPART 5 UNITS: 100 INJECTION, SOLUTION INTRAVENOUS; SUBCUTANEOUS at 07:08

## 2024-08-04 RX ADMIN — AMLODIPINE BESYLATE 10 MG: 5 TABLET ORAL at 09:08

## 2024-08-04 RX ADMIN — METHOCARBAMOL 500 MG: 500 TABLET ORAL at 08:08

## 2024-08-04 RX ADMIN — INSULIN ASPART 1 UNITS: 100 INJECTION, SOLUTION INTRAVENOUS; SUBCUTANEOUS at 09:08

## 2024-08-04 RX ADMIN — Medication 9 MG: at 08:08

## 2024-08-04 RX ADMIN — RISPERIDONE 0.5 MG: 0.25 TABLET, FILM COATED ORAL at 09:08

## 2024-08-04 RX ADMIN — GLIPIZIDE 2.5 MG: 2.5 TABLET, EXTENDED RELEASE ORAL at 09:08

## 2024-08-04 RX ADMIN — CARVEDILOL 3.12 MG: 3.12 TABLET, FILM COATED ORAL at 09:08

## 2024-08-04 RX ADMIN — ENOXAPARIN SODIUM 40 MG: 40 INJECTION SUBCUTANEOUS at 08:08

## 2024-08-04 RX ADMIN — ENOXAPARIN SODIUM 40 MG: 40 INJECTION SUBCUTANEOUS at 09:08

## 2024-08-04 RX ADMIN — INSULIN GLARGINE 7 UNITS: 100 INJECTION, SOLUTION SUBCUTANEOUS at 08:08

## 2024-08-04 NOTE — PLAN OF CARE
Problem: Adult Inpatient Plan of Care  Goal: Plan of Care Review  8/3/2024 2153 by Erma Barone RN  Outcome: Progressing  8/3/2024 1917 by Erma Barone RN  Outcome: Progressing  Goal: Patient-Specific Goal (Individualized)  8/3/2024 2153 by Erma Barone RN  Outcome: Progressing  8/3/2024 1917 by Erma Barone RN  Outcome: Progressing  Goal: Absence of Hospital-Acquired Illness or Injury  8/3/2024 2153 by Erma Barone RN  Outcome: Progressing  8/3/2024 1917 by Erma Barone RN  Outcome: Progressing  Goal: Optimal Comfort and Wellbeing  8/3/2024 2153 by Erma Barone RN  Outcome: Progressing  8/3/2024 1917 by Erma Barone RN  Outcome: Progressing  Goal: Readiness for Transition of Care  8/3/2024 2153 by Erma Barone RN  Outcome: Progressing  8/3/2024 1917 by Erma Barone RN  Outcome: Progressing     Problem: Wound  Goal: Optimal Coping  8/3/2024 2153 by Erma Barone RN  Outcome: Progressing  8/3/2024 1917 by Erma Barone RN  Outcome: Progressing  Goal: Optimal Functional Ability  8/3/2024 2153 by Erma Barone RN  Outcome: Progressing  8/3/2024 1917 by Erma Barone RN  Outcome: Progressing  Goal: Absence of Infection Signs and Symptoms  8/3/2024 2153 by Erma Barone RN  Outcome: Progressing  8/3/2024 1917 by Erma Barone RN  Outcome: Progressing  Goal: Improved Oral Intake  8/3/2024 2153 by Erma Barone RN  Outcome: Progressing  8/3/2024 1917 by Erma Barone RN  Outcome: Progressing  Goal: Optimal Pain Control and Function  8/3/2024 2153 by Erma Barone RN  Outcome: Progressing  8/3/2024 1917 by Erma Barone RN  Outcome: Progressing  Goal: Skin Health and Integrity  8/3/2024 2153 by Erma Barone RN  Outcome: Progressing  8/3/2024 1917 by Erma Barone, RN  Outcome: Progressing  Goal: Optimal Wound Healing  8/3/2024 2153 by Erma Barone RN  Outcome: Progressing  8/3/2024 1917 by Erma Barone, RN  Outcome: Progressing     Problem:  Fall Injury Risk  Goal: Absence of Fall and Fall-Related Injury  8/3/2024 2153 by Erma Barone RN  Outcome: Progressing  8/3/2024 1917 by Erma Barone RN  Outcome: Progressing     Problem: Skin Injury Risk Increased  Goal: Skin Health and Integrity  8/3/2024 2153 by Erma Barone RN  Outcome: Progressing  8/3/2024 1917 by Erma Barone RN  Outcome: Progressing     Problem: Coping Ineffective  Goal: Effective Coping  8/3/2024 2153 by Erma Barone RN  Outcome: Progressing  8/3/2024 1917 by Erma Barone RN  Outcome: Progressing

## 2024-08-04 NOTE — PROGRESS NOTES
[facility] Hospital Medicine Progress Note        Chief Complaint: Inpatient Follow-up for encephalopathy     HPI:   74 y.o. male with a PMHx  of alcoholic cirrhosis, hepatitis-C, diabetes mellitus type 2, and CVA who presented to [facility] on 7/19/2024 via EMS after slipped and fell down 4 steps at home and striking head. EMS reported patient was hypoglycemic with CBG of 44 on scene.  C-collar was placed en route and 250 mL of D5W was given. Initial vital signs in ED were /77, pulse 72, respirations 16, temperature 36.9° C, and SpO2 90% on room air.  Labs revealed WBC 3.29, hemoglobin 9.8. Na 139, K 3.7, CO2 21 chloride 112, CO2 21, Cr 1.0, glucose 166, and undetectable troponin.  EKG revealed normal sinus rhythm. CT head questionable minimal subdural blood products along the falx without mass effect.  CT cervical spine  no acute injury.  CT chest abdomen and pelvis with IV contrast revealed right transverse process fractures of L3 and L4, small left pleural effusion, cirrhotic liver with right hepatic lobe mass suspicious for HCC, and small volume ascites.  Pelvis x-ray revealed no acute findings.  Chest x-ray revealed mild left basilar opacities with a small left pleural effusion.     Scalp laceration was repaired in ED.  Neurosurgery was consulted and  was admitted to trauma services.  Neurosurgery recommended no surgical intervention, Keppra for seizure prophylaxis, and blood pressure less than 150/90.  Follow-up CT head on 07/19  with no subdural hematoma. Hospital medicine was consulted for transition of care and further medical management.  PT/OT consulted; recommending moderate intensity therapy.  Patient was noted to be agitated and restless attempting to climb out of bed for which p.r.n. IV Haldol was ordered and 1 to1 sitter placed.     Patient's  over all functional status was declining. He was needing full assist for ADLs. His mental status was very sluggish. Consulted  palliative care. AFP tumor marker was neg. He is now DNR status.        Interval Hx:   Sitting in the bedside chair .   Awake, alert, oriented to self ,  and place.  C/O constipation despite current bowel regimen.  Linzess  290 mcg x 1 dose given today     Case was discussed with patient's nurse and  on the floor.    Objective/physical exam:  General: In no acute distress, afebrile  Chest: Clear to auscultation bilaterally  Heart: RRR, +S1, S2, no appreciable murmur  Abdomen: Soft, nontender, BS +  MSK: Warm, no lower extremity edema, no clubbing or cyanosis  Neurologic: Alert and oriented to self and place. Moves all ext spontaneously     VITAL SIGNS: 24 HRS MIN & MAX LAST   Temp  Min: 97.6 °F (36.4 °C)  Max: 98.8 °F (37.1 °C) 97.6 °F (36.4 °C)   BP  Min: 127/68  Max: 149/71 (!) 149/71   Pulse  Min: 74  Max: 86  83   Resp  Min: 20  Max: 20 20   SpO2  Min: 96 %  Max: 99 % 99 %     I have reviewed the following labs:  Recent Labs   Lab 24  0529   WBC 2.84*   RBC 3.74*   HGB 11.4*   HCT 34.7*   MCV 92.8   MCH 30.5   MCHC 32.9*   RDW 14.6   PLT 79*   MPV 11.4*     Recent Labs   Lab 24  0529      K 4.0   *   CO2 22*   BUN 21.0   CREATININE 0.94   CALCIUM 9.1     Microbiology Results (last 7 days)       ** No results found for the last 168 hours. **             See below for Radiology    Assessment/Plan:  Fall at home 4 steps down  Head injury with mild subdural hematoma along the falx- resolved   Right transverse process fractures of L3 and L4 due to fall  Small left pleural effusion   Cirrhosis of  liver with right hepatic lobe mass with negative AFP tumor marker   Dementia with  delirium   Physical debility     History of alcoholic cirrhosis, hepatitis-C, diabetes mellitus type 2, and prior CVA     Plan:  Mentation improved since admission   Currently calm and cooperative   Continue OT/PT as tolerated   Blood sugars moderately controlled   Palliative care team discussing goals of  care with family   Continue supportive care   CM consulted to assist with DC planning     VTE prophylaxis: Lovenox    Patient condition:  Fair    Anticipated discharge and Disposition:     SNF/NH- TBD    All diagnosis and differential diagnosis have been reviewed; assessment and plan has been documented; I have personally reviewed the labs and test results that are presently available; I have reviewed the patients medication list; I have reviewed the consulting providers response and recommendations. I have reviewed or attempted to review medical records based upon their availability    All of the patient's questions have been  addressed and answered. Patient's is agreeable to the above stated plan. I will continue to monitor closely and make adjustments to medical management as needed.    Portions of this note dictated using EMR integrated voice recognition software, and may be subject to voice recognition errors not corrected at proofreading. Please contact writer for clarification if needed.   _____________________________________________________________________    Malnutrition Status:    Scheduled Med:   amLODIPine  10 mg Oral Daily    docusate sodium  200 mg Oral BID    enoxparin  40 mg Subcutaneous Q12H (prophylaxis, 0900/2100)    glipiZIDE  2.5 mg Oral Daily    LIDOcaine  1 patch Transdermal Q24H    melatonin  9 mg Oral Nightly    methocarbamoL  500 mg Oral BID    polyethylene glycol  17 g Oral BID    risperiDONE  0.5 mg Oral BID      Continuous Infusions:     PRN Meds:    Current Facility-Administered Medications:     dextrose 10%, 12.5 g, Intravenous, PRN    dextrose 10%, 25 g, Intravenous, PRN    diphenhydrAMINE, 25 mg, Intramuscular, Q4H PRN    glucagon (human recombinant), 1 mg, Intramuscular, PRN    glucose, 16 g, Oral, PRN    glucose, 24 g, Oral, PRN    hydrALAZINE, 10 mg, Intravenous, Q6H PRN    insulin aspart U-100, 0-5 Units, Subcutaneous, QID (AC + HS) PRN    magnesium hydroxide 400 mg/5 ml, 30 mL,  Oral, Daily PRN    oxyCODONE, 5 mg, Oral, Q4H PRN         Bobbi Cai MD  Department of Hospital Medicine   [facility]   08/03/2024

## 2024-08-04 NOTE — PLAN OF CARE
Problem: Adult Inpatient Plan of Care  Goal: Plan of Care Review  8/4/2024 0325 by Erma Barone RN  Outcome: Progressing  8/3/2024 2153 by Erma Barone RN  Outcome: Progressing  8/3/2024 1917 by Erma Barone RN  Outcome: Progressing  Goal: Patient-Specific Goal (Individualized)  8/4/2024 0325 by Erma Barone RN  Outcome: Progressing  8/3/2024 2153 by Erma Barone RN  Outcome: Progressing  8/3/2024 1917 by Erma Barone RN  Outcome: Progressing  Goal: Absence of Hospital-Acquired Illness or Injury  8/4/2024 0325 by Erma Barone RN  Outcome: Progressing  8/3/2024 2153 by Erma Barone RN  Outcome: Progressing  8/3/2024 1917 by Erma Barone RN  Outcome: Progressing  Goal: Optimal Comfort and Wellbeing  8/4/2024 0325 by Erma Barone RN  Outcome: Progressing  8/3/2024 2153 by Erma Barone RN  Outcome: Progressing  8/3/2024 1917 by Erma Barone RN  Outcome: Progressing  Goal: Readiness for Transition of Care  8/4/2024 0325 by Erma Barone RN  Outcome: Progressing  8/3/2024 2153 by Erma Barone RN  Outcome: Progressing  8/3/2024 1917 by Erma Barone RN  Outcome: Progressing

## 2024-08-04 NOTE — PLAN OF CARE
Problem: Adult Inpatient Plan of Care  Goal: Plan of Care Review  Outcome: Progressing  Goal: Patient-Specific Goal (Individualized)  Outcome: Progressing  Goal: Absence of Hospital-Acquired Illness or Injury  Outcome: Progressing  Goal: Optimal Comfort and Wellbeing  Outcome: Progressing  Goal: Readiness for Transition of Care  Outcome: Progressing     Problem: Wound  Goal: Optimal Coping  Outcome: Progressing  Goal: Optimal Functional Ability  Outcome: Progressing  Goal: Absence of Infection Signs and Symptoms  Outcome: Progressing  Goal: Improved Oral Intake  Outcome: Progressing  Goal: Optimal Pain Control and Function  Outcome: Progressing  Goal: Skin Health and Integrity  Outcome: Progressing  Goal: Optimal Wound Healing  Outcome: Progressing     Problem: Fall Injury Risk  Goal: Absence of Fall and Fall-Related Injury  Outcome: Progressing     Problem: Fall Injury Risk  Goal: Absence of Fall and Fall-Related Injury  Outcome: Progressing     Problem: Skin Injury Risk Increased  Goal: Skin Health and Integrity  Outcome: Progressing     Problem: Coping Ineffective  Goal: Effective Coping  Outcome: Progressing

## 2024-08-04 NOTE — NURSING
Nurses Note -- 4 Eyes      8/4/2024   7:47 AM      Skin assessed during: Q Shift Change      [x] No Altered Skin Integrity Present    []Prevention Measures Documented      [] Yes- Altered Skin Integrity Present or Discovered   [] LDA Added if Not in Epic (Describe Wound)   [] New Altered Skin Integrity was Present on Admit and Documented in LDA   [] Wound Image Taken    Wound Care Consulted? No    Attending Nurse:  Urbano Bennett RN/Staff Member: ERWIN Ritchie

## 2024-08-04 NOTE — PROGRESS NOTES
[facility] Hospital Medicine Progress Note        Chief Complaint: Inpatient Follow-up for encephalopathy     HPI:   74 y.o. male with a PMHx  of alcoholic cirrhosis, hepatitis-C, diabetes mellitus type 2, and CVA who presented to [facility] on 7/19/2024 via EMS after slipped and fell down 4 steps at home and striking head. EMS reported patient was hypoglycemic with CBG of 44 on scene.  C-collar was placed en route and 250 mL of D5W was given. Initial vital signs in ED were /77, pulse 72, respirations 16, temperature 36.9° C, and SpO2 90% on room air.  Labs revealed WBC 3.29, hemoglobin 9.8. Na 139, K 3.7, CO2 21 chloride 112, CO2 21, Cr 1.0, glucose 166, and undetectable troponin.  EKG revealed normal sinus rhythm. CT head questionable minimal subdural blood products along the falx without mass effect.  CT cervical spine  no acute injury.  CT chest abdomen and pelvis with IV contrast revealed right transverse process fractures of L3 and L4, small left pleural effusion, cirrhotic liver with right hepatic lobe mass suspicious for HCC, and small volume ascites.  Pelvis x-ray revealed no acute findings.  Chest x-ray revealed mild left basilar opacities with a small left pleural effusion.     Scalp laceration was repaired in ED.  Neurosurgery was consulted and  was admitted to trauma services.  Neurosurgery recommended no surgical intervention, Keppra for seizure prophylaxis, and blood pressure less than 150/90.  Follow-up CT head on 07/19  with no subdural hematoma. Hospital medicine was consulted for transition of care and further medical management.  PT/OT consulted; recommending moderate intensity therapy.  Patient was noted to be agitated and restless attempting to climb out of bed for which p.r.n. IV Haldol was ordered and 1 to1 sitter placed.     Patient's  over all functional status was declining. He was needing full assist for ADLs. His mental status was very sluggish. Consulted  palliative care. AFP tumor marker was neg. He is now DNR status.        Today:   No acute events reported overnight.    Seen at bedside this morning ,one-to-one sitter at bedside ,patient was resting comfortably in the bed, easily  arousable, patient is alert and oriented x3, patient denied any complaints     Case was discussed with patient's nurse     Objective/physical exam:  General: In no acute distress, afebrile  Chest:  Unlabored breathing on room air Heart:  Normal rate  Abdomen: Soft, nontender  MSK: Warm  Neurologic: Alert      VITAL SIGNS: 24 HRS MIN & MAX LAST   Temp  Min: 97.6 °F (36.4 °C)  Max: 98.2 °F (36.8 °C) 97.7 °F (36.5 °C)   BP  Min: 127/68  Max: 155/71 139/67   Pulse  Min: 74  Max: 84  77   Resp  Min: 16  Max: 20 16   SpO2  Min: 97 %  Max: 99 % 98 %     I have reviewed the following labs:  Recent Labs   Lab 07/29/24  0529   WBC 2.84*   RBC 3.74*   HGB 11.4*   HCT 34.7*   MCV 92.8   MCH 30.5   MCHC 32.9*   RDW 14.6   PLT 79*   MPV 11.4*     Recent Labs   Lab 07/29/24  0529      K 4.0   *   CO2 22*   BUN 21.0   CREATININE 0.94   CALCIUM 9.1     Microbiology Results (last 7 days)       ** No results found for the last 168 hours. **             See below for Radiology    Assessment/Plan:  Fall at home 4 steps down  Head injury with mild subdural hematoma along the falx- resolved   Right transverse process fractures of L3 and L4 due to fall  Small left pleural effusion   Cirrhosis of  liver with right hepatic lobe mass with negative AFP tumor marker   Dementia with  delirium   Physical debility     History of alcoholic cirrhosis, hepatitis-C, diabetes mellitus type 2, and prior CVA     Plan:  Add Coreg for better BP control  Continue therapy services   Monitor blood sugars, add long-acting insulin, continue glipizide, ISS.  Patient's A1c is 8.3% 05/04/2024  Palliative care team discussing goals of care with family   Continue supportive care .CM consulted to assist with DC planning     VTE  prophylaxis: Lovenox    Patient condition:  Fair    Anticipated discharge and Disposition:     SNF/NH- TBD        Portions of this note dictated using EMR integrated voice recognition software, and may be subject to voice recognition errors not corrected at proofreading. Please contact writer for clarification if needed.   _____________________________________________________________________    Malnutrition Status:    Scheduled Med:   amLODIPine  10 mg Oral Daily    docusate sodium  200 mg Oral BID    enoxparin  40 mg Subcutaneous Q12H (prophylaxis, 0900/2100)    glipiZIDE  2.5 mg Oral Daily    LIDOcaine  1 patch Transdermal Q24H    melatonin  9 mg Oral Nightly    methocarbamoL  500 mg Oral BID    polyethylene glycol  17 g Oral BID    risperiDONE  0.5 mg Oral BID      Continuous Infusions:     PRN Meds:    Current Facility-Administered Medications:     dextrose 10%, 12.5 g, Intravenous, PRN    dextrose 10%, 25 g, Intravenous, PRN    diphenhydrAMINE, 25 mg, Intramuscular, Q4H PRN    glucagon (human recombinant), 1 mg, Intramuscular, PRN    glucose, 16 g, Oral, PRN    glucose, 24 g, Oral, PRN    hydrALAZINE, 10 mg, Intravenous, Q6H PRN    insulin aspart U-100, 0-5 Units, Subcutaneous, QID (AC + HS) PRN    magnesium hydroxide 400 mg/5 ml, 30 mL, Oral, Daily PRN    oxyCODONE, 5 mg, Oral, Q4H PRN         Shameka Keller MD  Department of Hospital Medicine   [facility]   08/04/2024

## 2024-08-05 LAB
OHS QRS DURATION: 92 MS
OHS QTC CALCULATION: 469 MS
POCT GLUCOSE: 247 MG/DL (ref 70–110)
POCT GLUCOSE: 299 MG/DL (ref 70–110)
POCT GLUCOSE: 329 MG/DL (ref 70–110)

## 2024-08-05 PROCEDURE — 97530 THERAPEUTIC ACTIVITIES: CPT

## 2024-08-05 PROCEDURE — 25000003 PHARM REV CODE 250: Performed by: NURSE PRACTITIONER

## 2024-08-05 PROCEDURE — 11000001 HC ACUTE MED/SURG PRIVATE ROOM

## 2024-08-05 PROCEDURE — 25000003 PHARM REV CODE 250: Performed by: STUDENT IN AN ORGANIZED HEALTH CARE EDUCATION/TRAINING PROGRAM

## 2024-08-05 PROCEDURE — 63600175 PHARM REV CODE 636 W HCPCS: Performed by: INTERNAL MEDICINE

## 2024-08-05 PROCEDURE — 21400001 HC TELEMETRY ROOM

## 2024-08-05 PROCEDURE — 94799 UNLISTED PULMONARY SVC/PX: CPT

## 2024-08-05 PROCEDURE — 97530 THERAPEUTIC ACTIVITIES: CPT | Mod: CQ

## 2024-08-05 PROCEDURE — 63600175 PHARM REV CODE 636 W HCPCS

## 2024-08-05 PROCEDURE — 25000003 PHARM REV CODE 250

## 2024-08-05 PROCEDURE — 97116 GAIT TRAINING THERAPY: CPT | Mod: CQ

## 2024-08-05 PROCEDURE — 25000003 PHARM REV CODE 250: Performed by: INTERNAL MEDICINE

## 2024-08-05 PROCEDURE — 63600175 PHARM REV CODE 636 W HCPCS: Performed by: NURSE PRACTITIONER

## 2024-08-05 RX ORDER — GLIPIZIDE 5 MG/1
5 TABLET, FILM COATED, EXTENDED RELEASE ORAL DAILY
Status: DISCONTINUED | OUTPATIENT
Start: 2024-08-06 | End: 2024-08-06

## 2024-08-05 RX ADMIN — INSULIN GLARGINE 7 UNITS: 100 INJECTION, SOLUTION SUBCUTANEOUS at 08:08

## 2024-08-05 RX ADMIN — DOCUSATE SODIUM 200 MG: 100 CAPSULE, LIQUID FILLED ORAL at 08:08

## 2024-08-05 RX ADMIN — ENOXAPARIN SODIUM 40 MG: 40 INJECTION SUBCUTANEOUS at 08:08

## 2024-08-05 RX ADMIN — AMLODIPINE BESYLATE 10 MG: 5 TABLET ORAL at 08:08

## 2024-08-05 RX ADMIN — METHOCARBAMOL 500 MG: 500 TABLET ORAL at 08:08

## 2024-08-05 RX ADMIN — RISPERIDONE 0.5 MG: 0.25 TABLET, FILM COATED ORAL at 08:08

## 2024-08-05 RX ADMIN — OXYCODONE HYDROCHLORIDE 5 MG: 5 TABLET ORAL at 08:08

## 2024-08-05 RX ADMIN — CARVEDILOL 3.12 MG: 3.12 TABLET, FILM COATED ORAL at 08:08

## 2024-08-05 RX ADMIN — LIDOCAINE PATCH 5% 1 PATCH: 700 PATCH TOPICAL at 11:08

## 2024-08-05 RX ADMIN — Medication 9 MG: at 08:08

## 2024-08-05 RX ADMIN — POLYETHYLENE GLYCOL 3350 17 G: 17 POWDER, FOR SOLUTION ORAL at 08:08

## 2024-08-05 RX ADMIN — INSULIN ASPART 3 UNITS: 100 INJECTION, SOLUTION INTRAVENOUS; SUBCUTANEOUS at 11:08

## 2024-08-05 RX ADMIN — GLIPIZIDE 2.5 MG: 2.5 TABLET, EXTENDED RELEASE ORAL at 08:08

## 2024-08-05 NOTE — PROGRESS NOTES
[facility] Hospital Medicine Progress Note        Chief Complaint: Inpatient Follow-up for encephalopathy      HPI:   74 y.o. male with a PMHx  of alcoholic cirrhosis, hepatitis-C, diabetes mellitus type 2, and CVA who presented to [facility] on 7/19/2024 via EMS after slipped and fell down 4 steps at home and striking head. EMS reported patient was hypoglycemic with CBG of 44 on scene.  C-collar was placed en route and 250 mL of D5W was given. Initial vital signs in ED were /77, pulse 72, respirations 16, temperature 36.9° C, and SpO2 90% on room air.  Labs revealed WBC 3.29, hemoglobin 9.8. Na 139, K 3.7, CO2 21 chloride 112, CO2 21, Cr 1.0, glucose 166, and undetectable troponin.  EKG revealed normal sinus rhythm. CT head questionable minimal subdural blood products along the falx without mass effect.  CT cervical spine  no acute injury.  CT chest abdomen and pelvis with IV contrast revealed right transverse process fractures of L3 and L4, small left pleural effusion, cirrhotic liver with right hepatic lobe mass suspicious for HCC, and small volume ascites.  Pelvis x-ray revealed no acute findings.  Chest x-ray revealed mild left basilar opacities with a small left pleural effusion.      Scalp laceration was repaired in ED.  Neurosurgery was consulted and  was admitted to trauma services.  Neurosurgery recommended no surgical intervention, Keppra for seizure prophylaxis, and blood pressure less than 150/90.  Follow-up CT head on 07/19  with no subdural hematoma. Hospital medicine was consulted for transition of care and further medical management.  PT/OT consulted; recommending moderate intensity therapy.  Patient was noted to be agitated and restless attempting to climb out of bed for which p.r.n. IV Haldol was ordered and 1 to1 sitter placed.     Patient's  over all functional status was declining. He was needing full assist for ADLs. His mental status was very sluggish.  Consulted palliative care. AFP tumor marker was neg. He is now DNR status.         Today:   No acute events reported overnight.    Seen at bedside this morning ,one-to-one sitter at bedside ,patient was resting comfortably in the bed, easily  arousable, patient is alert and oriented x3, patient denied any complaints      Case was discussed with patient's nurse      Objective/physical exam:  General: In no acute distress, afebrile  Chest:  Unlabored breathing on room air Heart:  Normal rate  Abdomen: Soft, nontender  MSK: Warm  Neurologic: Alert      Assessment/Plan:  Fall at home 4 steps down  Head injury with mild subdural hematoma along the falx- resolved   Right transverse process fractures of L3 and L4 due to fall  Small left pleural effusion   Cirrhosis of  liver with right hepatic lobe mass with negative AFP tumor marker   Dementia with  delirium   Physical debility      History of alcoholic cirrhosis, hepatitis-C, diabetes mellitus type 2, and prior CVA     Plan:  INCREASE P.O. GLIPIZIDE TO 5 MG Q.A.M., CONTINUE INSULIN GLARGINE 7 UNITS AT BEDTIME  Add Coreg for better BP control  Continue therapy services   Monitor blood sugars, add long-acting insulin, continue glipizide, ISS.    Patient's A1c is 8.3% 05/04/2024  Palliative care team discussing goals of care with family   Continue supportive care .CM consulted to assist with DC planning      VTE prophylaxis: Lovenox     Patient condition:  Fair     Anticipated discharge and Disposition:     SNF/NH- TBD           Portions of this note dictated using EMR integrated voice recognition software, and may be subject to voice recognition errors not corrected at proofreading. Please contact writer for clarification if needed.     VITAL SIGNS: 24 HRS MIN & MAX LAST   Temp  Min: 97.7 °F (36.5 °C)  Max: 98.4 °F (36.9 °C) 97.7 °F (36.5 °C)   BP  Min: 121/60  Max: 164/75 (!) 143/71   Pulse  Min: 65  Max: 80  65   Resp  Min: 16  Max: 19 18   SpO2  Min: 96 %  Max: 98 % 98  "%     I have reviewed the following labs:  No results for input(s): "WBC", "RBC", "HGB", "HCT", "MCV", "MCH", "MCHC", "RDW", "PLT", "MPV", "GRAN", "LYMPH", "MONO", "BASO", "NRBC" in the last 168 hours.  No results for input(s): "NA", "K", "CL", "CO2", "ANIONGAP", "BUN", "CREATININE", "GLU", "CALCIUM", "PH", "MG", "ALBUMIN", "PROT", "ALKPHOS", "ALT", "AST", "BILITOT" in the last 168 hours.  Microbiology Results (last 7 days)       ** No results found for the last 168 hours. **             See below for Radiology    Assessment/Plan:      VTE prophylaxis:     Patient condition:  Stable/Fair/Guarded/ Serious/ Critical    Anticipated discharge and Disposition:         All diagnosis and differential diagnosis have been reviewed; assessment and plan has been documented; I have personally reviewed the labs and test results that are presently available; I have reviewed the patients medication list; I have reviewed the consulting providers response and recommendations. I have reviewed or attempted to review medical records based upon their availability    All of the patient's questions have been  addressed and answered. Patient's is agreeable to the above stated plan. I will continue to monitor closely and make adjustments to medical management as needed.    Portions of this note dictated using EMR integrated voice recognition software, and may be subject to voice recognition errors not corrected at proofreading. Please contact writer for clarification if needed.   _____________________________________________________________________    Malnutrition Status:    Scheduled Med:   amLODIPine  10 mg Oral Daily    carvediloL  3.125 mg Oral BID    docusate sodium  200 mg Oral BID    enoxparin  40 mg Subcutaneous Q12H (prophylaxis, 0900/2100)    [START ON 8/6/2024] glipiZIDE  5 mg Oral Daily    insulin glargine U-100  7 Units Subcutaneous QHS    LIDOcaine  1 patch Transdermal Q24H    melatonin  9 mg Oral Nightly    methocarbamoL  500 " mg Oral BID    polyethylene glycol  17 g Oral BID    risperiDONE  0.5 mg Oral BID      Continuous Infusions:     PRN Meds:    Current Facility-Administered Medications:     dextrose 10%, 12.5 g, Intravenous, PRN    dextrose 10%, 25 g, Intravenous, PRN    diphenhydrAMINE, 25 mg, Intramuscular, Q4H PRN    glucagon (human recombinant), 1 mg, Intramuscular, PRN    glucose, 16 g, Oral, PRN    glucose, 24 g, Oral, PRN    hydrALAZINE, 10 mg, Intravenous, Q6H PRN    insulin aspart U-100, 0-5 Units, Subcutaneous, QID (AC + HS) PRN    magnesium hydroxide 400 mg/5 ml, 30 mL, Oral, Daily PRN    oxyCODONE, 5 mg, Oral, Q4H PRN     Radiology:  I have personally reviewed the following imaging and agree with the radiologist.     CT Head Without Contrast  Narrative: EXAMINATION:  CT HEAD WITHOUT CONTRAST    CLINICAL HISTORY:  fu SDH;    TECHNIQUE:  Multiple axial images were obtained from the base of the brain to the vertex without contrast administration.  Sagittal and coronal reconstructions were performed. .Automatic exposure control  (AEC) is utilized to reduce patient radiation exposure.    COMPARISON:  None    FINDINGS:  There is no intracranial mass or lesion seen.  No hemorrhage is seen.  No subdural hemorrhage is seen along the falx on today's examination.  No infarct is seen.  The ventricles and basilar cisterns appear normal.  Brain parenchyma appears grossly unremarkable.    Posterior fossa appears normal.  The calvarium is intact.  The paranasal sinuses appear grossly unremarkable.  Impression: No subdural hematoma seen today's examination.    Electronically signed by: Robin Ellis  Date:    07/19/2024  Time:    15:00  X-ray Shoulder 2 or More Views Right  Narrative: EXAMINATION:  XR SHOULDER COMPLETE 2 OR MORE VIEWS RIGHT    CLINICAL HISTORY:  Fall;    TECHNIQUE:  Three views.    COMPARISON:  None available.    FINDINGS:  Articular surfaces alignment is preserved.  No acute fracture or dislocation identified.   Calcification adjacent to the greater tuberosity reflects calcific tendinopathy.  There is narrowed acromial head and acromion interval which raises the possibility of rotator cuff arthropathy.  Impression: No osseous abnormality identified.    Electronically signed by: David Erazo  Date:    07/19/2024  Time:    09:19  CT Chest Abdomen Pelvis With IV Contrast (XPD) NO Oral Contrast  Narrative: EXAMINATION:  CT CHEST ABDOMEN PELVIS WITH IV CONTRAST (XPD)    CLINICAL HISTORY:  Polytrauma, blunt;    TECHNIQUE:  Helical acquisition from the thoracic inlet through the ischia with  IV contrast. Three plane reconstructions made available for review.  mGycm. Automatic exposure control, adjustment of mA/kV or iterative reconstruction technique was used to reduce radiation.    COMPARISON:  None available.    FINDINGS:  Chest.    Heart size upper limit normal.  No pericardial effusion.  There are coronary artery calcifications.    There is no mediastinal hematoma.  No enlarged thoracic lymph nodes.    There is a small left pleural effusion.  No pneumothorax seen.  There is some atelectasis or scarring left lung base.  Mild chronic changes of the lungs elsewhere.    Abdomen and pelvis.    Cirrhotic liver.  There is a 4 cm hyperenhancing lesion in segment 5 image 126 series 2.  The portal vein is patent.  Prominent paraumbilical vein.  There are gallstones.  No significant biliary ductal dilatation.    The spleen is mildly enlarged.  No significant abnormality of the pancreas or adrenals.  No hydronephrosis.  The low lying malrotated right kidney.    There is no bowel obstruction or free air.  No suspicious bowel wall thickening.  Small volume ascites.    Urinary bladder is unremarkable.  The abdominal aorta is normal in caliber.  Moderate atherosclerotic disease.  Left inguinal hernia containing some fluid.    There are fractures right transverse processes L3 and L4.  There are old bilateral rib fractures.  Moderate  degenerative change of the spine.  Impression: 1. Right transverse process fractures L3 and L4.  2. Small left pleural effusion.  3. Cirrhotic liver with right hepatic lobe mass suspicious for HCC.  Recommend outpatient liver mass protocol CT.  4. Small volume ascites.    Electronically signed by: Surinder Danielle  Date:    07/19/2024  Time:    07:32  CT Head Without Contrast  Narrative: EXAMINATION:  CT HEAD WITHOUT CONTRAST    CLINICAL HISTORY:  Head trauma, minor (Age >= 65y);    TECHNIQUE:  CT imaging of the head performed from the skull base to the vertex without intravenous contrast. DLP 1387 mGycm. Automatic exposure control, adjustment of mA/kV or iterative reconstruction technique was used to reduce radiation.    COMPARISON:  None Available.    FINDINGS:  Questionable minimal subpleural blood products along the falx measuring only 1-2 mm maximally.  No mass effect.  There is mild patchy hypoattenuation in the cerebral white matter which is nonspecific but most commonly associated with chronic small vessel ischemic changes.  There is left cerebellar and left occipital encephalomalacia.  The ventricles are not significantly enlarged.  There are vascular calcifications.    Visualized paranasal sinuses and mastoid air cells are clear. The calvarium is grossly intact.  There is some soft tissue swelling over the right frontal scalp.  Impression: Question minimal subdural blood products along the falx without mass effect.    Findings discussed with Dr. Maddox at 713 on 7/19/2024.    Electronically signed by: Surinder Danielle  Date:    07/19/2024  Time:    07:14  CT Cervical Spine Without Contrast  Narrative: EXAMINATION:  CT CERVICAL SPINE WITHOUT CONTRAST    CLINICAL HISTORY:  Neck trauma (Age >= 65y);    TECHNIQUE:  Helical acquisition through the cervical spine without IV contrast. Three plane reconstructions were made available for review. DLP 1387 mGycm. Automatic exposure control, adjustment of mA/kV or iterative  reconstruction technique was used to reduce radiation.    COMPARISON:  None available.    FINDINGS:  No fractures identified.  There are mild degenerative alignment abnormalities.  Moderate degenerative changes.  Craniocervical junction and C1-C2 relationship are normal. The odontoid is intact. There is limited evaluation of the soft tissues. No prevertebral soft tissue swelling. Ligamentous injury cannot be excluded with CT.  There are vascular calcifications.  Impression: No acute bony injury of the cervical spine.    Electronically signed by: Surinder Danielle  Date:    07/19/2024  Time:    07:13  X-Ray Pelvis Routine AP  Narrative: EXAMINATION:  XR PELVIS ROUTINE AP    CLINICAL HISTORY:  Unspecified fall, initial encounter    COMPARISON:  None    FINDINGS:  Frontal image of the pelvis demonstrates no fracture or dislocation  Impression: No acute findings.    Electronically signed by: Surinder Danielle  Date:    07/19/2024  Time:    07:10  X-Ray Chest 1 View  Narrative: EXAMINATION:  XR CHEST 1 VIEW    CLINICAL HISTORY:  Unspecified fall, initial encounter    COMPARISON:  26 April 2024    FINDINGS:  Frontal view of the chest was obtained. The heart is not significantly enlarged.  There are mild left basilar opacities with a small left pleural effusion suspected.  There is no pneumothorax.  Impression: Mild left basilar opacities with a small left pleural effusion.    Electronically signed by: Surinder Danielle  Date:    07/19/2024  Time:    06:55      Alyse Maharaj MD  Department of Hospital Medicine   [facility]   08/05/2024

## 2024-08-05 NOTE — PT/OT/SLP PROGRESS
Physical Therapy Treatment    Patient Name:  Kevin Horan Jr.   MRN:  [id number]    Recommendations:     Discharge therapy intensity: Moderate Intensity Therapy   Discharge Equipment Recommendations: walker, rolling  Barriers to discharge:  Placement    Assessment:     Kevin Horan Jr. is a 74 y.o. male admitted with a medical diagnosis of fall down steps, R L3 and L4 TP fx, SDH, scalp lac, cirrhotic liver with R hepatic lobe mass. No surgery or brace needed for L spine TP fxs.  He presents with the following impairments/functional limitations: weakness, impaired self care skills, impaired functional mobility, gait instability, impaired balance, impaired cognition.    Rehab Prognosis: Good; patient would benefit from acute skilled PT services to address these deficits and reach maximum level of function.    Recent Surgery: * No surgery found *      Plan:     During this hospitalization, patient would benefit from acute PT services 5 x/week to address the identified rehab impairments via gait training, therapeutic activities, therapeutic exercises, neuromuscular re-education and progress toward the following goals:    Plan of Care Expires:  08/20/24    Subjective     Chief Complaint: none  Patient/Family Comments/goals: none   Pain/Comfort:         Objective:     Communicated with nursing prior to session.  Patient found up in chair with telemetry (1:1) upon PT entry to room.     General Precautions: Standard, fall  Orthopedic Precautions: N/A  Braces: N/A  Respiratory Status: Room air  Blood Pressure: NT    Functional Mobility:  Transfers:     Sit to Stand:  contact guard assistance with rolling walker  X10 reps  VC/TC for hand placement and posture  Gait: 180' w/RW, CGA  VC for posture, downward gaze, to step closer to RW, to increase step length, & gait speed - pt able to correct momentarily however unable to maintain  Noted NBOS    Education:  Patient provided with verbal education education regarding  PT role/goals/POC, fall prevention, and safety awareness.  Understanding was verbalized, however additional teaching warranted.     Patient left up in chair with all lines intact, call button in reach, nurse notified, and 1:1 present    GOALS:   Multidisciplinary Problems       Physical Therapy Goals          Problem: Physical Therapy    Goal Priority Disciplines Outcome Goal Variances Interventions   Physical Therapy Goal     PT, PT/OT Progressing     Description: Goals to be met by: 24     Patient will increase functional independence with mobility by performin. Supine to sit with supervision  2. Sit to stand transfer with Supervision  3. Gait  x 200 feet with Supervision using Rolling Walker.                          Time Tracking:     PT Received On: 24  PT Start Time: 1142     PT Stop Time: 1206  PT Total Time (min): 24 min     Billable Minutes: Gait Training 10 and Therapeutic Activity 14    Treatment Type: Treatment  PT/PTA: PTA     Number of PTA visits since last PT visit: 4     2024

## 2024-08-05 NOTE — PLAN OF CARE
Spoke to CM regarding this patient's nursing home placement. She stated that the patient will potentially be coming off of 1:1 sitters soon and will be ready for placement, however will likely need a facility with a memory care unit due to potential dementia. Confirmed with CM that I will follow up on all current open referrals and will also send to any facilities within the patient's insurance that has a memory care unit.    [facility] - Cannot meet needs  [facility] - Out of network  [facility] - Cannot meet needs  [facility] - Out of network  [facility] - No bed available  [facility] - Out of Network  [facility] - Cannot meet needs  [facility] - Cannot meet needs  [facility] - cannot meet needs  [facility]- Out of network  [facility] - out of network      Left a voicemail for the admissions coordinator of [facility] to follow up on the status of this referral at their facility. Awaiting a call back.    Called [facility] multiple times, but was not able to be connected with the admissions coordinator, was put on hold and the line disconnected. I called back but the line just rang until eventually disconnecting. Will follow up again tomorrow.    Sent referrals to [facility] and [facility] via fax since no response was receive on [facility]. Spoke to the admissions coordinator of [facility] who stated she would look for the referral and give me a call back. I also sent referrals to [facility] and [facility] via [employer] Fax as they were not in [facility]. Sent new referrals in [facility] to [facility] and  [facility] based on having a memory care unit. Will follow up with all pending referrals tomorrow.     Received a call from the admissions coordinator of [facility]. They are unable to accept this patient due to being out of network.    Received a voicemail from  [facility] stating they cannot accept  this patient  due to being out of network with his insurance provider.

## 2024-08-05 NOTE — PLAN OF CARE
Spoke to 1:1 sitter for patient. She states he had been combative but that he has improved lately. Spoke to patient's nurse and communicated that SNF/NH won't accept until he is off 1:1 sitter 24-48 hours. She said she will talk to MD about this. Attempted to call patient's wife, number in chart is incorrect. Called patient's brother, Tony. He said patient is in process of divorce and he was living with nephew, Ministerio (number [phone]). Attempted to call Ministerio regarding d/c planning, but I had to leave a voice mail. [facility] [place], will attempt to get more choices for SNF/NH placement. CM to follow up.

## 2024-08-05 NOTE — PT/OT/SLP PROGRESS
Occupational Therapy   Treatment    Name: Kevin Horan Jr.  MRN: [id number]  Admitting Diagnosis:  Fall       Recommendations:     Recommended therapy intensity at discharge: Moderate Intensity Therapy   Discharge Equipment Recommendations:  walker, rolling  Barriers to discharge:       Assessment:     Kevin Horan Jr. is a 74 y.o. male with a medical diagnosis of Fall.  He presents with the following performance deficits affecting function are weakness, impaired endurance, impaired self care skills, impaired functional mobility, gait instability, impaired balance, impaired cognition, decreased safety awareness.     Rehab Prognosis:  Fair; patient would benefit from acute skilled OT services to address these deficits and reach maximum level of function.       Plan:     Patient to be seen 3 x/week to address the above listed problems via self-care/home management, therapeutic activities, therapeutic exercises  Plan of Care Expires: 08/22/24  Plan of Care Reviewed with: patient    Subjective     Pain/Comfort:  Pain Rating 1: 0/10    Objective:     Communicated with: nrsg prior to session.  Patient found up in chair with  1:1 sitter upon OT entry to room.    General Precautions: Standard, fall    Orthopedic Precautions:N/A  Braces: N/A  Respiratory Status: Room air  Vital Signs: With Activity: 137/86     Occupational Performance:     Functional Mobility/Transfers:  Patient completed Sit <> Stand Transfer with minimum assistance  with  rolling walker   Functional Mobility: pt ambulated to the bathroom with RW, gave directions to sit on toilet for practice, however, pt declined, pt ambulated to sink but reported he already performed oral care with sitter and completed shower/dressing; ambulated back to chair; required min cues throughout for safety and keeping RW close     Patient Education:  Patient provided with verbal education education regarding OT role/goals/POC, fall prevention, and safety awareness.   Understanding was verbalized.      Patient left up in chair with all lines intact, call button in reach, and 1:1 sitter present.    GOALS:   Multidisciplinary Problems       Occupational Therapy Goals          Problem: Occupational Therapy    Goal Priority Disciplines Outcome Interventions   Occupational Therapy Goal     OT, PT/OT Progressing    Description: Goals to be met by: in 1 month      Patient will increase functional independence with ADLs by performing:    LE Dressing with Supervision.  Grooming while standing at sink with Stand-by Assistance.  Toileting from toilet with Stand-by Assistance for hygiene and clothing management.   Toilet transfer to toilet with Supervision.                         Time Tracking:     OT Date of Treatment:    OT Start Time: 1116  OT Stop Time: 1131  OT Total Time (min): 15 min    Billable Minutes:Self Care/Home Management 15min     OT/CHUN: OT     Number of CHUN visits since last OT visit: 5 8/5/2024

## 2024-08-06 LAB
ALLENS TEST BLOOD GAS (OHS): YES
BASE EXCESS BLD CALC-SCNC: 1.9 MMOL/L (ref -2–2)
BLOOD GAS SAMPLE TYPE (OHS): ABNORMAL
CA-I BLD-SCNC: 1.17 MMOL/L (ref 1.12–1.23)
CO2 BLDA-SCNC: 26.7 MMOL/L
COHGB MFR BLDA: 2.7 % (ref 0.5–1.5)
DRAWN BY BLOOD GAS (OHS): ABNORMAL
HCO3 BLDA-SCNC: 25.6 MMOL/L (ref 22–26)
INHALED O2 CONCENTRATION: 21 %
METHGB MFR BLDA: 0 % (ref 0.4–1.5)
O2 HB BLOOD GAS (OHS): 96.9 % (ref 94–97)
OXYHGB MFR BLDA: 11.5 G/DL (ref 12–16)
PCO2 BLDA: 36 MMHG (ref 35–45)
PH BLDA: 7.46 [PH] (ref 7.35–7.45)
PO2 BLDA: 88 MMHG (ref 80–100)
POCT GLUCOSE: 159 MG/DL (ref 70–110)
POCT GLUCOSE: 179 MG/DL (ref 70–110)
POCT GLUCOSE: 191 MG/DL (ref 70–110)
POCT GLUCOSE: 347 MG/DL (ref 70–110)
POTASSIUM BLOOD GAS (OHS): 4.6 MMOL/L (ref 3.5–5)
SAMPLE SITE BLOOD GAS (OHS): ABNORMAL
SAO2 % BLDA: 97.2 %
SODIUM BLOOD GAS (OHS): 136 MMOL/L (ref 137–145)

## 2024-08-06 PROCEDURE — 11000001 HC ACUTE MED/SURG PRIVATE ROOM

## 2024-08-06 PROCEDURE — 82803 BLOOD GASES ANY COMBINATION: CPT

## 2024-08-06 PROCEDURE — 25000003 PHARM REV CODE 250: Performed by: INTERNAL MEDICINE

## 2024-08-06 PROCEDURE — 63600175 PHARM REV CODE 636 W HCPCS: Performed by: INTERNAL MEDICINE

## 2024-08-06 PROCEDURE — 36600 WITHDRAWAL OF ARTERIAL BLOOD: CPT

## 2024-08-06 PROCEDURE — 63600175 PHARM REV CODE 636 W HCPCS

## 2024-08-06 PROCEDURE — 21400001 HC TELEMETRY ROOM

## 2024-08-06 PROCEDURE — 63600175 PHARM REV CODE 636 W HCPCS: Performed by: NURSE PRACTITIONER

## 2024-08-06 PROCEDURE — 97168 OT RE-EVAL EST PLAN CARE: CPT

## 2024-08-06 PROCEDURE — 25000003 PHARM REV CODE 250

## 2024-08-06 PROCEDURE — 99900035 HC TECH TIME PER 15 MIN (STAT)

## 2024-08-06 PROCEDURE — 25000003 PHARM REV CODE 250: Performed by: STUDENT IN AN ORGANIZED HEALTH CARE EDUCATION/TRAINING PROGRAM

## 2024-08-06 PROCEDURE — 25000003 PHARM REV CODE 250: Performed by: NURSE PRACTITIONER

## 2024-08-06 PROCEDURE — 94799 UNLISTED PULMONARY SVC/PX: CPT

## 2024-08-06 RX ORDER — ATORVASTATIN CALCIUM 10 MG/1
20 TABLET, FILM COATED ORAL NIGHTLY
Status: DISCONTINUED | OUTPATIENT
Start: 2024-08-06 | End: 2024-08-23 | Stop reason: HOSPADM

## 2024-08-06 RX ORDER — METFORMIN HYDROCHLORIDE 500 MG/1
500 TABLET ORAL 2 TIMES DAILY WITH MEALS
Status: DISCONTINUED | OUTPATIENT
Start: 2024-08-06 | End: 2024-08-22

## 2024-08-06 RX ORDER — HYDRALAZINE HYDROCHLORIDE 20 MG/ML
20 INJECTION INTRAMUSCULAR; INTRAVENOUS EVERY 6 HOURS PRN
Status: DISCONTINUED | OUTPATIENT
Start: 2024-08-06 | End: 2024-08-23 | Stop reason: HOSPADM

## 2024-08-06 RX ORDER — INSULIN GLARGINE 100 [IU]/ML
10 INJECTION, SOLUTION SUBCUTANEOUS NIGHTLY
Status: DISCONTINUED | OUTPATIENT
Start: 2024-08-06 | End: 2024-08-23 | Stop reason: HOSPADM

## 2024-08-06 RX ORDER — LISINOPRIL 20 MG/1
20 TABLET ORAL DAILY
Status: DISCONTINUED | OUTPATIENT
Start: 2024-08-06 | End: 2024-08-20

## 2024-08-06 RX ADMIN — RISPERIDONE 0.5 MG: 0.25 TABLET, FILM COATED ORAL at 10:08

## 2024-08-06 RX ADMIN — DOCUSATE SODIUM 200 MG: 100 CAPSULE, LIQUID FILLED ORAL at 08:08

## 2024-08-06 RX ADMIN — CARVEDILOL 3.12 MG: 3.12 TABLET, FILM COATED ORAL at 08:08

## 2024-08-06 RX ADMIN — METHOCARBAMOL 500 MG: 500 TABLET ORAL at 10:08

## 2024-08-06 RX ADMIN — ATORVASTATIN CALCIUM 20 MG: 10 TABLET, FILM COATED ORAL at 10:08

## 2024-08-06 RX ADMIN — ENOXAPARIN SODIUM 40 MG: 40 INJECTION SUBCUTANEOUS at 08:08

## 2024-08-06 RX ADMIN — LIDOCAINE PATCH 5% 1 PATCH: 700 PATCH TOPICAL at 11:08

## 2024-08-06 RX ADMIN — AMLODIPINE BESYLATE 10 MG: 5 TABLET ORAL at 08:08

## 2024-08-06 RX ADMIN — DOCUSATE SODIUM 200 MG: 100 CAPSULE, LIQUID FILLED ORAL at 10:08

## 2024-08-06 RX ADMIN — OXYCODONE HYDROCHLORIDE 5 MG: 5 TABLET ORAL at 12:08

## 2024-08-06 RX ADMIN — RISPERIDONE 0.5 MG: 0.25 TABLET, FILM COATED ORAL at 08:08

## 2024-08-06 RX ADMIN — LISINOPRIL 20 MG: 20 TABLET ORAL at 08:08

## 2024-08-06 RX ADMIN — INSULIN ASPART 5 UNITS: 100 INJECTION, SOLUTION INTRAVENOUS; SUBCUTANEOUS at 07:08

## 2024-08-06 RX ADMIN — POLYETHYLENE GLYCOL 3350 17 G: 17 POWDER, FOR SOLUTION ORAL at 10:08

## 2024-08-06 RX ADMIN — POLYETHYLENE GLYCOL 3350 17 G: 17 POWDER, FOR SOLUTION ORAL at 08:08

## 2024-08-06 RX ADMIN — METFORMIN HYDROCHLORIDE 500 MG: 500 TABLET, FILM COATED ORAL at 08:08

## 2024-08-06 RX ADMIN — METFORMIN HYDROCHLORIDE 500 MG: 500 TABLET, FILM COATED ORAL at 05:08

## 2024-08-06 RX ADMIN — Medication 9 MG: at 10:08

## 2024-08-06 RX ADMIN — METHOCARBAMOL 500 MG: 500 TABLET ORAL at 08:08

## 2024-08-06 RX ADMIN — CARVEDILOL 3.12 MG: 3.12 TABLET, FILM COATED ORAL at 10:08

## 2024-08-06 RX ADMIN — INSULIN GLARGINE 10 UNITS: 100 INJECTION, SOLUTION SUBCUTANEOUS at 10:08

## 2024-08-06 NOTE — PT/OT/SLP PROGRESS
Physical Therapy      Patient Name:  Kevin Horan Jr.   MRN:  [id number]    Attempted to see pt twice today, pt very lethargic and unable to open his eyes or follow any commands despite max stimuli. Nurse notified . Will follow-up as schedule permits.

## 2024-08-06 NOTE — PT/OT/SLP RE-EVAL
Occupational Therapy   Re-evaluation    Name: Kevin Horan Jr.  MRN: [id number]  Admitting Diagnosis: fall with SDH and spine fxs   Recent Surgery: * No surgery found *      Recommendations:     Discharge therapy intensity: Moderate Intensity Therapy   Discharge Equipment Recommendations:  walker, rolling  Barriers to discharge:       Assessment:     Kevin Horan Jr. is a 74 y.o. male with a medical diagnosis of fall at home with SDH and L3-L4 TP fxs, small R pleural effusion.  He presents with the following performance deficits affecting function: weakness, impaired endurance, impaired self care skills, impaired functional mobility, gait instability, impaired balance, impaired cognition.      Rehab Prognosis: Fair; patient would benefit from acute skilled OT services to address these deficits and reach maximum level of function.       Plan:     Patient to be seen 3 x/week to address the above listed problems via self-care/home management, therapeutic activities, therapeutic exercises  Plan of Care Expires: 09/06/24  Plan of Care Reviewed with: patient    Subjective       Pain/Comfort:  Pain Rating 1: 0/10    Patients cultural, spiritual, Yarsanism conflicts given the current situation: no    Objective:     OT communicated with nrsg prior to session.      Patient was found HOB elevated with   bed alarm and  upon OT entry to room.    General Precautions: Standard, fall  Orthopedic Precautions: N/A  Braces: N/A    Bed Mobility:    Patient completed Supine to Sit with maximal assistance  Patient completed Sit to Supine with maximal assistance    Functional Mobility/Transfers:  Patient completed Toilet Transfer Step Transfer technique with minimum assistance with  rolling walker  Functional Mobility: cues throughout for command following and safety with RW and steering     Activities of Daily Living:  Lower Body Dressing: maximal assistance 2/2 poor cognition   Toileting: maximal assistance for brief  management 2/2 poor cognition (command following)        Functional Cognition:  Affect: Flat   Orientation: oriented to Person and not oriented to place/month/year       Upper Extremity Function:  Right Upper Extremity:   WFL    Left Upper Extremity:  WFL    Balance:   Dynamic standing balance:min A     Therapeutic Positioning  Risk for acquired pressure injuries is increased due to relative decrease in mobility d/t hospitalization .    No pup kit in room     Skin assessment: all bony prominences were assessed    Findings: no redness or breakdown noted    OT recommendations for therapeutic positioning throughout hospitalization:   Follow [facility] Pressure Injury Prevention Protocol      Patient Education:  Patient provided with verbal education education regarding OT role/goals/POC, fall prevention, and safety awareness.  Understanding was verbalized.     Patient left HOB elevated with all lines intact, call button in reach, bed alarm on, and nursing notified    GOALS:   Multidisciplinary Problems       Occupational Therapy Goals          Problem: Occupational Therapy    Goal Priority Disciplines Outcome Interventions   Occupational Therapy Goal     OT, PT/OT Progressing    Description: Goals to be met by: in 1 month      Patient will increase functional independence with ADLs by performing:    LE Dressing with Supervision.  Grooming while standing at sink with Stand-by Assistance.  Toileting from toilet with Stand-by Assistance for hygiene and clothing management.   Toilet transfer to toilet with Supervision.                         History:     No past medical history on file.    No past surgical history on file.    Time Tracking:     OT Date of Treatment:    OT Start Time: 0945  OT Stop Time: 1008  OT Total Time (min): 23 min    Billable Minutes:Re-eval 23min     8/6/2024

## 2024-08-06 NOTE — PLAN OF CARE
JAMAR left a message for Miya at [facility] @ [phone]    Kia left a  voice message for Autumn @ [phone]    Malik Radford is reviewing [phone]    Wilmer Conway is reviewing [phone]

## 2024-08-06 NOTE — PROGRESS NOTES
Advance Care Planning     Date: 08/06/2024    Today a voluntary meeting took place: bedside    Patient Participation: Patient is able to participate, however patient is not oriented, is only oriented to self, noted confusion. Attempted to complete LaPOST with patient, however due to disorientation at time of assessment will continue to attempt to complete prior to discharge.     ACP Conversation (General): Understanding of current condition Patient with increase confusion and lethargy upon assessment. Primary nurse reports attending notified. Spoke to palliative care team NP Bebe and informed of patient's decline in orientation. Attempted to reach out to patient's brother Javad Horan to inform and review LAPOST completion attempt. Reviewed POA that was uploaded to patient's chart, noted to be only financial, informed medical and palliative care team.      ACP Documents: Reviewed current existing digital forms    Goals of care: The patient endorses that what is most important right now is to focus on symptom/pain control and improvement in condition but with limits to invasive therapies    Accordingly, we have decided that the best plan to meet the patient's goals includes continuing with treatment      Recommendations/  Follow-up tasks: Other (specify below) Palliative care team will continue to attempt to complete LaPOST and discuss goals of care with patient and patient's family.

## 2024-08-06 NOTE — CONSULTS
[facility] - 9th Floor Med Surg  Neurosurgery  Consult Note    Inpatient consult to Neurosurgery  Consult performed by: Sunny Villanueva PA  Consult ordered by: Alyse Maharaj MD        Subjective:     Chief Complaint/Reason for Admission: Fall, subdural hygroma     History of Present Illness: 74 year old male who was admitted on 7/19/24 following a fall down 4 steps and hitting his head. Neurosurgery initially was consulted for lumbar L3/4 right transverse fracture which were managed conservatively. Patient began with confusion today, originally A/0x4 and A/O only to person now. CT head was done showing small subdural hygroma.     PTA Medications   Medication Sig    atorvastatin (LIPITOR) 40 MG tablet Take 40 mg by mouth every evening.    fludrocortisone (FLORINEF) 0.1 mg Tab Take 100 mcg by mouth once daily.    glipiZIDE (GLUCOTROL) 2.5 MG TR24 Take 2.5 mg by mouth once daily.    omeprazole (PRILOSEC) 40 MG capsule Take 40 mg by mouth once daily.    sodium chloride 1,000 mg TbSO oral tablet Take 1,000 mg by mouth 3 (three) times daily.    traMADoL (ULTRAM) 50 mg tablet Take 50 mg by mouth every 12 (twelve) hours as needed.    TRESIBA FLEXTOUCH U-100 100 unit/mL (3 mL) insulin pen Inject 30 Units into the skin once daily.    pioglitazone (ACTOS) 30 MG tablet Take 30 mg by mouth once daily.       Review of patient's allergies indicates:  No Known Allergies    No past medical history on file.  No past surgical history on file.  Family History    None       Tobacco Use    Smoking status: Not on file    Smokeless tobacco: Not on file   Substance and Sexual Activity    Alcohol use: Not on file    Drug use: Not on file    Sexual activity: Not on file     Review of Systems  Objective:     Weight: 99.8 kg (220 lb 0.3 oz)  Body mass index is 29.03 kg/m².  Vital Signs (Most Recent):  Temp: 97.3 °F (36.3 °C) (08/06/24 1549)  Pulse: 62 (08/06/24 1555)  Resp: 16 (08/06/24 1254)  BP: 111/65 (08/06/24 1555)  SpO2:  "97 % (08/06/24 1549) Vital Signs (24h Range):  Temp:  [97.3 °F (36.3 °C)-97.8 °F (36.6 °C)] 97.3 °F (36.3 °C)  Pulse:  [59-85] 62  Resp:  [16-18] 16  SpO2:  [95 %-99 %] 97 %  BP: ()/(50-80) 111/65                              Neurosurgery Physical Exam  Awake,alert. Oriented only to person  Moving all extremities well. Motors equal bilaterally.   Follows commands. Answers some questions appropriately. No slurred speech  EOMI. Tracks.     Significant Labs:  No results for input(s): "GLU", "NA", "K", "CL", "CO2", "BUN", "CREATININE", "CALCIUM", "MG" in the last 48 hours.  No results for input(s): "WBC", "HGB", "HCT", "PLT" in the last 48 hours.  No results for input(s): "LABPT", "INR", "APTT" in the last 48 hours.  Microbiology Results (last 7 days)       ** No results found for the last 168 hours. **            Significant Diagnostics:  CT Head Without Contrast  Order: [id number]  Status: Final result       Visible to patient: Yes (not seen)       Next appt: 08/21/2024 at 10:30 AM in Neurology (Vega Winter MD)    0 Result Notes  Details    Reading Physician Reading Date Result Priority   Evelia Solorio MD  [phone] 8/6/2024 Routine     Narrative & Impression  EXAMINATION:  CT HEAD WITHOUT CONTRAST     CLINICAL HISTORY:  Mental status change, unknown cause;     TECHNIQUE:  Axial scans were obtained from skull base to the vertex.     Coronal and sagittal reconstructions obtained from the axial data.     Automatic exposure control was utilized to limit radiation dose.     Contrast: None     Radiation Dose:     Total DLP: 1002 mGy*cm     COMPARISON:  CT head dated 07/19/2024     FINDINGS:  There is increased low-density subdural fluid along the right cerebral convexity measuring up to 7 mm in thickness suggestive of a hygroma.  There are no definite acute blood products identified.  Patchy hypodensities in the subcortical and periventricular white matter likely represent chronic microvascular " ischemic changes.  There is a small area of encephalomalacia in the left occipital lobe.     There is local mass effect without midline shift.  The basal cisterns are patent.  The ventricles are stable in size.  There is diffuse parenchymal volume loss the calvarium and skull base are intact.  The mastoid air cells are clear.     Impression:     1. Small subdural hygroma along the right cerebral convexity.  No midline shift.  2. Chronic microvascular ischemic changes.        Electronically signed by:Evelia Solorio  Date:                                            08/06/2024  Time:                                           15:30           Exam Ended: 08/06/24 15:22 CDT Last Resulted: 08/06/24 15:30 CDT             Assessment/Plan:     Active Diagnoses:    Diagnosis Date Noted POA    PRINCIPAL PROBLEM:  Fall [W19.XXXA] 07/20/2024 Yes    SDH (subdural hematoma) [S06.5XAA] 07/20/2024 Yes    Fracture of transverse process of lumbar vertebra, closed, initial encounter [S32.009A] 07/20/2024 Yes      Problems Resolved During this Admission:     -No neurosurgical intervention  -Repeat CT head tomorrow AM  -Hold anticoagulation  -Q4 hrneurochecks  -Fall precautions   -SCDs    Thank you for your consult. I will follow-up with patient. Please contact us if you have any additional questions.    MARY Iniguez  Neurosurgery  [facility] - 9th Floor Med Surg

## 2024-08-07 LAB
POCT GLUCOSE: 183 MG/DL (ref 70–110)
POCT GLUCOSE: 198 MG/DL (ref 70–110)
POCT GLUCOSE: 259 MG/DL (ref 70–110)
POCT GLUCOSE: 90 MG/DL (ref 70–110)

## 2024-08-07 PROCEDURE — 99900031 HC PATIENT EDUCATION (STAT)

## 2024-08-07 PROCEDURE — 63600175 PHARM REV CODE 636 W HCPCS: Performed by: INTERNAL MEDICINE

## 2024-08-07 PROCEDURE — 97535 SELF CARE MNGMENT TRAINING: CPT

## 2024-08-07 PROCEDURE — 94799 UNLISTED PULMONARY SVC/PX: CPT

## 2024-08-07 PROCEDURE — 21400001 HC TELEMETRY ROOM

## 2024-08-07 PROCEDURE — 25000003 PHARM REV CODE 250: Performed by: STUDENT IN AN ORGANIZED HEALTH CARE EDUCATION/TRAINING PROGRAM

## 2024-08-07 PROCEDURE — 25000003 PHARM REV CODE 250: Performed by: INTERNAL MEDICINE

## 2024-08-07 PROCEDURE — 11000001 HC ACUTE MED/SURG PRIVATE ROOM

## 2024-08-07 PROCEDURE — 25000003 PHARM REV CODE 250: Performed by: NURSE PRACTITIONER

## 2024-08-07 PROCEDURE — 97164 PT RE-EVAL EST PLAN CARE: CPT

## 2024-08-07 PROCEDURE — 25000003 PHARM REV CODE 250

## 2024-08-07 RX ADMIN — RISPERIDONE 0.5 MG: 0.25 TABLET, FILM COATED ORAL at 09:08

## 2024-08-07 RX ADMIN — Medication 9 MG: at 09:08

## 2024-08-07 RX ADMIN — INSULIN GLARGINE 10 UNITS: 100 INJECTION, SOLUTION SUBCUTANEOUS at 09:08

## 2024-08-07 RX ADMIN — ATORVASTATIN CALCIUM 20 MG: 10 TABLET, FILM COATED ORAL at 09:08

## 2024-08-07 RX ADMIN — METFORMIN HYDROCHLORIDE 500 MG: 500 TABLET, FILM COATED ORAL at 04:08

## 2024-08-07 RX ADMIN — DOCUSATE SODIUM 200 MG: 100 CAPSULE, LIQUID FILLED ORAL at 09:08

## 2024-08-07 RX ADMIN — POLYETHYLENE GLYCOL 3350 17 G: 17 POWDER, FOR SOLUTION ORAL at 09:08

## 2024-08-07 RX ADMIN — METHOCARBAMOL 500 MG: 500 TABLET ORAL at 09:08

## 2024-08-07 RX ADMIN — METFORMIN HYDROCHLORIDE 500 MG: 500 TABLET, FILM COATED ORAL at 09:08

## 2024-08-07 RX ADMIN — LIDOCAINE PATCH 5% 1 PATCH: 700 PATCH TOPICAL at 09:08

## 2024-08-07 RX ADMIN — CARVEDILOL 3.12 MG: 3.12 TABLET, FILM COATED ORAL at 09:08

## 2024-08-07 RX ADMIN — AMLODIPINE BESYLATE 10 MG: 5 TABLET ORAL at 09:08

## 2024-08-07 RX ADMIN — LISINOPRIL 20 MG: 20 TABLET ORAL at 09:08

## 2024-08-07 NOTE — PROGRESS NOTES
[facility] Hospital Medicine Progress Note        Chief Complaint: Inpatient Follow-up for encephalopathy      HPI:   74 y.o. male with a PMHx  of alcoholic cirrhosis, hepatitis-C, diabetes mellitus type 2, and CVA who presented to [facility] on 7/19/2024 via EMS after slipped and fell down 4 steps at home and striking head. EMS reported patient was hypoglycemic with CBG of 44 on scene.  C-collar was placed en route and 250 mL of D5W was given. Initial vital signs in ED were /77, pulse 72, respirations 16, temperature 36.9° C, and SpO2 90% on room air.  Labs revealed WBC 3.29, hemoglobin 9.8. Na 139, K 3.7, CO2 21 chloride 112, CO2 21, Cr 1.0, glucose 166, and undetectable troponin.  EKG revealed normal sinus rhythm. CT head questionable minimal subdural blood products along the falx without mass effect.  CT cervical spine  no acute injury.  CT chest abdomen and pelvis with IV contrast revealed right transverse process fractures of L3 and L4, small left pleural effusion, cirrhotic liver with right hepatic lobe mass suspicious for HCC, and small volume ascites.  Pelvis x-ray revealed no acute findings.  Chest x-ray revealed mild left basilar opacities with a small left pleural effusion.      Scalp laceration was repaired in ED.  Neurosurgery was consulted and  was admitted to trauma services.  Neurosurgery recommended no surgical intervention, Keppra for seizure prophylaxis, and blood pressure less than 150/90.  Follow-up CT head on 07/19  with no subdural hematoma. Hospital medicine was consulted for transition of care and further medical management.  PT/OT consulted; recommending moderate intensity therapy.  Patient was noted to be agitated and restless attempting to climb out of bed for which p.r.n. IV Haldol was ordered and 1 to1 sitter placed.     Patient's  over all functional status was declining. He was needing full assist for ADLs. His mental status was very sluggish.  Consulted palliative care. AFP tumor marker was neg. He is now DNR status.       hosp course c/by worsening mental status on 8/6, ct head showed 7mm sub dural hygroma with mass effect but no shift, neurosurgery informed- recommended repeat ct head on 8/7- which is the same, no new change    Today:   Patient seen at bedside   Oriented to self only , can follow little commands-show me nose, show me left hand   ct head showed 7mm sub dural hygroma with mass effect but no shift, neurosurgery informed- recommended repeat ct head on 8/7- which is the same, no new change        Case was discussed with patient's nurse         Assessment/Plan:  AMS- traumatic sdh, sub dural hygroma 7mm in size with mass effect   Hypertension, uncontrolled  Fall at home 4 steps down  Head injury with mild subdural hematoma along the falx- resolved   Right transverse process fractures of L3 and L4 due to fall  Small left pleural effusion   Cirrhosis of  liver with right hepatic lobe mass with negative AFP tumor marker   Dementia with  delirium   Physical debility      History of alcoholic cirrhosis, hepatitis-C, diabetes mellitus type 2, and prior CVA     Plan:  ct head showed 7mm sub dural hygroma with mass effect but no shift,   neurosurgery informed- recommended repeat ct head on 8/7- which is the same, no new change  Continue glipizide 5 mg q.a.m. continue insulin glargine 10 units at bedtime   Continue Coreg 3.125 mg b.i.d., continue amlodipine 10 mg daily, add on lisinopril 20 mg q.day  Add on atorvastatin 20 mg q.h.s., patient is diabetic  Continue therapy services   Monitor blood sugars, add long-acting insulin, continue glipizide, ISS.    Patient's A1c is 8.3% 05/04/2024  Palliative care team discussing goals of care with family   Continue supportive care .CM consulted to assist with DC planning      VTE prophylaxis: Lovenox     Patient condition:  Fair     Anticipated discharge and Disposition:     SNF/NH- TBD           Portions of  "this note dictated using EMR integrated voice recognition software, and may be subject to voice recognition errors not corrected at proofreading. Please contact writer for clarification if needed.     VITAL SIGNS: 24 HRS MIN & MAX LAST   Temp  Min: 97.3 °F (36.3 °C)  Max: 98 °F (36.7 °C) 98 °F (36.7 °C)   BP  Min: 86/50  Max: 139/77 139/77   Pulse  Min: 59  Max: 74  74   Resp  Min: 16  Max: 18 16   SpO2  Min: 96 %  Max: 98 % 96 %     I have reviewed the following labs:  No results for input(s): "WBC", "RBC", "HGB", "HCT", "MCV", "MCH", "MCHC", "RDW", "PLT", "MPV", "GRAN", "LYMPH", "MONO", "BASO", "NRBC" in the last 168 hours.  Recent Labs   Lab 08/06/24  1448   PH 7.460*     Microbiology Results (last 7 days)       ** No results found for the last 168 hours. **             See below for Radiology    Assessment/Plan:      VTE prophylaxis:     Patient condition:  Stable/Fair/Guarded/ Serious/ Critical    Anticipated discharge and Disposition:         All diagnosis and differential diagnosis have been reviewed; assessment and plan has been documented; I have personally reviewed the labs and test results that are presently available; I have reviewed the patients medication list; I have reviewed the consulting providers response and recommendations. I have reviewed or attempted to review medical records based upon their availability    All of the patient's questions have been  addressed and answered. Patient's is agreeable to the above stated plan. I will continue to monitor closely and make adjustments to medical management as needed.    Portions of this note dictated using EMR integrated voice recognition software, and may be subject to voice recognition errors not corrected at proofreading. Please contact writer for clarification if needed.   _____________________________________________________________________    Malnutrition Status:    Scheduled Med:   amLODIPine  10 mg Oral Daily    atorvastatin  20 mg Oral QHS    " carvediloL  3.125 mg Oral BID    docusate sodium  200 mg Oral BID    insulin glargine U-100  10 Units Subcutaneous QHS    LIDOcaine  1 patch Transdermal Q24H    lisinopriL  20 mg Oral Daily    melatonin  9 mg Oral Nightly    metFORMIN  500 mg Oral BID WM    methocarbamoL  500 mg Oral BID    polyethylene glycol  17 g Oral BID    risperiDONE  0.5 mg Oral BID      Continuous Infusions:     PRN Meds:    Current Facility-Administered Medications:     dextrose 10%, 12.5 g, Intravenous, PRN    dextrose 10%, 25 g, Intravenous, PRN    glucagon (human recombinant), 1 mg, Intramuscular, PRN    glucose, 16 g, Oral, PRN    glucose, 24 g, Oral, PRN    hydrALAZINE, 20 mg, Intravenous, Q6H PRN    insulin aspart U-100, 0-5 Units, Subcutaneous, QID (AC + HS) PRN    magnesium hydroxide 400 mg/5 ml, 30 mL, Oral, Daily PRN    oxyCODONE, 5 mg, Oral, Q4H PRN     Radiology:  I have personally reviewed the following imaging and agree with the radiologist.     CT Head Without Contrast  Narrative: EXAMINATION:  CT HEAD WITHOUT CONTRAST    CLINICAL HISTORY:  subdural followup;    TECHNIQUE:  Axial scans were obtained from skull base to the vertex.    Coronal and sagittal reconstructions obtained from the axial data.    Automatic exposure control was utilized to limit radiation dose.    Contrast: None    Radiation Dose:    Total DLP: 1194 mGy*cm    COMPARISON:  CT head dated 08/06/2024    FINDINGS:  Subdural fluid collection along the right cerebral convexity remains stable measuring up to 7 mm in thickness.  The brain parenchyma is unchanged.  There is a small area of encephalomalacia the left occipital lobe.  There is local mass effect without midline shift or herniation.  The basal cisterns are patent.  The ventricles are stable in size.  The calvarium and skull base are intact.  The mastoid air cells are clear.  Impression: Stable exam without significant interval change.    Electronically signed by: Evelia  Lyndsey  Date:    08/07/2024  Time:    08:05      Alyse Maharaj MD  Department of Hospital Medicine   [facility]   08/07/2024

## 2024-08-07 NOTE — PT/OT/SLP RE-EVAL
Physical Therapy Re-Evaluation    Patient Name:  Kevin Horan Jr.   MRN:  [id number]    Recommendations:     Discharge therapy intensity: Moderate Intensity Therapy   Discharge Equipment Recommendations: walker, rolling   Barriers to discharge: Impaired mobility ongoing medical needs    Assessment:     Kevin Horan Jr. is a 74 y.o. male admitted with a medical diagnosis of AMS, traumatic SDH, HTN, hygroma.  He presents with the following impairments/functional limitations: weakness, impaired self care skills, impaired functional mobility, gait instability, impaired balance, impaired cognition . Pt lethargic today requiring maxA for bed mobility, unstable to stand or ambulation. Cont to recommend moderate intensity therapy at discharge..    Rehab Prognosis: Good; patient would benefit from acute skilled PT services to address these deficits and reach maximum level of function.    Recent Surgery: * No surgery found *      Plan:     During this hospitalization, patient would benefit from acute PT services 5 x/week to address the identified rehab impairments via gait training, therapeutic activities, therapeutic exercises, neuromuscular re-education and progress toward the following goals:    Plan of Care Expires:  08/20/24    PT/PTA conference to discuss PT POC and patient's progression towards goals held with Grady Lobo PTA.     Subjective     Chief Complaint: unable to state  Patient/Family Comments/goals: unable to state  Pain/Comfort:  Pain Rating 1: 0/10    Patients cultural, spiritual, Religion conflicts given the current situation: no    Objective:     Communicated with RN prior to session.  Patient found HOB elevated with telemetry  upon PT entry to room.    General Precautions: Standard, fall  Orthopedic Precautions:N/A   Braces: N/A  Respiratory Status: Room air  Blood Pressure: 107/64      Exams:  EDILBERTO strength due to poor command following; squeezed hands bilaterally on command  Skin  integrity: Visible skin intact      Functional Mobility:  Bed Mobility:     Supine to Sit: maximal assistance  Sit to Supine: maximal assistance  Balance: sitting balance = min-SBA      AM-PAC 6 CLICK MOBILITY  Total Score:8       Treatment & Education:      Patient provided with verbal education education regarding PT role/goals/POC, fall prevention, safety awareness, and discharge/DME recommendations.  Understanding was verbalized.     Patient left HOB elevated with all lines intact, call button in reach, bed alarm on, RN notified, and  monitoring present.    GOALS:   Multidisciplinary Problems       Physical Therapy Goals          Problem: Physical Therapy    Goal Priority Disciplines Outcome Goal Variances Interventions   Physical Therapy Goal     PT, PT/OT Progressing     Description: Goals to be met by: 24     Patient will increase functional independence with mobility by performin. Supine to sit with supervision  2. Sit to stand transfer with Supervision  3. Gait  x 200 feet with Supervision using Rolling Walker.                          History:     No past medical history on file.    No past surgical history on file.    Time Tracking:     PT Received On: 24  PT Start Time: 1458     PT Stop Time: 1508  PT Total Time (min): 10 min     Billable Minutes: Re-eval 10 min      2024

## 2024-08-07 NOTE — PT/OT/SLP PROGRESS
Occupational Therapy   Treatment    Name: Kevin Horan Jr.  MRN: [id number]  Admitting Diagnosis:  Fall       Recommendations:     Recommended therapy intensity at discharge: Moderate Intensity Therapy   Discharge Equipment Recommendations:  walker, rolling  Barriers to discharge:       Assessment:     Kevin Horan Jr. is a 74 y.o. male with a medical diagnosis of Fall.  He presents with the following performance deficits affecting function are weakness, impaired endurance, impaired self care skills, impaired functional mobility, gait instability, impaired balance, impaired cognition, decreased upper extremity function.     Rehab Prognosis:  Fair; patient would benefit from acute skilled OT services to address these deficits and reach maximum level of function.       Plan:     Patient to be seen 3 x/week to address the above listed problems via self-care/home management, therapeutic activities, therapeutic exercises  Plan of Care Expires: 09/06/24  Plan of Care Reviewed with: patient    Subjective     Pain/Comfort:       Objective:     Communicated with: nrsg prior to session.  Patient found HOB elevated with   upon OT entry to room.    General Precautions: Standard, fall    Orthopedic Precautions:N/A       Occupational Performance:     Bed Mobility:    Patient completed Supine to Sit with maximal assistance  Patient completed Sit to Supine with moderate assistance     Functional Mobility/Transfers:  Patient completed Toilet Transfer Step Transfer technique with moderate assistance with  rolling walker  Functional Mobility: mod A t/f to toilet with RW; assist for steering and t/f 2/2 poor command following/poor cognition     Activities of Daily Living:  Lower Body Dressing: maximal assistance to don BLE socks   Toileting: maximal assistance for BM cleanup       Therapeutic Positioning    No pup kit in room    AMPAC 6 Click ADL:      Patient Education:  Patient provided with verbal education education  regarding OT role/goals/POC, fall prevention, and safety awareness.  Understanding was verbalized, however additional teaching warranted.      Patient left HOB elevated with all lines intact, call button in reach, bed alarm on, and nursing notified.    GOALS:   Multidisciplinary Problems       Occupational Therapy Goals          Problem: Occupational Therapy    Goal Priority Disciplines Outcome Interventions   Occupational Therapy Goal     OT, PT/OT Progressing    Description: Goals to be met by: in 1 month      Patient will increase functional independence with ADLs by performing:    LE Dressing with Supervision.  Grooming while standing at sink with Stand-by Assistance.  Toileting from toilet with Stand-by Assistance for hygiene and clothing management.   Toilet transfer to toilet with Supervision.                         Time Tracking:     OT Date of Treatment:    OT Start Time: 1331  OT Stop Time: 1357  OT Total Time (min): 26 min    Billable Minutes:Self Care/Home Management 26min     OT/CHUN: OT     Number of CHUN visits since last OT visit: 1 8/7/2024

## 2024-08-07 NOTE — PROGRESS NOTES
[facility] Hospital Medicine Progress Note        Chief Complaint: Inpatient Follow-up for encephalopathy      HPI:   74 y.o. male with a PMHx  of alcoholic cirrhosis, hepatitis-C, diabetes mellitus type 2, and CVA who presented to [facility] on 7/19/2024 via EMS after slipped and fell down 4 steps at home and striking head. EMS reported patient was hypoglycemic with CBG of 44 on scene.  C-collar was placed en route and 250 mL of D5W was given. Initial vital signs in ED were /77, pulse 72, respirations 16, temperature 36.9° C, and SpO2 90% on room air.  Labs revealed WBC 3.29, hemoglobin 9.8. Na 139, K 3.7, CO2 21 chloride 112, CO2 21, Cr 1.0, glucose 166, and undetectable troponin.  EKG revealed normal sinus rhythm. CT head questionable minimal subdural blood products along the falx without mass effect.  CT cervical spine  no acute injury.  CT chest abdomen and pelvis with IV contrast revealed right transverse process fractures of L3 and L4, small left pleural effusion, cirrhotic liver with right hepatic lobe mass suspicious for HCC, and small volume ascites.  Pelvis x-ray revealed no acute findings.  Chest x-ray revealed mild left basilar opacities with a small left pleural effusion.      Scalp laceration was repaired in ED.  Neurosurgery was consulted and  was admitted to trauma services.  Neurosurgery recommended no surgical intervention, Keppra for seizure prophylaxis, and blood pressure less than 150/90.  Follow-up CT head on 07/19  with no subdural hematoma. Hospital medicine was consulted for transition of care and further medical management.  PT/OT consulted; recommending moderate intensity therapy.  Patient was noted to be agitated and restless attempting to climb out of bed for which p.r.n. IV Haldol was ordered and 1 to1 sitter placed.     Patient's  over all functional status was declining. He was needing full assist for ADLs. His mental status was very sluggish.  Consulted palliative care. AFP tumor marker was neg. He is now DNR status.         Today:   No acute events reported overnight.    Patient noticed to be very lethargic this morning CT head without contrast was done showed a 7 mm sized hygroma.  Neurosurgery was consulted  ABG was done and within normal limits , blood glucose was within normal limits  Blood pressure elevated       Case was discussed with patient's nurse        Assessment/Plan:  AMS- traumatic sdh  Hypertension, uncontrolled  Fall at home 4 steps down  Head injury with mild subdural hematoma along the falx- resolved   Right transverse process fractures of L3 and L4 due to fall  Small left pleural effusion   Cirrhosis of  liver with right hepatic lobe mass with negative AFP tumor marker   Dementia with  delirium   Physical debility      History of alcoholic cirrhosis, hepatitis-C, diabetes mellitus type 2, and prior CVA     Plan:  CT head without contrast was done showed a 7 mm sized hygroma.    Neurosurgery was consulted, follow up with recommendations   Continue glipizide 5 mg q.a.m. continue insulin glargine 70 units at bedtime   Continue Coreg 3.125 mg b.i.d., continue amlodipine 10 mg daily, add on lisinopril 20 mg q.day  Add on atorvastatin 20 mg q.h.s., patient is diabetic  Continue therapy services   Monitor blood sugars, add long-acting insulin, continue glipizide, ISS.    Patient's A1c is 8.3% 05/04/2024  Palliative care team discussing goals of care with family   Continue supportive care .CM consulted to assist with DC planning      VTE prophylaxis: Lovenox     Patient condition:  Fair     Anticipated discharge and Disposition:     SNF/NH- TBD           Portions of this note dictated using EMR integrated voice recognition software, and may be subject to voice recognition errors not corrected at proofreading. Please contact writer for clarification if needed.     VITAL SIGNS: 24 HRS MIN & MAX LAST   Temp  Min: 97.3 °F (36.3 °C)  Max: 97.8 °F  "(36.6 °C) 97.3 °F (36.3 °C)   BP  Min: 86/50  Max: 175/80 111/65   Pulse  Min: 59  Max: 85  62   Resp  Min: 16  Max: 18 16   SpO2  Min: 95 %  Max: 98 % 97 %     I have reviewed the following labs:  No results for input(s): "WBC", "RBC", "HGB", "HCT", "MCV", "MCH", "MCHC", "RDW", "PLT", "MPV", "GRAN", "LYMPH", "MONO", "BASO", "NRBC" in the last 168 hours.  Recent Labs   Lab 08/06/24  1448   PH 7.460*     Microbiology Results (last 7 days)       ** No results found for the last 168 hours. **             See below for Radiology    Assessment/Plan:      VTE prophylaxis:     Patient condition:  Stable/Fair/Guarded/ Serious/ Critical    Anticipated discharge and Disposition:         All diagnosis and differential diagnosis have been reviewed; assessment and plan has been documented; I have personally reviewed the labs and test results that are presently available; I have reviewed the patients medication list; I have reviewed the consulting providers response and recommendations. I have reviewed or attempted to review medical records based upon their availability    All of the patient's questions have been  addressed and answered. Patient's is agreeable to the above stated plan. I will continue to monitor closely and make adjustments to medical management as needed.    Portions of this note dictated using EMR integrated voice recognition software, and may be subject to voice recognition errors not corrected at proofreading. Please contact writer for clarification if needed.   _____________________________________________________________________    Malnutrition Status:    Scheduled Med:   amLODIPine  10 mg Oral Daily    atorvastatin  20 mg Oral QHS    carvediloL  3.125 mg Oral BID    docusate sodium  200 mg Oral BID    insulin glargine U-100  10 Units Subcutaneous QHS    LIDOcaine  1 patch Transdermal Q24H    lisinopriL  20 mg Oral Daily    melatonin  9 mg Oral Nightly    metFORMIN  500 mg Oral BID WM    methocarbamoL  500 " mg Oral BID    polyethylene glycol  17 g Oral BID    risperiDONE  0.5 mg Oral BID      Continuous Infusions:     PRN Meds:    Current Facility-Administered Medications:     dextrose 10%, 12.5 g, Intravenous, PRN    dextrose 10%, 25 g, Intravenous, PRN    glucagon (human recombinant), 1 mg, Intramuscular, PRN    glucose, 16 g, Oral, PRN    glucose, 24 g, Oral, PRN    hydrALAZINE, 20 mg, Intravenous, Q6H PRN    insulin aspart U-100, 0-5 Units, Subcutaneous, QID (AC + HS) PRN    magnesium hydroxide 400 mg/5 ml, 30 mL, Oral, Daily PRN    oxyCODONE, 5 mg, Oral, Q4H PRN     Radiology:  I have personally reviewed the following imaging and agree with the radiologist.     CT Head Without Contrast  Narrative: EXAMINATION:  CT HEAD WITHOUT CONTRAST    CLINICAL HISTORY:  Mental status change, unknown cause;    TECHNIQUE:  Axial scans were obtained from skull base to the vertex.    Coronal and sagittal reconstructions obtained from the axial data.    Automatic exposure control was utilized to limit radiation dose.    Contrast: None    Radiation Dose:    Total DLP: 1002 mGy*cm    COMPARISON:  CT head dated 07/19/2024    FINDINGS:  There is increased low-density subdural fluid along the right cerebral convexity measuring up to 7 mm in thickness suggestive of a hygroma.  There are no definite acute blood products identified.  Patchy hypodensities in the subcortical and periventricular white matter likely represent chronic microvascular ischemic changes.  There is a small area of encephalomalacia in the left occipital lobe.    There is local mass effect without midline shift.  The basal cisterns are patent.  The ventricles are stable in size.  There is diffuse parenchymal volume loss the calvarium and skull base are intact.  The mastoid air cells are clear.  Impression: 1. Small subdural hygroma along the right cerebral convexity.  No midline shift.  2. Chronic microvascular ischemic changes.    Electronically signed by: Evelia  Lyndsey  Date:    08/06/2024  Time:    15:30      Alyse Maharaj MD  Department of Hospital Medicine   [facility]   08/06/2024

## 2024-08-07 NOTE — PROGRESS NOTES
Advance Care Planning     Date: 08/07/2024    Today a voluntary meeting took place: other (conference room) notified patient's brother Javad, per patient's request, to discuss LaPOST and patient's wishes    Patient Participation: Patient is able to participate, however is limited due to periods of confusion     Attendees (Name and  Relationship to patient):  Javad Horan, [place]    Staff attendees (Name and  Role): Deidre BILL RN-CHPN    ACP Conversation (General): Understanding of advance care planning and role of health care agent defined Discussed with patient that POA that was given to the hospital appears to be only for financial, patient states he thought it was for both financial and medical and verbalized that he would want his brother Javad to make medical decisions for him. Patient is able to states where his son lives, how many brothers he has and their names, and state the name on his POA document. Patient is confused on situation and time, is not able to state what objects are when asked, however is able to follow the command to rise his left hand and then touch his noes when asked by Attending. Reviewed his medical wishes and discussed LaPOST, patient was able to verbalize discussion with Palliative NP and his previous elections of requesting DNR and no feeding tube placement. States to notify his brother, Javad, and discuss LaPOST with him. Notified patient's brother, Javad, via telephone and discussed LaPOST and offered to complete the document electronically prior to discharge, request for document to be emailed to him for further review with patient and his family. Copy of blank LaPOST emailed to Javad's confirmed email address. Informed to please share palliative care team's email contacts with patient's son, that lives in [facility], should he have any questions or concerns. States that he has a email thread created and there is a planned meeting regarding patient's eligibility for medicaid for tomorrow  that the son will be joining, and he will share contacts.        Code Status: DNR; status confirmed/order placed in chart    ACP Documents: Provided ACP documents and Other Documents (specify): emailed copy of LaPOST to patient's brother Javad for review    Goals of care: The patient and family endorses that what is most important right now is to focus on symptom/pain control, improvement in condition but with limits to invasive therapies, and comfort and QOL     Accordingly, we have decided that the best plan to meet the patient's goals includes continuing with treatment and no further escalation in treatment      Recommendations/  Follow-up tasks: Follow up family meeting planned: follow up regarding lapost prior to discharge       Palliative care RN visit  was spent on advance care planning, goals of care discussion, emotional support, formulating and communicating prognosis and exploring burden/benefit of various approaches of treatment. This discussion occurred on a fully voluntary basis with the verbal consent of the patient and/or family.

## 2024-08-07 NOTE — PROGRESS NOTES
[facility] - 9th Floor Med Surg  Neurosurgery  Progress Note    Subjective:     Interval History: He is sitting up in bed, NAD. He continues with confusion, oriented to person and hospital. Confused on year and situation. He denies HA.    Post-Op Info:  * No surgery found *          Medications:  Continuous Infusions:  Scheduled Meds:   amLODIPine  10 mg Oral Daily    atorvastatin  20 mg Oral QHS    carvediloL  3.125 mg Oral BID    docusate sodium  200 mg Oral BID    insulin glargine U-100  10 Units Subcutaneous QHS    LIDOcaine  1 patch Transdermal Q24H    lisinopriL  20 mg Oral Daily    melatonin  9 mg Oral Nightly    metFORMIN  500 mg Oral BID WM    methocarbamoL  500 mg Oral BID    polyethylene glycol  17 g Oral BID    risperiDONE  0.5 mg Oral BID     PRN Meds:  Current Facility-Administered Medications:     dextrose 10%, 12.5 g, Intravenous, PRN    dextrose 10%, 25 g, Intravenous, PRN    glucagon (human recombinant), 1 mg, Intramuscular, PRN    glucose, 16 g, Oral, PRN    glucose, 24 g, Oral, PRN    hydrALAZINE, 20 mg, Intravenous, Q6H PRN    insulin aspart U-100, 0-5 Units, Subcutaneous, QID (AC + HS) PRN    magnesium hydroxide 400 mg/5 ml, 30 mL, Oral, Daily PRN    oxyCODONE, 5 mg, Oral, Q4H PRN     Review of Systems  Objective:     Weight: 99.8 kg (220 lb 0.3 oz)  Body mass index is 29.03 kg/m².  Vital Signs (Most Recent):  Temp: 98 °F (36.7 °C) (08/07/24 0719)  Pulse: 74 (08/07/24 0719)  Resp: 16 (08/07/24 0719)  BP: 139/77 (08/07/24 0719)  SpO2: 96 % (08/07/24 0719) Vital Signs (24h Range):  Temp:  [97.3 °F (36.3 °C)-98 °F (36.7 °C)] 98 °F (36.7 °C)  Pulse:  [59-74] 74  Resp:  [16-18] 16  SpO2:  [96 %-98 %] 96 %  BP: ()/(50-77) 139/77                              Physical Exam:  Nursing note and vitals reviewed.    Constitutional: He appears well-developed. No distress.     Eyes: Pupils are equal, round, and reactive to light. EOM are normal.     Cardiovascular: Intact distal pulses.  "    Abdominal: Soft.     Psych/Behavior:   Alert, oriented to person and that he is in the hospital. Confused on year and situation. Follows commands briskly in all ext.     Neurological:        Cranial nerves: Cranial nerve(s) II, III, IV, V, VI, VII, VIII, IX, X, XI and XII are intact.       Significant Labs:  No results for input(s): "GLU", "NA", "K", "CL", "CO2", "BUN", "CREATININE", "CALCIUM", "MG" in the last 48 hours.  No results for input(s): "WBC", "HGB", "HCT", "PLT" in the last 48 hours.  No results for input(s): "LABPT", "INR", "APTT" in the last 48 hours.  Microbiology Results (last 7 days)       ** No results found for the last 168 hours. **            Significant Diagnostics:  CT Head Without Contrast [[id number]] Resulted: 08/07/24 0805   Order Status: Completed Updated: 08/07/24 0808   Narrative:     EXAMINATION:  CT HEAD WITHOUT CONTRAST    CLINICAL HISTORY:  subdural followup;    TECHNIQUE:  Axial scans were obtained from skull base to the vertex.    Coronal and sagittal reconstructions obtained from the axial data.    Automatic exposure control was utilized to limit radiation dose.    Contrast: None    Radiation Dose:    Total DLP: 1194 mGy*cm    COMPARISON:  CT head dated 08/06/2024    FINDINGS:  Subdural fluid collection along the right cerebral convexity remains stable measuring up to 7 mm in thickness.  The brain parenchyma is unchanged.  There is a small area of encephalomalacia the left occipital lobe.  There is local mass effect without midline shift or herniation.  The basal cisterns are patent.  The ventricles are stable in size.  The calvarium and skull base are intact.  The mastoid air cells are clear.   Impression:       Stable exam without significant interval change.       Assessment/Plan:     Active Diagnoses:    Diagnosis Date Noted POA    PRINCIPAL PROBLEM:  Fall [W19.XXXA] 07/20/2024 Yes    SDH (subdural hematoma) [S06.5XAA] 07/20/2024 Yes    Fracture of transverse process of " lumbar vertebra, closed, initial encounter [S32.009A] 07/20/2024 Yes      Problems Resolved During this Admission:     He continues with confusion.  CT head this am shows stable hygroma without midline shift  No plans for surgical intervention  Bp parameters below 160/90  OK to resume anticoagulation  We will sign off. Please call with any qeustions    MARY Thakru  Neurosurgery  [facility] - 9th Floor Med Surg

## 2024-08-08 LAB
POCT GLUCOSE: 167 MG/DL (ref 70–110)
POCT GLUCOSE: 217 MG/DL (ref 70–110)
POCT GLUCOSE: 362 MG/DL (ref 70–110)

## 2024-08-08 PROCEDURE — 25000003 PHARM REV CODE 250: Performed by: NURSE PRACTITIONER

## 2024-08-08 PROCEDURE — 25000003 PHARM REV CODE 250: Performed by: INTERNAL MEDICINE

## 2024-08-08 PROCEDURE — 97530 THERAPEUTIC ACTIVITIES: CPT

## 2024-08-08 PROCEDURE — 21400001 HC TELEMETRY ROOM

## 2024-08-08 PROCEDURE — 63600175 PHARM REV CODE 636 W HCPCS: Performed by: INTERNAL MEDICINE

## 2024-08-08 PROCEDURE — 63600175 PHARM REV CODE 636 W HCPCS: Performed by: NURSE PRACTITIONER

## 2024-08-08 PROCEDURE — 25000003 PHARM REV CODE 250: Performed by: STUDENT IN AN ORGANIZED HEALTH CARE EDUCATION/TRAINING PROGRAM

## 2024-08-08 PROCEDURE — 94799 UNLISTED PULMONARY SVC/PX: CPT

## 2024-08-08 PROCEDURE — 25000003 PHARM REV CODE 250

## 2024-08-08 PROCEDURE — 97116 GAIT TRAINING THERAPY: CPT

## 2024-08-08 PROCEDURE — 99900035 HC TECH TIME PER 15 MIN (STAT)

## 2024-08-08 PROCEDURE — 11000001 HC ACUTE MED/SURG PRIVATE ROOM

## 2024-08-08 RX ADMIN — METHOCARBAMOL 500 MG: 500 TABLET ORAL at 08:08

## 2024-08-08 RX ADMIN — RISPERIDONE 0.5 MG: 0.25 TABLET, FILM COATED ORAL at 08:08

## 2024-08-08 RX ADMIN — DOCUSATE SODIUM 200 MG: 100 CAPSULE, LIQUID FILLED ORAL at 08:08

## 2024-08-08 RX ADMIN — INSULIN GLARGINE 10 UNITS: 100 INJECTION, SOLUTION SUBCUTANEOUS at 09:08

## 2024-08-08 RX ADMIN — RISPERIDONE 0.5 MG: 0.25 TABLET, FILM COATED ORAL at 09:08

## 2024-08-08 RX ADMIN — ATORVASTATIN CALCIUM 20 MG: 10 TABLET, FILM COATED ORAL at 09:08

## 2024-08-08 RX ADMIN — CARVEDILOL 3.12 MG: 3.12 TABLET, FILM COATED ORAL at 08:08

## 2024-08-08 RX ADMIN — Medication 9 MG: at 09:08

## 2024-08-08 RX ADMIN — METFORMIN HYDROCHLORIDE 500 MG: 500 TABLET, FILM COATED ORAL at 04:08

## 2024-08-08 RX ADMIN — CARVEDILOL 3.12 MG: 3.12 TABLET, FILM COATED ORAL at 09:08

## 2024-08-08 RX ADMIN — INSULIN ASPART 5 UNITS: 100 INJECTION, SOLUTION INTRAVENOUS; SUBCUTANEOUS at 04:08

## 2024-08-08 RX ADMIN — METFORMIN HYDROCHLORIDE 500 MG: 500 TABLET, FILM COATED ORAL at 08:08

## 2024-08-08 NOTE — PLAN OF CARE
[facility] sent new referrals via [facility] to [facility], [facility], [facility], [facility] , [place], [facility] and [facility]     [place]:   Therese Maldonado         [facility] Sent a mass referral   Will follow up on 8/9/24

## 2024-08-08 NOTE — PROGRESS NOTES
[facility] Hospital Medicine Progress Note        Chief Complaint: Inpatient Follow-up     HPI:   74 y.o. male with a PMHx  of alcoholic cirrhosis, hepatitis-C, diabetes mellitus type 2, and CVA who presented to [facility] on 7/19/2024 via EMS after slipped and fell down 4 steps at home and striking head. EMS reported patient was hypoglycemic with CBG of 44 on scene.  C-collar was placed en route and 250 mL of D5W was given. Initial vital signs in ED were /77, pulse 72, respirations 16, temperature 36.9° C, and SpO2 90% on room air.  Labs revealed WBC 3.29, hemoglobin 9.8. Na 139, K 3.7, CO2 21 chloride 112, CO2 21, Cr 1.0, glucose 166, and undetectable troponin.  EKG revealed normal sinus rhythm. CT head questionable minimal subdural blood products along the falx without mass effect.  CT cervical spine  no acute injury.  CT chest abdomen and pelvis with IV contrast revealed right transverse process fractures of L3 and L4, small left pleural effusion, cirrhotic liver with right hepatic lobe mass suspicious for HCC, and small volume ascites.  Pelvis x-ray revealed no acute findings.  Chest x-ray revealed mild left basilar opacities with a small left pleural effusion.      Scalp laceration was repaired in ED.  Neurosurgery was consulted and  was admitted to trauma services.  Neurosurgery recommended no surgical intervention, Keppra for seizure prophylaxis, and blood pressure less than 150/90.  Follow-up CT head on 07/19  with no subdural hematoma. Hospital medicine was consulted for transition of care and further medical management.  PT/OT consulted; recommending moderate intensity therapy.  Patient was noted to be agitated and restless attempting to climb out of bed for which p.r.n. IV Haldol was ordered and 1 to1 sitter placed.     Patient's  over all functional status was declining. He was needing full assist for ADLs. His mental status was very sluggish. Consulted palliative care.  "AFP tumor marker was neg. He is now DNR status.       hosp course c/by worsening mental status on 8/6, ct head showed 7mm sub dural hygroma with mass effect but no shift, neurosurgery informed- recommended repeat ct head on 8/7- which is the same, no new change     Today:   No significant changes.  CT of the head is stable.  Awaiting placement.  Palliative Care following    Objective/physical exam:  General: In no acute distress, afebrile  Chest:  Unlabored breathing on room air Heart:  Normal rate  Abdomen: Soft, nontender  MSK: Warm  Neurologic: Alert      VITAL SIGNS: 24 HRS MIN & MAX LAST   Temp  Min: 97.5 °F (36.4 °C)  Max: 98.1 °F (36.7 °C) 98 °F (36.7 °C)   BP  Min: 111/68  Max: 127/76 127/76   Pulse  Min: 58  Max: 75  71   Resp  Min: 18  Max: 19 18   SpO2  Min: 96 %  Max: 98 % 96 %       No results for input(s): "WBC", "RBC", "HGB", "HCT", "MCV", "MCH", "MCHC", "RDW", "PLT", "MPV", "GRAN", "LYMPH", "MONO", "BASO", "NRBC" in the last 168 hours.    Recent Labs   Lab 08/06/24  1448   PH 7.460*          Microbiology Results (last 7 days)       ** No results found for the last 168 hours. **             Radiology:  CT Head Without Contrast  Narrative: EXAMINATION:  CT HEAD WITHOUT CONTRAST    CLINICAL HISTORY:  subdural followup;    TECHNIQUE:  Axial scans were obtained from skull base to the vertex.    Coronal and sagittal reconstructions obtained from the axial data.    Automatic exposure control was utilized to limit radiation dose.    Contrast: None    Radiation Dose:    Total DLP: 1194 mGy*cm    COMPARISON:  CT head dated 08/06/2024    FINDINGS:  Subdural fluid collection along the right cerebral convexity remains stable measuring up to 7 mm in thickness.  The brain parenchyma is unchanged.  There is a small area of encephalomalacia the left occipital lobe.  There is local mass effect without midline shift or herniation.  The basal cisterns are patent.  The ventricles are stable in size.  The calvarium and " skull base are intact.  The mastoid air cells are clear.  Impression: Stable exam without significant interval change.    Electronically signed by: Evelia Solorio  Date:    08/07/2024  Time:    08:05        Medications:  Scheduled Meds:   amLODIPine  10 mg Oral Daily    atorvastatin  20 mg Oral QHS    carvediloL  3.125 mg Oral BID    docusate sodium  200 mg Oral BID    insulin glargine U-100  10 Units Subcutaneous QHS    LIDOcaine  1 patch Transdermal Q24H    lisinopriL  20 mg Oral Daily    melatonin  9 mg Oral Nightly    metFORMIN  500 mg Oral BID WM    methocarbamoL  500 mg Oral BID    polyethylene glycol  17 g Oral BID    risperiDONE  0.5 mg Oral BID     Continuous Infusions:  PRN Meds:.  Current Facility-Administered Medications:     dextrose 10%, 12.5 g, Intravenous, PRN    dextrose 10%, 25 g, Intravenous, PRN    glucagon (human recombinant), 1 mg, Intramuscular, PRN    glucose, 16 g, Oral, PRN    glucose, 24 g, Oral, PRN    hydrALAZINE, 20 mg, Intravenous, Q6H PRN    insulin aspart U-100, 0-5 Units, Subcutaneous, QID (AC + HS) PRN    magnesium hydroxide 400 mg/5 ml, 30 mL, Oral, Daily PRN    oxyCODONE, 5 mg, Oral, Q4H PRN        Assessment/Plan:   Fall at home 4 steps down  Head injury with mild subdural hematoma along the falx- resolved   Right transverse process fractures of L3 and L4 due to fall  Small left pleural effusion   Cirrhosis of  liver with right hepatic lobe mass with negative AFP tumor marker   Dementia with  delirium   Physical debility      History of alcoholic cirrhosis, hepatitis-C, diabetes mellitus type 2, and prior CVA     Palliative care he was following along.  Patient was DNR.    Considering possible palliative Medicine the nursing home once he was placed.    Repeat CT of the head he was stable.    Continued delirium versus dementia.    Continue PT and OT as tolerated.    Pain appear to be controlled.  Will try to avoid too many sedating medications he was he was can worsen his  confusion.    Case management working on SNF placement.  Medically stable once accepted  Repeat labs Q Monday unless any acute changes      Osmel Thompson MD   08/08/2024     All diagnosis and differential diagnosis have been reviewed; assessment and plan has been documented; I have personally reviewed the labs and test results that are presently available; I have reviewed the patients medication list; I have reviewed the consulting providers response and recommendations. I have reviewed or attempted to review medical records based upon their availability    All of the patient's questions have been  addressed and answered. Patient's is agreeable to the above stated plan. I will continue to monitor closely and make adjustments to medical management as needed.  _____________________________________________________________________

## 2024-08-08 NOTE — PT/OT/SLP PROGRESS
Physical Therapy Treatment    Patient Name:  Kevin Horan Jr.   MRN:  [id number]    Recommendations:     Discharge therapy intensity: Moderate Intensity Therapy   Discharge Equipment Recommendations: walker, rolling  Barriers to discharge: Impaired mobility and Ongoing medical needs    Assessment:     Kevin Horan Jr. is a 74 y.o. male admitted with a medical diagnosis of AMS, traumatic SDH, HTN, hygroma.  He presents with the following impairments/functional limitations: weakness, impaired self care skills, impaired functional mobility, gait instability, impaired balance, impaired cognition.    Pt continued to present with decreased alertness and needing frequent repeated cueing to follow commands. Max A for supine > sit, CGA STS to RW, ambulated 120' at slow speed w/ RW and Mod A. Pt mobilizes well overall, increased assist for mobility needed due to impaired cognition and inconsistent command following.       Rehab Prognosis: Fair; patient would benefit from acute skilled PT services to address these deficits and reach maximum level of function.    Recent Surgery: * No surgery found *      Plan:     During this hospitalization, patient would benefit from acute PT services 5 x/week to address the identified rehab impairments via gait training, therapeutic activities, therapeutic exercises, neuromuscular re-education and progress toward the following goals:    Plan of Care Expires:  08/20/24    Subjective     Chief Complaint: none   Patient/Family Comments/goals: none  Pain/Comfort:  Pain Rating 1: 0/10      Objective:     Communicated with nurse prior to session.  Patient found HOB elevated with  (roll belt, telesitter) upon PT entry to room.     General Precautions: Standard, fall  Orthopedic Precautions: N/A  Braces: N/A  Respiratory Status: Room air  Blood Pressure: NT  Skin Integrity: Visible skin intact      Functional Mobility:  Bed Mobility:     Supine to Sit: maximal assistance  Sit to Supine:  minimum assistance  Transfers:     Sit to Stand:  contact guard assistance with rolling walker  Gait: 120' w/ RW Mod A for frequent steering AD, and cues for forward gaze and staying inside RW. Slow gait speed, NBOS    Therapeutic Activities/Exercises:  Static standing at RW CGA for changing brief and chucks      Patient left HOB elevated with all lines intact, call button in reach, and visitor present    GOALS:   Multidisciplinary Problems       Physical Therapy Goals          Problem: Physical Therapy    Goal Priority Disciplines Outcome Goal Variances Interventions   Physical Therapy Goal     PT, PT/OT Progressing     Description: Goals to be met by: 24     Patient will increase functional independence with mobility by performin. Supine to sit with supervision  2. Sit to stand transfer with Supervision  3. Gait  x 200 feet with Supervision using Rolling Walker.                          Time Tracking:     PT Received On: 24  PT Start Time: 1032     PT Stop Time: 1100  PT Total Time (min): 28 min     Billable Minutes: Gait Training 15 and Therapeutic Activity 13    Treatment Type: Treatment  PT/PTA: PT     Number of PTA visits since last PT visit: 2024

## 2024-08-08 NOTE — PLAN OF CARE
SSC spoke to Casi @ [employer] who will cb later ( state is monitoring their facility )    [facility] - left a message for Miya Martinez- Spoke to Miya REYES-( state is monitoring their facility ) nurse  will visit pt in hospital on Monday- referral pending [phone]    Arvind spoke to Maile ( Malina is off today)  she will cb    [facility] - denied    [facility]- spoke to Gretta Martinez she has availability in memory care unit   [facility] sent referral e-fax [phone] & via [facility] - she will review and cb      [facility] sent updates      9:24am  [facility] denied    [facility] didn't receive referral   [facility] sent efax to current fax# [phone]- Adalid will review     11:47am  [facility] denied  [facility] Wolf denied

## 2024-08-08 NOTE — PLAN OF CARE
SSC spoke to Vane with [facility] who will review for the [place] location    Spoke to Ana at [facility] -denial no male beds

## 2024-08-09 LAB
POCT GLUCOSE: 199 MG/DL (ref 70–110)
POCT GLUCOSE: 236 MG/DL (ref 70–110)
POCT GLUCOSE: 374 MG/DL (ref 70–110)

## 2024-08-09 PROCEDURE — 25000003 PHARM REV CODE 250

## 2024-08-09 PROCEDURE — 63600175 PHARM REV CODE 636 W HCPCS: Performed by: INTERNAL MEDICINE

## 2024-08-09 PROCEDURE — 25000003 PHARM REV CODE 250: Performed by: INTERNAL MEDICINE

## 2024-08-09 PROCEDURE — 21400001 HC TELEMETRY ROOM

## 2024-08-09 PROCEDURE — 63600175 PHARM REV CODE 636 W HCPCS: Performed by: NURSE PRACTITIONER

## 2024-08-09 PROCEDURE — 25000003 PHARM REV CODE 250: Performed by: STUDENT IN AN ORGANIZED HEALTH CARE EDUCATION/TRAINING PROGRAM

## 2024-08-09 PROCEDURE — 25000003 PHARM REV CODE 250: Performed by: NURSE PRACTITIONER

## 2024-08-09 PROCEDURE — 11000001 HC ACUTE MED/SURG PRIVATE ROOM

## 2024-08-09 PROCEDURE — 97116 GAIT TRAINING THERAPY: CPT

## 2024-08-09 RX ADMIN — METFORMIN HYDROCHLORIDE 500 MG: 500 TABLET, FILM COATED ORAL at 09:08

## 2024-08-09 RX ADMIN — OXYCODONE HYDROCHLORIDE 5 MG: 5 TABLET ORAL at 10:08

## 2024-08-09 RX ADMIN — ATORVASTATIN CALCIUM 20 MG: 10 TABLET, FILM COATED ORAL at 08:08

## 2024-08-09 RX ADMIN — RISPERIDONE 0.5 MG: 0.25 TABLET, FILM COATED ORAL at 09:08

## 2024-08-09 RX ADMIN — METHOCARBAMOL 500 MG: 500 TABLET ORAL at 08:08

## 2024-08-09 RX ADMIN — AMLODIPINE BESYLATE 10 MG: 5 TABLET ORAL at 09:08

## 2024-08-09 RX ADMIN — METHOCARBAMOL 500 MG: 500 TABLET ORAL at 09:08

## 2024-08-09 RX ADMIN — INSULIN ASPART 3 UNITS: 100 INJECTION, SOLUTION INTRAVENOUS; SUBCUTANEOUS at 09:08

## 2024-08-09 RX ADMIN — METFORMIN HYDROCHLORIDE 500 MG: 500 TABLET, FILM COATED ORAL at 04:08

## 2024-08-09 RX ADMIN — INSULIN ASPART 2 UNITS: 100 INJECTION, SOLUTION INTRAVENOUS; SUBCUTANEOUS at 05:08

## 2024-08-09 RX ADMIN — CARVEDILOL 3.12 MG: 3.12 TABLET, FILM COATED ORAL at 09:08

## 2024-08-09 RX ADMIN — POLYETHYLENE GLYCOL 3350 17 G: 17 POWDER, FOR SOLUTION ORAL at 09:08

## 2024-08-09 RX ADMIN — INSULIN GLARGINE 10 UNITS: 100 INJECTION, SOLUTION SUBCUTANEOUS at 08:08

## 2024-08-09 RX ADMIN — Medication 9 MG: at 08:08

## 2024-08-09 RX ADMIN — CARVEDILOL 3.12 MG: 3.12 TABLET, FILM COATED ORAL at 08:08

## 2024-08-09 RX ADMIN — RISPERIDONE 0.5 MG: 0.25 TABLET, FILM COATED ORAL at 08:08

## 2024-08-09 RX ADMIN — DOCUSATE SODIUM 200 MG: 100 CAPSULE, LIQUID FILLED ORAL at 09:08

## 2024-08-09 NOTE — PLAN OF CARE
Spoke to Dora Holder [facility], stated they had patient prviously and required constant supervision, and when family applied for christa it flagged d/t property. Also they do not have memory care unit and that is reason for denial

## 2024-08-09 NOTE — PT/OT/SLP PROGRESS
Physical Therapy Treatment    Patient Name:  Kevin Horan Jr.   MRN:  [id number]    Recommendations:     Discharge therapy intensity: Moderate Intensity Therapy   Discharge Equipment Recommendations: walker, rolling  Barriers to discharge: Impaired mobility    Assessment:     Kevin Horan Jr. is a 74 y.o. male admitted with a medical diagnosis of AMS, traumatic SDH, HTN, hygroma.  He presents with the following impairments/functional limitations: weakness, gait instability, impaired endurance, impaired balance, decreased safety awareness, impaired functional mobility, impaired cognition. The pt demonstrates difficulty with walker manipulation with ambulation, as well as difficulty with sequencing tasks. He requires mod/min A for all functional mobility at this time, max verbal cues to complete all tasks. Pt would greatly benefit from MOD intensity PT upon dc.    Rehab Prognosis: Fair; patient would benefit from acute skilled PT services to address these deficits and reach maximum level of function.    Recent Surgery: * No surgery found *      Plan:     During this hospitalization, patient would benefit from acute PT services 5 x/week to address the identified rehab impairments via gait training, therapeutic activities, therapeutic exercises and progress toward the following goals:    Plan of Care Expires:  08/20/24    Subjective     Chief Complaint: none stated  Patient/Family Comments/goals: return to PLOF  Pain/Comfort:  Pain Rating 1: 0/10      Objective:     Communicated with NSG prior to session.  Patient found HOB elevated with  (, roll belt, B mittens) upon PT entry to room.     General Precautions: Standard, fall  Orthopedic Precautions: N/A  Braces: N/A  Respiratory Status: Room air  Blood Pressure: N/A  Skin Integrity:  redness to posterior head      Functional Mobility:  Bed Mobility:     Supine to Sit: moderate assistance x2  Sit to Supine: moderate assistance x2  Transfers:     Sit to  Stand:  minimum assistance with rolling walker pt required increased time to perform, with verbal cues for upright stance  Gait: Pt ambulates x 130 feet with Bret and RW. He requires max verbal cues to continue taking steps and for walker proximity, and requires physical assistance with walker manipulation, especially with turning and obstacle negotiation.  Balance: Pt is able to sit EOB x 5 mins for ADLs, with no UE support and SBA.      Education:  Patient provided with verbal education education regarding PT role/goals/POC and fall prevention.  Understanding was verbalized, however additional teaching warranted.     Patient left HOB elevated with all lines intact, call button in reach, bed alarm on, B mittens donned, roll belt applied, NSG notified, CNA present.    GOALS:   Multidisciplinary Problems       Physical Therapy Goals          Problem: Physical Therapy    Goal Priority Disciplines Outcome Goal Variances Interventions   Physical Therapy Goal     PT, PT/OT Progressing     Description: Goals to be met by: 24     Patient will increase functional independence with mobility by performin. Supine to sit with supervision  2. Sit to stand transfer with Supervision  3. Gait  x 200 feet with Supervision using Rolling Walker.                          Time Tracking:     PT Received On: 24  PT Start Time: 1028     PT Stop Time: 1054  PT Total Time (min): 26 min     Billable Minutes: Gait Training 26    Treatment Type: Treatment  PT/PTA: PT     Number of PTA visits since last PT visit: 2     2024

## 2024-08-09 NOTE — PROGRESS NOTES
[facility] Hospital Medicine Progress Note        Chief Complaint: Inpatient Follow-up     HPI:   74 y.o. male with a PMHx  of alcoholic cirrhosis, hepatitis-C, diabetes mellitus type 2, and CVA who presented to [facility] on 7/19/2024 via EMS after slipped and fell down 4 steps at home and striking head. EMS reported patient was hypoglycemic with CBG of 44 on scene.  C-collar was placed en route and 250 mL of D5W was given. Initial vital signs in ED were /77, pulse 72, respirations 16, temperature 36.9° C, and SpO2 90% on room air.  Labs revealed WBC 3.29, hemoglobin 9.8. Na 139, K 3.7, CO2 21 chloride 112, CO2 21, Cr 1.0, glucose 166, and undetectable troponin.  EKG revealed normal sinus rhythm. CT head questionable minimal subdural blood products along the falx without mass effect.  CT cervical spine  no acute injury.  CT chest abdomen and pelvis with IV contrast revealed right transverse process fractures of L3 and L4, small left pleural effusion, cirrhotic liver with right hepatic lobe mass suspicious for HCC, and small volume ascites.  Pelvis x-ray revealed no acute findings.  Chest x-ray revealed mild left basilar opacities with a small left pleural effusion.      Scalp laceration was repaired in ED.  Neurosurgery was consulted and  was admitted to trauma services.  Neurosurgery recommended no surgical intervention, Keppra for seizure prophylaxis, and blood pressure less than 150/90.  Follow-up CT head on 07/19  with no subdural hematoma. Hospital medicine was consulted for transition of care and further medical management.  PT/OT consulted; recommending moderate intensity therapy.  Patient was noted to be agitated and restless attempting to climb out of bed for which p.r.n. IV Haldol was ordered and 1 to1 sitter placed.     Patient's  over all functional status was declining. He was needing full assist for ADLs. His mental status was very sluggish. Consulted palliative care.  "AFP tumor marker was neg. He is now DNR status.       hosp course c/by worsening mental status on 8/6, ct head showed 7mm sub dural hygroma with mass effect but no shift, neurosurgery informed- recommended repeat ct head on 8/7- which is the same, no new change     Today:   No significant events overnight. Medically stable.  Awaiting placement.      Objective/physical exam:  General: In no acute distress, afebrile  Chest:  Unlabored breathing on room air Heart:  Normal rate  Abdomen: Soft, nontender  MSK: Warm  Neurologic: Alert      VITAL SIGNS: 24 HRS MIN & MAX LAST   Temp  Min: 97.7 °F (36.5 °C)  Max: 99.7 °F (37.6 °C) 97.9 °F (36.6 °C)   BP  Min: 102/56  Max: 140/69 128/69   Pulse  Min: 61  Max: 74  67   Resp  Min: 16  Max: 18 16   SpO2  Min: 96 %  Max: 98 % 97 %       No results for input(s): "WBC", "RBC", "HGB", "HCT", "MCV", "MCH", "MCHC", "RDW", "PLT", "MPV", "GRAN", "LYMPH", "MONO", "BASO", "NRBC" in the last 168 hours.    Recent Labs   Lab 08/06/24  1448   PH 7.460*          Microbiology Results (last 7 days)       ** No results found for the last 168 hours. **             Radiology:  CT Head Without Contrast  Narrative: EXAMINATION:  CT HEAD WITHOUT CONTRAST    CLINICAL HISTORY:  subdural followup;    TECHNIQUE:  Axial scans were obtained from skull base to the vertex.    Coronal and sagittal reconstructions obtained from the axial data.    Automatic exposure control was utilized to limit radiation dose.    Contrast: None    Radiation Dose:    Total DLP: 1194 mGy*cm    COMPARISON:  CT head dated 08/06/2024    FINDINGS:  Subdural fluid collection along the right cerebral convexity remains stable measuring up to 7 mm in thickness.  The brain parenchyma is unchanged.  There is a small area of encephalomalacia the left occipital lobe.  There is local mass effect without midline shift or herniation.  The basal cisterns are patent.  The ventricles are stable in size.  The calvarium and skull base are intact.  " The mastoid air cells are clear.  Impression: Stable exam without significant interval change.    Electronically signed by: Evelia Solorio  Date:    08/07/2024  Time:    08:05        Medications:  Scheduled Meds:   amLODIPine  10 mg Oral Daily    atorvastatin  20 mg Oral QHS    carvediloL  3.125 mg Oral BID    docusate sodium  200 mg Oral BID    insulin glargine U-100  10 Units Subcutaneous QHS    LIDOcaine  1 patch Transdermal Q24H    lisinopriL  20 mg Oral Daily    melatonin  9 mg Oral Nightly    metFORMIN  500 mg Oral BID WM    methocarbamoL  500 mg Oral BID    polyethylene glycol  17 g Oral BID    risperiDONE  0.5 mg Oral BID     Continuous Infusions:  PRN Meds:.  Current Facility-Administered Medications:     dextrose 10%, 12.5 g, Intravenous, PRN    dextrose 10%, 25 g, Intravenous, PRN    glucagon (human recombinant), 1 mg, Intramuscular, PRN    glucose, 16 g, Oral, PRN    glucose, 24 g, Oral, PRN    hydrALAZINE, 20 mg, Intravenous, Q6H PRN    insulin aspart U-100, 0-5 Units, Subcutaneous, QID (AC + HS) PRN    magnesium hydroxide 400 mg/5 ml, 30 mL, Oral, Daily PRN    oxyCODONE, 5 mg, Oral, Q4H PRN        Assessment/Plan:   Fall at home 4 steps down  Head injury with mild subdural hematoma along the falx- resolved   Right transverse process fractures of L3 and L4 due to fall  Small left pleural effusion   Cirrhosis of  liver with right hepatic lobe mass with negative AFP tumor marker   Dementia with  delirium   Physical debility      History of alcoholic cirrhosis, hepatitis-C, diabetes mellitus type 2, and prior CVA     Waiting on placement  DNR. Palliative following  PT/OT  Avoid oversedation but also do not want him agitated  SNF placement pending.  Medically stable      Osmel Thompson MD   08/09/2024     All diagnosis and differential diagnosis have been reviewed; assessment and plan has been documented; I have personally reviewed the labs and test results that are presently available; I have reviewed the  patients medication list; I have reviewed the consulting providers response and recommendations. I have reviewed or attempted to review medical records based upon their availability    All of the patient's questions have been  addressed and answered. Patient's is agreeable to the above stated plan. I will continue to monitor closely and make adjustments to medical management as needed.  _____________________________________________________________________

## 2024-08-09 NOTE — PLAN OF CARE
Spoke to Bridgette at [facility]- memory care unit doesn't exist at the facility any longer     They can't accept the pt at [facility]       Spoke to Marvin at [facility] - referral is pending review robbin Guzman [phone]    [facility] sent updates via [employer]

## 2024-08-09 NOTE — PLAN OF CARE
Received call from Lesli [email or web address], professional christa planner, stating she hired by family to assist with christ[facility] application, states pt is financial eligible, they get all required documents and once pt has placement they fill out erin, do appeals if needed and assist family the whole process until approved. Will update her and family once facility accepts  Phone: [phone]  [email or web address]

## 2024-08-09 NOTE — PLAN OF CARE
Spoke to Keely at [facility] @ [phone] who has clinical accepted pt for the [place] location   ( memory care unit facility ) pending finances    Keely is waiting on finances from the brother Javad @  [phone]

## 2024-08-09 NOTE — PROGRESS NOTES
"Inpatient Nutrition Evaluation    Admit Date: 7/19/2024   Total duration of encounter: 21 days    Nutrition Recommendation/Prescription     Continue oral diet as tolerated; currently Diet diabetic Low Sodium,2gm; 2800 Calorie     Provide assistance with feeding as needed      Nutrition Assessment     Chart Review    Reason Seen: length of stay    Malnutrition Screening Tool Results   Have you recently lost weight without trying?: No  Have you been eating poorly because of a decreased appetite?: No   MST Score: 0     Diagnosis:  S/p ground level fall  Questionable subdural along the phalanx-no ICH on repeat scan  Right transverse process fractures of L3 and L4   Small left pleural effusion   Cirrhotic liver with right hepatic lobe mass suspicious for HCC  Agitation  Dementia?    Relevant Medical History: alcoholic cirrhosis, hepatitis-C, diabetes mellitus type 2, and CVA     Nutrition-Related Medications: Scheduled Medications:  amLODIPine, 10 mg, Daily  atorvastatin, 20 mg, QHS  carvediloL, 3.125 mg, BID  docusate sodium, 200 mg, BID  insulin glargine U-100, 10 Units, QHS  LIDOcaine, 1 patch, Q24H  lisinopriL, 20 mg, Daily  melatonin, 9 mg, Nightly  metFORMIN, 500 mg, BID WM  methocarbamoL, 500 mg, BID  polyethylene glycol, 17 g, BID  risperiDONE, 0.5 mg, BID    Continuous Infusions:   PRN Medications:    amLODIPine tablet 10 mg    atorvastatin tablet 20 mg    carvediloL tablet 3.125 mg    docusate sodium capsule 200 mg    insulin glargine U-100 (Lantus) injection 10 Units    LIDOcaine 5 % patch 1 patch    lisinopriL tablet 20 mg    melatonin tablet 9 mg    metFORMIN tablet 500 mg    methocarbamoL tablet 500 mg    polyethylene glycol packet 17 g    risperiDONE tablet 0.5 mg        Nutrition-Related Labs:  No results for input(s): "NA", "K", "CALCIUM", "PHOS", "MG", "CHLORIDE", "CO2", "BUN", "CREATININE", "EGFRNORACEVR", "GLUCOSE", "BILITOT", "ALKPHOS", "ALT", "AST", "ALBUMIN", "PREALB", "CRP", "HSCRP", "TRIG", " ""HGBA1C", "AMMONIA", "LIPASE", "AMYLASE", "WBC", "HGB", "HCT" in the last 168 hours.       Diet Order: Diet diabetic Low Sodium,2gm; 2800 Calorie  Oral Supplement Order: none  Appetite/Oral Intake: good/% of meals  Factors Affecting Nutritional Intake: none identified  Food/Temple/Cultural Preferences: none reported  Food Allergies: no known food allergies    Skin Integrity: bruised (ecchymotic)  Wound(s):      Wound 07/20/24 0800 Laceration Occipital region-Tissue loss description: Full thickness     Comments    7/26/24 pt tolerating oral diet, eating % of most meals. Some weight loss noted since April, will monitor. Increased calorie limit to allow for adequate protein intake for wound healing. Will add Prosource for additional protein    8/2/24 pt eating 100% of meals    8/9/24 pt eating 100% of most meals per nursing, needs assistance with feeding; not taking prosource so will d/c; currently out of magic cup in the kitchen    Anthropometrics    Height: 6' 1" (185.4 cm) Height Method: Stated  Last Weight: 99.8 kg (220 lb 0.3 oz) (07/30/24 1443) Weight Method: Standard Scale  BMI (Calculated): 29  BMI Classification: overweight (BMI 25-29.9)        Ideal Body Weight (IBW), Male: 184 lb     % Ideal Body Weight, Male (lb): 119.58 %                          Usual Weight Provided By: EMR weight history    Wt Readings from Last 5 Encounters:   07/30/24 99.8 kg (220 lb 0.3 oz)   04/26/24 104.3 kg (230 lb)   01/15/20 99.5 kg (219 lb 6.1 oz)     Weight Change(s) Since Admission:  Admit Weight: 99.8 kg (220 lb) (07/19/24 0614)      Patient Education    Not applicable.    Monitoring & Evaluation     Dietitian will monitor food and beverage intake and weight change.  Nutrition Risk/Follow-Up: low (follow-up in 5-7 days)  Patients assigned 'low nutrition risk' status do not qualify for a full nutritional assessment but will be monitored and re-evaluated in a 5-7 day time period. Please consult if re-evaluation " needed sooner.

## 2024-08-10 LAB
POCT GLUCOSE: 126 MG/DL (ref 70–110)
POCT GLUCOSE: 304 MG/DL (ref 70–110)

## 2024-08-10 PROCEDURE — 11000001 HC ACUTE MED/SURG PRIVATE ROOM

## 2024-08-10 PROCEDURE — 63600175 PHARM REV CODE 636 W HCPCS: Performed by: NURSE PRACTITIONER

## 2024-08-10 PROCEDURE — 25000003 PHARM REV CODE 250

## 2024-08-10 PROCEDURE — 25000003 PHARM REV CODE 250: Performed by: INTERNAL MEDICINE

## 2024-08-10 PROCEDURE — 63600175 PHARM REV CODE 636 W HCPCS: Performed by: INTERNAL MEDICINE

## 2024-08-10 PROCEDURE — 25000003 PHARM REV CODE 250: Performed by: NURSE PRACTITIONER

## 2024-08-10 PROCEDURE — 25000003 PHARM REV CODE 250: Performed by: STUDENT IN AN ORGANIZED HEALTH CARE EDUCATION/TRAINING PROGRAM

## 2024-08-10 RX ADMIN — RISPERIDONE 0.5 MG: 0.25 TABLET, FILM COATED ORAL at 08:08

## 2024-08-10 RX ADMIN — AMLODIPINE BESYLATE 10 MG: 5 TABLET ORAL at 09:08

## 2024-08-10 RX ADMIN — CARVEDILOL 3.12 MG: 3.12 TABLET, FILM COATED ORAL at 09:08

## 2024-08-10 RX ADMIN — RISPERIDONE 0.5 MG: 0.25 TABLET, FILM COATED ORAL at 09:08

## 2024-08-10 RX ADMIN — Medication 9 MG: at 08:08

## 2024-08-10 RX ADMIN — METHOCARBAMOL 500 MG: 500 TABLET ORAL at 08:08

## 2024-08-10 RX ADMIN — METHOCARBAMOL 500 MG: 500 TABLET ORAL at 09:08

## 2024-08-10 RX ADMIN — METFORMIN HYDROCHLORIDE 500 MG: 500 TABLET, FILM COATED ORAL at 09:08

## 2024-08-10 RX ADMIN — DOCUSATE SODIUM 200 MG: 100 CAPSULE, LIQUID FILLED ORAL at 08:08

## 2024-08-10 RX ADMIN — LISINOPRIL 20 MG: 20 TABLET ORAL at 09:08

## 2024-08-10 RX ADMIN — METFORMIN HYDROCHLORIDE 500 MG: 500 TABLET, FILM COATED ORAL at 05:08

## 2024-08-10 RX ADMIN — POLYETHYLENE GLYCOL 3350 17 G: 17 POWDER, FOR SOLUTION ORAL at 09:08

## 2024-08-10 RX ADMIN — ATORVASTATIN CALCIUM 20 MG: 10 TABLET, FILM COATED ORAL at 08:08

## 2024-08-10 RX ADMIN — CARVEDILOL 3.12 MG: 3.12 TABLET, FILM COATED ORAL at 08:08

## 2024-08-10 RX ADMIN — POLYETHYLENE GLYCOL 3350 17 G: 17 POWDER, FOR SOLUTION ORAL at 08:08

## 2024-08-10 RX ADMIN — INSULIN GLARGINE 10 UNITS: 100 INJECTION, SOLUTION SUBCUTANEOUS at 10:08

## 2024-08-10 RX ADMIN — INSULIN ASPART 2 UNITS: 100 INJECTION, SOLUTION INTRAVENOUS; SUBCUTANEOUS at 10:08

## 2024-08-10 RX ADMIN — DOCUSATE SODIUM 200 MG: 100 CAPSULE, LIQUID FILLED ORAL at 09:08

## 2024-08-10 NOTE — PROGRESS NOTES
[facility] Hospital Medicine Progress Note        Chief Complaint: Inpatient Follow-up     HPI:   74 y.o. male with a PMHx  of alcoholic cirrhosis, hepatitis-C, diabetes mellitus type 2, and CVA who presented to [facility] on 7/19/2024 via EMS after slipped and fell down 4 steps at home and striking head. EMS reported patient was hypoglycemic with CBG of 44 on scene.  C-collar was placed en route and 250 mL of D5W was given. Initial vital signs in ED were /77, pulse 72, respirations 16, temperature 36.9° C, and SpO2 90% on room air.  Labs revealed WBC 3.29, hemoglobin 9.8. Na 139, K 3.7, CO2 21 chloride 112, CO2 21, Cr 1.0, glucose 166, and undetectable troponin.  EKG revealed normal sinus rhythm. CT head questionable minimal subdural blood products along the falx without mass effect.  CT cervical spine  no acute injury.  CT chest abdomen and pelvis with IV contrast revealed right transverse process fractures of L3 and L4, small left pleural effusion, cirrhotic liver with right hepatic lobe mass suspicious for HCC, and small volume ascites.  Pelvis x-ray revealed no acute findings.  Chest x-ray revealed mild left basilar opacities with a small left pleural effusion.      Scalp laceration was repaired in ED.  Neurosurgery was consulted and  was admitted to trauma services.  Neurosurgery recommended no surgical intervention, Keppra for seizure prophylaxis, and blood pressure less than 150/90.  Follow-up CT head on 07/19  with no subdural hematoma. Hospital medicine was consulted for transition of care and further medical management.  PT/OT consulted; recommending moderate intensity therapy.  Patient was noted to be agitated and restless attempting to climb out of bed for which p.r.n. IV Haldol was ordered and 1 to1 sitter placed.     Patient's  over all functional status was declining. He was needing full assist for ADLs. His mental status was very sluggish. Consulted palliative care.  "AFP tumor marker was neg. He is now DNR status.       hosp course c/by worsening mental status on 8/6, ct head showed 7mm sub dural hygroma with mass effect but no shift, neurosurgery informed- recommended repeat ct head on 8/7- which is the same, no new change     Today:   No significant events overnight. Medically stable.  Awaiting placement.      Objective/physical exam:  General: In no acute distress, afebrile  Chest:  Unlabored breathing on room air Heart:  Normal rate  Abdomen: Soft, nontender  MSK: Warm  Neurologic: Alert    VITAL SIGNS: 24 HRS MIN & MAX LAST   Temp  Min: 92.7 °F (33.7 °C)  Max: 98.4 °F (36.9 °C) 98.4 °F (36.9 °C)   BP  Min: 111/64  Max: 140/70 127/68   Pulse  Min: 59  Max: 69  64   Resp  Min: 18  Max: 19 19   SpO2  Min: 97 %  Max: 99 % 98 %       No results for input(s): "WBC", "RBC", "HGB", "HCT", "MCV", "MCH", "MCHC", "RDW", "PLT", "MPV", "GRAN", "LYMPH", "MONO", "BASO", "NRBC" in the last 168 hours.    Recent Labs   Lab 08/06/24  1448   PH 7.460*          Microbiology Results (last 7 days)       ** No results found for the last 168 hours. **             Radiology:  CT Head Without Contrast  Narrative: EXAMINATION:  CT HEAD WITHOUT CONTRAST    CLINICAL HISTORY:  subdural followup;    TECHNIQUE:  Axial scans were obtained from skull base to the vertex.    Coronal and sagittal reconstructions obtained from the axial data.    Automatic exposure control was utilized to limit radiation dose.    Contrast: None    Radiation Dose:    Total DLP: 1194 mGy*cm    COMPARISON:  CT head dated 08/06/2024    FINDINGS:  Subdural fluid collection along the right cerebral convexity remains stable measuring up to 7 mm in thickness.  The brain parenchyma is unchanged.  There is a small area of encephalomalacia the left occipital lobe.  There is local mass effect without midline shift or herniation.  The basal cisterns are patent.  The ventricles are stable in size.  The calvarium and skull base are intact.  The " mastoid air cells are clear.  Impression: Stable exam without significant interval change.    Electronically signed by: Evelia Solorio  Date:    08/07/2024  Time:    08:05        Medications:  Scheduled Meds:   amLODIPine  10 mg Oral Daily    atorvastatin  20 mg Oral QHS    carvediloL  3.125 mg Oral BID    docusate sodium  200 mg Oral BID    insulin glargine U-100  10 Units Subcutaneous QHS    LIDOcaine  1 patch Transdermal Q24H    lisinopriL  20 mg Oral Daily    melatonin  9 mg Oral Nightly    metFORMIN  500 mg Oral BID WM    methocarbamoL  500 mg Oral BID    polyethylene glycol  17 g Oral BID    risperiDONE  0.5 mg Oral BID     Continuous Infusions:  PRN Meds:.  Current Facility-Administered Medications:     dextrose 10%, 12.5 g, Intravenous, PRN    dextrose 10%, 25 g, Intravenous, PRN    glucagon (human recombinant), 1 mg, Intramuscular, PRN    glucose, 16 g, Oral, PRN    glucose, 24 g, Oral, PRN    hydrALAZINE, 20 mg, Intravenous, Q6H PRN    insulin aspart U-100, 0-5 Units, Subcutaneous, QID (AC + HS) PRN    magnesium hydroxide 400 mg/5 ml, 30 mL, Oral, Daily PRN    oxyCODONE, 5 mg, Oral, Q4H PRN        Assessment/Plan:   Fall at home 4 steps down  Head injury with mild subdural hematoma along the falx- resolved   Right transverse process fractures of L3 and L4 due to fall  Small left pleural effusion   Cirrhosis of  liver with right hepatic lobe mass with negative AFP tumor marker   Dementia with  delirium   Physical debility      History of alcoholic cirrhosis, hepatitis-C, diabetes mellitus type 2, and prior CVA     DNR. Palliative following  PT/OT  Family was requested no further labs unless any acute findings.  Last checked on 07/29 and were stable  Case management working on nursing home placement with memory care.  Appears [facility] is interested and under can accept the patient was Monday pending financials          Osmel Thompson MD   08/10/2024     All diagnosis and differential diagnosis  have been reviewed; assessment and plan has been documented; I have personally reviewed the labs and test results that are presently available; I have reviewed the patients medication list; I have reviewed the consulting providers response and recommendations. I have reviewed or attempted to review medical records based upon their availability    All of the patient's questions have been  addressed and answered. Patient's is agreeable to the above stated plan. I will continue to monitor closely and make adjustments to medical management as needed.  _____________________________________________________________________

## 2024-08-10 NOTE — NURSING
Nurses Note -- 4 Eyes      8/10/2024   1:58 PM      Skin assessed during: Q Shift Change      [x] No Altered Skin Integrity Present    []Prevention Measures Documented      [] Yes- Altered Skin Integrity Present or Discovered   [] LDA Added if Not in Epic (Describe Wound)   [] New Altered Skin Integrity was Present on Admit and Documented in LDA   [] Wound Image Taken    Wound Care Consulted? No    Attending Nurse:  Rita Bennett RN/Staff Member:  Mrs. Tita RN

## 2024-08-11 LAB
POCT GLUCOSE: 117 MG/DL (ref 70–110)
POCT GLUCOSE: 209 MG/DL (ref 70–110)
POCT GLUCOSE: 292 MG/DL (ref 70–110)
POCT GLUCOSE: 97 MG/DL (ref 70–110)

## 2024-08-11 PROCEDURE — 25000003 PHARM REV CODE 250

## 2024-08-11 PROCEDURE — 63600175 PHARM REV CODE 636 W HCPCS: Performed by: NURSE PRACTITIONER

## 2024-08-11 PROCEDURE — 25000003 PHARM REV CODE 250: Performed by: STUDENT IN AN ORGANIZED HEALTH CARE EDUCATION/TRAINING PROGRAM

## 2024-08-11 PROCEDURE — 25000003 PHARM REV CODE 250: Performed by: INTERNAL MEDICINE

## 2024-08-11 PROCEDURE — 63600175 PHARM REV CODE 636 W HCPCS: Performed by: INTERNAL MEDICINE

## 2024-08-11 PROCEDURE — 99900035 HC TECH TIME PER 15 MIN (STAT)

## 2024-08-11 PROCEDURE — 99900031 HC PATIENT EDUCATION (STAT)

## 2024-08-11 PROCEDURE — 94799 UNLISTED PULMONARY SVC/PX: CPT

## 2024-08-11 PROCEDURE — 11000001 HC ACUTE MED/SURG PRIVATE ROOM

## 2024-08-11 PROCEDURE — 25000003 PHARM REV CODE 250: Performed by: NURSE PRACTITIONER

## 2024-08-11 PROCEDURE — 97116 GAIT TRAINING THERAPY: CPT | Mod: CQ

## 2024-08-11 RX ADMIN — LISINOPRIL 20 MG: 20 TABLET ORAL at 09:08

## 2024-08-11 RX ADMIN — DOCUSATE SODIUM 200 MG: 100 CAPSULE, LIQUID FILLED ORAL at 09:08

## 2024-08-11 RX ADMIN — INSULIN ASPART 1 UNITS: 100 INJECTION, SOLUTION INTRAVENOUS; SUBCUTANEOUS at 08:08

## 2024-08-11 RX ADMIN — POLYETHYLENE GLYCOL 3350 17 G: 17 POWDER, FOR SOLUTION ORAL at 09:08

## 2024-08-11 RX ADMIN — AMLODIPINE BESYLATE 10 MG: 5 TABLET ORAL at 09:08

## 2024-08-11 RX ADMIN — POLYETHYLENE GLYCOL 3350 17 G: 17 POWDER, FOR SOLUTION ORAL at 08:08

## 2024-08-11 RX ADMIN — RISPERIDONE 0.5 MG: 0.25 TABLET, FILM COATED ORAL at 08:08

## 2024-08-11 RX ADMIN — CARVEDILOL 3.12 MG: 3.12 TABLET, FILM COATED ORAL at 09:08

## 2024-08-11 RX ADMIN — INSULIN GLARGINE 10 UNITS: 100 INJECTION, SOLUTION SUBCUTANEOUS at 08:08

## 2024-08-11 RX ADMIN — DOCUSATE SODIUM 200 MG: 100 CAPSULE, LIQUID FILLED ORAL at 08:08

## 2024-08-11 RX ADMIN — INSULIN ASPART 2 UNITS: 100 INJECTION, SOLUTION INTRAVENOUS; SUBCUTANEOUS at 05:08

## 2024-08-11 RX ADMIN — METHOCARBAMOL 500 MG: 500 TABLET ORAL at 08:08

## 2024-08-11 RX ADMIN — ATORVASTATIN CALCIUM 20 MG: 10 TABLET, FILM COATED ORAL at 08:08

## 2024-08-11 RX ADMIN — METFORMIN HYDROCHLORIDE 500 MG: 500 TABLET, FILM COATED ORAL at 09:08

## 2024-08-11 RX ADMIN — RISPERIDONE 0.5 MG: 0.25 TABLET, FILM COATED ORAL at 09:08

## 2024-08-11 RX ADMIN — METHOCARBAMOL 500 MG: 500 TABLET ORAL at 09:08

## 2024-08-11 RX ADMIN — CARVEDILOL 3.12 MG: 3.12 TABLET, FILM COATED ORAL at 08:08

## 2024-08-11 RX ADMIN — LIDOCAINE PATCH 5% 1 PATCH: 700 PATCH TOPICAL at 09:08

## 2024-08-11 RX ADMIN — METFORMIN HYDROCHLORIDE 500 MG: 500 TABLET, FILM COATED ORAL at 05:08

## 2024-08-11 RX ADMIN — Medication 9 MG: at 08:08

## 2024-08-11 NOTE — PROGRESS NOTES
"[facility]  Hospital Medicine Progress Note        Chief complaint:  Follow-up for a head injury with mild subdural hematoma, lumbar fractures.    Hospital course: 74-year-old male with alcoholic cirrhosis, hepatitis-C, diabetes mellitus, CVA, who was admitted on 07/1924 with a fall on the stairs.  Imaging demonstrated right transverse process fractures of L3, L4, small left pleural effusion, cirrhotic liver with right hepatic lobe mass suspicious for hepatocellular carcinoma.  Imaging of the head demonstrated questionable minimal subdural hematoma.  Neurosurgery recommended conservative management.  Repeat imaging unremarkable.  The patient has been pending placement.  Palliative Care has been consulted.  The patient did have some mentation changes on 08/06/2024 which demonstrated 7 mm subdural hygroma with mass effect without any shift.  Repeat imaging was the same.  Conservative management was pursued.    Today:  Current vital signs stable, the patient is afebrile, not hypoxic.  Tolerating oral food/medications. No acute issues overnight, alert.       Objective/physical exam:  General: In no acute distress, afebrile  Chest: Clear to auscultation bilaterally  Heart: RRR, +S1, S2, no appreciable murmur  Abdomen: Soft, nontender, BS +  MSK: Warm, no lower extremity edema, no clubbing or cyanosis  Neurologic: Alert / oriented at baseline    VITAL SIGNS: 24 HRS MIN & MAX LAST   Temp  Min: 97.6 °F (36.4 °C)  Max: 98.9 °F (37.2 °C) 97.7 °F (36.5 °C)   BP  Min: 94/55  Max: 146/70 112/63   Pulse  Min: 59  Max: 73  (!) 59   Resp  Min: 18  Max: 20 18   SpO2  Min: 97 %  Max: 100 % 100 %     I have reviewed the following labs:  No results for input(s): "WBC", "RBC", "HGB", "HCT", "MCV", "MCH", "MCHC", "RDW", "PLT", "MPV", "GRAN", "LYMPH", "MONO", "BASO", "NRBC" in the last 168 hours.  Recent Labs   Lab 08/06/24  1448   PH 7.460*     Microbiology Results (last 7 days)       ** No results found for " the last 168 hours. **             See below for Radiology    Assessment/Plan:  Fall at home 4 steps down  Head injury with mild subdural hematoma along the falx- resolved   Right transverse process fractures of L3 and L4 due to fall  Small left pleural effusion   Cirrhosis of  liver with right hepatic lobe mass with negative AFP tumor marker   Dementia with  delirium   Physical debility      History of alcoholic cirrhosis, hepatitis-C, diabetes mellitus type 2, and prior CVA  Continue with PT/OT.  Continue with previously noted neuro checks.  Cm working on a subacute level of care.  The patient is now a do not resuscitate.    VTE prophylaxis: SCDs    Patient condition:  fair    Anticipated discharge and Disposition:   [facility] possibly on 8/12/24      All diagnosis and differential diagnosis have been reviewed; assessment and plan has been documented; I have personally reviewed the labs and test results that are presently available; I have reviewed the patients medication list; I have reviewed the consulting providers response and recommendations. I have reviewed or attempted to review medical records based upon their availability    All of the patient's questions have been  addressed and answered. Patient's is agreeable to the above stated plan. I will continue to monitor closely and make adjustments to medical management as needed.    Portions of this note dictated using EMR integrated voice recognition software, and may be subject to voice recognition errors not corrected at proofreading. Please contact writer for clarification if needed.   _____________________________________________________________________    Malnutrition Status:    Scheduled Med:   amLODIPine  10 mg Oral Daily    atorvastatin  20 mg Oral QHS    carvediloL  3.125 mg Oral BID    docusate sodium  200 mg Oral BID    insulin glargine U-100  10 Units Subcutaneous QHS    LIDOcaine  1 patch Transdermal Q24H    lisinopriL  20 mg Oral Daily    melatonin  9  mg Oral Nightly    metFORMIN  500 mg Oral BID WM    methocarbamoL  500 mg Oral BID    polyethylene glycol  17 g Oral BID    risperiDONE  0.5 mg Oral BID      Continuous Infusions:     PRN Meds:    Current Facility-Administered Medications:     dextrose 10%, 12.5 g, Intravenous, PRN    dextrose 10%, 25 g, Intravenous, PRN    glucagon (human recombinant), 1 mg, Intramuscular, PRN    glucose, 16 g, Oral, PRN    glucose, 24 g, Oral, PRN    hydrALAZINE, 20 mg, Intravenous, Q6H PRN    insulin aspart U-100, 0-5 Units, Subcutaneous, QID (AC + HS) PRN    magnesium hydroxide 400 mg/5 ml, 30 mL, Oral, Daily PRN    oxyCODONE, 5 mg, Oral, Q4H PRN     Radiology:  I have personally reviewed the following imaging and agree with the radiologist.     Cartre Rehman MD  Department of Hospital Medicine   [facility]   08/11/2024

## 2024-08-11 NOTE — NURSING
Nurses Note -- 4 Eyes      8/11/2024   7:25 AM      Skin assessed during: Q Shift Change      [x] No Altered Skin Integrity Present    [x]Prevention Measures Documented      [] Yes- Altered Skin Integrity Present or Discovered   [] LDA Added if Not in Epic (Describe Wound)   [] New Altered Skin Integrity was Present on Admit and Documented in LDA   [] Wound Image Taken    Wound Care Consulted? No    Attending Nurse:  Sofia HOOD    Second RN/Staff Member:  Rita HOOD

## 2024-08-11 NOTE — PT/OT/SLP PROGRESS
Physical Therapy Treatment    Patient Name:  Kevin Horan Jr.   MRN:  [id number]    Recommendations:     Discharge therapy intensity: Moderate Intensity Therapy   Discharge Equipment Recommendations: walker, rolling  Barriers to discharge: Decreased caregiver support, Impaired mobility, and Ongoing medical needs    Assessment:     Kevin Horan Jr. is a 74 y.o. male.  He presents with the following impairments/functional limitations: weakness, gait instability, impaired endurance, impaired balance, decreased safety awareness, impaired functional mobility, impaired cognition.    Rehab Prognosis: Good; patient would benefit from acute skilled PT services to address these deficits and reach maximum level of function.    Recent Surgery: * No surgery found *      Plan:     During this hospitalization, patient would benefit from acute PT services 5 x/week to address the identified rehab impairments via gait training, therapeutic activities, therapeutic exercises and progress toward the following goals:    Plan of Care Expires:  08/20/24    Subjective     Chief Complaint: I'm hungry    Objective:     Communicated with nurse prior to session.  Patient found supine with  (, roll belt, B mittens) upon PT entry to room.     General Precautions: Standard, fall  Orthopedic Precautions: N/A  Braces: N/A  Respiratory Status: Room air  Blood Pressure: 95/60  Skin Integrity: Visible skin intact      Functional Mobility:  Min assist to get EOB and same to stand  Gait 125 ft x 2 RW min assist with walker management.     Education Provided:  Role and goals of PT, transfer training, bed mobility, gait training, balance training, safety awareness, assistive device, strengthening exercises, and importance of participating in PT to return to PLOF.    Patient left supine with call button in reach, bed alarm on, and restraints reapplied at end of session    GOALS:   Multidisciplinary Problems       Physical Therapy Goals           Problem: Physical Therapy    Goal Priority Disciplines Outcome Goal Variances Interventions   Physical Therapy Goal     PT, PT/OT Progressing     Description: Goals to be met by: 24     Patient will increase functional independence with mobility by performin. Supine to sit with supervision  2. Sit to stand transfer with Supervision  3. Gait  x 200 feet with Supervision using Rolling Walker.                          Time Tracking:       Billable Minutes: Gait Training 24    Treatment Type: Treatment  PT/PTA: PTA     Number of PTA visits since last PT visit: 3     2024

## 2024-08-11 NOTE — NURSING
Nurses Note -- 4 Eyes      8/11/2024   11:12 AM      Skin assessed during: Q Shift Change      [x] No Altered Skin Integrity Present    [x]Prevention Measures Documented      [] Yes- Altered Skin Integrity Present or Discovered   [] LDA Added if Not in Epic (Describe Wound)   [] New Altered Skin Integrity was Present on Admit and Documented in LDA   [] Wound Image Taken    Wound Care Consulted? No    Attending Nurse:  Sandra Simmons RN    Second RN/Staff Member:  ERWIN Diaz

## 2024-08-12 LAB
POCT GLUCOSE: 130 MG/DL (ref 70–110)
POCT GLUCOSE: 131 MG/DL (ref 70–110)
POCT GLUCOSE: 171 MG/DL (ref 70–110)
POCT GLUCOSE: 238 MG/DL (ref 70–110)

## 2024-08-12 PROCEDURE — 25000003 PHARM REV CODE 250: Performed by: INTERNAL MEDICINE

## 2024-08-12 PROCEDURE — 11000001 HC ACUTE MED/SURG PRIVATE ROOM

## 2024-08-12 PROCEDURE — 99900035 HC TECH TIME PER 15 MIN (STAT)

## 2024-08-12 PROCEDURE — 97535 SELF CARE MNGMENT TRAINING: CPT | Mod: CO

## 2024-08-12 PROCEDURE — 94799 UNLISTED PULMONARY SVC/PX: CPT

## 2024-08-12 PROCEDURE — 25000003 PHARM REV CODE 250

## 2024-08-12 PROCEDURE — 63600175 PHARM REV CODE 636 W HCPCS: Performed by: INTERNAL MEDICINE

## 2024-08-12 PROCEDURE — 63600175 PHARM REV CODE 636 W HCPCS: Performed by: NURSE PRACTITIONER

## 2024-08-12 PROCEDURE — 25000003 PHARM REV CODE 250: Performed by: STUDENT IN AN ORGANIZED HEALTH CARE EDUCATION/TRAINING PROGRAM

## 2024-08-12 PROCEDURE — 25000003 PHARM REV CODE 250: Performed by: NURSE PRACTITIONER

## 2024-08-12 RX ADMIN — DOCUSATE SODIUM 200 MG: 100 CAPSULE, LIQUID FILLED ORAL at 08:08

## 2024-08-12 RX ADMIN — METFORMIN HYDROCHLORIDE 500 MG: 500 TABLET, FILM COATED ORAL at 09:08

## 2024-08-12 RX ADMIN — METFORMIN HYDROCHLORIDE 500 MG: 500 TABLET, FILM COATED ORAL at 04:08

## 2024-08-12 RX ADMIN — ATORVASTATIN CALCIUM 20 MG: 10 TABLET, FILM COATED ORAL at 08:08

## 2024-08-12 RX ADMIN — METHOCARBAMOL 500 MG: 500 TABLET ORAL at 09:08

## 2024-08-12 RX ADMIN — CARVEDILOL 3.12 MG: 3.12 TABLET, FILM COATED ORAL at 09:08

## 2024-08-12 RX ADMIN — POLYETHYLENE GLYCOL 3350 17 G: 17 POWDER, FOR SOLUTION ORAL at 02:08

## 2024-08-12 RX ADMIN — RISPERIDONE 0.5 MG: 0.25 TABLET, FILM COATED ORAL at 08:08

## 2024-08-12 RX ADMIN — CARVEDILOL 3.12 MG: 3.12 TABLET, FILM COATED ORAL at 08:08

## 2024-08-12 RX ADMIN — METHOCARBAMOL 500 MG: 500 TABLET ORAL at 08:08

## 2024-08-12 RX ADMIN — Medication 9 MG: at 08:08

## 2024-08-12 RX ADMIN — POLYETHYLENE GLYCOL 3350 17 G: 17 POWDER, FOR SOLUTION ORAL at 08:08

## 2024-08-12 RX ADMIN — INSULIN ASPART 1 UNITS: 100 INJECTION, SOLUTION INTRAVENOUS; SUBCUTANEOUS at 09:08

## 2024-08-12 RX ADMIN — LIDOCAINE PATCH 5% 1 PATCH: 700 PATCH TOPICAL at 02:08

## 2024-08-12 RX ADMIN — DOCUSATE SODIUM 200 MG: 100 CAPSULE, LIQUID FILLED ORAL at 02:08

## 2024-08-12 RX ADMIN — INSULIN GLARGINE 10 UNITS: 100 INJECTION, SOLUTION SUBCUTANEOUS at 09:08

## 2024-08-12 RX ADMIN — RISPERIDONE 0.5 MG: 0.25 TABLET, FILM COATED ORAL at 09:08

## 2024-08-12 NOTE — NURSING
Nurses Note -- 4 Eyes      8/11/2024   1835 PM      Skin assessed during: Q Shift Change      [x] No Altered Skin Integrity Present    []Prevention Measures Documented      [] Yes- Altered Skin Integrity Present or Discovered   [] LDA Added if Not in Epic (Describe Wound)   [] New Altered Skin Integrity was Present on Admit and Documented in LDA   [] Wound Image Taken    Wound Care Consulted? No    Attending Nurse:  Sherley Bennett RN/Staff Member:  Sandra

## 2024-08-12 NOTE — PROGRESS NOTES
[facility] Hospital Medicine Progress Note        Chief Complaint: Inpatient Follow-up     HPI:   74 y.o. male with a PMHx  of alcoholic cirrhosis, hepatitis-C, diabetes mellitus type 2, and CVA who presented to [facility] on 7/19/2024 via EMS after slipped and fell down 4 steps at home and striking head. EMS reported patient was hypoglycemic with CBG of 44 on scene.  C-collar was placed en route and 250 mL of D5W was given. Initial vital signs in ED were /77, pulse 72, respirations 16, temperature 36.9° C, and SpO2 90% on room air.  Labs revealed WBC 3.29, hemoglobin 9.8. Na 139, K 3.7, CO2 21 chloride 112, CO2 21, Cr 1.0, glucose 166, and undetectable troponin.  EKG revealed normal sinus rhythm. CT head questionable minimal subdural blood products along the falx without mass effect.  CT cervical spine  no acute injury.  CT chest abdomen and pelvis with IV contrast revealed right transverse process fractures of L3 and L4, small left pleural effusion, cirrhotic liver with right hepatic lobe mass suspicious for HCC, and small volume ascites.  Pelvis x-ray revealed no acute findings.  Chest x-ray revealed mild left basilar opacities with a small left pleural effusion.      Scalp laceration was repaired in ED.  Neurosurgery was consulted and  was admitted to trauma services.  Neurosurgery recommended no surgical intervention, Keppra for seizure prophylaxis, and blood pressure less than 150/90.  Follow-up CT head on 07/19  with no subdural hematoma. Hospital medicine was consulted for transition of care and further medical management.  PT/OT consulted; recommending moderate intensity therapy.  Patient was noted to be agitated and restless attempting to climb out of bed for which p.r.n. IV Haldol was ordered and 1 to1 sitter placed.     Patient's  over all functional status was declining. He was needing full assist for ADLs. His mental status was very sluggish. Consulted palliative care.  "AFP tumor marker was neg. He is now DNR status.       hosp course c/by worsening mental status on 8/6, ct head showed 7mm sub dural hygroma with mass effect but no shift, neurosurgery informed- recommended repeat ct head on 8/7- which is the same, no new change     Today:   Patient seen and examined by bedside.  No acute overnight events.  Awaiting placement     Objective/physical exam:  General: In no acute distress, afebrile  Chest:  Unlabored breathing on room air Heart:  Normal rate  Abdomen: Soft, nontender  MSK: Warm  Neurologic: Alert    VITAL SIGNS: 24 HRS MIN & MAX LAST   Temp  Min: 96.8 °F (36 °C)  Max: 98 °F (36.7 °C) 97.9 °F (36.6 °C)   BP  Min: 92/52  Max: 124/69 105/60   Pulse  Min: 57  Max: 68  (!) 57   Resp  Min: 18  Max: 20 18   SpO2  Min: 96 %  Max: 99 % 98 %       No results for input(s): "WBC", "RBC", "HGB", "HCT", "MCV", "MCH", "MCHC", "RDW", "PLT", "MPV", "GRAN", "LYMPH", "MONO", "BASO", "NRBC" in the last 168 hours.    Recent Labs   Lab 08/06/24  1448   PH 7.460*          Microbiology Results (last 7 days)       ** No results found for the last 168 hours. **             Radiology:  CT Head Without Contrast  Narrative: EXAMINATION:  CT HEAD WITHOUT CONTRAST    CLINICAL HISTORY:  subdural followup;    TECHNIQUE:  Axial scans were obtained from skull base to the vertex.    Coronal and sagittal reconstructions obtained from the axial data.    Automatic exposure control was utilized to limit radiation dose.    Contrast: None    Radiation Dose:    Total DLP: 1194 mGy*cm    COMPARISON:  CT head dated 08/06/2024    FINDINGS:  Subdural fluid collection along the right cerebral convexity remains stable measuring up to 7 mm in thickness.  The brain parenchyma is unchanged.  There is a small area of encephalomalacia the left occipital lobe.  There is local mass effect without midline shift or herniation.  The basal cisterns are patent.  The ventricles are stable in size.  The calvarium and skull base are " intact.  The mastoid air cells are clear.  Impression: Stable exam without significant interval change.    Electronically signed by: Evelia Solorio  Date:    08/07/2024  Time:    08:05        Medications:  Scheduled Meds:   amLODIPine  10 mg Oral Daily    atorvastatin  20 mg Oral QHS    carvediloL  3.125 mg Oral BID    docusate sodium  200 mg Oral BID    insulin glargine U-100  10 Units Subcutaneous QHS    LIDOcaine  1 patch Transdermal Q24H    lisinopriL  20 mg Oral Daily    melatonin  9 mg Oral Nightly    metFORMIN  500 mg Oral BID WM    methocarbamoL  500 mg Oral BID    polyethylene glycol  17 g Oral BID    risperiDONE  0.5 mg Oral BID     Continuous Infusions:  PRN Meds:.  Current Facility-Administered Medications:     dextrose 10%, 12.5 g, Intravenous, PRN    dextrose 10%, 25 g, Intravenous, PRN    glucagon (human recombinant), 1 mg, Intramuscular, PRN    glucose, 16 g, Oral, PRN    glucose, 24 g, Oral, PRN    hydrALAZINE, 20 mg, Intravenous, Q6H PRN    insulin aspart U-100, 0-5 Units, Subcutaneous, QID (AC + HS) PRN    magnesium hydroxide 400 mg/5 ml, 30 mL, Oral, Daily PRN    oxyCODONE, 5 mg, Oral, Q4H PRN        Assessment/Plan:   Fall at home 4 steps down  Head injury with mild subdural hematoma along the falx- resolved   Right transverse process fractures of L3 and L4 due to fall  Small left pleural effusion   Cirrhosis of  liver with right hepatic lobe mass with negative AFP tumor marker   Dementia with  delirium   Physical debility      History of alcoholic cirrhosis, hepatitis-C, diabetes mellitus type 2, and prior CVA     DNR. Palliative following  PT/OT, recommended SNF  Labs previously stable, we will check p.r.n.  Case management working on nursing home placement with memory care.  Appears [facility] is interested and under can accept the patient was Monday pending financials  Medically Stable    All diagnosis and differential diagnosis have been reviewed; assessment and plan has been  documented; I have personally reviewed the labs and test results that are presently available; I have reviewed the patients medication list; I have reviewed the consulting providers response and recommendations. I have reviewed or attempted to review medical records based upon their availability    All of the patient's questions have been  addressed and answered. Patient's is agreeable to the above stated plan. I will continue to monitor closely and make adjustments to medical management as needed.  _____________________________________________________________________    Rowan Mathias DO  Department of Hospital Medicine  [facility]  08/12/2024

## 2024-08-12 NOTE — PT/OT/SLP PROGRESS
Occupational Therapy   Treatment    Name: Kevin Horan Jr.  MRN: [id number]  Admitting Diagnosis:  Fall       Recommendations:     Recommended therapy intensity at discharge: Moderate Intensity Therapy   Discharge Equipment Recommendations:  walker, rolling  Barriers to discharge:       Assessment:     Kevin Horan Jr. is a 74 y.o. male with a medical diagnosis of Fall.  He presents with increased activity tolerance however delayed processing noted with slow radha. Pt. Is a high fall risk recommending Mod intensity therapy pending progress. Performance deficits affecting function are weakness, impaired endurance, impaired self care skills, impaired functional mobility, impaired balance.     Rehab Prognosis:  Good; patient would benefit from acute skilled OT services to address these deficits and reach maximum level of function.       Plan:     Patient to be seen 3 x/week to address the above listed problems via self-care/home management, therapeutic activities, therapeutic exercises  Plan of Care Expires: 09/06/24  Plan of Care Reviewed with: patient    Subjective     Pain/Comfort:       Objective:     Communicated with: RN prior to session.  Patient found HOB elevated with   upon OT entry to room.    General Precautions: Standard, fall    Orthopedic Precautions:N/A  Braces: N/A  Respiratory Status: Room air       Occupational Performance:   (Bed Mobility- Mod A)  Total A required for donning/doffing socks.  (Sitting balance- min A/CGA)  (Sit to stand- Min A)  Pt. Ambulating from EOB to bathroom using RW for UE support with balance, Min A with cueing required for safety throughout.  (Standing balance- Min A) For balance while performing grooming task (brushing teeth) with appropriate preparation and setup. Strong posterior lean noted in standing.       Therapeutic Positioning    OT interventions performed during the course of today's session in an effort to prevent and/or reduce acquired pressure  injuries:   Therapeutic positioning was provided at the conclusion of session to offload all bony prominences for the prevention and/or reduction of pressure injuries    [facility] 6 Click ADL:      Patient Education:  Patient provided with verbal education education regarding fall prevention, safety awareness, and pressure ulcer prevention.  Additional teaching is warranted.      Patient left HOB elevated with all lines intact and call button in reach.    GOALS:   Multidisciplinary Problems       Occupational Therapy Goals          Problem: Occupational Therapy    Goal Priority Disciplines Outcome Interventions   Occupational Therapy Goal     OT, PT/OT Progressing    Description: Goals to be met by: in 1 month      Patient will increase functional independence with ADLs by performing:    LE Dressing with Supervision.  Grooming while standing at sink with Stand-by Assistance.  Toileting from toilet with Stand-by Assistance for hygiene and clothing management.   Toilet transfer to toilet with Supervision.                         Time Tracking:     OT Date of Treatment: 08/12/24  OT Start Time: 0935  OT Stop Time: 0950  OT Total Time (min): 15 min    Billable Minutes:Self Care/Home Management 1    OT/CHUN: CHUN     Number of CHUN visits since last OT visit: 2    8/12/2024

## 2024-08-12 NOTE — PROGRESS NOTES
Advance Care Planning     Date: 08/12/2024    Today a voluntary meeting took place: bedside    Patient Participation: Patient is able to participate     Attendees (Name and  Relationship to patient):  Notified patient's brother Javad    Staff attendees (Name and  Role): Deidre RN-PN    ACP Conversation (General): Understanding of advance care planning and role of health care agent defined reviewed [place]POST and emailed copy per request for review. States family will be at bedside midmorning on 08/13     Code Status: DNR; status confirmed/order placed in chart    ACP Documents: Provided ACP documents    Goals of care: The patient and family endorses that what is most important right now is to focus on symptom/pain control, improvement in condition but with limits to invasive therapies, and comfort and QOL     Accordingly, we have decided that the best plan to meet the patient's goals includes no further escalation in treatment      Recommendations/  Follow-up tasks: Follow up family meeting planned: on tomorrow 08/13/2024, patient's brother is to be at bedside mid-morning, informed to let nurse know when he arrives to notify palliative care team. Will attempt to complete the LaPOST.        Palliative Care RN visit was spent on advance care planning, goals of care discussion, emotional support, formulating and communicating prognosis and exploring burden/benefit of various approaches of treatment. This discussion occurred on a fully voluntary basis with the verbal consent of the patient and/or family.

## 2024-08-12 NOTE — PLAN OF CARE
Problem: Adult Inpatient Plan of Care  Goal: Plan of Care Review  Outcome: Progressing  Goal: Patient-Specific Goal (Individualized)  Outcome: Progressing  Goal: Absence of Hospital-Acquired Illness or Injury  Outcome: Progressing  Goal: Optimal Comfort and Wellbeing  Outcome: Progressing  Goal: Readiness for Transition of Care  Outcome: Progressing     Problem: Wound  Goal: Skin Health and Integrity  Outcome: Progressing     Problem: Fall Injury Risk  Goal: Absence of Fall and Fall-Related Injury  Outcome: Progressing     Problem: Skin Injury Risk Increased  Goal: Skin Health and Integrity  Outcome: Progressing     Problem: Coping Ineffective  Goal: Effective Coping  Outcome: Progressing

## 2024-08-12 NOTE — PLAN OF CARE
[facility] sent updates via [facility] to [facility]- accepting facility ( pending finances )  Spoke to Keely @ [phone] -notified her of the updates sent   Referral status- pending property search for medicaid purposes   She will cb with  results later today

## 2024-08-13 LAB
POCT GLUCOSE: 281 MG/DL (ref 70–110)
POCT GLUCOSE: 308 MG/DL (ref 70–110)

## 2024-08-13 PROCEDURE — 11000001 HC ACUTE MED/SURG PRIVATE ROOM

## 2024-08-13 PROCEDURE — 97535 SELF CARE MNGMENT TRAINING: CPT | Mod: CO

## 2024-08-13 PROCEDURE — 25000003 PHARM REV CODE 250: Performed by: NURSE PRACTITIONER

## 2024-08-13 PROCEDURE — 97530 THERAPEUTIC ACTIVITIES: CPT | Mod: CQ

## 2024-08-13 PROCEDURE — 25000003 PHARM REV CODE 250: Performed by: INTERNAL MEDICINE

## 2024-08-13 PROCEDURE — 97116 GAIT TRAINING THERAPY: CPT | Mod: CQ

## 2024-08-13 PROCEDURE — 63600175 PHARM REV CODE 636 W HCPCS: Performed by: NURSE PRACTITIONER

## 2024-08-13 PROCEDURE — 63600175 PHARM REV CODE 636 W HCPCS: Performed by: INTERNAL MEDICINE

## 2024-08-13 PROCEDURE — 25000003 PHARM REV CODE 250

## 2024-08-13 PROCEDURE — 25000003 PHARM REV CODE 250: Performed by: STUDENT IN AN ORGANIZED HEALTH CARE EDUCATION/TRAINING PROGRAM

## 2024-08-13 PROCEDURE — 94799 UNLISTED PULMONARY SVC/PX: CPT

## 2024-08-13 PROCEDURE — 99900035 HC TECH TIME PER 15 MIN (STAT)

## 2024-08-13 RX ADMIN — POLYETHYLENE GLYCOL 3350 17 G: 17 POWDER, FOR SOLUTION ORAL at 08:08

## 2024-08-13 RX ADMIN — ATORVASTATIN CALCIUM 20 MG: 10 TABLET, FILM COATED ORAL at 08:08

## 2024-08-13 RX ADMIN — INSULIN ASPART 1 UNITS: 100 INJECTION, SOLUTION INTRAVENOUS; SUBCUTANEOUS at 08:08

## 2024-08-13 RX ADMIN — CARVEDILOL 3.12 MG: 3.12 TABLET, FILM COATED ORAL at 09:08

## 2024-08-13 RX ADMIN — METFORMIN HYDROCHLORIDE 500 MG: 500 TABLET, FILM COATED ORAL at 05:08

## 2024-08-13 RX ADMIN — LIDOCAINE PATCH 5% 1 PATCH: 700 PATCH TOPICAL at 09:08

## 2024-08-13 RX ADMIN — METHOCARBAMOL 500 MG: 500 TABLET ORAL at 08:08

## 2024-08-13 RX ADMIN — AMLODIPINE BESYLATE 10 MG: 5 TABLET ORAL at 09:08

## 2024-08-13 RX ADMIN — Medication 9 MG: at 08:08

## 2024-08-13 RX ADMIN — METFORMIN HYDROCHLORIDE 500 MG: 500 TABLET, FILM COATED ORAL at 09:08

## 2024-08-13 RX ADMIN — DOCUSATE SODIUM 200 MG: 100 CAPSULE, LIQUID FILLED ORAL at 09:08

## 2024-08-13 RX ADMIN — POLYETHYLENE GLYCOL 3350 17 G: 17 POWDER, FOR SOLUTION ORAL at 09:08

## 2024-08-13 RX ADMIN — CARVEDILOL 3.12 MG: 3.12 TABLET, FILM COATED ORAL at 08:08

## 2024-08-13 RX ADMIN — INSULIN ASPART 4 UNITS: 100 INJECTION, SOLUTION INTRAVENOUS; SUBCUTANEOUS at 05:08

## 2024-08-13 RX ADMIN — METHOCARBAMOL 500 MG: 500 TABLET ORAL at 09:08

## 2024-08-13 RX ADMIN — LISINOPRIL 20 MG: 20 TABLET ORAL at 09:08

## 2024-08-13 RX ADMIN — INSULIN GLARGINE 10 UNITS: 100 INJECTION, SOLUTION SUBCUTANEOUS at 08:08

## 2024-08-13 RX ADMIN — RISPERIDONE 0.5 MG: 0.25 TABLET, FILM COATED ORAL at 09:08

## 2024-08-13 RX ADMIN — DOCUSATE SODIUM 200 MG: 100 CAPSULE, LIQUID FILLED ORAL at 08:08

## 2024-08-13 RX ADMIN — RISPERIDONE 0.5 MG: 0.25 TABLET, FILM COATED ORAL at 08:08

## 2024-08-13 NOTE — PROGRESS NOTES
Advance Care Planning     Date: 08/13/2024    Today a voluntary meeting took place: bedside    Patient Participation: Patient is able to participate     Attendees (Name and  Relationship to patient):  No Family at bedside, patient's brother planning to visit patient mid-morning with plan to review LaPOST with patient.     Staff attendees (Name and  Role):  Deidre BILL RN-CHPN    ACP Conversation (General): Understanding of advance care planning and role of health care agent defined discussed LaPOST and HCPOA documents with patient. Patient expressed interest in completing documents while in the hospital. Informed the palliative team can arrange for completion. Patient is awake, alert and oriented x's 4 upon assessment, is able to identify objects at bedside.       Code Status: DNR; status confirmed/order placed in chart    ACP Documents: Provided ACP documents    Goals of care: The patient and family endorses that what is most important right now is to focus on remaining as independent as possible, symptom/pain control, quality of life, even if it means sacrificing a little time, extending life as long as possible, even it it means sacrificing quality, improvement in condition but with limits to invasive therapies, and comfort and QOL     Accordingly, we have decided that the best plan to meet the patient's goals includes continuing with treatment. Bebe notified of patient's wishes to complete documents.       Recommendations/  Follow-up tasks: Follow up family meeting planned: palliative will complete HCPOA and LaPOST prior to discharge.       Palliative care visit was spent on advance care planning, goals of care discussion, emotional support, formulating and communicating prognosis and exploring burden/benefit of various approaches of treatment. This discussion occurred on a fully voluntary basis with the verbal consent of the patient and/or family.

## 2024-08-13 NOTE — PT/OT/SLP PROGRESS
Physical Therapy Treatment    Patient Name:  Kevin Horan Jr.   MRN:  [id number]    Recommendations:     Discharge therapy intensity: Moderate Intensity Therapy   Discharge Equipment Recommendations: walker, rolling  Barriers to discharge: Impaired mobility    Assessment:     Kevin Horan Jr. is a 74 y.o. male admitted with a medical diagnosis of AMS, traumatic SDH, HTN, hygroma.  He presents with the following impairments/functional limitations: weakness, impaired endurance, impaired self care skills, impaired functional mobility, impaired balance, gait instability, impaired cognition .    Rehab Prognosis: Good; patient would benefit from acute skilled PT services to address these deficits and reach maximum level of function.    Recent Surgery: * No surgery found *      Plan:     During this hospitalization, patient would benefit from acute PT services 5 x/week to address the identified rehab impairments via gait training, therapeutic activities, therapeutic exercises and progress toward the following goals:    Plan of Care Expires:  08/20/24    Subjective     Chief Complaint: None  Patient/Family Comments/goals: to return home  Pain/Comfort:  Pain Rating 1: 0/10      Objective:     Communicated with pts nurse prior to session.  Patient found supine with  () upon PT entry to room.     General Precautions: Standard, fall  Orthopedic Precautions: N/A  Braces: N/A  Respiratory Status: Room air  Blood Pressure:   Skin Integrity: Visible skin intact      Functional Mobility:  Bed Mobility:     Scooting: contact guard assistance  Supine to Sit: contact guard assistance and cues to improve mechanics  Transfers:     Sit to Stand:  stand by assistance and contact guard assistance with rolling walker and 2 x 5 reps to improve obtaining balance, increase forward lean, loading of legs, avoiding LOB backwards.  Bed to Chair: contact guard assistance with  rolling walker  using  Step Transfer and cues for foot  clearance and direction change  Gait: The pt ambulated 125 ft x 2 trials w/ RW, CGA, demonstrating slow gait w/decrease step length, foot clearance and narrow ADRIAN.  Pt has difficulty improving quality of gait.    Education:  Patient provided with verbal education and demonstrations education regarding fall prevention and safety awareness.  Additional teaching is warranted.     Patient left up in chair with all lines intact, call button in reach, and nurse notified    GOALS:   Multidisciplinary Problems       Physical Therapy Goals          Problem: Physical Therapy    Goal Priority Disciplines Outcome Goal Variances Interventions   Physical Therapy Goal     PT, PT/OT Progressing     Description: Goals to be met by: 24     Patient will increase functional independence with mobility by performin. Supine to sit with supervision  2. Sit to stand transfer with Supervision  3. Gait  x 200 feet with Supervision using Rolling Walker.                          Time Tracking:     PT Received On: 24  PT Start Time: 1149     PT Stop Time: 1217  PT Total Time (min): 28 min     Billable Minutes: Gait Training 14 and Therapeutic Activity 14    Treatment Type: Treatment  PT/PTA: PTA     Number of PTA visits since last PT visit: 4     2024

## 2024-08-13 NOTE — PROGRESS NOTES
[facility] Hospital Medicine Progress Note        Chief Complaint: Inpatient Follow-up     HPI:   74 y.o. male with a PMHx  of alcoholic cirrhosis, hepatitis-C, diabetes mellitus type 2, and CVA who presented to [facility] on 7/19/2024 via EMS after slipped and fell down 4 steps at home and striking head. EMS reported patient was hypoglycemic with CBG of 44 on scene.  C-collar was placed en route and 250 mL of D5W was given. Initial vital signs in ED were /77, pulse 72, respirations 16, temperature 36.9° C, and SpO2 90% on room air.  Labs revealed WBC 3.29, hemoglobin 9.8. Na 139, K 3.7, CO2 21 chloride 112, CO2 21, Cr 1.0, glucose 166, and undetectable troponin.  EKG revealed normal sinus rhythm. CT head questionable minimal subdural blood products along the falx without mass effect.  CT cervical spine  no acute injury.  CT chest abdomen and pelvis with IV contrast revealed right transverse process fractures of L3 and L4, small left pleural effusion, cirrhotic liver with right hepatic lobe mass suspicious for HCC, and small volume ascites.  Pelvis x-ray revealed no acute findings.  Chest x-ray revealed mild left basilar opacities with a small left pleural effusion.      Scalp laceration was repaired in ED.  Neurosurgery was consulted and  was admitted to trauma services.  Neurosurgery recommended no surgical intervention, Keppra for seizure prophylaxis, and blood pressure less than 150/90.  Follow-up CT head on 07/19  with no subdural hematoma. Hospital medicine was consulted for transition of care and further medical management.  PT/OT consulted; recommending moderate intensity therapy.  Patient was noted to be agitated and restless attempting to climb out of bed for which p.r.n. IV Haldol was ordered and 1 to1 sitter placed.     Patient's  over all functional status was declining. He was needing full assist for ADLs. His mental status was very sluggish. Consulted palliative care.  "AFP tumor marker was neg. He is now DNR status.       hosp course c/by worsening mental status on 8/6, ct head showed 7mm sub dural hygroma with mass effect but no shift, neurosurgery informed- recommended repeat ct head on 8/7- which is the same, no new change     Today:   Patient seen and examined by bedside.  Eating breakfast without any issues.  Awaiting placement.  No acute overnight events    Objective/physical exam:  General: In no acute distress, afebrile  Chest:  Unlabored breathing on room air Heart:  Normal rate  Abdomen: Soft, nontender  MSK: Warm  Neurologic: Alert    VITAL SIGNS: 24 HRS MIN & MAX LAST   Temp  Min: 97.5 °F (36.4 °C)  Max: 98.6 °F (37 °C) 97.5 °F (36.4 °C)   BP  Min: 96/57  Max: 151/71 (!) 96/57   Pulse  Min: 59  Max: 72  (!) 59   Resp  Min: 17  Max: 17 17   SpO2  Min: 97 %  Max: 99 % 98 %       No results for input(s): "WBC", "RBC", "HGB", "HCT", "MCV", "MCH", "MCHC", "RDW", "PLT", "MPV", "GRAN", "LYMPH", "MONO", "BASO", "NRBC" in the last 168 hours.    Recent Labs   Lab 08/06/24  1448   PH 7.460*          Microbiology Results (last 7 days)       ** No results found for the last 168 hours. **             Radiology:  CT Head Without Contrast  Narrative: EXAMINATION:  CT HEAD WITHOUT CONTRAST    CLINICAL HISTORY:  subdural followup;    TECHNIQUE:  Axial scans were obtained from skull base to the vertex.    Coronal and sagittal reconstructions obtained from the axial data.    Automatic exposure control was utilized to limit radiation dose.    Contrast: None    Radiation Dose:    Total DLP: 1194 mGy*cm    COMPARISON:  CT head dated 08/06/2024    FINDINGS:  Subdural fluid collection along the right cerebral convexity remains stable measuring up to 7 mm in thickness.  The brain parenchyma is unchanged.  There is a small area of encephalomalacia the left occipital lobe.  There is local mass effect without midline shift or herniation.  The basal cisterns are patent.  The ventricles are stable " in size.  The calvarium and skull base are intact.  The mastoid air cells are clear.  Impression: Stable exam without significant interval change.    Electronically signed by: Evelia Solorio  Date:    08/07/2024  Time:    08:05        Medications:  Scheduled Meds:   amLODIPine  10 mg Oral Daily    atorvastatin  20 mg Oral QHS    carvediloL  3.125 mg Oral BID    docusate sodium  200 mg Oral BID    insulin glargine U-100  10 Units Subcutaneous QHS    LIDOcaine  1 patch Transdermal Q24H    lisinopriL  20 mg Oral Daily    melatonin  9 mg Oral Nightly    metFORMIN  500 mg Oral BID WM    methocarbamoL  500 mg Oral BID    polyethylene glycol  17 g Oral BID    risperiDONE  0.5 mg Oral BID     Continuous Infusions:  PRN Meds:.  Current Facility-Administered Medications:     dextrose 10%, 12.5 g, Intravenous, PRN    dextrose 10%, 25 g, Intravenous, PRN    glucagon (human recombinant), 1 mg, Intramuscular, PRN    glucose, 16 g, Oral, PRN    glucose, 24 g, Oral, PRN    hydrALAZINE, 20 mg, Intravenous, Q6H PRN    insulin aspart U-100, 0-5 Units, Subcutaneous, QID (AC + HS) PRN    magnesium hydroxide 400 mg/5 ml, 30 mL, Oral, Daily PRN    oxyCODONE, 5 mg, Oral, Q4H PRN        Assessment/Plan:   Fall at home 4 steps down  Head injury with mild subdural hematoma along the falx- resolved   Right transverse process fractures of L3 and L4 due to fall  Small left pleural effusion   Cirrhosis of  liver with right hepatic lobe mass with negative AFP tumor marker   Dementia with  delirium   Physical debility      History of alcoholic cirrhosis, hepatitis-C, diabetes mellitus type 2, and prior CVA     DNR. Palliative following  PT/OT, recommended SNF  Labs previously stable, we will check p.r.n.  Case management working on nursing home placement with memory care.    Medically Stable    All diagnosis and differential diagnosis have been reviewed; assessment and plan has been documented; I have personally reviewed the labs and test results  that are presently available; I have reviewed the patients medication list; I have reviewed the consulting providers response and recommendations. I have reviewed or attempted to review medical records based upon their availability    All of the patient's questions have been  addressed and answered. Patient's is agreeable to the above stated plan. I will continue to monitor closely and make adjustments to medical management as needed.  _____________________________________________________________________    Rowan Mathias DO  Department of Hospital Medicine  [facility]  08/13/2024

## 2024-08-13 NOTE — NURSING
Nurses Note -- 4 Eyes      8/12/2024   8:00 PM      Skin assessed during: Q Shift Change      [x] No Altered Skin Integrity Present    []Prevention Measures Documented      [] Yes- Altered Skin Integrity Present or Discovered   [] LDA Added if Not in Epic (Describe Wound)   [] New Altered Skin Integrity was Present on Admit and Documented in LDA   [] Wound Image Taken    Wound Care Consulted? No    Attending Nurse:  Autumn Bennett RN/Staff Member:  ERWIN Queen

## 2024-08-13 NOTE — PROGRESS NOTES
Advance Care Planning     Date: 08/13/2024    Power of   I initiated the process of voluntary advance care planning today and explained the importance of this process to the patient.  I introduced the concept of advance directives to the patient, as well. Then the patient received detailed information about the importance of designating a Health Care Power of  (HCPOA). He was also instructed to communicate with this person about their wishes for future healthcare, should he become sick and lose decision-making capacity. The patient has not previously appointed a HCPOA. After our discussion, the patient has decided to complete a HCPOA and has appointed his brother and/or sister in law ( and wife), health care agent:  Javad Horan and Jovanna Robbins  & health care agent number:  [phone] . I encouraged him to communicate with this person about their wishes for future healthcare, should he become sick and lose decision-making capacity.    Code Status  In light of the patients advanced and life limiting illness,I engaged the the patient and family in a voluntary conversation about the patient's preferences for care  at the very end of life. The patient wishes to have a natural, peaceful death.  Along those lines, the patient does not wish to have CPR or other invasive treatments performed when his heart and/or breathing stops. I communicated to the patient and family that a [place] form was completed to reflect other EOL preferences of the patient such as A. DNR/Do Not Attempt Resuscitation (allow natural death), B. Selective Medical Interventions/Treatments with the primary goal of treating medical conditions while avoiding burdensome treatments, and C. No artificial nutrition by tube. Elections were discussed with patient and patient's HCPOA/Brother Javad Robbins at bedside. .    Palliative Care RN visit was spent on advance care planning, goals of care discussion, emotional support,  formulating and communicating prognosis and exploring burden/benefit of various approaches of treatment. This discussion occurred on a fully voluntary basis with the verbal consent of the patient and/or family.

## 2024-08-13 NOTE — PT/OT/SLP PROGRESS
Occupational Therapy   Treatment    Name: Kevin Horan Jr.  MRN: [id number]  Admitting Diagnosis:  Fall       Recommendations:     Recommended therapy intensity at discharge: Moderate Intensity Therapy   Discharge Equipment Recommendations:  walker, rolling  Barriers to discharge:       Assessment:     Kevin Horan Jr. is a 74 y.o. male with a medical diagnosis of Fall.  He presents with slow radha with increased time given due to delayed processing. Pt. Presents with a shuffled/rigid gait. Pt. Is a high fall risk recommending Mod intensity therapy. Performance deficits affecting function are weakness, impaired endurance, impaired self care skills, impaired functional mobility, impaired balance.     Rehab Prognosis:  Good; patient would benefit from acute skilled OT services to address these deficits and reach maximum level of function.       Plan:     Patient to be seen 3 x/week to address the above listed problems via self-care/home management, therapeutic activities, therapeutic exercises  Plan of Care Expires: 09/06/24  Plan of Care Reviewed with: patient    Subjective     Pain/Comfort:       Objective:     Communicated with: RN prior to session.  Patient found HOB elevated with   upon OT entry to room.    General Precautions: Standard, fall    Orthopedic Precautions:N/A  Braces: N/A  Respiratory Status: Room air       Occupational Performance:   (Bed Mobility- Mod A)  (Sitting balance- CGA)  Pt. Requiring Mod A during LB dressing task while donning/doffing socks in a figure 4 position.   (Sit to stand- Min A)  Pt. Ambulating from EOB to Min A using RW for UE support with balance, slow radha, with cueing required for safety with RW.   Pt. Standing at sink while performing grooming task (brushing teeth) Mod A for balance due to strong posterior lean. Cueing required for setup of toothbrush.       Therapeutic Positioning    OT interventions performed during the course of today's session in an effort  to prevent and/or reduce acquired pressure injuries:   Therapeutic positioning was provided at the conclusion of session to offload all bony prominences for the prevention and/or reduction of pressure injuries      [facility] 6 Click ADL:      Patient Education:  Patient provided with verbal education education regarding fall prevention, safety awareness, and pressure ulcer prevention.  Additional teaching is warranted.      Patient left HOB elevated with all lines intact and call button in reach.    GOALS:   Multidisciplinary Problems       Occupational Therapy Goals          Problem: Occupational Therapy    Goal Priority Disciplines Outcome Interventions   Occupational Therapy Goal     OT, PT/OT Progressing    Description: Goals to be met by: in 1 month      Patient will increase functional independence with ADLs by performing:    LE Dressing with Supervision.  Grooming while standing at sink with Stand-by Assistance.  Toileting from toilet with Stand-by Assistance for hygiene and clothing management.   Toilet transfer to toilet with Supervision.                         Time Tracking:     OT Date of Treatment: 08/13/24  OT Start Time: 1024  OT Stop Time: 1055  OT Total Time (min): 31 min    Billable Minutes:Self Care/Home Management 2    OT/CHUN: CHUN     Number of CHUN visits since last OT visit: 3    8/13/2024

## 2024-08-13 NOTE — PLAN OF CARE
[facility] for [phone]    Sent updates via [employer] to [employer] is waiting on supporting documents from [facility] who assisted pt with medicad erin.      Pt is clinically approved for the [facility]- pending finances    Status on property search - 1 property was present on report according to [employer]       no

## 2024-08-14 LAB
POCT GLUCOSE: 155 MG/DL (ref 70–110)
POCT GLUCOSE: 267 MG/DL (ref 70–110)

## 2024-08-14 PROCEDURE — 11000001 HC ACUTE MED/SURG PRIVATE ROOM

## 2024-08-14 PROCEDURE — 97530 THERAPEUTIC ACTIVITIES: CPT | Mod: CQ

## 2024-08-14 PROCEDURE — 25000003 PHARM REV CODE 250: Performed by: INTERNAL MEDICINE

## 2024-08-14 PROCEDURE — 99900035 HC TECH TIME PER 15 MIN (STAT)

## 2024-08-14 PROCEDURE — 63600175 PHARM REV CODE 636 W HCPCS: Performed by: NURSE PRACTITIONER

## 2024-08-14 PROCEDURE — 63600175 PHARM REV CODE 636 W HCPCS: Performed by: INTERNAL MEDICINE

## 2024-08-14 PROCEDURE — 97116 GAIT TRAINING THERAPY: CPT | Mod: CQ

## 2024-08-14 PROCEDURE — 25000003 PHARM REV CODE 250: Performed by: NURSE PRACTITIONER

## 2024-08-14 PROCEDURE — 25000003 PHARM REV CODE 250: Performed by: STUDENT IN AN ORGANIZED HEALTH CARE EDUCATION/TRAINING PROGRAM

## 2024-08-14 PROCEDURE — 94799 UNLISTED PULMONARY SVC/PX: CPT

## 2024-08-14 PROCEDURE — 25000003 PHARM REV CODE 250

## 2024-08-14 RX ADMIN — CARVEDILOL 3.12 MG: 3.12 TABLET, FILM COATED ORAL at 08:08

## 2024-08-14 RX ADMIN — AMLODIPINE BESYLATE 10 MG: 5 TABLET ORAL at 09:08

## 2024-08-14 RX ADMIN — RISPERIDONE 0.5 MG: 0.25 TABLET, FILM COATED ORAL at 09:08

## 2024-08-14 RX ADMIN — CARVEDILOL 3.12 MG: 3.12 TABLET, FILM COATED ORAL at 09:08

## 2024-08-14 RX ADMIN — INSULIN GLARGINE 10 UNITS: 100 INJECTION, SOLUTION SUBCUTANEOUS at 08:08

## 2024-08-14 RX ADMIN — Medication 9 MG: at 08:08

## 2024-08-14 RX ADMIN — ATORVASTATIN CALCIUM 20 MG: 10 TABLET, FILM COATED ORAL at 08:08

## 2024-08-14 RX ADMIN — DOCUSATE SODIUM 200 MG: 100 CAPSULE, LIQUID FILLED ORAL at 08:08

## 2024-08-14 RX ADMIN — METHOCARBAMOL 500 MG: 500 TABLET ORAL at 08:08

## 2024-08-14 RX ADMIN — INSULIN ASPART 4 UNITS: 100 INJECTION, SOLUTION INTRAVENOUS; SUBCUTANEOUS at 05:08

## 2024-08-14 RX ADMIN — METHOCARBAMOL 500 MG: 500 TABLET ORAL at 09:08

## 2024-08-14 RX ADMIN — METFORMIN HYDROCHLORIDE 500 MG: 500 TABLET, FILM COATED ORAL at 05:08

## 2024-08-14 RX ADMIN — LIDOCAINE PATCH 5% 1 PATCH: 700 PATCH TOPICAL at 09:08

## 2024-08-14 RX ADMIN — RISPERIDONE 0.5 MG: 0.25 TABLET, FILM COATED ORAL at 08:08

## 2024-08-14 RX ADMIN — POLYETHYLENE GLYCOL 3350 17 G: 17 POWDER, FOR SOLUTION ORAL at 08:08

## 2024-08-14 RX ADMIN — METFORMIN HYDROCHLORIDE 500 MG: 500 TABLET, FILM COATED ORAL at 09:08

## 2024-08-14 NOTE — PLAN OF CARE
[facility] paying cash for pt stay while waiting on medicaid approval.    The pt stay is approx. $250@ day at [facility]    [employer] will discuss with pt brother Javad that they would accept a 30 day pre payment also if the medicaid erin is denied or finances doesn't qualify pt then the family would be responsible  for payment per contract     SSC followed up & left a message for Ministerio - waiting on cb

## 2024-08-14 NOTE — NURSING
Nurses Note -- 4 Eyes      8/13/2024   10:39 PM      Skin assessed during: Daily Assessment      [] No Altered Skin Integrity Present    []Prevention Measures Documented      [x] Yes- Altered Skin Integrity Present or Discovered   [] LDA Added if Not in Epic (Describe Wound)   [] New Altered Skin Integrity was Present on Admit and Documented in LDA   [] Wound Image Taken    Wound Care Consulted? yes    Attending Nurse:  Effie Bennett RN/Staff Member: mani

## 2024-08-14 NOTE — PT/OT/SLP PROGRESS
Physical Therapy Treatment    Patient Name:  Kevin Horan Jr.   MRN:  [id number]    Recommendations:     Discharge therapy intensity: Moderate Intensity Therapy   Discharge Equipment Recommendations: walker, rolling  Barriers to discharge: Impaired mobility and Ongoing medical needs    Assessment:     Kevin Horan Jr. is a 74 y.o. male admitted with a medical diagnosis of AMS, traumatic SDH, HTN, hygroma.  He presents with the following impairments/functional limitations: weakness, impaired endurance, impaired self care skills, impaired functional mobility, gait instability, impaired balance, impaired cognition.    Rehab Prognosis: Good; patient would benefit from acute skilled PT services to address these deficits and reach maximum level of function.    Recent Surgery: * No surgery found *      Plan:     During this hospitalization, patient would benefit from acute PT services 5 x/week to address the identified rehab impairments via gait training, therapeutic activities, therapeutic exercises and progress toward the following goals:    Plan of Care Expires:  08/20/24    Subjective     Chief Complaint: tired/sleepy  Patient/Family Comments/goals:   Pain/Comfort:  Pain Rating 1: 0/10      Objective:     Communicated with pts nurse prior to session.  Patient found supine with telemetry () upon PT entry to room.     General Precautions: Standard, fall  Orthopedic Precautions: N/A  Braces: N/A  Respiratory Status: Room air  Blood Pressure:   Skin Integrity: Visible skin intact      Functional Mobility:  Bed Mobility:     Rolling Right: minimum assistance and moderate assistance  Scooting: moderate assistance and sitting to EOB  Supine to Sit: minimum assistance and increase assist w/ bed mobility due to pt stopping and starting, appearing, at times, to be stuck or frozen.  Transfers:     Sit to Stand:  contact guard assistance and minimum assistance with rolling walker and min A to initiate and CGA for pt  to finish.  To sit, pt did poorly responding to cues and required assistance to flex hip and bend knees.   Bed to Chair: minimum assistance with  rolling walker  using  Step Transfer and assisting w/ walker management during turn  Gait: The pt ambulated 200 ft in 11 mins w/ RW.  Pt moved slow with varying step length and narrow step width, at time scissoring. Pt required mod A during turn for walker management. No LOB during gait or t/f's    Education:  Patient provided with verbal education and demonstrations education regarding fall prevention and safety awareness.  Additional teaching is warranted.     Patient left up in chair with all lines intact, call button in reach, nurse notified, and pt given sandwich, w/ permission from nurse.    GOALS:   Multidisciplinary Problems       Physical Therapy Goals          Problem: Physical Therapy    Goal Priority Disciplines Outcome Goal Variances Interventions   Physical Therapy Goal     PT, PT/OT Progressing     Description: Goals to be met by: 24     Patient will increase functional independence with mobility by performin. Supine to sit with supervision  2. Sit to stand transfer with Supervision  3. Gait  x 200 feet with Supervision using Rolling Walker.                          Time Tracking:     PT Received On: 24  PT Start Time: 1450     PT Stop Time: 1516  PT Total Time (min): 26 min     Billable Minutes: Gait Training 13 and Therapeutic Activity 13    Treatment Type: Treatment  PT/PTA: PTA     Number of PTA visits since last PT visit: 5     2024

## 2024-08-14 NOTE — PLAN OF CARE
SSC spoke to pt brother Javad @[phone] who is meeting with pt wife&  to get finances to submit to [employer] ( [employer])    Javad will cb with results after meeting     SSC sent updates via [employer] to [employer]

## 2024-08-14 NOTE — PLAN OF CARE
Family is willing to pay out of pocket until christa erin is completed, mary faulkner made aware and will reach out to brother Javad.

## 2024-08-15 PROBLEM — E11.9 TYPE 2 DIABETES MELLITUS, WITHOUT LONG-TERM CURRENT USE OF INSULIN: Status: ACTIVE | Noted: 2024-08-15

## 2024-08-15 LAB
POCT GLUCOSE: 131 MG/DL (ref 70–110)
POCT GLUCOSE: 135 MG/DL (ref 70–110)
POCT GLUCOSE: 244 MG/DL (ref 70–110)
POCT GLUCOSE: 303 MG/DL (ref 70–110)
POCT GLUCOSE: 89 MG/DL (ref 70–110)

## 2024-08-15 PROCEDURE — 97164 PT RE-EVAL EST PLAN CARE: CPT

## 2024-08-15 PROCEDURE — 99900035 HC TECH TIME PER 15 MIN (STAT)

## 2024-08-15 PROCEDURE — 97535 SELF CARE MNGMENT TRAINING: CPT | Mod: CO

## 2024-08-15 PROCEDURE — 97530 THERAPEUTIC ACTIVITIES: CPT | Mod: CO

## 2024-08-15 PROCEDURE — 11000001 HC ACUTE MED/SURG PRIVATE ROOM

## 2024-08-15 PROCEDURE — 25000003 PHARM REV CODE 250: Performed by: INTERNAL MEDICINE

## 2024-08-15 PROCEDURE — 25000003 PHARM REV CODE 250: Performed by: STUDENT IN AN ORGANIZED HEALTH CARE EDUCATION/TRAINING PROGRAM

## 2024-08-15 PROCEDURE — 63600175 PHARM REV CODE 636 W HCPCS: Performed by: NURSE PRACTITIONER

## 2024-08-15 PROCEDURE — 25000003 PHARM REV CODE 250

## 2024-08-15 PROCEDURE — 25000003 PHARM REV CODE 250: Performed by: NURSE PRACTITIONER

## 2024-08-15 PROCEDURE — 94799 UNLISTED PULMONARY SVC/PX: CPT

## 2024-08-15 PROCEDURE — 63600175 PHARM REV CODE 636 W HCPCS: Performed by: INTERNAL MEDICINE

## 2024-08-15 RX ADMIN — INSULIN ASPART 2 UNITS: 100 INJECTION, SOLUTION INTRAVENOUS; SUBCUTANEOUS at 04:08

## 2024-08-15 RX ADMIN — Medication 9 MG: at 08:08

## 2024-08-15 RX ADMIN — METFORMIN HYDROCHLORIDE 500 MG: 500 TABLET, FILM COATED ORAL at 04:08

## 2024-08-15 RX ADMIN — AMLODIPINE BESYLATE 10 MG: 5 TABLET ORAL at 09:08

## 2024-08-15 RX ADMIN — CARVEDILOL 3.12 MG: 3.12 TABLET, FILM COATED ORAL at 09:08

## 2024-08-15 RX ADMIN — RISPERIDONE 0.5 MG: 0.25 TABLET, FILM COATED ORAL at 08:08

## 2024-08-15 RX ADMIN — CARVEDILOL 3.12 MG: 3.12 TABLET, FILM COATED ORAL at 08:08

## 2024-08-15 RX ADMIN — METHOCARBAMOL 500 MG: 500 TABLET ORAL at 09:08

## 2024-08-15 RX ADMIN — DOCUSATE SODIUM 200 MG: 100 CAPSULE, LIQUID FILLED ORAL at 08:08

## 2024-08-15 RX ADMIN — ATORVASTATIN CALCIUM 20 MG: 10 TABLET, FILM COATED ORAL at 08:08

## 2024-08-15 RX ADMIN — METHOCARBAMOL 500 MG: 500 TABLET ORAL at 08:08

## 2024-08-15 RX ADMIN — DOCUSATE SODIUM 200 MG: 100 CAPSULE, LIQUID FILLED ORAL at 09:08

## 2024-08-15 RX ADMIN — RISPERIDONE 0.5 MG: 0.25 TABLET, FILM COATED ORAL at 09:08

## 2024-08-15 RX ADMIN — LISINOPRIL 20 MG: 20 TABLET ORAL at 09:08

## 2024-08-15 RX ADMIN — INSULIN GLARGINE 10 UNITS: 100 INJECTION, SOLUTION SUBCUTANEOUS at 08:08

## 2024-08-15 RX ADMIN — LIDOCAINE PATCH 5% 1 PATCH: 700 PATCH TOPICAL at 09:08

## 2024-08-15 RX ADMIN — POLYETHYLENE GLYCOL 3350 17 G: 17 POWDER, FOR SOLUTION ORAL at 08:08

## 2024-08-15 RX ADMIN — METFORMIN HYDROCHLORIDE 500 MG: 500 TABLET, FILM COATED ORAL at 09:08

## 2024-08-15 NOTE — PT/OT/SLP RE-EVAL
"Physical Therapy Re-Evaluation    Patient Name:  Kevin Horan Jr.   MRN:  [id number]    Recommendations:     Discharge therapy intensity: Moderate Intensity Therapy   Discharge Equipment Recommendations: walker, rolling   Barriers to discharge: Impaired mobility    Assessment:     Kevin Horan Jr. is a 74 y.o. male admitted with a medical diagnosis of  AMS, traumatic SDH, HTN, hygroma.  He presents with the following impairments/functional limitations: weakness, impaired endurance, impaired functional mobility, gait instability, impaired balance, impaired self care skills, impaired cognition, decreased safety awareness . Pt progressing well with mobility but remains limited by cognitive impairments; continue to recommend moderate intensity therapy at this time due to poor safety awareness and current functional limitations.    Rehab Prognosis: Good; patient would benefit from acute skilled PT services to address these deficits and reach maximum level of function.    Recent Surgery: * No surgery found *      Plan:     During this hospitalization, patient would benefit from acute PT services 5 x/week to address the identified rehab impairments via gait training, therapeutic activities, therapeutic exercises, neuromuscular re-education and progress toward the following goals:    Plan of Care Expires:  09/15/24    PT/PTA conference to discuss PT POC and patient's progression towards goals held with Sharon Pickett PTA.     Subjective     Chief Complaint: "I want to shave."  Patient/Family Comments/goals: unable to state  Pain/Comfort:  Pain Rating 1: 0/10    Patients cultural, spiritual, Mormonism conflicts given the current situation: no    Objective:     Communicated with RN prior to session.  Patient found  long sitting in bed  with telemetry ()  upon PT entry to room.    General Precautions: Standard, fall  Orthopedic Precautions:N/A   Braces: N/A  Respiratory Status: Room air  Blood Pressure: 101/58 " post ambulation      Exams:  Cognitive Exam:  Patient is oriented to Person and disoriented to time, place (thinks he's in [place]), situation  RLE Strength: WFL, rigid  LLE Strength: WFL, rigid  Skin integrity:  redness noted to posterior scalp      Functional Mobility:  Bed Mobility:     Bridging: stand by assistance  Supine to Sit: moderate assistance  Transfers:     Sit to Stand:  moderate assistance with rolling walker  Gait: 160 ft with RW with Bret, cues to stand within RW and increase gait speed. Occasional scissoring with turns. Kyphotic posture.  Balance: Sitting balance = SBA      AM-PAC 6 CLICK MOBILITY  Total Score:14       Treatment & Education:      Patient provided with verbal education education regarding PT role/goals/POC, fall prevention, safety awareness, and discharge/DME recommendations.  Understanding was verbalized, however additional teaching warranted.     Patient left up in chair with all lines intact, call button in reach, chair alarm on, geomat cushion, RN notified, and  monitoring present.    GOALS:   Multidisciplinary Problems       Physical Therapy Goals          Problem: Physical Therapy    Goal Priority Disciplines Outcome Goal Variances Interventions   Physical Therapy Goal     PT, PT/OT Progressing     Description: Goals to be met by: 9/15/24     Patient will increase functional independence with mobility by performin. Supine to sit with supervision  2. Sit to stand transfer with Supervision  3. Gait  x 200 feet with Supervision using Rolling Walker.                          History:     No past medical history on file.    No past surgical history on file.    Time Tracking:     PT Received On: 08/15/24  PT Start Time: 1515     PT Stop Time: 1540  PT Total Time (min): 25 min     Billable Minutes: Re-eval 25 min      08/15/2024

## 2024-08-15 NOTE — PLAN OF CARE
F/u with pts brother Javad about conversation yesterday with [facility] and , would like to get pt into rehab and if not accepted stated they will have to take pt back to [facility] where he resided before hospitalization.

## 2024-08-15 NOTE — PROGRESS NOTES
[facility] - 9th Floor [facility] Medicine  Progress Note    Patient Name: Kevin Horan Jr.  MRN: [id number]  Patient Class: IP- Inpatient   Admission Date: 7/19/2024  Length of Stay: 27 days  Attending Physician: Alyse Maharaj MD  Primary Care Provider: Dimitrios Mack MD (Inactive)        Subjective:     Principal Problem:SDH (subdural hematoma)        HPI:  74 y.o. male with a PMHx  of alcoholic cirrhosis, hepatitis-C, diabetes mellitus type 2, and CVA who presented to [facility] on 7/19/2024 via EMS after slipped and fell down 4 steps at home and striking head. EMS reported patient was hypoglycemic with CBG of 44 on scene.  C-collar was placed en route and 250 mL of D5W was given. Initial vital signs in ED were /77, pulse 72, respirations 16, temperature 36.9° C, and SpO2 90% on room air.  Labs revealed WBC 3.29, hemoglobin 9.8. Na 139, K 3.7, CO2 21 chloride 112, CO2 21, Cr 1.0, glucose 166, and undetectable troponin.  EKG revealed normal sinus rhythm. CT head questionable minimal subdural blood products along the falx without mass effect.  CT cervical spine  no acute injury.  CT chest abdomen and pelvis with IV contrast revealed right transverse process fractures of L3 and L4, small left pleural effusion, cirrhotic liver with right hepatic lobe mass suspicious for HCC, and small volume ascites.  Pelvis x-ray revealed no acute findings.  Chest x-ray revealed mild left basilar opacities with a small left pleural effusion.      Scalp laceration was repaired in ED.  Neurosurgery was consulted and  was admitted to trauma services.  Neurosurgery recommended no surgical intervention, Keppra for seizure prophylaxis, and blood pressure less than 150/90.  Follow-up CT head on 07/19  with no subdural hematoma. Hospital medicine was consulted for transition of care and further medical management.  PT/OT consulted; recommending moderate intensity therapy.  Patient was noted to be agitated  and restless attempting to climb out of bed for which p.r.n. IV Haldol was ordered and 1 to1 sitter placed.     Patient's  over all functional status was declining. He was needing full assist for ADLs. His mental status was very sluggish. Consulted palliative care. AFP tumor marker was neg. He is now DNR status.       hosp course c/by worsening mental status on 8/6, ct head showed 7mm sub dural hygroma with mass effect but no shift, neurosurgery informed- recommended repeat ct head on 8/7- which is the same, no new change       Overview/Hospital Course:  8/15/24-Waiting for placement.  Patient is pleasantly confused at this time and thinks he is at home.    Interval History:     Review of Systems   Unable to perform ROS: Dementia     Objective:     Vital Signs (Most Recent):  Temp: 97.6 °F (36.4 °C) (08/15/24 1122)  Pulse: (!) 57 (08/15/24 1122)  Resp: 20 (08/15/24 0733)  BP: (!) 95/54 (08/15/24 1122)  SpO2: 100 % (08/15/24 1122) Vital Signs (24h Range):  Temp:  [97.4 °F (36.3 °C)-98.8 °F (37.1 °C)] 97.6 °F (36.4 °C)  Pulse:  [57-77] 57  Resp:  [18-20] 20  SpO2:  [97 %-100 %] 100 %  BP: ()/(54-81) 95/54     Weight: 99.8 kg (220 lb 0.3 oz)  Body mass index is 29.03 kg/m².    Intake/Output Summary (Last 24 hours) at 8/15/2024 1336  Last data filed at 8/15/2024 1326  Gross per 24 hour   Intake 1080 ml   Output --   Net 1080 ml         Physical Exam  Constitutional:       General: He is awake.      Appearance: Normal appearance. He is normal weight.   HENT:      Head: Normocephalic and atraumatic.      Nose: Nose normal.      Mouth/Throat:      Mouth: Mucous membranes are moist.      Pharynx: Oropharynx is clear.   Eyes:      Extraocular Movements: Extraocular movements intact.      Pupils: Pupils are equal, round, and reactive to light.   Cardiovascular:      Rate and Rhythm: Normal rate and regular rhythm.      Pulses: Normal pulses.      Heart sounds: Normal heart sounds.   Pulmonary:      Effort: Pulmonary  "effort is normal.      Breath sounds: Normal breath sounds.   Abdominal:      General: Bowel sounds are normal.      Palpations: Abdomen is soft.   Musculoskeletal:         General: Normal range of motion.      Cervical back: Normal range of motion and neck supple.   Skin:     General: Skin is warm and dry.      Capillary Refill: Capillary refill takes 2 to 3 seconds.   Neurological:      Mental Status: He is disoriented.   Psychiatric:         Attention and Perception: He is inattentive.         Mood and Affect: Affect is labile.         Speech: Speech is delayed.         Behavior: Behavior is slowed.         Cognition and Memory: Cognition is impaired. Memory is impaired. He exhibits impaired recent memory.         Judgment: Judgment is impulsive.             Significant Labs: All pertinent labs within the past 24 hours have been reviewed.  BMP: No results for input(s): "GLU", "NA", "K", "CL", "CO2", "BUN", "CREATININE", "CALCIUM", "MG" in the last 48 hours.  CBC: No results for input(s): "WBC", "HGB", "HCT", "PLT" in the last 48 hours.  CMP: No results for input(s): "NA", "K", "CL", "CO2", "GLU", "BUN", "CREATININE", "CALCIUM", "PROT", "ALBUMIN", "BILITOT", "ALKPHOS", "AST", "ALT", "ANIONGAP", "EGFRNONAA" in the last 48 hours.    Invalid input(s): "ESTGFAFRICA"  Magnesium: No results for input(s): "MG" in the last 48 hours.    Significant Imaging: I have reviewed all pertinent imaging results/findings within the past 24 hours.    Assessment/Plan:      * SDH (subdural hematoma)  Patient has acute blood loss due to hemorrhage, the hemorrhage is due to trauma/laceration, patient does not have a propensity for bleeding.. Will trend hemoglobin/hematocrit Daily, as well as monitor and correct for any coagulation defects. CBC and vital signs have been reviewed and last CBC was noted-   Lab Results   Component Value Date    WBC 2.84 (L) 07/29/2024    HGB 11.4 (L) 07/29/2024    HCT 34.7 (L) 07/29/2024    MCV 92.8 " 07/29/2024    PLT 79 (L) 07/29/2024         Will order a type and screen and consent patient for blood transfusion. Will transfuse if Hgb is <7g/dl (<8g/dl in cases of active ACS) or if patient has rapid bleeding leading to hemodynamic instability.    Type 2 diabetes mellitus, without long-term current use of insulin  ISS      Fracture of transverse process of lumbar vertebra, closed, initial encounter  Therapy  Needs placement        VTE Risk Mitigation (From admission, onward)           Ordered     Reason for No Pharmacological VTE Prophylaxis  Once        Question:  Reasons:  Answer:  Risk of Bleeding    07/19/24 0943     IP VTE HIGH RISK PATIENT  Once         07/19/24 0943     Place sequential compression device  Until discontinued         07/19/24 0943                Waiting for placement  DNR  Therapy  OOB    Discharge Planning   VALDEZ:      Code Status: DNR   Is the patient medically ready for discharge?:     Reason for patient still in hospital (select all that apply): Patient trending condition, Laboratory test, Treatment, PT / OT recommendations, and Pending disposition  Discharge Plan A: Skilled Nursing Facility   Discharge Delays: None known at this time              Marlon Julien MD  Department of Hospital Medicine   [facility] - 9th Floor Med Surg

## 2024-08-15 NOTE — PT/OT/SLP PROGRESS
Occupational Therapy   Treatment    Name: Kevin Horan Jr.  MRN: [id number]  Admitting Diagnosis:  SDH (subdural hematoma)       Recommendations:     Recommended therapy intensity at discharge: Moderate Intensity Therapy   Discharge Equipment Recommendations:  walker, rolling  Barriers to discharge:       Assessment:     Kevin Horan Jr. is a 74 y.o. male with a medical diagnosis of SDH (subdural hematoma).  He presents with good participation. Performance deficits affecting function are weakness, impaired endurance, impaired self care skills, decreased lower extremity function, decreased safety awareness, gait instability, impaired balance, impaired functional mobility.     Rehab Prognosis:  Good; patient would benefit from acute skilled OT services to address these deficits and reach maximum level of function.       Plan:     Patient to be seen 3 x/week to address the above listed problems via self-care/home management, therapeutic activities, therapeutic exercises  Plan of Care Expires: 09/06/24  Plan of Care Reviewed with: patient    Subjective     Pain/Comfort:  Pain Rating 1: 0/10    Objective:     Communicated with: nurse prior to session.  Patient found up in chair with telemetry upon OT entry to room.    General Precautions: Standard, fall    Orthopedic Precautions:N/A  Braces: N/A  Respiratory Status: Room air     Occupational Performance:     Functional Mobility/Transfers:  Patient completed Sit <> Stand Transfer with minimum assistance  with  rolling walker   Functional Mobility: ambulate to bathroom with extra time needed with RW    Activities of Daily Living:  Grooming: minimum assistance shaving ax standing at sink, tolerating approximately 20 minutes before rest , with setup    Therapeutic Positioning    OT interventions performed during the course of today's session in an effort to prevent and/or reduce acquired pressure injuries:   Therapeutic positioning was provided at the conclusion  of session to offload all bony prominences for the prevention and/or reduction of pressure injuries    [facility] 6 Click ADL: 15    Patient Education:  Patient provided with verbal education and demonstrations education regarding fall prevention and safety awareness.  Understanding was verbalized, however additional teaching warranted.      Patient left up in chair with all lines intact and call button in reach.    GOALS:   Multidisciplinary Problems       Occupational Therapy Goals          Problem: Occupational Therapy    Goal Priority Disciplines Outcome Interventions   Occupational Therapy Goal     OT, PT/OT Progressing    Description: Goals to be met by: in 1 month      Patient will increase functional independence with ADLs by performing:    LE Dressing with Supervision.  Grooming while standing at sink with Stand-by Assistance.  Toileting from toilet with Stand-by Assistance for hygiene and clothing management.   Toilet transfer to toilet with Supervision.                         Time Tracking:     OT Date of Treatment: 08/15/24  OT Start Time: 1542  OT Stop Time: 1620  OT Total Time (min): 38 min    Billable Minutes:Self Care/Home Management 15  Therapeutic Activity 23    OT/CHUN: CHUN     Number of CHUN visits since last OT visit: 4    8/15/2024

## 2024-08-15 NOTE — NURSING
Nurses Note -- 4 Eyes      8/14/2024   10:14 PM      Skin assessed during: Q Shift Change      [x] No Altered Skin Integrity Present    []Prevention Measures Documented      [] Yes- Altered Skin Integrity Present or Discovered   [] LDA Added if Not in Epic (Describe Wound)   [] New Altered Skin Integrity was Present on Admit and Documented in LDA   [] Wound Image Taken    Wound Care Consulted? No    Attending Nurse:  Haider Bennett RN/Staff Member:  Lety RICE

## 2024-08-15 NOTE — PLAN OF CARE
SSC spoke to Ministerio at [facility] who inform me that the pt son (who resides in [place]) is wiling to pay for 1 month of nursing home stay &but would want to get reimbursed once medicaid is approved.     [email or web address] [email or web address] will not accept the pt until she has all the finances    The pt brother Javad [phone] & wife met with  yesterday & wife refuse to cooperate, it ended up in the wife calling police  to the scene, due to being pressured and grounds of restrain order protects her from the pt & family -per brother Javad    Due to the family issues and restrain order the wife has on pt, the [facility] doesn't want to accept the pt without all the bank statements. The brother Javad who has power of  over the pt has submitted the bank statement with both their names on it ( brother Javad & pt) to [facility] but their are more accounts with pt & wife that is currently  needed.    SSC spoke to pt wife Forrest Horan [phone] who is willing to submit needed finances to nursing home or myself  SSC provided wife with [facility] phone number and contact name also I sent an email to wife ([email or web address]), so she can respond with bank statements. She was concern about pt well being and is willing to assist with placement.     Pt brothcole Farnsworth is in agreement to take the brother home after he receives some rehab therapies if medicaid is not approved. He requested [facility] to accept  him for the 20 days [employer] approved pt for but [facility] denied being its a risky family situation, they will only accept the pt with finances. SSC explained this to the pt brother Javad & he requested to send referral to other rehab facilities in the area.     SSC sent new rehab referrals via [facility] to [facility], [facility], [facility] and [facility]

## 2024-08-15 NOTE — PLAN OF CARE
Received call from earnestine at [facility], pts brother Javad is ok with pt going to [facility] for ltac/rehab, they will submit for insurance auth today.

## 2024-08-15 NOTE — PLAN OF CARE
Problem: Physical Therapy  Goal: Physical Therapy Goal  Description: Goals to be met by: 9/15/24     Patient will increase functional independence with mobility by performin. Supine to sit with supervision  2. Sit to stand transfer with Supervision  3. Gait  x 200 feet with Supervision using Rolling Walker.     8/15/2024 1553 by Kristyn Jimenez, PT  Outcome: Progressing

## 2024-08-15 NOTE — HOSPITAL COURSE
Patient seen with a sitter at the bedside.  He is mostly oriented and irritated that he has mittens on and is in a roll belt.  He thinks that he needs to make his own plans to get out of the hospital and that he has tried on Saturday and failed.  He has otherwise no complaints

## 2024-08-15 NOTE — SUBJECTIVE & OBJECTIVE
Interval History:     Review of Systems   Unable to perform ROS: Dementia     Objective:     Vital Signs (Most Recent):  Temp: 97.6 °F (36.4 °C) (08/15/24 1122)  Pulse: (!) 57 (08/15/24 1122)  Resp: 20 (08/15/24 0733)  BP: (!) 95/54 (08/15/24 1122)  SpO2: 100 % (08/15/24 1122) Vital Signs (24h Range):  Temp:  [97.4 °F (36.3 °C)-98.8 °F (37.1 °C)] 97.6 °F (36.4 °C)  Pulse:  [57-77] 57  Resp:  [18-20] 20  SpO2:  [97 %-100 %] 100 %  BP: ()/(54-81) 95/54     Weight: 99.8 kg (220 lb 0.3 oz)  Body mass index is 29.03 kg/m².    Intake/Output Summary (Last 24 hours) at 8/15/2024 1336  Last data filed at 8/15/2024 1326  Gross per 24 hour   Intake 1080 ml   Output --   Net 1080 ml         Physical Exam  Constitutional:       General: He is awake.      Appearance: Normal appearance. He is normal weight.   HENT:      Head: Normocephalic and atraumatic.      Nose: Nose normal.      Mouth/Throat:      Mouth: Mucous membranes are moist.      Pharynx: Oropharynx is clear.   Eyes:      Extraocular Movements: Extraocular movements intact.      Pupils: Pupils are equal, round, and reactive to light.   Cardiovascular:      Rate and Rhythm: Normal rate and regular rhythm.      Pulses: Normal pulses.      Heart sounds: Normal heart sounds.   Pulmonary:      Effort: Pulmonary effort is normal.      Breath sounds: Normal breath sounds.   Abdominal:      General: Bowel sounds are normal.      Palpations: Abdomen is soft.   Musculoskeletal:         General: Normal range of motion.      Cervical back: Normal range of motion and neck supple.   Skin:     General: Skin is warm and dry.      Capillary Refill: Capillary refill takes 2 to 3 seconds.   Neurological:      Mental Status: He is disoriented.   Psychiatric:         Attention and Perception: He is inattentive.         Mood and Affect: Affect is labile.         Speech: Speech is delayed.         Behavior: Behavior is slowed.         Cognition and Memory: Cognition is impaired. Memory  "is impaired. He exhibits impaired recent memory.         Judgment: Judgment is impulsive.             Significant Labs: All pertinent labs within the past 24 hours have been reviewed.  BMP: No results for input(s): "GLU", "NA", "K", "CL", "CO2", "BUN", "CREATININE", "CALCIUM", "MG" in the last 48 hours.  CBC: No results for input(s): "WBC", "HGB", "HCT", "PLT" in the last 48 hours.  CMP: No results for input(s): "NA", "K", "CL", "CO2", "GLU", "BUN", "CREATININE", "CALCIUM", "PROT", "ALBUMIN", "BILITOT", "ALKPHOS", "AST", "ALT", "ANIONGAP", "EGFRNONAA" in the last 48 hours.    Invalid input(s): "ESTGFAFRICA"  Magnesium: No results for input(s): "MG" in the last 48 hours.    Significant Imaging: I have reviewed all pertinent imaging results/findings within the past 24 hours.  "

## 2024-08-15 NOTE — HPI
74 y.o. male with a PMHx  of alcoholic cirrhosis, hepatitis-C, diabetes mellitus type 2, and CVA who presented to [facility] on 7/19/2024 via EMS after slipped and fell down 4 steps at home and striking head. EMS reported patient was hypoglycemic with CBG of 44 on scene.  C-collar was placed en route and 250 mL of D5W was given. Initial vital signs in ED were /77, pulse 72, respirations 16, temperature 36.9° C, and SpO2 90% on room air.  Labs revealed WBC 3.29, hemoglobin 9.8. Na 139, K 3.7, CO2 21 chloride 112, CO2 21, Cr 1.0, glucose 166, and undetectable troponin.  EKG revealed normal sinus rhythm. CT head questionable minimal subdural blood products along the falx without mass effect.  CT cervical spine  no acute injury.  CT chest abdomen and pelvis with IV contrast revealed right transverse process fractures of L3 and L4, small left pleural effusion, cirrhotic liver with right hepatic lobe mass suspicious for HCC, and small volume ascites.  Pelvis x-ray revealed no acute findings.  Chest x-ray revealed mild left basilar opacities with a small left pleural effusion.      Scalp laceration was repaired in ED.  Neurosurgery was consulted and  was admitted to trauma services.  Neurosurgery recommended no surgical intervention, Keppra for seizure prophylaxis, and blood pressure less than 150/90.  Follow-up CT head on 07/19  with no subdural hematoma. Hospital medicine was consulted for transition of care and further medical management.  PT/OT consulted; recommending moderate intensity therapy.  Patient was noted to be agitated and restless attempting to climb out of bed for which p.r.n. IV Haldol was ordered and 1 to1 sitter placed.     Patient's  over all functional status was declining. He was needing full assist for ADLs. His mental status was very sluggish. Consulted palliative care. AFP tumor marker was neg. He is now DNR status.       hosp course c/by worsening mental status on 8/6, ct head showed 7mm sub  dural hygroma with mass effect but no shift, neurosurgery informed- recommended repeat ct head on 8/7- which is the same, no new change

## 2024-08-15 NOTE — PLAN OF CARE
SSC spoke to pt wife who is not willing to send the bank statements until she speaks to an     SSC sent updates via [facility] to [facility] via [facility]- reviewing to accept the pt

## 2024-08-15 NOTE — PROGRESS NOTES
[facility] Hospital Medicine Progress Note        Chief Complaint: Inpatient Follow-up     HPI:   74 y.o. male with a PMHx  of alcoholic cirrhosis, hepatitis-C, diabetes mellitus type 2, and CVA who presented to [facility] on 7/19/2024 via EMS after slipped and fell down 4 steps at home and striking head. EMS reported patient was hypoglycemic with CBG of 44 on scene.  C-collar was placed en route and 250 mL of D5W was given. Initial vital signs in ED were /77, pulse 72, respirations 16, temperature 36.9° C, and SpO2 90% on room air.  Labs revealed WBC 3.29, hemoglobin 9.8. Na 139, K 3.7, CO2 21 chloride 112, CO2 21, Cr 1.0, glucose 166, and undetectable troponin.  EKG revealed normal sinus rhythm. CT head questionable minimal subdural blood products along the falx without mass effect.  CT cervical spine  no acute injury.  CT chest abdomen and pelvis with IV contrast revealed right transverse process fractures of L3 and L4, small left pleural effusion, cirrhotic liver with right hepatic lobe mass suspicious for HCC, and small volume ascites.  Pelvis x-ray revealed no acute findings.  Chest x-ray revealed mild left basilar opacities with a small left pleural effusion.      Scalp laceration was repaired in ED.  Neurosurgery was consulted and  was admitted to trauma services.  Neurosurgery recommended no surgical intervention, Keppra for seizure prophylaxis, and blood pressure less than 150/90.  Follow-up CT head on 07/19  with no subdural hematoma. Hospital medicine was consulted for transition of care and further medical management.  PT/OT consulted; recommending moderate intensity therapy.  Patient was noted to be agitated and restless attempting to climb out of bed for which p.r.n. IV Haldol was ordered and 1 to1 sitter placed.     Patient's  over all functional status was declining. He was needing full assist for ADLs. His mental status was very sluggish. Consulted palliative care.  "AFP tumor marker was neg. He is now DNR status.       hosp course c/by worsening mental status on 8/6, ct head showed 7mm sub dural hygroma with mass effect but no shift, neurosurgery informed- recommended repeat ct head on 8/7- which is the same, no new change     Today:   Patient seen and examined. Sleeping; no acute events    Objective/physical exam:  General: In no acute distress, afebrile  Chest:  Unlabored breathing on room air Heart:  Normal rate  Abdomen: Soft, nontender  MSK: Warm  Neurologic: Alert    VITAL SIGNS: 24 HRS MIN & MAX LAST   Temp  Min: 96.7 °F (35.9 °C)  Max: 99 °F (37.2 °C) 97.4 °F (36.3 °C)   BP  Min: 96/58  Max: 131/68 131/68   Pulse  Min: 57  Max: 73  68   Resp  Min: 18  Max: 20 20   SpO2  Min: 97 %  Max: 99 % 99 %       No results for input(s): "WBC", "RBC", "HGB", "HCT", "MCV", "MCH", "MCHC", "RDW", "PLT", "MPV", "GRAN", "LYMPH", "MONO", "BASO", "NRBC" in the last 168 hours.    No results for input(s): "NA", "K", "CL", "CO2", "ANIONGAP", "BUN", "CREATININE", "GLU", "CALCIUM", "PH", "MG", "ALBUMIN", "PROT", "ALKPHOS", "ALT", "AST", "BILITOT" in the last 168 hours.         Microbiology Results (last 7 days)       ** No results found for the last 168 hours. **             Radiology:  CT Head Without Contrast  Narrative: EXAMINATION:  CT HEAD WITHOUT CONTRAST    CLINICAL HISTORY:  subdural followup;    TECHNIQUE:  Axial scans were obtained from skull base to the vertex.    Coronal and sagittal reconstructions obtained from the axial data.    Automatic exposure control was utilized to limit radiation dose.    Contrast: None    Radiation Dose:    Total DLP: 1194 mGy*cm    COMPARISON:  CT head dated 08/06/2024    FINDINGS:  Subdural fluid collection along the right cerebral convexity remains stable measuring up to 7 mm in thickness.  The brain parenchyma is unchanged.  There is a small area of encephalomalacia the left occipital lobe.  There is local mass effect without midline shift or " herniation.  The basal cisterns are patent.  The ventricles are stable in size.  The calvarium and skull base are intact.  The mastoid air cells are clear.  Impression: Stable exam without significant interval change.    Electronically signed by: Evelia Solorio  Date:    08/07/2024  Time:    08:05        Medications:  Scheduled Meds:   amLODIPine  10 mg Oral Daily    atorvastatin  20 mg Oral QHS    carvediloL  3.125 mg Oral BID    docusate sodium  200 mg Oral BID    insulin glargine U-100  10 Units Subcutaneous QHS    LIDOcaine  1 patch Transdermal Q24H    lisinopriL  20 mg Oral Daily    melatonin  9 mg Oral Nightly    metFORMIN  500 mg Oral BID WM    methocarbamoL  500 mg Oral BID    polyethylene glycol  17 g Oral BID    risperiDONE  0.5 mg Oral BID     Continuous Infusions:  PRN Meds:.  Current Facility-Administered Medications:     dextrose 10%, 12.5 g, Intravenous, PRN    dextrose 10%, 25 g, Intravenous, PRN    glucagon (human recombinant), 1 mg, Intramuscular, PRN    glucose, 16 g, Oral, PRN    glucose, 24 g, Oral, PRN    hydrALAZINE, 20 mg, Intravenous, Q6H PRN    insulin aspart U-100, 0-5 Units, Subcutaneous, QID (AC + HS) PRN    magnesium hydroxide 400 mg/5 ml, 30 mL, Oral, Daily PRN    oxyCODONE, 5 mg, Oral, Q4H PRN        Assessment/Plan:   Fall at home 4 steps down  Head injury with mild subdural hematoma along the falx- resolved   Right transverse process fractures of L3 and L4 due to fall  Small left pleural effusion   Cirrhosis of  liver with right hepatic lobe mass with negative AFP tumor marker   Dementia with  delirium   Physical debility      History of alcoholic cirrhosis, hepatitis-C, diabetes mellitus type 2, and prior CVA     DNR. Palliative following  PT/OT, recommended SNF  Labs previously stable, we will check p.r.n.  Case management working on nursing home placement with memory care.    Medically Stable    All diagnosis and differential diagnosis have been reviewed; assessment and plan  has been documented; I have personally reviewed the labs and test results that are presently available; I have reviewed the patients medication list; I have reviewed the consulting providers response and recommendations. I have reviewed or attempted to review medical records based upon their availability    All of the patient's questions have been  addressed and answered. Patient's is agreeable to the above stated plan. I will continue to monitor closely and make adjustments to medical management as needed.  _____________________________________________________________________    Rowan Mathias DO  Department of Hospital Medicine  [facility]  08/14/2024

## 2024-08-15 NOTE — ASSESSMENT & PLAN NOTE
Patient has acute blood loss due to hemorrhage, the hemorrhage is due to trauma/laceration, patient does not have a propensity for bleeding.. Will trend hemoglobin/hematocrit Daily, as well as monitor and correct for any coagulation defects. CBC and vital signs have been reviewed and last CBC was noted-   Lab Results   Component Value Date    WBC 2.84 (L) 07/29/2024    HGB 11.4 (L) 07/29/2024    HCT 34.7 (L) 07/29/2024    MCV 92.8 07/29/2024    PLT 79 (L) 07/29/2024         Will order a type and screen and consent patient for blood transfusion. Will transfuse if Hgb is <7g/dl (<8g/dl in cases of active ACS) or if patient has rapid bleeding leading to hemodynamic instability.

## 2024-08-16 LAB
POCT GLUCOSE: 101 MG/DL (ref 70–110)
POCT GLUCOSE: 151 MG/DL (ref 70–110)
POCT GLUCOSE: 171 MG/DL (ref 70–110)
POCT GLUCOSE: 240 MG/DL (ref 70–110)
POCT GLUCOSE: 64 MG/DL (ref 70–110)

## 2024-08-16 PROCEDURE — 94799 UNLISTED PULMONARY SVC/PX: CPT

## 2024-08-16 PROCEDURE — 25000003 PHARM REV CODE 250: Performed by: INTERNAL MEDICINE

## 2024-08-16 PROCEDURE — 25000003 PHARM REV CODE 250: Performed by: STUDENT IN AN ORGANIZED HEALTH CARE EDUCATION/TRAINING PROGRAM

## 2024-08-16 PROCEDURE — 25000003 PHARM REV CODE 250: Performed by: NURSE PRACTITIONER

## 2024-08-16 PROCEDURE — 94760 N-INVAS EAR/PLS OXIMETRY 1: CPT

## 2024-08-16 PROCEDURE — 63600175 PHARM REV CODE 636 W HCPCS: Performed by: INTERNAL MEDICINE

## 2024-08-16 PROCEDURE — 25000003 PHARM REV CODE 250

## 2024-08-16 PROCEDURE — 99900031 HC PATIENT EDUCATION (STAT)

## 2024-08-16 PROCEDURE — 99900035 HC TECH TIME PER 15 MIN (STAT)

## 2024-08-16 PROCEDURE — 11000001 HC ACUTE MED/SURG PRIVATE ROOM

## 2024-08-16 RX ORDER — AMLODIPINE BESYLATE 5 MG/1
5 TABLET ORAL DAILY
Status: DISCONTINUED | OUTPATIENT
Start: 2024-08-17 | End: 2024-08-19

## 2024-08-16 RX ADMIN — METFORMIN HYDROCHLORIDE 500 MG: 500 TABLET, FILM COATED ORAL at 09:08

## 2024-08-16 RX ADMIN — RISPERIDONE 0.5 MG: 0.25 TABLET, FILM COATED ORAL at 09:08

## 2024-08-16 RX ADMIN — POLYETHYLENE GLYCOL 3350 17 G: 17 POWDER, FOR SOLUTION ORAL at 09:08

## 2024-08-16 RX ADMIN — CARVEDILOL 3.12 MG: 3.12 TABLET, FILM COATED ORAL at 09:08

## 2024-08-16 RX ADMIN — METHOCARBAMOL 500 MG: 500 TABLET ORAL at 09:08

## 2024-08-16 RX ADMIN — METFORMIN HYDROCHLORIDE 500 MG: 500 TABLET, FILM COATED ORAL at 04:08

## 2024-08-16 RX ADMIN — DOCUSATE SODIUM 200 MG: 100 CAPSULE, LIQUID FILLED ORAL at 09:08

## 2024-08-16 RX ADMIN — LIDOCAINE PATCH 5% 1 PATCH: 700 PATCH TOPICAL at 11:08

## 2024-08-16 RX ADMIN — Medication 9 MG: at 09:08

## 2024-08-16 RX ADMIN — INSULIN GLARGINE 10 UNITS: 100 INJECTION, SOLUTION SUBCUTANEOUS at 10:08

## 2024-08-16 RX ADMIN — ATORVASTATIN CALCIUM 20 MG: 10 TABLET, FILM COATED ORAL at 09:08

## 2024-08-16 NOTE — PROGRESS NOTES
[facility] - 9th Floor [facility] MEDICINE ~ PROGRESS NOTE    CHIEF COMPLAINT   Hospital follow up for SDH    HOSPITAL COURSE   74 y.o. male with a PMHx  of alcoholic cirrhosis, hepatitis-C, diabetes mellitus type 2, and CVA who presented to [facility] on 7/19/2024 via EMS after slipped and fell down 4 steps at home and striking head. EMS reported patient was hypoglycemic with CBG of 44 on scene.  C-collar was placed en route and 250 mL of D5W was given. Initial vital signs in ED were /77, pulse 72, respirations 16, temperature 36.9° C, and SpO2 90% on room air.  Labs revealed WBC 3.29, hemoglobin 9.8. Na 139, K 3.7, CO2 21 chloride 112, CO2 21, Cr 1.0, glucose 166, and undetectable troponin.  EKG revealed normal sinus rhythm. CT head questionable minimal subdural blood products along the falx without mass effect.  CT cervical spine  no acute injury.  CT chest abdomen and pelvis with IV contrast revealed right transverse process fractures of L3 and L4, small left pleural effusion, cirrhotic liver with right hepatic lobe mass suspicious for HCC, and small volume ascites.  Pelvis x-ray revealed no acute findings.  Chest x-ray revealed mild left basilar opacities with a small left pleural effusion.      Scalp laceration was repaired in ED.  Neurosurgery was consulted and  was admitted to trauma services.  Neurosurgery recommended no surgical intervention, Keppra for seizure prophylaxis, and blood pressure less than 150/90.  Follow-up CT head on 07/19  with no subdural hematoma. Hospital medicine was consulted for transition of care and further medical management.  PT/OT consulted; recommending moderate intensity therapy.  Patient was noted to be agitated and restless attempting to climb out of bed for which p.r.n. IV Haldol was ordered and 1 to1 sitter placed.     Patient's  over all functional status was declining. He was needing full assist for ADLs. His mental status was very sluggish.  Consulted palliative care. AFP tumor marker was neg. He is now DNR status.      Hosp course complicated by worsening mental status on 8/6, ct head showed 7mm SDH with mass effect but no midline shift, neurosurgery informed- recommended repeat ct head on 8/7- which showed no new change.    Today  Patient noted to have occasional hypotensive episodes, asymptomatic. Reduced his dose of amlodipine on 08/16, may need to further down titrate his blood pressure medications.  Pending authorization for discharge to [facility] as of 8/16  OBJECTIVE/PHYSICAL EXAM     VITAL SIGNS (MOST RECENT):  Temp: 98 °F (36.7 °C) (08/16/24 1119)  Pulse: (!) 59 (08/16/24 1119)  Resp: (!) 23 (08/16/24 1119)  BP: (!) 103/56 (08/16/24 1119)  SpO2: 97 % (08/16/24 1119) VITAL SIGNS (24 HOUR RANGE):  Temp:  [97.7 °F (36.5 °C)-98.3 °F (36.8 °C)] 98 °F (36.7 °C)  Pulse:  [59-69] 59  Resp:  [16-23] 23  SpO2:  [96 %-99 %] 97 %  BP: (101-127)/(50-68) 103/56   GENERAL: In no acute distress, afebrile  HEENT:PERRLA  CHEST: Clear to auscultation bilaterally  HEART: S1, S2, no appreciable murmur  ABDOMEN: Soft, nontender, BS +  MSK: Warm, no lower extremity edema, no clubbing or cyanosis  NEUROLOGIC: Alert and oriented x4, moving all extremities with good strength     ASSESSMENT/PLAN   Fall at home 4 steps down  Head injury with mild subdural hematoma along the falx- resolved   Right transverse process fractures of L3 and L4 due to fall  Small left pleural effusion   Cirrhosis of  liver with right hepatic lobe mass with negative AFP tumor marker   Dementia with  delirium   Physical debility      History of alcoholic cirrhosis, hepatitis-C, diabetes mellitus type 2, and prior CVA     DNR. Palliative following  PT/OT, recommended SNF  Labs previously stable, we will check p.r.n.  Case management working on nursing home placement with memory care.    Medically Stable    DVT prophylaxis: SCD in setting of SDH  Anticipated discharge and disposition: [facility] when auth is  "obtained  __________________________________________________________________________    NUTRITIONAL STATUS     Patient meets ASPEN criteria for   malnutrition of   per RD assessment as evidenced by:                       A minimum of two characteristics is recommended for diagnosis of either severe or non-severe malnutrition.     LABS/MICRO/MEDS/DIAGNOSTICS       LABS  No results for input(s): "NA", "K", "CHLORIDE", "CO2", "BUN", "CREATININE", "GLUCOSE", "CALCIUM", "ALKPHOS", "AST", "ALT", "ALBUMIN" in the last 72 hours.  No results for input(s): "WBC", "RBC", "HCT", "MCV", "PLT" in the last 72 hours.    Invalid input(s): "HG"    MICROBIOLOGY  Microbiology Results (last 7 days)       ** No results found for the last 168 hours. **               MEDICATIONS   [START ON 8/17/2024] amLODIPine  5 mg Oral Daily    atorvastatin  20 mg Oral QHS    carvediloL  3.125 mg Oral BID    docusate sodium  200 mg Oral BID    insulin glargine U-100  10 Units Subcutaneous QHS    LIDOcaine  1 patch Transdermal Q24H    lisinopriL  20 mg Oral Daily    melatonin  9 mg Oral Nightly    metFORMIN  500 mg Oral BID WM    methocarbamoL  500 mg Oral BID    polyethylene glycol  17 g Oral BID    risperiDONE  0.5 mg Oral BID         INFUSIONS         DIAGNOSTIC TESTS  CT Head Without Contrast   Final Result      Stable exam without significant interval change.         Electronically signed by: Evelia Solorio   Date:    08/07/2024   Time:    08:05      CT Head Without Contrast   Final Result      1. Small subdural hygroma along the right cerebral convexity.  No midline shift.   2. Chronic microvascular ischemic changes.         Electronically signed by: Evelia Solorio   Date:    08/06/2024   Time:    15:30      CT Head Without Contrast   Final Result      No subdural hematoma seen today's examination.         Electronically signed by: Robin Ellis   Date:    07/19/2024   Time:    15:00      X-ray Shoulder 2 or More Views Right   Final Result    "   No osseous abnormality identified.         Electronically signed by: David Erazo   Date:    07/19/2024   Time:    09:19      X-Ray Pelvis Routine AP   Final Result      No acute findings.         Electronically signed by: Surinder Danielle   Date:    07/19/2024   Time:    07:10      X-Ray Chest 1 View   Final Result      Mild left basilar opacities with a small left pleural effusion.         Electronically signed by: Surinder Danielle   Date:    07/19/2024   Time:    06:55      CT Head Without Contrast   Final Result      Question minimal subdural blood products along the falx without mass effect.      Findings discussed with Dr. Maddox at 713 on 7/19/2024.         Electronically signed by: Surinder Danielle   Date:    07/19/2024   Time:    07:14      CT Cervical Spine Without Contrast   Final Result      No acute bony injury of the cervical spine.         Electronically signed by: Surinder Danielle   Date:    07/19/2024   Time:    07:13      CT Chest Abdomen Pelvis With IV Contrast (XPD) NO Oral Contrast   Final Result      1. Right transverse process fractures L3 and L4.   2. Small left pleural effusion.   3. Cirrhotic liver with right hepatic lobe mass suspicious for HCC.  Recommend outpatient liver mass protocol CT.   4. Small volume ascites.         Electronically signed by: Surinder Danielle   Date:    07/19/2024   Time:    07:32           No echocardiogram results found for the past 14 days.         Case related differential diagnoses have been reviewed; assessment and plan has been documented. I have personally reviewed the labs and test results that are currently available; I have reviewed the patients medication list. I have reviewed the consulting providers recommendations. I have reviewed or attempted to review medical records based upon their availability.  All of the patient's and/or family's questions have been addressed and answered to the best of my ability.  I will continue to monitor closely and make adjustments to  medical management as needed.  This document was created using M*Modal Fluency Direct.  Transcription errors may have been made.  Please contact me if any questions may rise regarding documentation to clarify transcription.        Thairy G Reyes, DO   Internal Medicine  Department of Hospital Medicine  [facility] - 9th Floor Med Surg

## 2024-08-16 NOTE — PROGRESS NOTES
"Inpatient Nutrition Evaluation    Admit Date: 7/19/2024   Total duration of encounter: 28 days    Nutrition Recommendation/Prescription     Continue oral diet as tolerated; currently Diet diabetic Low Sodium,2gm; 2800 Calorie     Provide assistance with feeding as needed      Nutrition Assessment     Chart Review    Reason Seen: length of stay    Malnutrition Screening Tool Results   Have you recently lost weight without trying?: No  Have you been eating poorly because of a decreased appetite?: No   MST Score: 0     Diagnosis:  S/p ground level fall  Questionable subdural along the phalanx-no ICH on repeat scan  Right transverse process fractures of L3 and L4   Small left pleural effusion   Cirrhotic liver with right hepatic lobe mass suspicious for HCC  Agitation  Dementia?    Relevant Medical History: alcoholic cirrhosis, hepatitis-C, diabetes mellitus type 2, and CVA     Nutrition-Related Medications: Scheduled Medications:  amLODIPine, 10 mg, Daily  atorvastatin, 20 mg, QHS  carvediloL, 3.125 mg, BID  docusate sodium, 200 mg, BID  insulin glargine U-100, 10 Units, QHS  LIDOcaine, 1 patch, Q24H  lisinopriL, 20 mg, Daily  melatonin, 9 mg, Nightly  metFORMIN, 500 mg, BID WM  methocarbamoL, 500 mg, BID  polyethylene glycol, 17 g, BID  risperiDONE, 0.5 mg, BID    Continuous Infusions:   PRN Medications:    amLODIPine tablet 10 mg    atorvastatin tablet 20 mg    carvediloL tablet 3.125 mg    docusate sodium capsule 200 mg    insulin glargine U-100 (Lantus) injection 10 Units    LIDOcaine 5 % patch 1 patch    lisinopriL tablet 20 mg    melatonin tablet 9 mg    metFORMIN tablet 500 mg    methocarbamoL tablet 500 mg    polyethylene glycol packet 17 g    risperiDONE tablet 0.5 mg        Nutrition-Related Labs:  No results for input(s): "NA", "K", "CALCIUM", "PHOS", "MG", "CHLORIDE", "CO2", "BUN", "CREATININE", "EGFRNORACEVR", "GLUCOSE", "BILITOT", "ALKPHOS", "ALT", "AST", "ALBUMIN", "PREALB", "CRP", "HSCRP", "TRIG", " ""HGBA1C", "AMMONIA", "LIPASE", "AMYLASE", "WBC", "HGB", "HCT" in the last 168 hours.       Diet Order: Diet diabetic Low Sodium,2gm; 2800 Calorie  Oral Supplement Order: none  Appetite/Oral Intake: good/% of meals  Factors Affecting Nutritional Intake: none identified  Food/Roman Catholic/Cultural Preferences: none reported  Food Allergies: no known food allergies    Skin Integrity: intact  Wound(s): [REMOVED]      Wound 07/20/24 0800 Laceration Occipital region-Tissue loss description: Full thickness     Comments    7/26/24 pt tolerating oral diet, eating % of most meals. Some weight loss noted since April, will monitor. Increased calorie limit to allow for adequate protein intake for wound healing. Will add Prosource for additional protein    8/2/24 pt eating 100% of meals    8/9/24 pt eating 100% of most meals per nursing, needs assistance with feeding; not taking prosource so will d/c; currently out of magic cup in the kitchen    8/16/24 pt tolerating oral diet, 100% intake of most meals    Anthropometrics    Height: 6' 1" (185.4 cm) Height Method: Stated  Last Weight: 99.8 kg (220 lb 0.3 oz) (07/30/24 1443) Weight Method: Standard Scale  BMI (Calculated): 29  BMI Classification: overweight (BMI 25-29.9)        Ideal Body Weight (IBW), Male: 184 lb     % Ideal Body Weight, Male (lb): 119.58 %                          Usual Weight Provided By: EMR weight history    Wt Readings from Last 5 Encounters:   07/30/24 99.8 kg (220 lb 0.3 oz)   04/26/24 104.3 kg (230 lb)   01/15/20 99.5 kg (219 lb 6.1 oz)     Weight Change(s) Since Admission:  Admit Weight: 99.8 kg (220 lb) (07/19/24 0614)      Patient Education    Not applicable.    Monitoring & Evaluation     Dietitian will monitor food and beverage intake and weight change.  Nutrition Risk/Follow-Up: low (follow-up in 5-7 days)  Patients assigned 'low nutrition risk' status do not qualify for a full nutritional assessment but will be monitored and re-evaluated " in a 5-7 day time period. Please consult if re-evaluation needed sooner.

## 2024-08-16 NOTE — PLAN OF CARE
Problem: Adult Inpatient Plan of Care  Goal: Plan of Care Review  8/16/2024 1504 by Jacey Martins RN  Outcome: Progressing  8/16/2024 0748 by Jacey Martins RN  Outcome: Progressing  Goal: Patient-Specific Goal (Individualized)  8/16/2024 1504 by Jacey Martins RN  Outcome: Progressing  8/16/2024 0748 by Jacey Martins RN  Outcome: Progressing  Goal: Absence of Hospital-Acquired Illness or Injury  8/16/2024 1504 by Jacey Martins RN  Outcome: Progressing  8/16/2024 0748 by Jacey Martins RN  Outcome: Progressing  Goal: Optimal Comfort and Wellbeing  8/16/2024 1504 by Jacey Martins RN  Outcome: Progressing  8/16/2024 0748 by Jacey Martins RN  Outcome: Progressing  Goal: Readiness for Transition of Care  8/16/2024 1504 by Jacey Martins RN  Outcome: Progressing  8/16/2024 0748 by Jacey Martins RN  Outcome: Progressing

## 2024-08-16 NOTE — PLAN OF CARE
Problem: Adult Inpatient Plan of Care  Goal: Plan of Care Review  Outcome: Progressing  Goal: Patient-Specific Goal (Individualized)  Outcome: Progressing  Goal: Absence of Hospital-Acquired Illness or Injury  Outcome: Progressing  Goal: Optimal Comfort and Wellbeing  Outcome: Progressing  Goal: Readiness for Transition of Care  Outcome: Progressing     Problem: Wound  Goal: Optimal Coping  Outcome: Progressing  Goal: Optimal Functional Ability  Outcome: Progressing  Goal: Absence of Infection Signs and Symptoms  Outcome: Progressing  Goal: Improved Oral Intake  Outcome: Progressing  Goal: Optimal Pain Control and Function  Outcome: Progressing  Goal: Skin Health and Integrity  Outcome: Progressing  Goal: Optimal Wound Healing  Outcome: Progressing     Problem: Fall Injury Risk  Goal: Absence of Fall and Fall-Related Injury  Outcome: Progressing     Problem: Skin Injury Risk Increased  Goal: Skin Health and Integrity  Outcome: Progressing     Problem: Coping Ineffective  Goal: Effective Coping  Outcome: Progressing     Problem: Diabetes Comorbidity  Goal: Blood Glucose Level Within Targeted Range  Outcome: Progressing

## 2024-08-16 NOTE — PT/OT/SLP PROGRESS
Physical Therapy      Patient Name:  Kevin Lintoneusebiojanessa    MRN:  [id number]    Patient refused PT today stating he was tired and comfortable in bed . Will follow-up as schedule permits.

## 2024-08-16 NOTE — NURSING
Nurses Note -- 4 Eyes      8/15/2024   9:46 PM      Skin assessed during: Q Shift Change      [x] No Altered Skin Integrity Present    []Prevention Measures Documented      [] Yes- Altered Skin Integrity Present or Discovered   [] LDA Added if Not in Epic (Describe Wound)   [] New Altered Skin Integrity was Present on Admit and Documented in LDA   [] Wound Image Taken    Wound Care Consulted? No    Attending Nurse:  Haider Bennett RN/Staff Member: Clarisse SCHROEDER

## 2024-08-16 NOTE — PLAN OF CARE
[facility] sent updates via [employer] to [place] via [employer] - auth is pending per Trish    9:34am [employer] denied LTAC- per Trish  Insurance is reviewing for Inpt Rehab

## 2024-08-16 NOTE — PLAN OF CARE
Spoke to Jacqueline at [facility]- denied due to can't meet the pt needs they dont have beds available [phone]

## 2024-08-16 NOTE — PLAN OF CARE
Problem: Adult Inpatient Plan of Care  Goal: Plan of Care Review  8/16/2024 1707 by Jacey Martins RN  Outcome: Progressing  8/16/2024 1504 by Jacey Martins RN  Outcome: Progressing  8/16/2024 0748 by Jacey Martins RN  Outcome: Progressing  Goal: Patient-Specific Goal (Individualized)  8/16/2024 1707 by Jacey Martins RN  Outcome: Progressing  8/16/2024 1504 by Jacey Martins RN  Outcome: Progressing  8/16/2024 0748 by Jacey Martins RN  Outcome: Progressing  Goal: Absence of Hospital-Acquired Illness or Injury  8/16/2024 1707 by Jacey Martins RN  Outcome: Progressing  8/16/2024 1504 by Jacey Martins RN  Outcome: Progressing  8/16/2024 0748 by Jacey Martins RN  Outcome: Progressing  Goal: Optimal Comfort and Wellbeing  8/16/2024 1707 by Jacey Martins RN  Outcome: Progressing  8/16/2024 1504 by Jacey Martins RN  Outcome: Progressing  8/16/2024 0748 by Jacey Martins RN  Outcome: Progressing  Goal: Readiness for Transition of Care  8/16/2024 1707 by Jacey Martins RN  Outcome: Progressing  8/16/2024 1504 by Jacey Martins RN  Outcome: Progressing  8/16/2024 0748 by Jacey Martins RN  Outcome: Progressing

## 2024-08-17 LAB
ANION GAP SERPL CALC-SCNC: 6 MEQ/L
BASOPHILS # BLD AUTO: 0.02 X10(3)/MCL
BASOPHILS NFR BLD AUTO: 0.7 %
BUN SERPL-MCNC: 31.3 MG/DL (ref 8.4–25.7)
CALCIUM SERPL-MCNC: 9.1 MG/DL (ref 8.8–10)
CHLORIDE SERPL-SCNC: 110 MMOL/L (ref 98–107)
CO2 SERPL-SCNC: 20 MMOL/L (ref 23–31)
CREAT SERPL-MCNC: 1.11 MG/DL (ref 0.73–1.18)
CREAT/UREA NIT SERPL: 28
EOSINOPHIL # BLD AUTO: 0.22 X10(3)/MCL (ref 0–0.9)
EOSINOPHIL NFR BLD AUTO: 7.4 %
ERYTHROCYTE [DISTWIDTH] IN BLOOD BY AUTOMATED COUNT: 13.7 % (ref 11.5–17)
GFR SERPLBLD CREATININE-BSD FMLA CKD-EPI: >60 ML/MIN/1.73/M2
GLUCOSE SERPL-MCNC: 126 MG/DL (ref 82–115)
HCT VFR BLD AUTO: 33.7 % (ref 42–52)
HGB BLD-MCNC: 11.2 G/DL (ref 14–18)
IMM GRANULOCYTES # BLD AUTO: 0.01 X10(3)/MCL (ref 0–0.04)
IMM GRANULOCYTES NFR BLD AUTO: 0.3 %
LYMPHOCYTES # BLD AUTO: 0.72 X10(3)/MCL (ref 0.6–4.6)
LYMPHOCYTES NFR BLD AUTO: 24.3 %
MCH RBC QN AUTO: 30.6 PG (ref 27–31)
MCHC RBC AUTO-ENTMCNC: 33.2 G/DL (ref 33–36)
MCV RBC AUTO: 92.1 FL (ref 80–94)
MONOCYTES # BLD AUTO: 0.31 X10(3)/MCL (ref 0.1–1.3)
MONOCYTES NFR BLD AUTO: 10.5 %
NEUTROPHILS # BLD AUTO: 1.68 X10(3)/MCL (ref 2.1–9.2)
NEUTROPHILS NFR BLD AUTO: 56.8 %
NRBC BLD AUTO-RTO: 0 %
PLATELET # BLD AUTO: 63 X10(3)/MCL (ref 130–400)
PMV BLD AUTO: 12.5 FL (ref 7.4–10.4)
POCT GLUCOSE: 193 MG/DL (ref 70–110)
POCT GLUCOSE: 195 MG/DL (ref 70–110)
POTASSIUM SERPL-SCNC: 4.7 MMOL/L (ref 3.5–5.1)
RBC # BLD AUTO: 3.66 X10(6)/MCL (ref 4.7–6.1)
SODIUM SERPL-SCNC: 136 MMOL/L (ref 136–145)
WBC # BLD AUTO: 2.96 X10(3)/MCL (ref 4.5–11.5)

## 2024-08-17 PROCEDURE — 25000003 PHARM REV CODE 250: Performed by: NURSE PRACTITIONER

## 2024-08-17 PROCEDURE — 94799 UNLISTED PULMONARY SVC/PX: CPT

## 2024-08-17 PROCEDURE — 25000003 PHARM REV CODE 250: Performed by: STUDENT IN AN ORGANIZED HEALTH CARE EDUCATION/TRAINING PROGRAM

## 2024-08-17 PROCEDURE — 25000003 PHARM REV CODE 250: Performed by: INTERNAL MEDICINE

## 2024-08-17 PROCEDURE — 85025 COMPLETE CBC W/AUTO DIFF WBC: CPT | Performed by: STUDENT IN AN ORGANIZED HEALTH CARE EDUCATION/TRAINING PROGRAM

## 2024-08-17 PROCEDURE — 36415 COLL VENOUS BLD VENIPUNCTURE: CPT | Performed by: STUDENT IN AN ORGANIZED HEALTH CARE EDUCATION/TRAINING PROGRAM

## 2024-08-17 PROCEDURE — 80048 BASIC METABOLIC PNL TOTAL CA: CPT | Performed by: STUDENT IN AN ORGANIZED HEALTH CARE EDUCATION/TRAINING PROGRAM

## 2024-08-17 PROCEDURE — 25000003 PHARM REV CODE 250

## 2024-08-17 PROCEDURE — 11000001 HC ACUTE MED/SURG PRIVATE ROOM

## 2024-08-17 RX ORDER — POLYETHYLENE GLYCOL 3350 17 G/17G
17 POWDER, FOR SOLUTION ORAL 2 TIMES DAILY PRN
Status: DISCONTINUED | OUTPATIENT
Start: 2024-08-17 | End: 2024-08-23 | Stop reason: HOSPADM

## 2024-08-17 RX ORDER — DOCUSATE SODIUM 100 MG/1
200 CAPSULE, LIQUID FILLED ORAL 2 TIMES DAILY PRN
Status: DISCONTINUED | OUTPATIENT
Start: 2024-08-17 | End: 2024-08-23 | Stop reason: HOSPADM

## 2024-08-17 RX ADMIN — METFORMIN HYDROCHLORIDE 500 MG: 500 TABLET, FILM COATED ORAL at 08:08

## 2024-08-17 RX ADMIN — AMLODIPINE BESYLATE 5 MG: 5 TABLET ORAL at 08:08

## 2024-08-17 RX ADMIN — CARVEDILOL 3.12 MG: 3.12 TABLET, FILM COATED ORAL at 08:08

## 2024-08-17 RX ADMIN — OXYCODONE HYDROCHLORIDE 5 MG: 5 TABLET ORAL at 04:08

## 2024-08-17 RX ADMIN — RISPERIDONE 0.5 MG: 0.25 TABLET, FILM COATED ORAL at 09:08

## 2024-08-17 RX ADMIN — METHOCARBAMOL 500 MG: 500 TABLET ORAL at 09:08

## 2024-08-17 RX ADMIN — LISINOPRIL 20 MG: 20 TABLET ORAL at 08:08

## 2024-08-17 RX ADMIN — METFORMIN HYDROCHLORIDE 500 MG: 500 TABLET, FILM COATED ORAL at 04:08

## 2024-08-17 RX ADMIN — LIDOCAINE PATCH 5% 1 PATCH: 700 PATCH TOPICAL at 08:08

## 2024-08-17 RX ADMIN — Medication 9 MG: at 09:08

## 2024-08-17 RX ADMIN — METHOCARBAMOL 500 MG: 500 TABLET ORAL at 08:08

## 2024-08-17 RX ADMIN — RISPERIDONE 0.5 MG: 0.25 TABLET, FILM COATED ORAL at 08:08

## 2024-08-17 NOTE — PROGRESS NOTES
[facility] - 9th Floor [facility] MEDICINE ~ PROGRESS NOTE    CHIEF COMPLAINT   Hospital follow up for SDH    HOSPITAL COURSE   74 y.o. male with a PMHx  of alcoholic cirrhosis, hepatitis-C, diabetes mellitus type 2, and CVA who presented to [facility] on 7/19/2024 via EMS after slipped and fell down 4 steps at home and striking head. EMS reported patient was hypoglycemic with CBG of 44 on scene.  C-collar was placed en route and 250 mL of D5W was given. Initial vital signs in ED were /77, pulse 72, respirations 16, temperature 36.9° C, and SpO2 90% on room air.  Labs revealed WBC 3.29, hemoglobin 9.8. Na 139, K 3.7, CO2 21 chloride 112, CO2 21, Cr 1.0, glucose 166, and undetectable troponin.  EKG revealed normal sinus rhythm. CT head questionable minimal subdural blood products along the falx without mass effect.  CT cervical spine  no acute injury.  CT chest abdomen and pelvis with IV contrast revealed right transverse process fractures of L3 and L4, small left pleural effusion, cirrhotic liver with right hepatic lobe mass suspicious for HCC, and small volume ascites.  Pelvis x-ray revealed no acute findings.  Chest x-ray revealed mild left basilar opacities with a small left pleural effusion.      Scalp laceration was repaired in ED.  Neurosurgery was consulted and  was admitted to trauma services.  Neurosurgery recommended no surgical intervention, Keppra for seizure prophylaxis, and blood pressure less than 150/90.  Follow-up CT head on 07/19  with no subdural hematoma. Hospital medicine was consulted for transition of care and further medical management.  PT/OT consulted; recommending moderate intensity therapy.  Patient was noted to be agitated and restless attempting to climb out of bed for which p.r.n. IV Haldol was ordered and 1 to1 sitter placed.     Patient's  over all functional status was declining. He was needing full assist for ADLs. His mental status was very sluggish.  Consulted palliative care. AFP tumor marker was neg. He is now DNR status.      Hosp course complicated by worsening mental status on 8/6, ct head showed 7mm SDH with mass effect but no midline shift, neurosurgery informed- recommended repeat ct head on 8/7- which showed no new change.    Pending authorization for discharge to [facility] as of 8/16    Today  Seen sitting up in bed, no acute complaints.  No events overnight.  OBJECTIVE/PHYSICAL EXAM     VITAL SIGNS (MOST RECENT):  Temp: 97.4 °F (36.3 °C) (08/17/24 0754)  Pulse: 62 (08/17/24 0846)  Resp: 18 (08/17/24 0754)  BP: (!) 144/73 (08/17/24 0846)  SpO2: 96 % (08/17/24 0754) VITAL SIGNS (24 HOUR RANGE):  Temp:  [97.4 °F (36.3 °C)-98 °F (36.7 °C)] 97.4 °F (36.3 °C)  Pulse:  [59-70] 62  Resp:  [18-23] 18  SpO2:  [96 %-100 %] 96 %  BP: ()/(52-73) 144/73   GENERAL: In no acute distress, afebrile  HEENT:PERRLA  CHEST: Clear to auscultation bilaterally  HEART: S1, S2, no appreciable murmur  ABDOMEN: Soft, nontender, BS +  MSK: Warm, no lower extremity edema, no clubbing or cyanosis  NEUROLOGIC: Alert and oriented x4, moving all extremities with good strength     ASSESSMENT/PLAN   Fall at home 4 steps down  Head injury with mild subdural hematoma along the falx- resolved   Right transverse process fractures of L3 and L4 due to fall  Small left pleural effusion   Cirrhosis of  liver with right hepatic lobe mass with negative AFP tumor marker   Dementia with  delirium   Physical debility   Chronic thrombocytopenia-stable     History of alcoholic cirrhosis, hepatitis-C, diabetes mellitus type 2, and prior CVA     DNR. Palliative following  PT/OT, recommended SNF  Labs previously stable, we will check p.r.n.  Case management working on nursing home placement with memory care.    Medically Stable    DVT prophylaxis: SCD in setting of SDH  Anticipated discharge and disposition: [facility] when auth is  obtained  __________________________________________________________________________    NUTRITIONAL STATUS     Patient meets ASPEN criteria for   malnutrition of   per RD assessment as evidenced by:                       A minimum of two characteristics is recommended for diagnosis of either severe or non-severe malnutrition.     LABS/MICRO/MEDS/DIAGNOSTICS       LABS  Recent Labs     08/17/24  0640      K 4.7   CO2 20*   BUN 31.3*   CREATININE 1.11   GLUCOSE 126*   CALCIUM 9.1     Recent Labs     08/17/24  0640   WBC 2.96*   RBC 3.66*   HCT 33.7*   MCV 92.1   PLT 63*       MICROBIOLOGY  Microbiology Results (last 7 days)       ** No results found for the last 168 hours. **               MEDICATIONS   amLODIPine  5 mg Oral Daily    atorvastatin  20 mg Oral QHS    carvediloL  3.125 mg Oral BID    insulin glargine U-100  10 Units Subcutaneous QHS    LIDOcaine  1 patch Transdermal Q24H    lisinopriL  20 mg Oral Daily    melatonin  9 mg Oral Nightly    metFORMIN  500 mg Oral BID WM    methocarbamoL  500 mg Oral BID    risperiDONE  0.5 mg Oral BID         INFUSIONS         DIAGNOSTIC TESTS  CT Head Without Contrast   Final Result      Stable exam without significant interval change.         Electronically signed by: Evelia Solorio   Date:    08/07/2024   Time:    08:05      CT Head Without Contrast   Final Result      1. Small subdural hygroma along the right cerebral convexity.  No midline shift.   2. Chronic microvascular ischemic changes.         Electronically signed by: Evelia Solorio   Date:    08/06/2024   Time:    15:30      CT Head Without Contrast   Final Result      No subdural hematoma seen today's examination.         Electronically signed by: Robin Ellis   Date:    07/19/2024   Time:    15:00      X-ray Shoulder 2 or More Views Right   Final Result      No osseous abnormality identified.         Electronically signed by: David Erazo   Date:    07/19/2024   Time:    09:19      X-Ray Pelvis  Routine AP   Final Result      No acute findings.         Electronically signed by: Surinder Danielle   Date:    07/19/2024   Time:    07:10      X-Ray Chest 1 View   Final Result      Mild left basilar opacities with a small left pleural effusion.         Electronically signed by: Surinder Danielle   Date:    07/19/2024   Time:    06:55      CT Head Without Contrast   Final Result      Question minimal subdural blood products along the falx without mass effect.      Findings discussed with Dr. Maddox at 713 on 7/19/2024.         Electronically signed by: Surinder Danielle   Date:    07/19/2024   Time:    07:14      CT Cervical Spine Without Contrast   Final Result      No acute bony injury of the cervical spine.         Electronically signed by: Surinder Danielle   Date:    07/19/2024   Time:    07:13      CT Chest Abdomen Pelvis With IV Contrast (XPD) NO Oral Contrast   Final Result      1. Right transverse process fractures L3 and L4.   2. Small left pleural effusion.   3. Cirrhotic liver with right hepatic lobe mass suspicious for HCC.  Recommend outpatient liver mass protocol CT.   4. Small volume ascites.         Electronically signed by: Surinder Danielle   Date:    07/19/2024   Time:    07:32           No echocardiogram results found for the past 14 days.         Case related differential diagnoses have been reviewed; assessment and plan has been documented. I have personally reviewed the labs and test results that are currently available; I have reviewed the patients medication list. I have reviewed the consulting providers recommendations. I have reviewed or attempted to review medical records based upon their availability.  All of the patient's and/or family's questions have been addressed and answered to the best of my ability.  I will continue to monitor closely and make adjustments to medical management as needed.  This document was created using M*Modal Fluency Direct.  Transcription errors may have been made.  Please  contact me if any questions may rise regarding documentation to clarify transcription.        Thairy G Reyes, DO   Internal Medicine  Department of [facility] Medicine  [facility] - 9th Floor Med Surg

## 2024-08-17 NOTE — PLAN OF CARE
Problem: Adult Inpatient Plan of Care  Goal: Plan of Care Review  Outcome: Progressing  Goal: Patient-Specific Goal (Individualized)  Outcome: Progressing  Goal: Absence of Hospital-Acquired Illness or Injury  Outcome: Progressing  Goal: Optimal Comfort and Wellbeing  Outcome: Progressing  Goal: Readiness for Transition of Care  Outcome: Progressing     Problem: Fall Injury Risk  Goal: Absence of Fall and Fall-Related Injury  Outcome: Progressing     Problem: Skin Injury Risk Increased  Goal: Skin Health and Integrity  Outcome: Progressing     Problem: Coping Ineffective  Goal: Effective Coping  Outcome: Progressing

## 2024-08-17 NOTE — PLAN OF CARE
Problem: Adult Inpatient Plan of Care  Goal: Plan of Care Review  Outcome: Progressing  Goal: Patient-Specific Goal (Individualized)  Outcome: Progressing  Goal: Absence of Hospital-Acquired Illness or Injury  Outcome: Progressing  Goal: Optimal Comfort and Wellbeing  Outcome: Progressing  Goal: Readiness for Transition of Care  Outcome: Progressing     Problem: Wound  Goal: Optimal Coping  Outcome: Progressing  Goal: Optimal Functional Ability  Outcome: Progressing  Goal: Absence of Infection Signs and Symptoms  Outcome: Progressing  Goal: Improved Oral Intake  Outcome: Progressing  Goal: Optimal Pain Control and Function  Outcome: Progressing  Goal: Skin Health and Integrity  Outcome: Progressing  Goal: Optimal Wound Healing  Outcome: Progressing     Problem: Skin Injury Risk Increased  Goal: Skin Health and Integrity  Outcome: Progressing     Problem: Fall Injury Risk  Goal: Absence of Fall and Fall-Related Injury  Outcome: Progressing     Problem: Coping Ineffective  Goal: Effective Coping  Outcome: Progressing     Problem: Diabetes Comorbidity  Goal: Blood Glucose Level Within Targeted Range  Outcome: Progressing     Problem: Skin Injury Risk Increased  Goal: Skin Health and Integrity  Outcome: Progressing

## 2024-08-18 LAB
POCT GLUCOSE: 144 MG/DL (ref 70–110)
POCT GLUCOSE: 182 MG/DL (ref 70–110)

## 2024-08-18 PROCEDURE — 25000003 PHARM REV CODE 250

## 2024-08-18 PROCEDURE — 63600175 PHARM REV CODE 636 W HCPCS: Performed by: NURSE PRACTITIONER

## 2024-08-18 PROCEDURE — 25000003 PHARM REV CODE 250: Performed by: STUDENT IN AN ORGANIZED HEALTH CARE EDUCATION/TRAINING PROGRAM

## 2024-08-18 PROCEDURE — 25000003 PHARM REV CODE 250: Performed by: NURSE PRACTITIONER

## 2024-08-18 PROCEDURE — 11000001 HC ACUTE MED/SURG PRIVATE ROOM

## 2024-08-18 PROCEDURE — 63600175 PHARM REV CODE 636 W HCPCS: Performed by: INTERNAL MEDICINE

## 2024-08-18 PROCEDURE — 25000003 PHARM REV CODE 250: Performed by: INTERNAL MEDICINE

## 2024-08-18 PROCEDURE — 99900035 HC TECH TIME PER 15 MIN (STAT)

## 2024-08-18 PROCEDURE — 97116 GAIT TRAINING THERAPY: CPT | Mod: CQ

## 2024-08-18 PROCEDURE — 94799 UNLISTED PULMONARY SVC/PX: CPT

## 2024-08-18 RX ADMIN — CARVEDILOL 3.12 MG: 3.12 TABLET, FILM COATED ORAL at 08:08

## 2024-08-18 RX ADMIN — LISINOPRIL 20 MG: 20 TABLET ORAL at 08:08

## 2024-08-18 RX ADMIN — RISPERIDONE 0.5 MG: 0.25 TABLET, FILM COATED ORAL at 08:08

## 2024-08-18 RX ADMIN — AMLODIPINE BESYLATE 5 MG: 5 TABLET ORAL at 08:08

## 2024-08-18 RX ADMIN — METFORMIN HYDROCHLORIDE 500 MG: 500 TABLET, FILM COATED ORAL at 04:08

## 2024-08-18 RX ADMIN — Medication 9 MG: at 08:08

## 2024-08-18 RX ADMIN — ATORVASTATIN CALCIUM 20 MG: 10 TABLET, FILM COATED ORAL at 08:08

## 2024-08-18 RX ADMIN — METHOCARBAMOL 500 MG: 500 TABLET ORAL at 08:08

## 2024-08-18 RX ADMIN — INSULIN GLARGINE 10 UNITS: 100 INJECTION, SOLUTION SUBCUTANEOUS at 08:08

## 2024-08-18 RX ADMIN — OXYCODONE HYDROCHLORIDE 5 MG: 5 TABLET ORAL at 08:08

## 2024-08-18 RX ADMIN — METFORMIN HYDROCHLORIDE 500 MG: 500 TABLET, FILM COATED ORAL at 07:08

## 2024-08-18 RX ADMIN — INSULIN ASPART 3 UNITS: 100 INJECTION, SOLUTION INTRAVENOUS; SUBCUTANEOUS at 06:08

## 2024-08-18 NOTE — PT/OT/SLP PROGRESS
"Physical Therapy Treatment    Patient Name:  Kevin Horan Jr.   MRN:  [id number]    Recommendations:     Discharge therapy intensity: Moderate Intensity Therapy   Discharge Equipment Recommendations: walker, rolling  Barriers to discharge: Impaired mobility    Assessment:     Kevin Horan Jr. is a 74 y.o. male admitted with a medical diagnosis of AMS, traumatic SDH, HTN, hygroma.  He presents with the following impairments/functional limitations: weakness, impaired endurance, impaired functional mobility, gait instability, impaired balance, impaired self care skills, impaired cognition, decreased safety awareness.    Rehab Prognosis: Good; patient would benefit from acute skilled PT services to address these deficits and reach maximum level of function.    Recent Surgery: * No surgery found *      Plan:     During this hospitalization, patient would benefit from acute PT services 5 x/week to address the identified rehab impairments via gait training, therapeutic activities, therapeutic exercises, neuromuscular re-education and progress toward the following goals:    Plan of Care Expires:  09/15/24    Subjective     Chief Complaint: "I want a candy"  Patient/Family Comments/goals: none stated  Pain/Comfort:  Pain Rating 1: 0/10      Objective:     Communicated with nurse prior to session.  Patient found HOB elevated with telemetry upon PT entry to room.     General Precautions: Standard, fall  Orthopedic Precautions: N/A  Braces: N/A  Respiratory Status: Room air  Blood Pressure: NT  Skin Integrity: Visible skin intact      Functional Mobility:  Bed Mobility:     Scooting: minimum assistance  Supine to Sit: minimum assistance  Sit to Supine: minimum assistance  Transfers:     Sit to Stand:  contact guard assistance with rolling walker  Gait: patient amb 180ft with rolling walker with CGA-Bret. Patient presents with decreased step length and radha. Patient able to correct with Bret and max verbal " cues.      Education:  Patient provided with verbal education education regarding PT role/goals/POC, fall prevention, and safety awareness.  Understanding was verbalized.     Patient left HOB elevated with all lines intact and call button in reach, sitter present    GOALS:   Multidisciplinary Problems       Physical Therapy Goals          Problem: Physical Therapy    Goal Priority Disciplines Outcome Goal Variances Interventions   Physical Therapy Goal     PT, PT/OT Progressing     Description: Goals to be met by: 9/15/24     Patient will increase functional independence with mobility by performin. Supine to sit with supervision  2. Sit to stand transfer with Supervision  3. Gait  x 200 feet with Supervision using Rolling Walker.                          Time Tracking:     PT Received On: 24  PT Start Time: 1337     PT Stop Time: 1400  PT Total Time (min): 23 min     Billable Minutes: Gait Training 23    Treatment Type: Treatment  PT/PTA: PTA     Number of PTA visits since last PT visit: 2024

## 2024-08-18 NOTE — NURSING
Nurses Note -- 4 Eyes      8/18/2024   11:15 AM      Skin assessed during: Q Shift Change      [x] No Altered Skin Integrity Present    []Prevention Measures Documented      [] Yes- Altered Skin Integrity Present or Discovered   [] LDA Added if Not in Epic (Describe Wound)   [] New Altered Skin Integrity was Present on Admit and Documented in LDA   [] Wound Image Taken    Wound Care Consulted? No    Attending Nurse:  Urbano Bennett RN/Staff Member:  Selina

## 2024-08-18 NOTE — SUBJECTIVE & OBJECTIVE
Review of Systems   Constitutional:  Negative for chills and fever.   Respiratory: Negative.     Cardiovascular: Negative.    Gastrointestinal: Negative.    Genitourinary: Negative.    All other systems reviewed and are negative.    Objective:     Vital Signs (Most Recent):  Temp: 97.6 °F (36.4 °C) (08/18/24 0737)  Pulse: 77 (08/18/24 0737)  Resp: 16 (08/18/24 0823)  BP: (!) 149/80 (08/18/24 0823)  SpO2: 100 % (08/18/24 0737) Vital Signs (24h Range):  Temp:  [97.2 °F (36.2 °C)-98 °F (36.7 °C)] 97.6 °F (36.4 °C)  Pulse:  [58-77] 77  Resp:  [16-18] 16  SpO2:  [98 %-100 %] 100 %  BP: ()/(45-80) 149/80     Weight: 99.8 kg (220 lb 0.3 oz)  Body mass index is 29.03 kg/m².    Intake/Output Summary (Last 24 hours) at 8/18/2024 0950  Last data filed at 8/17/2024 1800  Gross per 24 hour   Intake 480 ml   Output --   Net 480 ml         Physical Exam  Vitals and nursing note reviewed. Exam conducted with a chaperone present.   Constitutional:       Appearance: Normal appearance.   Cardiovascular:      Rate and Rhythm: Normal rate and regular rhythm.      Pulses: Normal pulses.      Heart sounds: Normal heart sounds.   Pulmonary:      Effort: Pulmonary effort is normal.      Breath sounds: Normal breath sounds.   Abdominal:      General: Abdomen is flat. Bowel sounds are normal.      Palpations: Abdomen is soft.   Skin:     General: Skin is warm and dry.   Neurological:      General: No focal deficit present.      Mental Status: He is alert. Mental status is at baseline.             Significant Labs: All pertinent labs within the past 24 hours have been reviewed.    Significant Imaging: I have reviewed all pertinent imaging results/findings within the past 24 hours.

## 2024-08-18 NOTE — PROGRESS NOTES
[facility] - 9th Floor [facility] Medicine  Progress Note    Patient Name: Kevin Horan Jr.  MRN: [id number]  Patient Class: IP- Inpatient   Admission Date: 7/19/2024  Length of Stay: 30 days  Attending Physician: Reyes, Thairy G, DO  Primary Care Provider: Dimitrios Mack MD (Inactive)        Subjective:     Principal Problem:SDH (subdural hematoma)        HPI:  74 y.o. male with a PMHx  of alcoholic cirrhosis, hepatitis-C, diabetes mellitus type 2, and CVA who presented to [facility] on 7/19/2024 via EMS after slipped and fell down 4 steps at home and striking head. EMS reported patient was hypoglycemic with CBG of 44 on scene.  C-collar was placed en route and 250 mL of D5W was given. Initial vital signs in ED were /77, pulse 72, respirations 16, temperature 36.9° C, and SpO2 90% on room air.  Labs revealed WBC 3.29, hemoglobin 9.8. Na 139, K 3.7, CO2 21 chloride 112, CO2 21, Cr 1.0, glucose 166, and undetectable troponin.  EKG revealed normal sinus rhythm. CT head questionable minimal subdural blood products along the falx without mass effect.  CT cervical spine  no acute injury.  CT chest abdomen and pelvis with IV contrast revealed right transverse process fractures of L3 and L4, small left pleural effusion, cirrhotic liver with right hepatic lobe mass suspicious for HCC, and small volume ascites.  Pelvis x-ray revealed no acute findings.  Chest x-ray revealed mild left basilar opacities with a small left pleural effusion.      Scalp laceration was repaired in ED.  Neurosurgery was consulted and  was admitted to trauma services.  Neurosurgery recommended no surgical intervention, Keppra for seizure prophylaxis, and blood pressure less than 150/90.  Follow-up CT head on 07/19  with no subdural hematoma. Hospital medicine was consulted for transition of care and further medical management.  PT/OT consulted; recommending moderate intensity therapy.  Patient was noted to be agitated  and restless attempting to climb out of bed for which p.r.n. IV Haldol was ordered and 1 to1 sitter placed.     Patient's  over all functional status was declining. He was needing full assist for ADLs. His mental status was very sluggish. Consulted palliative care. AFP tumor marker was neg. He is now DNR status.       hosp course c/by worsening mental status on 8/6, ct head showed 7mm sub dural hygroma with mass effect but no shift, neurosurgery informed- recommended repeat ct head on 8/7- which is the same, no new change       Overview/Hospital Course:  Patient seen with a sitter at the bedside.  He is mostly oriented and irritated that he has mittens on and is in a roll belt.  He thinks that he needs to make his own plans to get out of the hospital and that he has tried on Saturday and failed.  He has otherwise no complaints        Review of Systems   Constitutional:  Negative for chills and fever.   Respiratory: Negative.     Cardiovascular: Negative.    Gastrointestinal: Negative.    Genitourinary: Negative.    All other systems reviewed and are negative.    Objective:     Vital Signs (Most Recent):  Temp: 97.6 °F (36.4 °C) (08/18/24 0737)  Pulse: 77 (08/18/24 0737)  Resp: 16 (08/18/24 0823)  BP: (!) 149/80 (08/18/24 0823)  SpO2: 100 % (08/18/24 0737) Vital Signs (24h Range):  Temp:  [97.2 °F (36.2 °C)-98 °F (36.7 °C)] 97.6 °F (36.4 °C)  Pulse:  [58-77] 77  Resp:  [16-18] 16  SpO2:  [98 %-100 %] 100 %  BP: ()/(45-80) 149/80     Weight: 99.8 kg (220 lb 0.3 oz)  Body mass index is 29.03 kg/m².    Intake/Output Summary (Last 24 hours) at 8/18/2024 0950  Last data filed at 8/17/2024 1800  Gross per 24 hour   Intake 480 ml   Output --   Net 480 ml         Physical Exam  Vitals and nursing note reviewed. Exam conducted with a chaperone present.   Constitutional:       Appearance: Normal appearance.   Cardiovascular:      Rate and Rhythm: Normal rate and regular rhythm.      Pulses: Normal pulses.      Heart  sounds: Normal heart sounds.   Pulmonary:      Effort: Pulmonary effort is normal.      Breath sounds: Normal breath sounds.   Abdominal:      General: Abdomen is flat. Bowel sounds are normal.      Palpations: Abdomen is soft.   Skin:     General: Skin is warm and dry.   Neurological:      General: No focal deficit present.      Mental Status: He is alert. Mental status is at baseline.             Significant Labs: All pertinent labs within the past 24 hours have been reviewed.    Significant Imaging: I have reviewed all pertinent imaging results/findings within the past 24 hours.    Assessment/Plan:      * SDH (subdural hematoma)  Patient has acute blood loss due to hemorrhage, the hemorrhage is due to trauma/laceration, patient does not have a propensity for bleeding.. Will trend hemoglobin/hematocrit Daily, as well as monitor and correct for any coagulation defects. CBC and vital signs have been reviewed and last CBC was noted-   Lab Results   Component Value Date    WBC 2.84 (L) 07/29/2024    HGB 11.4 (L) 07/29/2024    HCT 34.7 (L) 07/29/2024    MCV 92.8 07/29/2024    PLT 79 (L) 07/29/2024         Will order a type and screen and consent patient for blood transfusion. Will transfuse if Hgb is <7g/dl (<8g/dl in cases of active ACS) or if patient has rapid bleeding leading to hemodynamic instability.    Type 2 diabetes mellitus, without long-term current use of insulin  ISS      Fracture of transverse process of lumbar vertebra, closed, initial encounter  Therapy  Needs placement        Fall at home 4 steps down  Head injury with mild subdural hematoma along the falx- resolved   Right transverse process fractures of L3 and L4 due to fall  Small left pleural effusion   Cirrhosis of  liver with right hepatic lobe mass with negative AFP tumor marker   Dementia with  delirium   Physical debility   Chronic thrombocytopenia-stable     History of alcoholic cirrhosis, hepatitis-C, diabetes mellitus type 2, and prior  CVA     DNR. Palliative following and appreciate their assistance.  PT/OT, recommended SNF  Labs previously stable, we will check p.r.n.  Case management working on nursing home placement with memory care.    Medically Stable     DVT prophylaxis: SCD in setting of SDH  Anticipated discharge and disposition: [facility] when auth is obtained    VTE Risk Mitigation (From admission, onward)           Ordered     Reason for No Pharmacological VTE Prophylaxis  Once        Question:  Reasons:  Answer:  Risk of Bleeding    07/19/24 0943     IP VTE HIGH RISK PATIENT  Once         07/19/24 0943     Place sequential compression device  Until discontinued         07/19/24 0943                    Discharge Planning   VALDEZ: 8/19/2024     Code Status: DNR   Is the patient medically ready for discharge?:     Reason for patient still in hospital (select all that apply): Pending disposition  Discharge Plan A: Skilled Nursing Facility   Discharge Delays: None known at this time              Miya Lemus MD  Department of Hospital Medicine   [facility] - 9th Floor Med Surg

## 2024-08-18 NOTE — PLAN OF CARE
Problem: Adult Inpatient Plan of Care  Goal: Plan of Care Review  Outcome: Progressing  Goal: Patient-Specific Goal (Individualized)  Outcome: Progressing  Goal: Absence of Hospital-Acquired Illness or Injury  Outcome: Progressing  Goal: Optimal Comfort and Wellbeing  Outcome: Progressing  Goal: Readiness for Transition of Care  Outcome: Progressing     Problem: Wound  Goal: Absence of Infection Signs and Symptoms  Outcome: Progressing  Goal: Improved Oral Intake  Outcome: Progressing  Goal: Optimal Pain Control and Function  Outcome: Progressing  Goal: Skin Health and Integrity  Outcome: Progressing  Goal: Optimal Wound Healing  Outcome: Progressing     Problem: Fall Injury Risk  Goal: Absence of Fall and Fall-Related Injury  Outcome: Progressing     Problem: Skin Injury Risk Increased  Goal: Skin Health and Integrity  Outcome: Progressing     Problem: Diabetes Comorbidity  Goal: Blood Glucose Level Within Targeted Range  Outcome: Progressing

## 2024-08-19 LAB
POCT GLUCOSE: 109 MG/DL (ref 70–110)
POCT GLUCOSE: 124 MG/DL (ref 70–110)
POCT GLUCOSE: 205 MG/DL (ref 70–110)
POCT GLUCOSE: 212 MG/DL (ref 70–110)

## 2024-08-19 PROCEDURE — 63600175 PHARM REV CODE 636 W HCPCS: Performed by: INTERNAL MEDICINE

## 2024-08-19 PROCEDURE — 25000003 PHARM REV CODE 250: Performed by: STUDENT IN AN ORGANIZED HEALTH CARE EDUCATION/TRAINING PROGRAM

## 2024-08-19 PROCEDURE — 25000003 PHARM REV CODE 250

## 2024-08-19 PROCEDURE — 25000003 PHARM REV CODE 250: Performed by: INTERNAL MEDICINE

## 2024-08-19 PROCEDURE — 11000001 HC ACUTE MED/SURG PRIVATE ROOM

## 2024-08-19 PROCEDURE — 25000003 PHARM REV CODE 250: Performed by: NURSE PRACTITIONER

## 2024-08-19 PROCEDURE — 97116 GAIT TRAINING THERAPY: CPT | Mod: CQ

## 2024-08-19 RX ADMIN — CARVEDILOL 3.12 MG: 3.12 TABLET, FILM COATED ORAL at 09:08

## 2024-08-19 RX ADMIN — METHOCARBAMOL 500 MG: 500 TABLET ORAL at 08:08

## 2024-08-19 RX ADMIN — OXYCODONE HYDROCHLORIDE 5 MG: 5 TABLET ORAL at 09:08

## 2024-08-19 RX ADMIN — METFORMIN HYDROCHLORIDE 500 MG: 500 TABLET, FILM COATED ORAL at 08:08

## 2024-08-19 RX ADMIN — CARVEDILOL 3.12 MG: 3.12 TABLET, FILM COATED ORAL at 08:08

## 2024-08-19 RX ADMIN — Medication 9 MG: at 08:08

## 2024-08-19 RX ADMIN — RISPERIDONE 0.5 MG: 0.25 TABLET, FILM COATED ORAL at 08:08

## 2024-08-19 RX ADMIN — LISINOPRIL 20 MG: 20 TABLET ORAL at 09:08

## 2024-08-19 RX ADMIN — AMLODIPINE BESYLATE 5 MG: 5 TABLET ORAL at 09:08

## 2024-08-19 RX ADMIN — RISPERIDONE 0.5 MG: 0.25 TABLET, FILM COATED ORAL at 09:08

## 2024-08-19 RX ADMIN — METFORMIN HYDROCHLORIDE 500 MG: 500 TABLET, FILM COATED ORAL at 09:08

## 2024-08-19 RX ADMIN — INSULIN GLARGINE 10 UNITS: 100 INJECTION, SOLUTION SUBCUTANEOUS at 08:08

## 2024-08-19 RX ADMIN — OXYCODONE HYDROCHLORIDE 5 MG: 5 TABLET ORAL at 08:08

## 2024-08-19 RX ADMIN — ATORVASTATIN CALCIUM 20 MG: 10 TABLET, FILM COATED ORAL at 08:08

## 2024-08-19 RX ADMIN — METHOCARBAMOL 500 MG: 500 TABLET ORAL at 09:08

## 2024-08-19 NOTE — PROGRESS NOTES
[facility] Hospital Medicine Progress Note        Chief Complaint: Inpatient Follow-up for     HPI: 74 y.o. male with a PMHx  of alcoholic cirrhosis, hepatitis-C, diabetes mellitus type 2, and CVA who presented to [facility] on 7/19/2024 via EMS after slipped and fell down 4 steps at home and striking head. EMS reported patient was hypoglycemic with CBG of 44 on scene.  C-collar was placed en route and 250 mL of D5W was given. Initial vital signs in ED were /77, pulse 72, respirations 16, temperature 36.9° C, and SpO2 90% on room air.  Labs revealed WBC 3.29, hemoglobin 9.8. Na 139, K 3.7, CO2 21 chloride 112, CO2 21, Cr 1.0, glucose 166, and undetectable troponin.  EKG revealed normal sinus rhythm. CT head questionable minimal subdural blood products along the falx without mass effect.  CT cervical spine  no acute injury.  CT chest abdomen and pelvis with IV contrast revealed right transverse process fractures of L3 and L4, small left pleural effusion, cirrhotic liver with right hepatic lobe mass suspicious for HCC, and small volume ascites.  Pelvis x-ray revealed no acute findings.  Chest x-ray revealed mild left basilar opacities with a small left pleural effusion.      Scalp laceration was repaired in ED.  Neurosurgery was consulted and  was admitted to trauma services.  Neurosurgery recommended no surgical intervention, Keppra for seizure prophylaxis, and blood pressure less than 150/90.  Follow-up CT head on 07/19  with no subdural hematoma. Hospital medicine was consulted for transition of care and further medical management.  PT/OT consulted; recommending moderate intensity therapy.  Patient was noted to be agitated and restless attempting to climb out of bed for which p.r.n. IV Haldol was ordered and 1 to1 sitter placed.     Patient's  over all functional status was declining. He was needing full assist for ADLs. His mental status was very sluggish. Consulted palliative care.  AFP tumor marker was neg. He is now DNR status.       hosp course c/by worsening mental status on 8/6, ct head showed 7mm sub dural hygroma with mass effect but no shift, neurosurgery informed- recommended repeat ct head on 8/7- which is the same, no new change    Interval Hx:   Patient seen and examined this morning with one-to-one sitter bedside discussed with him the CT findings that he has a liver mass patient is refusing any further workup   Case was discussed with patient's nurse and  on the floor.    Objective/physical exam:  General: In no acute distress, afebrile  Chest: Clear to auscultation bilaterally  Heart: RRR, +S1, S2, no appreciable murmur  Abdomen: Soft, nontender, BS +  MSK: Warm, no lower extremity edema, no clubbing or cyanosis  Neurologic:  Awake and alert  VITAL SIGNS: 24 HRS MIN & MAX LAST   Temp  Min: 97.2 °F (36.2 °C)  Max: 98.5 °F (36.9 °C) 97.8 °F (36.6 °C)   BP  Min: 108/62  Max: 144/72 119/65   Pulse  Min: 61  Max: 68  66   No data recorded 16   SpO2  Min: 96 %  Max: 98 % 97 %     I have reviewed the following labs:  Recent Labs   Lab 08/17/24  0640   WBC 2.96*   RBC 3.66*   HGB 11.2*   HCT 33.7*   MCV 92.1   MCH 30.6   MCHC 33.2   RDW 13.7   PLT 63*   MPV 12.5*     Recent Labs   Lab 08/17/24  0640      K 4.7   *   CO2 20*   BUN 31.3*   CREATININE 1.11   CALCIUM 9.1     Microbiology Results (last 7 days)       ** No results found for the last 168 hours. **             See below for Radiology    Assessment/Plan:  Fall at home 4 steps down  Head injury with mild subdural hematoma along the falx- resolved   Right transverse process fractures of L3 and L4 due to fall  Small left pleural effusion   Cirrhosis of  liver with right hepatic lobe mass with negative AFP tumor marker   Dementia with  delirium   Physical debility   Chronic thrombocytopenia-stable     History of alcoholic cirrhosis, hepatitis-C, diabetes mellitus type 2, and prior CVA     Discussed with the  patient about the right hepatic lobe mass and he does not want any other further testing  Blood pressure slightly on the lower side this morning  DNR. Palliative following and appreciate their assistance.  PT/OT, recommended SNF  Labs previously stable, we will check p.r.n.  Case management working on nursing home placement with memory care.    Continue with home medications, I will hold off on amlodipine for today as blood pressure is on the lower side and see how blood pressure stays    I was texted by the  that his insurance has denied LTAC and inpatient rehab and they offered p2p  for LTAC authorization but in my opinion patient does not meet LTAC criteria as he is not on any IV antibiotics or any long term care.  Physical therapy is recommending moderate intensity so I recommended skilled nursing facility placement    I told the  to start looking for skilled nursing facility places     DVT prophylaxis: SCD in setting of SDH    VTE prophylaxis:     Patient condition:  Stable/Fair/Guarded/ Serious/ Critical    Anticipated discharge and Disposition:         All diagnosis and differential diagnosis have been reviewed; assessment and plan has been documented; I have personally reviewed the labs and test results that are presently available; I have reviewed the patients medication list; I have reviewed the consulting providers response and recommendations. I have reviewed or attempted to review medical records based upon their availability    All of the patient's questions have been  addressed and answered. Patient's is agreeable to the above stated plan. I will continue to monitor closely and make adjustments to medical management as needed.    Portions of this note dictated using EMR integrated voice recognition software, and may be subject to voice recognition errors not corrected at proofreading. Please contact writer for clarification if needed.    _____________________________________________________________________    Malnutrition Status:    Scheduled Med:   amLODIPine  5 mg Oral Daily    atorvastatin  20 mg Oral QHS    carvediloL  3.125 mg Oral BID    insulin glargine U-100  10 Units Subcutaneous QHS    LIDOcaine  1 patch Transdermal Q24H    lisinopriL  20 mg Oral Daily    melatonin  9 mg Oral Nightly    metFORMIN  500 mg Oral BID WM    methocarbamoL  500 mg Oral BID    risperiDONE  0.5 mg Oral BID      Continuous Infusions:     PRN Meds:    Current Facility-Administered Medications:     dextrose 10%, 12.5 g, Intravenous, PRN    dextrose 10%, 25 g, Intravenous, PRN    docusate sodium, 200 mg, Oral, BID PRN    glucagon (human recombinant), 1 mg, Intramuscular, PRN    glucose, 16 g, Oral, PRN    glucose, 24 g, Oral, PRN    hydrALAZINE, 20 mg, Intravenous, Q6H PRN    insulin aspart U-100, 0-5 Units, Subcutaneous, QID (AC + HS) PRN    magnesium hydroxide 400 mg/5 ml, 30 mL, Oral, Daily PRN    oxyCODONE, 5 mg, Oral, Q4H PRN    polyethylene glycol, 17 g, Oral, BID PRN     Radiology:  I have personally reviewed the following imaging and agree with the radiologist.     CT Head Without Contrast  Narrative: EXAMINATION:  CT HEAD WITHOUT CONTRAST    CLINICAL HISTORY:  subdural followup;    TECHNIQUE:  Axial scans were obtained from skull base to the vertex.    Coronal and sagittal reconstructions obtained from the axial data.    Automatic exposure control was utilized to limit radiation dose.    Contrast: None    Radiation Dose:    Total DLP: 1194 mGy*cm    COMPARISON:  CT head dated 08/06/2024    FINDINGS:  Subdural fluid collection along the right cerebral convexity remains stable measuring up to 7 mm in thickness.  The brain parenchyma is unchanged.  There is a small area of encephalomalacia the left occipital lobe.  There is local mass effect without midline shift or herniation.  The basal cisterns are patent.  The ventricles are stable in size.  The calvarium  and skull base are intact.  The mastoid air cells are clear.  Impression: Stable exam without significant interval change.    Electronically signed by: Evelia Solorio  Date:    08/07/2024  Time:    08:05      Mary Alice Martinez MD  Department of Hospital Medicine   [facility]   08/19/2024

## 2024-08-19 NOTE — PT/OT/SLP PROGRESS
Physical Therapy Treatment    Patient Name:  Kevin Horan Jr.   MRN:  [id number]    Recommendations:     Discharge therapy intensity: Moderate Intensity Therapy   Discharge Equipment Recommendations: walker, rolling  Barriers to discharge:  Placement    Assessment:     Kevin Horan Jr. is a 74 y.o. male admitted with a medical diagnosis of AMS, traumatic SDH, HTN, hygroma  No surgery or brace needed for L spine TP fxs.  He presents with the following impairments/functional limitations: weakness, impaired endurance, impaired functional mobility, gait instability, impaired balance, impaired self care skills, impaired cognition, decreased safety awareness.    Rehab Prognosis: Good; patient would benefit from acute skilled PT services to address these deficits and reach maximum level of function.    Recent Surgery: * No surgery found *      Plan:     During this hospitalization, patient would benefit from acute PT services 5 x/week to address the identified rehab impairments via gait training, therapeutic activities, therapeutic exercises, neuromuscular re-education and progress toward the following goals:    Plan of Care Expires:  09/15/24    Subjective     Chief Complaint: none  Patient/Family Comments/goals: none   Pain/Comfort:         Objective:     Communicated with nursing prior to session.  Patient found up in chair with  (roll belt, 1:1) upon PT entry to room.     General Precautions: Standard, fall  Orthopedic Precautions: N/A  Braces: N/A  Respiratory Status: Room air  Blood Pressure: NT    Functional Mobility:  Bed Mobility:  Supine to sit: Tereza  Transfers:     Sit to Stand:  Tereza with rolling walker  Gait: 180' w/RW, CGA  VC for posture, downward gaze, to step closer to RW, to increase step length, & gait speed - pt able to correct momentarily however unable to maintain  Noted NBOS    Education:  Patient provided with verbal education education regarding PT role/goals/POC, fall prevention, and  safety awareness.  Understanding was verbalized, however additional teaching warranted.     Patient left sitting edge of bed (pt declining laying down) with all lines intact, call button in reach, nurse notified, and 1:1 present sitting next to patient.    GOALS:   Multidisciplinary Problems       Physical Therapy Goals          Problem: Physical Therapy    Goal Priority Disciplines Outcome Goal Variances Interventions   Physical Therapy Goal     PT, PT/OT Progressing     Description: Goals to be met by: 9/15/24     Patient will increase functional independence with mobility by performin. Supine to sit with supervision  2. Sit to stand transfer with Supervision  3. Gait  x 200 feet with Supervision using Rolling Walker.                          Time Tracking:     PT Received On: 24  PT Start Time: 1533     PT Stop Time: 1552  PT Total Time (min): 19 min     Billable Minutes: Gait Training 19    Treatment Type: Treatment  PT/PTA: PTA     Number of PTA visits since last PT visit: 2     2024

## 2024-08-19 NOTE — PLAN OF CARE
[facility] sent updates via [facility] to [facility]    Spoke to Trish @ [facility]   LtAC & inpt rehab was denied by pt Insurance [employer]    A Peer to Peer is being offered for LTAC auth- [id number] to schedule call [phone] by 8/20/24 @9am central time- PAULY Ricardo notified    10am MD denied peer to peer due to pt is snf appropriate and is not able to meet the goals of rehab    [facility] sent reminder email to wife for bank statements    [facility] sent updates via [facility] to [facility]

## 2024-08-19 NOTE — PLAN OF CARE
Spoke with brother wild and notified of [employer] denial and no supporting documentation for doc to do P2P, will get with pts son and mostly likely pt will go back to nephews house where he was previous to this admission. Cm to follow-up in am.

## 2024-08-19 NOTE — NURSING
Nurses Note -- 4 Eyes      8/19/2024   9:37 AM      Skin assessed during: Q Shift Change      [x] No Altered Skin Integrity Present    []Prevention Measures Documented      [] Yes- Altered Skin Integrity Present or Discovered   [] LDA Added if Not in Epic (Describe Wound)   [] New Altered Skin Integrity was Present on Admit and Documented in LDA   [] Wound Image Taken    Wound Care Consulted? No    Attending Nurse:  Urbano Bennett RN/Staff Member: Jelani

## 2024-08-20 LAB
ANION GAP SERPL CALC-SCNC: 7 MEQ/L
BASOPHILS # BLD AUTO: 0.02 X10(3)/MCL
BASOPHILS NFR BLD AUTO: 0.5 %
BUN SERPL-MCNC: 39.1 MG/DL (ref 8.4–25.7)
CALCIUM SERPL-MCNC: 9.4 MG/DL (ref 8.8–10)
CHLORIDE SERPL-SCNC: 110 MMOL/L (ref 98–107)
CO2 SERPL-SCNC: 20 MMOL/L (ref 23–31)
CREAT SERPL-MCNC: 1.36 MG/DL (ref 0.73–1.18)
CREAT/UREA NIT SERPL: 29
EOSINOPHIL # BLD AUTO: 0.43 X10(3)/MCL (ref 0–0.9)
EOSINOPHIL NFR BLD AUTO: 10.6 %
ERYTHROCYTE [DISTWIDTH] IN BLOOD BY AUTOMATED COUNT: 13.8 % (ref 11.5–17)
GFR SERPLBLD CREATININE-BSD FMLA CKD-EPI: 55 ML/MIN/1.73/M2
GLUCOSE SERPL-MCNC: 71 MG/DL (ref 82–115)
HCT VFR BLD AUTO: 34.9 % (ref 42–52)
HGB BLD-MCNC: 11.9 G/DL (ref 14–18)
IMM GRANULOCYTES # BLD AUTO: 0.01 X10(3)/MCL (ref 0–0.04)
IMM GRANULOCYTES NFR BLD AUTO: 0.2 %
LYMPHOCYTES # BLD AUTO: 0.94 X10(3)/MCL (ref 0.6–4.6)
LYMPHOCYTES NFR BLD AUTO: 23.1 %
MCH RBC QN AUTO: 30.7 PG (ref 27–31)
MCHC RBC AUTO-ENTMCNC: 34.1 G/DL (ref 33–36)
MCV RBC AUTO: 90.2 FL (ref 80–94)
MONOCYTES # BLD AUTO: 0.42 X10(3)/MCL (ref 0.1–1.3)
MONOCYTES NFR BLD AUTO: 10.3 %
NEUTROPHILS # BLD AUTO: 2.25 X10(3)/MCL (ref 2.1–9.2)
NEUTROPHILS NFR BLD AUTO: 55.3 %
NRBC BLD AUTO-RTO: 0 %
PLATELET # BLD AUTO: 79 X10(3)/MCL (ref 130–400)
PMV BLD AUTO: 11.5 FL (ref 7.4–10.4)
POCT GLUCOSE: 158 MG/DL (ref 70–110)
POCT GLUCOSE: 183 MG/DL (ref 70–110)
POCT GLUCOSE: 75 MG/DL (ref 70–110)
POTASSIUM SERPL-SCNC: 5.2 MMOL/L (ref 3.5–5.1)
RBC # BLD AUTO: 3.87 X10(6)/MCL (ref 4.7–6.1)
SODIUM SERPL-SCNC: 137 MMOL/L (ref 136–145)
WBC # BLD AUTO: 4.07 X10(3)/MCL (ref 4.5–11.5)

## 2024-08-20 PROCEDURE — 63600175 PHARM REV CODE 636 W HCPCS: Performed by: INTERNAL MEDICINE

## 2024-08-20 PROCEDURE — 25000003 PHARM REV CODE 250: Performed by: STUDENT IN AN ORGANIZED HEALTH CARE EDUCATION/TRAINING PROGRAM

## 2024-08-20 PROCEDURE — 99900035 HC TECH TIME PER 15 MIN (STAT)

## 2024-08-20 PROCEDURE — 11000001 HC ACUTE MED/SURG PRIVATE ROOM

## 2024-08-20 PROCEDURE — 25000003 PHARM REV CODE 250

## 2024-08-20 PROCEDURE — 97530 THERAPEUTIC ACTIVITIES: CPT | Mod: CO

## 2024-08-20 PROCEDURE — 25000003 PHARM REV CODE 250: Performed by: NURSE PRACTITIONER

## 2024-08-20 PROCEDURE — 25000003 PHARM REV CODE 250: Performed by: INTERNAL MEDICINE

## 2024-08-20 PROCEDURE — 94799 UNLISTED PULMONARY SVC/PX: CPT

## 2024-08-20 PROCEDURE — 97116 GAIT TRAINING THERAPY: CPT | Mod: CQ

## 2024-08-20 PROCEDURE — 85025 COMPLETE CBC W/AUTO DIFF WBC: CPT | Performed by: INTERNAL MEDICINE

## 2024-08-20 PROCEDURE — 36415 COLL VENOUS BLD VENIPUNCTURE: CPT | Performed by: INTERNAL MEDICINE

## 2024-08-20 PROCEDURE — 97535 SELF CARE MNGMENT TRAINING: CPT | Mod: CO

## 2024-08-20 PROCEDURE — 80048 BASIC METABOLIC PNL TOTAL CA: CPT | Performed by: INTERNAL MEDICINE

## 2024-08-20 PROCEDURE — 63600175 PHARM REV CODE 636 W HCPCS: Performed by: NURSE PRACTITIONER

## 2024-08-20 RX ORDER — SODIUM CHLORIDE 9 MG/ML
INJECTION, SOLUTION INTRAVENOUS CONTINUOUS
Status: DISCONTINUED | OUTPATIENT
Start: 2024-08-20 | End: 2024-08-21

## 2024-08-20 RX ADMIN — RISPERIDONE 0.5 MG: 0.25 TABLET, FILM COATED ORAL at 08:08

## 2024-08-20 RX ADMIN — ATORVASTATIN CALCIUM 20 MG: 10 TABLET, FILM COATED ORAL at 08:08

## 2024-08-20 RX ADMIN — INSULIN ASPART 3 UNITS: 100 INJECTION, SOLUTION INTRAVENOUS; SUBCUTANEOUS at 04:08

## 2024-08-20 RX ADMIN — OXYCODONE HYDROCHLORIDE 5 MG: 5 TABLET ORAL at 09:08

## 2024-08-20 RX ADMIN — INSULIN GLARGINE 10 UNITS: 100 INJECTION, SOLUTION SUBCUTANEOUS at 08:08

## 2024-08-20 RX ADMIN — METHOCARBAMOL 500 MG: 500 TABLET ORAL at 09:08

## 2024-08-20 RX ADMIN — CARVEDILOL 3.12 MG: 3.12 TABLET, FILM COATED ORAL at 08:08

## 2024-08-20 RX ADMIN — LISINOPRIL 20 MG: 20 TABLET ORAL at 09:08

## 2024-08-20 RX ADMIN — METFORMIN HYDROCHLORIDE 500 MG: 500 TABLET, FILM COATED ORAL at 04:08

## 2024-08-20 RX ADMIN — METFORMIN HYDROCHLORIDE 500 MG: 500 TABLET, FILM COATED ORAL at 09:08

## 2024-08-20 RX ADMIN — SODIUM CHLORIDE: 9 INJECTION, SOLUTION INTRAVENOUS at 09:08

## 2024-08-20 RX ADMIN — LIDOCAINE PATCH 5% 1 PATCH: 700 PATCH TOPICAL at 09:08

## 2024-08-20 RX ADMIN — Medication 9 MG: at 08:08

## 2024-08-20 RX ADMIN — CARVEDILOL 3.12 MG: 3.12 TABLET, FILM COATED ORAL at 09:08

## 2024-08-20 RX ADMIN — RISPERIDONE 0.5 MG: 0.25 TABLET, FILM COATED ORAL at 09:08

## 2024-08-20 RX ADMIN — METHOCARBAMOL 500 MG: 500 TABLET ORAL at 08:08

## 2024-08-20 NOTE — PT/OT/SLP PROGRESS
Occupational Therapy   Treatment    Name: Kevin Horan Jr.  MRN: [id number]  Admitting Diagnosis:  SDH (subdural hematoma)       Recommendations:     Recommended therapy intensity at discharge: Moderate Intensity Therapy   Discharge Equipment Recommendations:  walker, rolling  Barriers to discharge:       Assessment:     Kevin Horan Jr. is a 74 y.o. male with a medical diagnosis of SDH (subdural hematoma).  He presents with good participation. Performance deficits affecting function are weakness, impaired endurance, impaired self care skills, impaired functional mobility, gait instability, impaired balance.     Rehab Prognosis:  Good; patient would benefit from acute skilled OT services to address these deficits and reach maximum level of function.       Plan:     Patient to be seen 3 x/week to address the above listed problems via self-care/home management, therapeutic activities, therapeutic exercises  Plan of Care Expires: 09/06/24  Plan of Care Reviewed with: patient    Subjective     Pain/Comfort:  Pain Rating 1: 0/10    Objective:     Communicated with: nurse prior to session.  Patient found HOB elevated with telemetry upon OT entry to room.    General Precautions: Standard, fall    Orthopedic Precautions:N/A  Braces: N/A  Respiratory Status: Room air     Occupational Performance:     Bed Mobility:    Patient completed Supine to Sit with moderate assistance  Patient completed Sit to Supine with moderate assistance   Scooting to edge of bed in sitting with SBA  Maintain sitting edge of bed with SBA    Functional Mobility/Transfers:  Patient completed Sit <> Stand Transfer with minimum assistance  with  rolling walker   Functional Mobility: ambulate to and from bathroom with Min A with RW, assistance with manipulating walker    Activities of Daily Living:  Grooming: minimum assistance standing at sink with RW to complete shaving ax    Therapeutic Positioning    OT interventions performed during the  course of today's session in an effort to prevent and/or reduce acquired pressure injuries:   Therapeutic positioning was provided at the conclusion of session to offload all bony prominences for the prevention and/or reduction of pressure injuries    [facility] 6 Click ADL: 16    Patient Education:  Patient provided with verbal education and demonstrations education regarding safety awareness.  Understanding was verbalized, however additional teaching warranted.      Patient left HOB elevated with all lines intact, call button in reach, and 1:1 CNA present.    GOALS:   Multidisciplinary Problems       Occupational Therapy Goals          Problem: Occupational Therapy    Goal Priority Disciplines Outcome Interventions   Occupational Therapy Goal     OT, PT/OT Progressing    Description: Goals to be met by: in 1 month      Patient will increase functional independence with ADLs by performing:    LE Dressing with Supervision.  Grooming while standing at sink with Stand-by Assistance.  Toileting from toilet with Stand-by Assistance for hygiene and clothing management.   Toilet transfer to toilet with Supervision.                         Time Tracking:     OT Date of Treatment: 08/20/24  OT Start Time: 1515  OT Stop Time: 1558  OT Total Time (min): 43 min    Billable Minutes:Self Care/Home Management 28  Therapeutic Activity 15    OT/CHUN: CHUN     Number of CHUN visits since last OT visit: 5    8/20/2024

## 2024-08-20 NOTE — PT/OT/SLP PROGRESS
Physical Therapy      Patient Name:  Kevin Horan Jr.   MRN:  [id number]    PT/PTA conference to discuss PT POC and patient's progression towards goals held with Xochitl Archibald PTA. Decreasing pt's POC to 3x/week to reflect current functional status; will continue to follow this admission.

## 2024-08-20 NOTE — PT/OT/SLP PROGRESS
Physical Therapy Treatment    Patient Name:  Kevin Horan Jr.   MRN:  [id number]    Recommendations:     Discharge therapy intensity: Moderate Intensity Therapy   Discharge Equipment Recommendations: walker, rolling  Barriers to discharge:  Placement    Assessment:     Kevin Horan Jr. is a 74 y.o. male admitted with a medical diagnosis of AMS, traumatic SDH, HTN, hygroma  No surgery or brace needed for L spine TP fxs.  He presents with the following impairments/functional limitations: weakness, impaired endurance, impaired functional mobility, gait instability, impaired balance, impaired self care skills, impaired cognition, decreased safety awareness.    Rehab Prognosis: Good; patient would benefit from acute skilled PT services to address these deficits and reach maximum level of function.    Recent Surgery: * No surgery found *      Plan:     During this hospitalization, patient would benefit from acute PT services 3 x/week to address the identified rehab impairments via gait training, therapeutic activities, therapeutic exercises, neuromuscular re-education and progress toward the following goals:    Plan of Care Expires:  09/15/24    Subjective     Chief Complaint: none  Patient/Family Comments/goals: none   Pain/Comfort:         Objective:     Communicated with nursing prior to session.  Patient found up in chair with  (roll ceci, 1:1) upon PT entry to room.     General Precautions: Standard, fall  Orthopedic Precautions: N/A  Braces: N/A  Respiratory Status: Room air  Blood Pressure: NT    Functional Mobility:  Bed Mobility:  Supine to sit: Tereza  Transfers:     Sit to Stand:  Tereza with rolling walker  Gait: 120' w/RW, CGA  VC for posture, downward gaze, to step closer to RW, to increase step length, & gait speed - pt able to correct momentarily however unable to maintain  Noted NBOS    Education:  Patient provided with verbal education education regarding PT role/goals/POC, fall prevention, and  safety awareness.  Understanding was verbalized, however additional teaching warranted.     Patient left up in chair with all lines intact, call button in reach, geomat cushion, nurse notified, and 1:1 present sitting next to patient.    GOALS:   Multidisciplinary Problems       Physical Therapy Goals          Problem: Physical Therapy    Goal Priority Disciplines Outcome Goal Variances Interventions   Physical Therapy Goal     PT, PT/OT Progressing     Description: Goals to be met by: 9/15/24     Patient will increase functional independence with mobility by performin. Supine to sit with supervision  2. Sit to stand transfer with Supervision  3. Gait  x 200 feet with Supervision using Rolling Walker.                          Time Tracking:     PT Received On: 24  PT Start Time: 937     PT Stop Time: 953  PT Total Time (min): 16 min     Billable Minutes: Gait Training 16    Treatment Type: Treatment  PT/PTA: PTA     Number of PTA visits since last PT visit: 3     2024

## 2024-08-20 NOTE — PROGRESS NOTES
[facility] Hospital Medicine Progress Note        Chief Complaint: Inpatient Follow-up for     HPI: 74 y.o. male with a PMHx  of alcoholic cirrhosis, hepatitis-C, diabetes mellitus type 2, and CVA who presented to [facility] on 7/19/2024 via EMS after slipped and fell down 4 steps at home and striking head. EMS reported patient was hypoglycemic with CBG of 44 on scene.  C-collar was placed en route and 250 mL of D5W was given. Initial vital signs in ED were /77, pulse 72, respirations 16, temperature 36.9° C, and SpO2 90% on room air.  Labs revealed WBC 3.29, hemoglobin 9.8. Na 139, K 3.7, CO2 21 chloride 112, CO2 21, Cr 1.0, glucose 166, and undetectable troponin.  EKG revealed normal sinus rhythm. CT head questionable minimal subdural blood products along the falx without mass effect.  CT cervical spine  no acute injury.  CT chest abdomen and pelvis with IV contrast revealed right transverse process fractures of L3 and L4, small left pleural effusion, cirrhotic liver with right hepatic lobe mass suspicious for HCC, and small volume ascites.  Pelvis x-ray revealed no acute findings.  Chest x-ray revealed mild left basilar opacities with a small left pleural effusion.      Scalp laceration was repaired in ED.  Neurosurgery was consulted and  was admitted to trauma services.  Neurosurgery recommended no surgical intervention, Keppra for seizure prophylaxis, and blood pressure less than 150/90.  Follow-up CT head on 07/19  with no subdural hematoma. Hospital medicine was consulted for transition of care and further medical management.  PT/OT consulted; recommending moderate intensity therapy.  Patient was noted to be agitated and restless attempting to climb out of bed for which p.r.n. IV Haldol was ordered and 1 to1 sitter placed.     Patient's  over all functional status was declining. He was needing full assist for ADLs. His mental status was very sluggish. Consulted palliative care.  AFP tumor marker was neg. He is now DNR status.       hosp course c/by worsening mental status on 8/6, ct head showed 7mm sub dural hygroma with mass effect but no shift, neurosurgery informed- recommended repeat ct head on 8/7- which is the same, no new change    Interval Hx:   Patient seen and examined this morning with one-to-one sitter bedside discussed with him the CT findings that he has a liver mass patient is refusing any further workup  I called patient's brother Mr. Farnsworth who is the decision maker and he will talk to patient's son and will update us   Case was discussed with patient's nurse and  on the floor.    Objective/physical exam:  General: In no acute distress, afebrile  Chest: Clear to auscultation bilaterally  Heart: RRR, +S1, S2, no appreciable murmur  Abdomen: Soft, nontender, BS +  MSK: Warm, no lower extremity edema, no clubbing or cyanosis  Neurologic:  Awake and alert  VITAL SIGNS: 24 HRS MIN & MAX LAST   Temp  Min: 97.7 °F (36.5 °C)  Max: 98.1 °F (36.7 °C) 97.9 °F (36.6 °C)   BP  Min: 97/59  Max: 132/69 132/69   Pulse  Min: 57  Max: 66  63   Resp  Min: 18  Max: 18 18   SpO2  Min: 96 %  Max: 100 % 97 %     I have reviewed the following labs:  Recent Labs   Lab 08/17/24  0640 08/20/24  0341   WBC 2.96* 4.07*   RBC 3.66* 3.87*   HGB 11.2* 11.9*   HCT 33.7* 34.9*   MCV 92.1 90.2   MCH 30.6 30.7   MCHC 33.2 34.1   RDW 13.7 13.8   PLT 63* 79*   MPV 12.5* 11.5*     Recent Labs   Lab 08/17/24  0640 08/20/24  0341    137   K 4.7 5.2*   * 110*   CO2 20* 20*   BUN 31.3* 39.1*   CREATININE 1.11 1.36*   CALCIUM 9.1 9.4     Microbiology Results (last 7 days)       ** No results found for the last 168 hours. **             See below for Radiology    Assessment/Plan:  Fall at home 4 steps down  Head injury with mild subdural hematoma along the falx- resolved   Right transverse process fractures of L3 and L4 due to fall  Small left pleural effusion   Cirrhosis of  liver with right  hepatic lobe mass with negative AFP tumor marker   Dementia with  delirium   Physical debility   Chronic thrombocytopenia-stable     History of alcoholic cirrhosis, hepatitis-C, diabetes mellitus type 2, and prior CVA       I will consult psych to see if we can start him on some medications to calm him down to see if we can remove the one-to-one sitter  Still awaiting family's decision if they want any further workup or about the right hepatic lobe mass  Blood pressure stable   Developed acute kidney injury creatinine of 1.3 I have started the patient on IV fluids  DNR. Palliative following and appreciate their assistance.  PT/OT, recommended SNF  Labs previously stable, we will check p.r.n.  Case management working on nursing home placement with memory care.    Continue with home medications, I will hold off on amlodipine for today as blood pressure is on the lower side and see how blood pressure stays      I was texted by the  that his insurance has denied LTAC and inpatient rehab and they offered p2p  for LTAC authorization but in my opinion patient does not meet LTAC criteria as he is not on any IV antibiotics or any long term care.  Physical therapy is recommending moderate intensity so I recommended skilled nursing facility placement    I told the  to start looking for skilled nursing facility places but apparently patient has been denied by multiple places as patient's wife is not giving any financial information     DVT prophylaxis: SCD in setting of SDH    VTE prophylaxis:     Patient condition:  Stable/Fair/Guarded/ Serious/ Critical    Anticipated discharge and Disposition:         All diagnosis and differential diagnosis have been reviewed; assessment and plan has been documented; I have personally reviewed the labs and test results that are presently available; I have reviewed the patients medication list; I have reviewed the consulting providers response and recommendations. I  have reviewed or attempted to review medical records based upon their availability    All of the patient's questions have been  addressed and answered. Patient's is agreeable to the above stated plan. I will continue to monitor closely and make adjustments to medical management as needed.    Portions of this note dictated using EMR integrated voice recognition software, and may be subject to voice recognition errors not corrected at proofreading. Please contact writer for clarification if needed.   _____________________________________________________________________    Malnutrition Status:    Scheduled Med:   atorvastatin  20 mg Oral QHS    carvediloL  3.125 mg Oral BID    insulin glargine U-100  10 Units Subcutaneous QHS    LIDOcaine  1 patch Transdermal Q24H    melatonin  9 mg Oral Nightly    metFORMIN  500 mg Oral BID WM    methocarbamoL  500 mg Oral BID    risperiDONE  0.5 mg Oral BID      Continuous Infusions:   0.9% NaCl   Intravenous Continuous          PRN Meds:    Current Facility-Administered Medications:     dextrose 10%, 12.5 g, Intravenous, PRN    dextrose 10%, 25 g, Intravenous, PRN    docusate sodium, 200 mg, Oral, BID PRN    glucagon (human recombinant), 1 mg, Intramuscular, PRN    glucose, 16 g, Oral, PRN    glucose, 24 g, Oral, PRN    hydrALAZINE, 20 mg, Intravenous, Q6H PRN    insulin aspart U-100, 0-5 Units, Subcutaneous, QID (AC + HS) PRN    magnesium hydroxide 400 mg/5 ml, 30 mL, Oral, Daily PRN    oxyCODONE, 5 mg, Oral, Q4H PRN    polyethylene glycol, 17 g, Oral, BID PRN     Radiology:  I have personally reviewed the following imaging and agree with the radiologist.     CT Head Without Contrast  Narrative: EXAMINATION:  CT HEAD WITHOUT CONTRAST    CLINICAL HISTORY:  subdural followup;    TECHNIQUE:  Axial scans were obtained from skull base to the vertex.    Coronal and sagittal reconstructions obtained from the axial data.    Automatic exposure control was utilized to limit radiation  dose.    Contrast: None    Radiation Dose:    Total DLP: 1194 mGy*cm    COMPARISON:  CT head dated 08/06/2024    FINDINGS:  Subdural fluid collection along the right cerebral convexity remains stable measuring up to 7 mm in thickness.  The brain parenchyma is unchanged.  There is a small area of encephalomalacia the left occipital lobe.  There is local mass effect without midline shift or herniation.  The basal cisterns are patent.  The ventricles are stable in size.  The calvarium and skull base are intact.  The mastoid air cells are clear.  Impression: Stable exam without significant interval change.    Electronically signed by: Evelia Solorio  Date:    08/07/2024  Time:    08:05      Mary Alice Martinez MD  Department of Hospital Medicine   [facility]   08/20/2024

## 2024-08-21 LAB
ANION GAP SERPL CALC-SCNC: 8 MEQ/L
BUN SERPL-MCNC: 46.5 MG/DL (ref 8.4–25.7)
CALCIUM SERPL-MCNC: 9.3 MG/DL (ref 8.8–10)
CHLORIDE SERPL-SCNC: 112 MMOL/L (ref 98–107)
CO2 SERPL-SCNC: 18 MMOL/L (ref 23–31)
CREAT SERPL-MCNC: 1.5 MG/DL (ref 0.73–1.18)
CREAT/UREA NIT SERPL: 31
GFR SERPLBLD CREATININE-BSD FMLA CKD-EPI: 49 ML/MIN/1.73/M2
GLUCOSE SERPL-MCNC: 111 MG/DL (ref 82–115)
POCT GLUCOSE: 111 MG/DL (ref 70–110)
POCT GLUCOSE: 116 MG/DL (ref 70–110)
POCT GLUCOSE: 130 MG/DL (ref 70–110)
POCT GLUCOSE: 132 MG/DL (ref 70–110)
POCT GLUCOSE: 144 MG/DL (ref 70–110)
POCT GLUCOSE: 268 MG/DL (ref 70–110)
POTASSIUM SERPL-SCNC: 4.8 MMOL/L (ref 3.5–5.1)
SODIUM SERPL-SCNC: 138 MMOL/L (ref 136–145)

## 2024-08-21 PROCEDURE — 11000001 HC ACUTE MED/SURG PRIVATE ROOM

## 2024-08-21 PROCEDURE — 25000003 PHARM REV CODE 250: Performed by: NURSE PRACTITIONER

## 2024-08-21 PROCEDURE — 94760 N-INVAS EAR/PLS OXIMETRY 1: CPT

## 2024-08-21 PROCEDURE — 25000003 PHARM REV CODE 250: Performed by: STUDENT IN AN ORGANIZED HEALTH CARE EDUCATION/TRAINING PROGRAM

## 2024-08-21 PROCEDURE — 25500020 PHARM REV CODE 255: Performed by: INTERNAL MEDICINE

## 2024-08-21 PROCEDURE — 94799 UNLISTED PULMONARY SVC/PX: CPT

## 2024-08-21 PROCEDURE — 80048 BASIC METABOLIC PNL TOTAL CA: CPT | Performed by: INTERNAL MEDICINE

## 2024-08-21 PROCEDURE — 25000003 PHARM REV CODE 250: Performed by: INTERNAL MEDICINE

## 2024-08-21 PROCEDURE — 97116 GAIT TRAINING THERAPY: CPT | Mod: CQ

## 2024-08-21 PROCEDURE — 99900031 HC PATIENT EDUCATION (STAT)

## 2024-08-21 PROCEDURE — 63600175 PHARM REV CODE 636 W HCPCS: Performed by: INTERNAL MEDICINE

## 2024-08-21 PROCEDURE — 36415 COLL VENOUS BLD VENIPUNCTURE: CPT | Performed by: INTERNAL MEDICINE

## 2024-08-21 PROCEDURE — 25000003 PHARM REV CODE 250

## 2024-08-21 PROCEDURE — 99900035 HC TECH TIME PER 15 MIN (STAT)

## 2024-08-21 RX ORDER — QUETIAPINE FUMARATE 25 MG/1
50 TABLET, FILM COATED ORAL 2 TIMES DAILY
Status: DISCONTINUED | OUTPATIENT
Start: 2024-08-21 | End: 2024-08-22

## 2024-08-21 RX ORDER — SODIUM CHLORIDE 450 MG/100ML
INJECTION, SOLUTION INTRAVENOUS CONTINUOUS
Status: DISCONTINUED | OUTPATIENT
Start: 2024-08-21 | End: 2024-08-23

## 2024-08-21 RX ADMIN — INSULIN GLARGINE 10 UNITS: 100 INJECTION, SOLUTION SUBCUTANEOUS at 08:08

## 2024-08-21 RX ADMIN — IOHEXOL 100 ML: 350 INJECTION, SOLUTION INTRAVENOUS at 03:08

## 2024-08-21 RX ADMIN — ATORVASTATIN CALCIUM 20 MG: 10 TABLET, FILM COATED ORAL at 08:08

## 2024-08-21 RX ADMIN — RISPERIDONE 0.5 MG: 0.25 TABLET, FILM COATED ORAL at 09:08

## 2024-08-21 RX ADMIN — METFORMIN HYDROCHLORIDE 500 MG: 500 TABLET, FILM COATED ORAL at 09:08

## 2024-08-21 RX ADMIN — QUETIAPINE FUMARATE 50 MG: 25 TABLET ORAL at 12:08

## 2024-08-21 RX ADMIN — QUETIAPINE FUMARATE 50 MG: 25 TABLET ORAL at 08:08

## 2024-08-21 RX ADMIN — SODIUM CHLORIDE: 4.5 INJECTION, SOLUTION INTRAVENOUS at 02:08

## 2024-08-21 RX ADMIN — CARVEDILOL 3.12 MG: 3.12 TABLET, FILM COATED ORAL at 09:08

## 2024-08-21 RX ADMIN — CARVEDILOL 3.12 MG: 3.12 TABLET, FILM COATED ORAL at 08:08

## 2024-08-21 RX ADMIN — METHOCARBAMOL 500 MG: 500 TABLET ORAL at 09:08

## 2024-08-21 RX ADMIN — Medication 9 MG: at 08:08

## 2024-08-21 NOTE — PLAN OF CARE
Spoke to pts brother Max, after speaking with Dr Martinez yesterday, they would like to see if liver mass has grown, message relayed to Dr Martinez per Nurse Marivel, Ct ordered, Max states they are working with [facility] as private pay until [facility] can go through and understands that pt may be discharged home until [place] has a bed.

## 2024-08-21 NOTE — CONSULTS
8/21/2024  Kevin Horan Jr.   1950   [id number]        Psychiatry Progress Note       SUBJECTIVE:   Kevin Horan Jr. is a 74 y.o. male with a past medical history that includes alcoholic cirrhosis, hepatitis-C, T2DM, and CVA who presented to [facility] on 07/19/24 after falling down multiple steps and striking his head. Was hypoglycemic with CBG of 44 on scene. EKG revealed normal sinus rhythm. CT head questionable minimal subdural blood products along the falx without mass effect. CT cervical spine no acute injury. CT chest abdomen and pelvis with IV contrast revealed right transverse process fractures of L3 and L4, small left pleural effusion, cirrhotic liver with right hepatic lobe mass suspicious for HCC, and small volume ascites. Pelvis x-ray revealed no acute findings. Chest x-ray revealed mild left basilar opacities with a small left pleural effusion. Follow-up CT head on 07/19 with no subdural hematoma. Had worsening mental status on 08/06/24 and ct head showed 7mm subdural hygroma with mass effect. Psychiatry initially saw on 07/29/24 where he had irritable mood, restlessness, and agitation. Psychiatry reconsulted for agitation.    Seen at the bedside with 1:1 sitter present. Currently in rollbelt. He is pleasant and cooperative with interview. Able to verbalize reason for admission but displays some impairment in memory and attention. Reports euthymic mood and displays constricted affect. Resting quietly at this time with no agitation present. Reported to be restless over the weekend with frequent attempt to get out of bed. Sitter reports he has tried to get out of bed once this morning. Oriented to person and place, but not time. No evidence of psychosis at this time. Denies suicidal ideations, homicidal ideations, hallucinations, or paranoia.        Current Medications:   Scheduled Meds:    atorvastatin  20 mg Oral QHS    carvediloL  3.125 mg Oral BID    insulin glargine U-100  10 Units  Subcutaneous QHS    LIDOcaine  1 patch Transdermal Q24H    melatonin  9 mg Oral Nightly    metFORMIN  500 mg Oral BID WM    methocarbamoL  500 mg Oral BID    risperiDONE  0.5 mg Oral BID      PRN Meds:   Current Facility-Administered Medications:     dextrose 10%, 12.5 g, Intravenous, PRN    dextrose 10%, 25 g, Intravenous, PRN    docusate sodium, 200 mg, Oral, BID PRN    glucagon (human recombinant), 1 mg, Intramuscular, PRN    glucose, 16 g, Oral, PRN    glucose, 24 g, Oral, PRN    hydrALAZINE, 20 mg, Intravenous, Q6H PRN    insulin aspart U-100, 0-5 Units, Subcutaneous, QID (AC + HS) PRN    magnesium hydroxide 400 mg/5 ml, 30 mL, Oral, Daily PRN    oxyCODONE, 5 mg, Oral, Q4H PRN    polyethylene glycol, 17 g, Oral, BID PRN   Psychotherapeutics (From admission, onward)      Start     Stop Route Frequency Ordered    07/29/24 2100  risperiDONE tablet 0.5 mg         -- Oral 2 times daily 07/29/24 1228            Allergies:   Review of patient's allergies indicates:  No Known Allergies     OBJECTIVE:   Vitals   Vitals:    08/21/24 1121   BP: (!) 109/54   Pulse: 60   Resp: 18   Temp: 97.6 °F (36.4 °C)        Labs/Imaging/Studies:   Recent Results (from the past 36 hour(s))   Basic Metabolic Panel    Collection Time: 08/20/24  3:41 AM   Result Value Ref Range    Sodium 137 136 - 145 mmol/L    Potassium 5.2 (H) 3.5 - 5.1 mmol/L    Chloride 110 (H) 98 - 107 mmol/L    CO2 20 (L) 23 - 31 mmol/L    Glucose 71 (L) 82 - 115 mg/dL    Blood Urea Nitrogen 39.1 (H) 8.4 - 25.7 mg/dL    Creatinine 1.36 (H) 0.73 - 1.18 mg/dL    BUN/Creatinine Ratio 29     Calcium 9.4 8.8 - 10.0 mg/dL    Anion Gap 7.0 mEq/L    eGFR 55 mL/min/1.73/m2   CBC with Differential    Collection Time: 08/20/24  3:41 AM   Result Value Ref Range    WBC 4.07 (L) 4.50 - 11.50 x10(3)/mcL    RBC 3.87 (L) 4.70 - 6.10 x10(6)/mcL    Hgb 11.9 (L) 14.0 - 18.0 g/dL    Hct 34.9 (L) 42.0 - 52.0 %    MCV 90.2 80.0 - 94.0 fL    MCH 30.7 27.0 - 31.0 pg    MCHC 34.1 33.0 - 36.0  g/dL    RDW 13.8 11.5 - 17.0 %    Platelet 79 (L) 130 - 400 x10(3)/mcL    MPV 11.5 (H) 7.4 - 10.4 fL    Neut % 55.3 %    Lymph % 23.1 %    Mono % 10.3 %    Eos % 10.6 %    Basophil % 0.5 %    Lymph # 0.94 0.6 - 4.6 x10(3)/mcL    Neut # 2.25 2.1 - 9.2 x10(3)/mcL    Mono # 0.42 0.1 - 1.3 x10(3)/mcL    Eos # 0.43 0 - 0.9 x10(3)/mcL    Baso # 0.02 <=0.2 x10(3)/mcL    IG# 0.01 0 - 0.04 x10(3)/mcL    IG% 0.2 %    NRBC% 0.0 %   POCT glucose    Collection Time: 08/20/24  6:20 AM   Result Value Ref Range    POCT Glucose 75 70 - 110 mg/dL   POCT glucose    Collection Time: 08/20/24 11:12 AM   Result Value Ref Range    POCT Glucose 158 (H) 70 - 110 mg/dL   POCT glucose    Collection Time: 08/20/24  4:48 PM   Result Value Ref Range    POCT Glucose 268 (H) 70 - 110 mg/dL   POCT glucose    Collection Time: 08/21/24  6:30 AM   Result Value Ref Range    POCT Glucose 130 (H) 70 - 110 mg/dL   POCT glucose    Collection Time: 08/21/24  9:41 AM   Result Value Ref Range    POCT Glucose 116 (H) 70 - 110 mg/dL        Psychiatric Mental Status Exam:  General Appearance: appears stated age, dressed in hospital garb, lying in bed, in no acute distress, in rollbelt  Arousal: alert  Behavior: cooperative, polite, appropriate eye-contact, under good behavioral control  Movements and Motor Activity: no tics, no tremors, no akathisia, no dystonia, no evidence of tardive dyskinesia  Orientation: oriented to person and place  Speech: normal rate, rhythm, volume, tone and pitch  Mood: Euthymic  Affect: constricted  Thought Process: linear, goal-directed  Associations: no loosening of associations  Thought Content and Perceptions: no suicidal or homicidal ideation, no auditory or visual hallucinations, no paranoid ideation, no ideas of reference, no evidence of delusions or psychosis  Recent and Remote Memory: recent memory impaired, registers 3/3 objects, recalls 1/3 objects at 5 minutes; per interview/observation with patient  Attention and  Concentration: able to spell WORLD forwards, unable to spell WORLD backwards; per interview/observation with patient  Fund of Knowledge: vocabulary appropriate, unable to identify the ; based on history, vocabulary, fund of knowledge, syntax, grammar, and content  Insight: questionable; based on understanding of severity of illness and HPI  Judgment: impaired; based on patient's behavior and HPI    ASSESSMENT/PLAN:   Problems Addressed/Diagnoses:  Delirium NOS  Cognitive disorder NOS r/o major neurocognitive disorder    Plan:  Medication Management  Discontinue risperidone  Quetiapine 50mg PO BID  PEC not recommended at this time.  Will continue to follow      Manuel Bird

## 2024-08-21 NOTE — PT/OT/SLP PROGRESS
Physical Therapy Treatment    Patient Name:  Kevin Horan Jr.   MRN:  [id number]    Recommendations:     Discharge therapy intensity: Moderate Intensity Therapy   Discharge Equipment Recommendations: walker, rolling  Barriers to discharge: Impaired mobility    Assessment:     Kevin Horan Jr. is a 74 y.o. male admitted with a medical diagnosis of AMS, traumatic SDH, HTN, hygroma.  He presents with the following impairments/functional limitations: weakness, impaired endurance, impaired functional mobility, gait instability, impaired balance, impaired self care skills, impaired cognition, decreased safety awareness.    Rehab Prognosis: Good; patient would benefit from acute skilled PT services to address these deficits and reach maximum level of function.    Recent Surgery: * No surgery found *      Plan:     During this hospitalization, patient would benefit from acute PT services 3 x/week to address the identified rehab impairments via gait training, therapeutic activities, therapeutic exercises, neuromuscular re-education and progress toward the following goals:    Plan of Care Expires:  09/15/24    Subjective     Chief Complaint: none stated  Patient/Family Comments/goals: none stated  Pain/Comfort:  Pain Rating 1: 0/10      Objective:     Communicated with nurse prior to session.  Patient found HOB elevated with  (roll ceci, 1:1) upon PT entry to room.     General Precautions: Standard, fall  Orthopedic Precautions: N/A  Braces: N/A  Respiratory Status: Room air  Blood Pressure: NT  Skin Integrity: Visible skin intact      Functional Mobility:  Bed Mobility:     Scooting: minimum assistance  Supine to Sit: minimum assistance  Sit to Supine: minimum assistance  Transfers:     Sit to Stand:  contact guard assistance with rolling walker  Gait: patient amb 180ft with rolling walker with CGA-Bret. Patient presents with decreased step length and radha. Patient able to correct with Bret and max verbal  cues.      Education:  Patient provided with verbal education education regarding PT role/goals/POC, fall prevention, and safety awareness.  Understanding was verbalized.     Patient left HOB elevated with all lines intact and call button in reach, sitter present    GOALS:   Multidisciplinary Problems       Physical Therapy Goals          Problem: Physical Therapy    Goal Priority Disciplines Outcome Goal Variances Interventions   Physical Therapy Goal     PT, PT/OT Progressing     Description: Goals to be met by: 9/15/24     Patient will increase functional independence with mobility by performin. Supine to sit with supervision  2. Sit to stand transfer with Supervision  3. Gait  x 200 feet with Supervision using Rolling Walker.                          Time Tracking:     PT Received On: 24  PT Start Time: 1020     PT Stop Time: 1044  PT Total Time (min): 24 min     Billable Minutes: Gait Training 24    Treatment Type: Treatment  PT/PTA: PTA     Number of PTA visits since last PT visit: 4     2024

## 2024-08-21 NOTE — PT/OT/SLP PROGRESS
Occupational Therapy      Patient Name:  Kevin Lemusra Obando   MRN:  [id number]    Patient not seen today secondary to declining OT services despite encouragement. Will follow-up when schedule allows.    8/21/2024

## 2024-08-21 NOTE — PROGRESS NOTES
[facility] Hospital Medicine Progress Note        Chief Complaint: Inpatient Follow-up for     HPI: 74 y.o. male with a PMHx  of alcoholic cirrhosis, hepatitis-C, diabetes mellitus type 2, and CVA who presented to [facility] on 7/19/2024 via EMS after slipped and fell down 4 steps at home and striking head. EMS reported patient was hypoglycemic with CBG of 44 on scene.  C-collar was placed en route and 250 mL of D5W was given. Initial vital signs in ED were /77, pulse 72, respirations 16, temperature 36.9° C, and SpO2 90% on room air.  Labs revealed WBC 3.29, hemoglobin 9.8. Na 139, K 3.7, CO2 21 chloride 112, CO2 21, Cr 1.0, glucose 166, and undetectable troponin.  EKG revealed normal sinus rhythm. CT head questionable minimal subdural blood products along the falx without mass effect.  CT cervical spine  no acute injury.  CT chest abdomen and pelvis with IV contrast revealed right transverse process fractures of L3 and L4, small left pleural effusion, cirrhotic liver with right hepatic lobe mass suspicious for HCC, and small volume ascites.  Pelvis x-ray revealed no acute findings.  Chest x-ray revealed mild left basilar opacities with a small left pleural effusion.      Scalp laceration was repaired in ED.  Neurosurgery was consulted and  was admitted to trauma services.  Neurosurgery recommended no surgical intervention, Keppra for seizure prophylaxis, and blood pressure less than 150/90.  Follow-up CT head on 07/19  with no subdural hematoma. Hospital medicine was consulted for transition of care and further medical management.  PT/OT consulted; recommending moderate intensity therapy.  Patient was noted to be agitated and restless attempting to climb out of bed for which p.r.n. IV Haldol was ordered and 1 to1 sitter placed.     Patient's  over all functional status was declining. He was needing full assist for ADLs. His mental status was very sluggish. Consulted palliative care.  AFP tumor marker was neg. He is now DNR status.       hosp course c/by worsening mental status on 8/6, ct head showed 7mm sub dural hygroma with mass effect but no shift, neurosurgery informed- recommended repeat ct head on 8/7- which is the same, no new change    Interval Hx:   Patient seen and examined this morning with one-to-one sitter bedside was sleeping open his eyes to verbal stimuli was very agitated and cursed  me out       Case was discussed with patient's nurse and  on the floor.    Objective/physical exam:  General: In no acute distress, afebrile  Chest: Clear to auscultation bilaterally  Heart: RRR, +S1, S2, no appreciable murmur  Abdomen: Soft, nontender, BS +  MSK: Warm, no lower extremity edema, no clubbing or cyanosis  Neurologic:  Awake and alert  VITAL SIGNS: 24 HRS MIN & MAX LAST   Temp  Min: 97.6 °F (36.4 °C)  Max: 97.9 °F (36.6 °C) 97.6 °F (36.4 °C)   BP  Min: 94/58  Max: 122/63 (!) 109/54   Pulse  Min: 59  Max: 70  60   Resp  Min: 18  Max: 18 18   SpO2  Min: 96 %  Max: 98 % 97 %     I have reviewed the following labs:  Recent Labs   Lab 08/17/24  0640 08/20/24  0341   WBC 2.96* 4.07*   RBC 3.66* 3.87*   HGB 11.2* 11.9*   HCT 33.7* 34.9*   MCV 92.1 90.2   MCH 30.6 30.7   MCHC 33.2 34.1   RDW 13.7 13.8   PLT 63* 79*   MPV 12.5* 11.5*     Recent Labs   Lab 08/17/24  0640 08/20/24  0341    137   K 4.7 5.2*   * 110*   CO2 20* 20*   BUN 31.3* 39.1*   CREATININE 1.11 1.36*   CALCIUM 9.1 9.4     Microbiology Results (last 7 days)       ** No results found for the last 168 hours. **             See below for Radiology    Assessment/Plan:  Fall at home 4 steps down  Head injury with mild subdural hematoma along the falx- resolved   Right transverse process fractures of L3 and L4 due to fall  Small left pleural effusion   Cirrhosis of  liver with right hepatic lobe mass with negative AFP tumor marker   Dementia with  delirium   Physical debility   Chronic  thrombocytopenia-stable     History of alcoholic cirrhosis, hepatitis-C, diabetes mellitus type 2, and prior CVA     Psych was consulted yesterday   Patient's brother discussed it with patient's son and they want further workup for the liver mass so I will order CT of the abdomen liver mass protocol  Blood pressure stable   Creatinine did trend up yesterday patient was started on IV fluids I am awaiting blood work for today    DNR. Palliative following and appreciate their assistance.  PT/OT, recommended SNF  Labs previously stable, we will check p.r.n.  Case management working on nursing home placement with memory care.    Continue with home medications, I will hold off on amlodipine for today as blood pressure is on the lower side and see how blood pressure stays  Reviewed the lab creatinine is 1.5 family requested CT so I have ordered CT liver mass protocol  Switch IV fluids to half-normal saline we will increase the rate to 75 cc an hour    I was texted by the  that his insurance has denied LTAC and inpatient rehab and they offered p2p  for LTAC authorization but in my opinion patient does not meet LTAC criteria as he is not on any IV antibiotics or any long term care.  Physical therapy is recommending moderate intensity so I recommended skilled nursing facility placement    I told the  to start looking for skilled nursing facility places but apparently patient has been denied by multiple places as patient's wife is not giving any financial information.  Discussed with the  again and son is okay paying for assisted living so  will reach out to them today     DVT prophylaxis: SCD in setting of SDH    VTE prophylaxis:     Patient condition:  Stable/Fair/Guarded/ Serious/ Critical    Anticipated discharge and Disposition:         All diagnosis and differential diagnosis have been reviewed; assessment and plan has been documented; I have personally reviewed the labs  and test results that are presently available; I have reviewed the patients medication list; I have reviewed the consulting providers response and recommendations. I have reviewed or attempted to review medical records based upon their availability    All of the patient's questions have been  addressed and answered. Patient's is agreeable to the above stated plan. I will continue to monitor closely and make adjustments to medical management as needed.    Portions of this note dictated using EMR integrated voice recognition software, and may be subject to voice recognition errors not corrected at proofreading. Please contact writer for clarification if needed.   _____________________________________________________________________    Malnutrition Status:    Scheduled Med:   atorvastatin  20 mg Oral QHS    carvediloL  3.125 mg Oral BID    insulin glargine U-100  10 Units Subcutaneous QHS    LIDOcaine  1 patch Transdermal Q24H    melatonin  9 mg Oral Nightly    metFORMIN  500 mg Oral BID WM    methocarbamoL  500 mg Oral BID    risperiDONE  0.5 mg Oral BID      Continuous Infusions:   0.9% NaCl   Intravenous Continuous 50 mL/hr at 08/20/24 2100 New Bag at 08/20/24 2100      PRN Meds:    Current Facility-Administered Medications:     dextrose 10%, 12.5 g, Intravenous, PRN    dextrose 10%, 25 g, Intravenous, PRN    docusate sodium, 200 mg, Oral, BID PRN    glucagon (human recombinant), 1 mg, Intramuscular, PRN    glucose, 16 g, Oral, PRN    glucose, 24 g, Oral, PRN    hydrALAZINE, 20 mg, Intravenous, Q6H PRN    insulin aspart U-100, 0-5 Units, Subcutaneous, QID (AC + HS) PRN    magnesium hydroxide 400 mg/5 ml, 30 mL, Oral, Daily PRN    oxyCODONE, 5 mg, Oral, Q4H PRN    polyethylene glycol, 17 g, Oral, BID PRN     Radiology:  I have personally reviewed the following imaging and agree with the radiologist.     CT Head Without Contrast  Narrative: EXAMINATION:  CT HEAD WITHOUT CONTRAST    CLINICAL HISTORY:  subdural  followup;    TECHNIQUE:  Axial scans were obtained from skull base to the vertex.    Coronal and sagittal reconstructions obtained from the axial data.    Automatic exposure control was utilized to limit radiation dose.    Contrast: None    Radiation Dose:    Total DLP: 1194 mGy*cm    COMPARISON:  CT head dated 08/06/2024    FINDINGS:  Subdural fluid collection along the right cerebral convexity remains stable measuring up to 7 mm in thickness.  The brain parenchyma is unchanged.  There is a small area of encephalomalacia the left occipital lobe.  There is local mass effect without midline shift or herniation.  The basal cisterns are patent.  The ventricles are stable in size.  The calvarium and skull base are intact.  The mastoid air cells are clear.  Impression: Stable exam without significant interval change.    Electronically signed by: Evelia Solorio  Date:    08/07/2024  Time:    08:05      Mary Alice Martinez MD  Department of Hospital Medicine   [facility]   08/21/2024

## 2024-08-22 LAB — POCT GLUCOSE: 110 MG/DL (ref 70–110)

## 2024-08-22 PROCEDURE — 25000003 PHARM REV CODE 250: Performed by: INTERNAL MEDICINE

## 2024-08-22 PROCEDURE — 94799 UNLISTED PULMONARY SVC/PX: CPT

## 2024-08-22 PROCEDURE — 25000003 PHARM REV CODE 250: Performed by: STUDENT IN AN ORGANIZED HEALTH CARE EDUCATION/TRAINING PROGRAM

## 2024-08-22 PROCEDURE — 25000003 PHARM REV CODE 250: Performed by: NURSE PRACTITIONER

## 2024-08-22 PROCEDURE — 97168 OT RE-EVAL EST PLAN CARE: CPT

## 2024-08-22 PROCEDURE — 11000001 HC ACUTE MED/SURG PRIVATE ROOM

## 2024-08-22 PROCEDURE — 25000003 PHARM REV CODE 250

## 2024-08-22 PROCEDURE — 94760 N-INVAS EAR/PLS OXIMETRY 1: CPT

## 2024-08-22 PROCEDURE — 94799 UNLISTED PULMONARY SVC/PX: CPT | Mod: XB

## 2024-08-22 PROCEDURE — 63600175 PHARM REV CODE 636 W HCPCS: Performed by: INTERNAL MEDICINE

## 2024-08-22 RX ORDER — CITALOPRAM 10 MG/1
10 TABLET ORAL DAILY
Status: DISCONTINUED | OUTPATIENT
Start: 2024-08-22 | End: 2024-08-23 | Stop reason: HOSPADM

## 2024-08-22 RX ORDER — QUETIAPINE FUMARATE 25 MG/1
25 TABLET, FILM COATED ORAL 2 TIMES DAILY
Status: DISCONTINUED | OUTPATIENT
Start: 2024-08-22 | End: 2024-08-23 | Stop reason: HOSPADM

## 2024-08-22 RX ADMIN — CARVEDILOL 3.12 MG: 3.12 TABLET, FILM COATED ORAL at 08:08

## 2024-08-22 RX ADMIN — SODIUM CHLORIDE: 4.5 INJECTION, SOLUTION INTRAVENOUS at 06:08

## 2024-08-22 RX ADMIN — QUETIAPINE FUMARATE 25 MG: 25 TABLET ORAL at 09:08

## 2024-08-22 RX ADMIN — METHOCARBAMOL 500 MG: 500 TABLET ORAL at 09:08

## 2024-08-22 RX ADMIN — METHOCARBAMOL 500 MG: 500 TABLET ORAL at 08:08

## 2024-08-22 RX ADMIN — ATORVASTATIN CALCIUM 20 MG: 10 TABLET, FILM COATED ORAL at 08:08

## 2024-08-22 RX ADMIN — INSULIN GLARGINE 10 UNITS: 100 INJECTION, SOLUTION SUBCUTANEOUS at 08:08

## 2024-08-22 RX ADMIN — Medication 9 MG: at 08:08

## 2024-08-22 RX ADMIN — QUETIAPINE FUMARATE 25 MG: 25 TABLET ORAL at 08:08

## 2024-08-22 RX ADMIN — METFORMIN HYDROCHLORIDE 500 MG: 500 TABLET, FILM COATED ORAL at 09:08

## 2024-08-22 NOTE — PROGRESS NOTES
8/22/2024  Kevin Horan Jr.   1950   [id number]        Psychiatry Progress Note     SUBJECTIVE:   Kevin Horan Jr. is a 74 y.o. male with a past medical history that includes alcoholic cirrhosis, hepatitis-C, T2DM, and CVA who presented to [facility] on 07/19/24 after falling down multiple steps and striking his head. Was hypoglycemic with CBG of 44 on scene. EKG revealed normal sinus rhythm. CT head questionable minimal subdural blood products along the falx without mass effect. CT cervical spine no acute injury. CT chest abdomen and pelvis with IV contrast revealed right transverse process fractures of L3 and L4, small left pleural effusion, cirrhotic liver with right hepatic lobe mass suspicious for HCC, and small volume ascites. Pelvis x-ray revealed no acute findings. Chest x-ray revealed mild left basilar opacities with a small left pleural effusion. Follow-up CT head on 07/19 with no subdural hematoma. Had worsening mental status on 08/06/24 and ct head showed 7mm subdural hygroma with mass effect. Psychiatry initially saw on 07/29/24 where he had irritable mood, restlessness, and agitation. Psychiatry reconsulted for agitation.     Seen at the bedside where he is drowsy, but easily awoken by verbal stimuli. Displays irritable mood and affect. Using vulgar language. Denies feeling depressed or anxious. Nursing staff reports increased sedation after starting quetiapine yesterday. Remains oriented to person and place only. Denies suicidal ideations, homicidal ideations, or auditory/visual hallucinations.      Current Medications:   Scheduled Meds:    atorvastatin  20 mg Oral QHS    carvediloL  3.125 mg Oral BID    insulin glargine U-100  10 Units Subcutaneous QHS    LIDOcaine  1 patch Transdermal Q24H    melatonin  9 mg Oral Nightly    metFORMIN  500 mg Oral BID WM    methocarbamoL  500 mg Oral BID    QUEtiapine  50 mg Oral BID      PRN Meds:   Current Facility-Administered Medications:     dextrose  10%, 12.5 g, Intravenous, PRN    dextrose 10%, 25 g, Intravenous, PRN    docusate sodium, 200 mg, Oral, BID PRN    glucagon (human recombinant), 1 mg, Intramuscular, PRN    glucose, 16 g, Oral, PRN    glucose, 24 g, Oral, PRN    hydrALAZINE, 20 mg, Intravenous, Q6H PRN    insulin aspart U-100, 0-5 Units, Subcutaneous, QID (AC + HS) PRN    magnesium hydroxide 400 mg/5 ml, 30 mL, Oral, Daily PRN    oxyCODONE, 5 mg, Oral, Q4H PRN    polyethylene glycol, 17 g, Oral, BID PRN   Psychotherapeutics (From admission, onward)      Start     Stop Route Frequency Ordered    08/21/24 1245  QUEtiapine tablet 50 mg         -- Oral 2 times daily 08/21/24 1142            Allergies:   Review of patient's allergies indicates:  No Known Allergies     OBJECTIVE:   Vitals   Vitals:    08/22/24 1105   BP: 107/62   Pulse: (!) 56   Resp:    Temp: 97.8 °F (36.6 °C)        Labs/Imaging/Studies:   Recent Results (from the past 36 hour(s))   POCT glucose    Collection Time: 08/21/24  6:30 AM   Result Value Ref Range    POCT Glucose 130 (H) 70 - 110 mg/dL   POCT glucose    Collection Time: 08/21/24  9:41 AM   Result Value Ref Range    POCT Glucose 116 (H) 70 - 110 mg/dL   Basic Metabolic Panel    Collection Time: 08/21/24 12:15 PM   Result Value Ref Range    Sodium 138 136 - 145 mmol/L    Potassium 4.8 3.5 - 5.1 mmol/L    Chloride 112 (H) 98 - 107 mmol/L    CO2 18 (L) 23 - 31 mmol/L    Glucose 111 82 - 115 mg/dL    Blood Urea Nitrogen 46.5 (H) 8.4 - 25.7 mg/dL    Creatinine 1.50 (H) 0.73 - 1.18 mg/dL    BUN/Creatinine Ratio 31     Calcium 9.3 8.8 - 10.0 mg/dL    Anion Gap 8.0 mEq/L    eGFR 49 mL/min/1.73/m2   POCT glucose    Collection Time: 08/21/24 12:21 PM   Result Value Ref Range    POCT Glucose 132 (H) 70 - 110 mg/dL   POCT glucose    Collection Time: 08/21/24  4:37 PM   Result Value Ref Range    POCT Glucose 111 (H) 70 - 110 mg/dL   POCT glucose    Collection Time: 08/21/24  7:59 PM   Result Value Ref Range    POCT Glucose 144 (H) 70 - 110  mg/dL   POCT glucose    Collection Time: 08/22/24 11:09 AM   Result Value Ref Range    POCT Glucose 110 70 - 110 mg/dL          Psychiatric Mental Status Exam:  General Appearance: appears stated age, dressed in hospital garb, lying in bed, in no acute distress  Arousal: drowsy  Behavior: appropriate eye-contact, hostile  Movements and Motor Activity: no tics, no tremors, no akathisia, no dystonia, no evidence of tardive dyskinesia  Orientation: oriented to person and place  Speech: normal rate, rhythm, volume, tone and pitch  Mood: Irritable  Affect: mood-congruent, irritable  Thought Process: goal-directed  Associations: no loosening of associations  Thought Content and Perceptions: no suicidal or homicidal ideation, no auditory or visual hallucinations, no paranoid ideation, no ideas of reference, no evidence of delusions or psychosis  Recent and Remote Memory: recent memory impaired; per interview/observation with patient  Attention and Concentration: impaired; per interview/observation with patient  Fund of Knowledge: vocabulary appropriate; based on history, vocabulary, fund of knowledge, syntax, grammar, and content  Insight: questionable; based on understanding of severity of illness and HPI  Judgment: impaired; based on patient's behavior and HPI    ASSESSMENT/PLAN:   Problems Addressed/Diagnoses:  Delirium NOS  Cognitive disorder NOS r/o major neurocognitive disorder  Mood Disorder NOS    Plan:  Medication management  Quetiapine 25mg PO BID  Celexa 10mg PO QD  Monitoring   EKG on 08/03/24 with QTC of 469ms  Will continue to follow        Manuel Bird

## 2024-08-22 NOTE — NURSING
Nurses Note -- 4 Eyes      8/21/2024   7:44 PM      Skin assessed during: Q Shift Change      [x] No Altered Skin Integrity Present    []Prevention Measures Documented      [] Yes- Altered Skin Integrity Present or Discovered   [] LDA Added if Not in Epic (Describe Wound)   [] New Altered Skin Integrity was Present on Admit and Documented in LDA   [] Wound Image Taken    Wound Care Consulted? No    Attending Nurse:  Jennifer Bennett RN/Staff Member: Jelani

## 2024-08-22 NOTE — PLAN OF CARE
Home health referral sent to [facility] via [facility].

## 2024-08-22 NOTE — PT/OT/SLP RE-EVAL
"Occupational Therapy   Re-evaluation    Name: Kevin Horan Jr.  MRN: [id number]  Admitting Diagnosis: fall with L3/L4 , Head injury mild subdural hematoma  Recent Surgery: * No surgery found *      Recommendations:     Discharge therapy intensity: Moderate Intensity Therapy   Discharge Equipment Recommendations:  to be determined by next level of care  Barriers to discharge:       Assessment:     Kevin Horan Jr. is a 74 y.o. male with a medical diagnosis of fall with L3/L4 , Head injury mild subdural hematoma.  He presents with the following performance deficits affecting function: weakness, impaired endurance, impaired self care skills, impaired functional mobility, gait instability, impaired balance, decreased upper extremity function, impaired cognition, decreased safety awareness.    Pt required max A x2 bed mobility, unable to maintain alertness on side of bed, kept eyes closed but did answer a few questions, told therapist "fuck you". When asked if he wanted to lay back down pt replied "I never wanted to get up to begin with". Max A sitting balance EOB. Recommend mod intensity.     Rehab Prognosis: Fair; patient would benefit from acute skilled OT services to address these deficits and reach maximum level of function.       Plan:     Patient to be seen 2 x/week to address the above listed problems via self-care/home management, therapeutic activities, therapeutic exercises  Plan of Care Expires: 09/20/24  Plan of Care Reviewed with: patient    Subjective     "Fuck you"     Objective:     OT communicated with nrsg prior to session.      Patient was found HOB elevated with   roll belt and sitter upon OT entry to room.    General Precautions: Standard, fall  Orthopedic Precautions: N/A  Braces: N/A      Bed Mobility:    Patient completed Supine to Sit with maximal assistance  Patient completed Sit to Supine with maximal assistance    Functional Mobility/Transfers:  Max A sitting balance; unable to " stand 2/2 decreased alertness and poor participation     Activities of Daily Living:  Upper Body Dressing: maximal assistance    Lower Body Dressing: maximal assistance    Toileting: maximal assistance      Functional Cognition:  Did not answer questions when asked     Visual Perceptual Skills:  Pt kept eyes closed and poor command following     Upper Extremity Function:  Right Upper Extremity:   Poor command following; unable to assess     Left Upper Extremity:  WFL per observation during involuntary mvmt  ; for shoulder flex       Therapeutic Positioning  Risk for acquired pressure injuries is increased due to relative decrease in mobility d/t hospitalization , incontinence, inability to communicate toileting needs, and decreased level of consciousness .    OT interventions performed during the course of today's session in an effort to prevent and/or reduce acquired pressure injuries:   Therapeutic positioning was provided at the conclusion of session to offload all bony prominences for the prevention and/or reduction of pressure injuries    Skin assessment: all bony prominences were assessed    Findings: known area of altered skin integrity at skin tear on scrotum     OT recommendations for therapeutic positioning throughout hospitalization:   Follow [facility] Pressure Injury Prevention Protocol    Patient Education:  Patient provided with verbal education education regarding OT role/goals/POC, fall prevention, and safety awareness.  Understanding was verbalized.     Patient left right sidelying with all lines intact, call button in reach, wedge under L side, restraints reapplied at end of session, nursing notified, and sitter present    GOALS:   Multidisciplinary Problems       Occupational Therapy Goals          Problem: Occupational Therapy    Goal Priority Disciplines Outcome Interventions   Occupational Therapy Goal     OT, PT/OT Progressing    Description: Goals to be met by: in 1 month      Patient will  increase functional independence with ADLs by performing:    LE Dressing with Supervision.  Grooming while standing at sink with Stand-by Assistance.  Toileting from toilet with Stand-by Assistance for hygiene and clothing management.   Toilet transfer to toilet with Supervision.                         History:     No past medical history on file.    No past surgical history on file.    Time Tracking:     OT Date of Treatment:    OT Start Time: 0951  OT Stop Time: 1008  OT Total Time (min): 17 min    Billable Minutes:Re-eval 17min     8/22/2024

## 2024-08-22 NOTE — PROGRESS NOTES
[facility] Hospital Medicine Progress Note        Chief Complaint: Inpatient Follow-up for SDH    HPI:   74 y.o. male with a PMHx  of alcoholic cirrhosis, hepatitis-C, diabetes mellitus type 2, and CVA who presented to [facility] on 7/19/2024 via EMS after slipped and fell down 4 steps at home and striking head. EMS reported patient was hypoglycemic with CBG of 44 on scene.  C-collar was placed en route and 250 mL of D5W was given. Initial vital signs in ED were /77, pulse 72, respirations 16, temperature 36.9° C, and SpO2 90% on room air.  Labs revealed WBC 3.29, hemoglobin 9.8. Na 139, K 3.7, CO2 21 chloride 112, CO2 21, Cr 1.0, glucose 166, and undetectable troponin.  EKG revealed normal sinus rhythm. CT head questionable minimal subdural blood products along the falx without mass effect.  CT cervical spine  no acute injury.  CT chest abdomen and pelvis with IV contrast revealed right transverse process fractures of L3 and L4, small left pleural effusion, cirrhotic liver with right hepatic lobe mass suspicious for HCC, and small volume ascites.  Pelvis x-ray revealed no acute findings.  Chest x-ray revealed mild left basilar opacities with a small left pleural effusion.      Scalp laceration was repaired in ED.  Neurosurgery was consulted and  was admitted to trauma services.  Neurosurgery recommended no surgical intervention, Keppra for seizure prophylaxis, and blood pressure less than 150/90.  Follow-up CT head on 07/19  with no subdural hematoma. Hospital medicine was consulted for transition of care and further medical management.  PT/OT consulted; recommending moderate intensity therapy.  Patient was noted to be agitated and restless attempting to climb out of bed for which p.r.n. IV Haldol was ordered and 1 to1 sitter placed.     Patient's  over all functional status was declining. He was needing full assist for ADLs. His mental status was very sluggish. Consulted palliative  care. AFP tumor marker was neg. He is now DNR status.       hosp course c/by worsening mental status on 8/6, ct head showed 7mm sub dural hygroma with mass effect but no shift, neurosurgery informed- recommended repeat ct head on 8/7- which is the same, no new change    Interval Hx:   Patient today awake and oriented only to self. He is confused but mood is calm. He was pulling on his purwick and then went off to sleep. He has been afebrile  Sitter at bedside.     Case was discussed with patient's nurse and  on the floor.    Objective/physical exam:  General: In no acute distress, Confused   Chest: Clear to auscultation bilaterally  Heart: RRR, +S1, S2, no appreciable murmur  Abdomen: Soft, nontender, BS +  Neurologic: Moving extremities but unable to do a full exam     VITAL SIGNS: 24 HRS MIN & MAX LAST   Temp  Min: 97.3 °F (36.3 °C)  Max: 98.9 °F (37.2 °C) 97.3 °F (36.3 °C)   BP  Min: 87/41  Max: 134/74 108/64   Pulse  Min: 53  Max: 71  (!) 56   Resp  Min: 16  Max: 18 18   SpO2  Min: 95 %  Max: 98 % 98 %     I have reviewed the following labs:  Recent Labs   Lab 08/17/24  0640 08/20/24  0341   WBC 2.96* 4.07*   RBC 3.66* 3.87*   HGB 11.2* 11.9*   HCT 33.7* 34.9*   MCV 92.1 90.2   MCH 30.6 30.7   MCHC 33.2 34.1   RDW 13.7 13.8   PLT 63* 79*   MPV 12.5* 11.5*     Recent Labs   Lab 08/17/24  0640 08/20/24  0341 08/21/24  1215    137 138   K 4.7 5.2* 4.8   * 110* 112*   CO2 20* 20* 18*   BUN 31.3* 39.1* 46.5*   CREATININE 1.11 1.36* 1.50*   CALCIUM 9.1 9.4 9.3     Microbiology Results (last 7 days)       ** No results found for the last 168 hours. **             See below for Radiology    Assessment/Plan:  Acute metabolic encephalopathy with dilirium  Fall at home 4 steps down  Head injury with mild subdural hematoma along the falx- resolved   Right transverse process fractures of L3 and L4 due to fall  Small left pleural effusion   Cirrhosis of  liver with right hepatic lobe mass with negative  AFP tumor marker   Dementia with  delirium   Physical debility   Chronic thrombocytopenia-stable     History of alcoholic cirrhosis, hepatitis-C, diabetes mellitus type 2, and prior CVA    Plan:  Patient looks comfortable. Oriented only to self   Not following verbal commands   Has been afebrile  Rpt CT done and showed Cirrhotic liver with enhancing mass at inferior right lobe    Patients over all declined and has has poor functionals status    He will benefit from home palliative comfort care. Informed our palliative care team and they will discuss further with the family.     Continue supportive care     Current meds reviewed, Dc metformin, crt 1.5    Continue strict aspiration, fall and decubitus precautions      VTE prophylaxis: SCD    Patient condition:  Fair    Anticipated discharge and Disposition:   Home with HH and ? Palliative care       All diagnosis and differential diagnosis have been reviewed; assessment and plan has been documented; I have personally reviewed the labs and test results that are presently available; I have reviewed the patients medication list; I have reviewed the consulting providers response and recommendations. I have reviewed or attempted to review medical records based upon their availability    All of the patient's questions have been  addressed and answered. Patient's is agreeable to the above stated plan. I will continue to monitor closely and make adjustments to medical management as needed.    Portions of this note dictated using EMR integrated voice recognition software, and may be subject to voice recognition errors not corrected at proofreading. Please contact writer for clarification if needed.   _____________________________________________________________________    Malnutrition Status:    Scheduled Med:   atorvastatin  20 mg Oral QHS    carvediloL  3.125 mg Oral BID    citalopram  10 mg Oral Daily    insulin glargine U-100  10 Units Subcutaneous QHS    LIDOcaine  1  patch Transdermal Q24H    melatonin  9 mg Oral Nightly    metFORMIN  500 mg Oral BID WM    methocarbamoL  500 mg Oral BID    QUEtiapine  25 mg Oral BID      Continuous Infusions:   0.45% NaCl   Intravenous Continuous 75 mL/hr at 08/22/24 0600 New Bag at 08/22/24 0600      PRN Meds:    Current Facility-Administered Medications:     dextrose 10%, 12.5 g, Intravenous, PRN    dextrose 10%, 25 g, Intravenous, PRN    docusate sodium, 200 mg, Oral, BID PRN    glucagon (human recombinant), 1 mg, Intramuscular, PRN    glucose, 16 g, Oral, PRN    glucose, 24 g, Oral, PRN    hydrALAZINE, 20 mg, Intravenous, Q6H PRN    insulin aspart U-100, 0-5 Units, Subcutaneous, QID (AC + HS) PRN    magnesium hydroxide 400 mg/5 ml, 30 mL, Oral, Daily PRN    oxyCODONE, 5 mg, Oral, Q4H PRN    polyethylene glycol, 17 g, Oral, BID PRN     Radiology:  I have personally reviewed the following imaging and agree with the radiologist.     CT Abdomen Pelvis w/o Contrast Plus Triphasic w/Contrast  Narrative: EXAMINATION:  CT ABDOMEN PELVIS W/O CONTRAST PLUS TRIPHASIC W/CONTRAST    CLINICAL HISTORY:  liver mass;liver mass;    TECHNIQUE:  Helically acquired images with axial, sagittal and coronal reformations were obtained from the lung bases to the pubic symphysis prior to and after the IV administration of contrast.  Triphasic post contrast imaging protocol.    Automated tube current modulation, weight-based exposure dosing, and/or iterative reconstruction technique utilized to reach lowest reasonably achievable exposure rate.    DLP: 4185 mGy*cm    COMPARISON:  CT chest abdomen pelvis 07/19/2024    FINDINGS:  HEART: There coronary artery calcifications.    LUNG BASES: Trace left pleural fluid.  Left basilar atelectasis similar to prior.    LIVER: Cirrhotic morphology of the liver.  Evaluation degraded by motion.  Enhancing mass at the inferior right lobe of the liver measures approximately 4.2 cm, not significantly changed from previous exam.  There  is peripheral arterial hyperenhancement with central hypodensity.  On previous CT exam enhancement was more robust.  Is unclear if this is related to differences in timing of the contrast bolus between the exams or interval necrosis.  No significant washout at the areas of enhancement.  Portal vein is patent.  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  There is a recanalized periumbilical vein.    BILIARY: Cholelithiasis.    PANCREAS: No inflammatory change.    SPLEEN: 15 cm.    ADRENALS: No mass.    KIDNEYS/URETERS: Ptotic right kidney.    GI TRACT/MESENTERY:  No evidence of bowel obstruction or inflammation. Appendix is normal.    PERITONEUM: No free fluid.No free air.    LYMPH NODES: No enlarged lymph nodes by size criteria.    VASCULATURE: Aortoiliac atherosclerosis.    BLADDER: Normal appearance given degree of distention.    REPRODUCTIVE ORGANS: Normal as visualized.    ABDOMINAL WALL: Unremarkable.    BONES: Grade 1 anterolisthesis L4 on L5 related to facet arthropathy.  Impression: Cirrhotic liver with enhancing mass at inferior right lobe .  LR 4    Motion degraded exam.    Electronically signed by: Claudine Shah  Date:    08/21/2024  Time:    16:11      Armando Herrera MD  Department of Hospital Medicine   [facility]   08/22/2024

## 2024-08-22 NOTE — NURSING
Nurses Note -- 4 Eyes      8/22/2024   7:27 AM      Skin assessed during: Q Shift Change      [x] No Altered Skin Integrity Present    []Prevention Measures Documented      [] Yes- Altered Skin Integrity Present or Discovered   [] LDA Added if Not in Epic (Describe Wound)   [] New Altered Skin Integrity was Present on Admit and Documented in LDA   [] Wound Image Taken    Wound Care Consulted? No    Attending Nurse:  Shilpa Rodriguez RN    Second RN/Staff Member:  Clark Zamora RN

## 2024-08-22 NOTE — NURSING
Nurses Note -- 4 Eyes      8/21/2024   11:15 PM      Skin assessed during: Daily Assessment      [x] No Altered Skin Integrity Present    []Prevention Measures Documented      [] Yes- Altered Skin Integrity Present or Discovered   [] LDA Added if Not in Epic (Describe Wound)   [] New Altered Skin Integrity was Present on Admit and Documented in LDA   [] Wound Image Taken    Wound Care Consulted? No    Attending Nurse:  Clark Bennett RN/Staff Member:  Coni

## 2024-08-23 VITALS
OXYGEN SATURATION: 97 % | DIASTOLIC BLOOD PRESSURE: 65 MMHG | RESPIRATION RATE: 18 BRPM | SYSTOLIC BLOOD PRESSURE: 112 MMHG | HEIGHT: 73 IN | TEMPERATURE: 99 F | BODY MASS INDEX: 29.16 KG/M2 | HEART RATE: 62 BPM | WEIGHT: 220 LBS

## 2024-08-23 LAB
ANION GAP SERPL CALC-SCNC: 7 MEQ/L
BASOPHILS # BLD AUTO: 0.02 X10(3)/MCL
BASOPHILS NFR BLD AUTO: 0.7 %
BUN SERPL-MCNC: 32.6 MG/DL (ref 8.4–25.7)
CALCIUM SERPL-MCNC: 8.5 MG/DL (ref 8.8–10)
CHLORIDE SERPL-SCNC: 112 MMOL/L (ref 98–107)
CO2 SERPL-SCNC: 17 MMOL/L (ref 23–31)
CREAT SERPL-MCNC: 1.13 MG/DL (ref 0.73–1.18)
CREAT/UREA NIT SERPL: 29
EOSINOPHIL # BLD AUTO: 0.21 X10(3)/MCL (ref 0–0.9)
EOSINOPHIL NFR BLD AUTO: 7.1 %
ERYTHROCYTE [DISTWIDTH] IN BLOOD BY AUTOMATED COUNT: 13.6 % (ref 11.5–17)
GFR SERPLBLD CREATININE-BSD FMLA CKD-EPI: >60 ML/MIN/1.73/M2
GLUCOSE SERPL-MCNC: 92 MG/DL (ref 82–115)
HCT VFR BLD AUTO: 31.7 % (ref 42–52)
HGB BLD-MCNC: 11 G/DL (ref 14–18)
IMM GRANULOCYTES # BLD AUTO: 0 X10(3)/MCL (ref 0–0.04)
IMM GRANULOCYTES NFR BLD AUTO: 0 %
LYMPHOCYTES # BLD AUTO: 0.64 X10(3)/MCL (ref 0.6–4.6)
LYMPHOCYTES NFR BLD AUTO: 21.7 %
MCH RBC QN AUTO: 30.8 PG (ref 27–31)
MCHC RBC AUTO-ENTMCNC: 34.7 G/DL (ref 33–36)
MCV RBC AUTO: 88.8 FL (ref 80–94)
MONOCYTES # BLD AUTO: 0.44 X10(3)/MCL (ref 0.1–1.3)
MONOCYTES NFR BLD AUTO: 14.9 %
NEUTROPHILS # BLD AUTO: 1.64 X10(3)/MCL (ref 2.1–9.2)
NEUTROPHILS NFR BLD AUTO: 55.6 %
NRBC BLD AUTO-RTO: 0 %
PLATELET # BLD AUTO: 64 X10(3)/MCL (ref 130–400)
PMV BLD AUTO: 12.6 FL (ref 7.4–10.4)
POCT GLUCOSE: 127 MG/DL (ref 70–110)
POCT GLUCOSE: 187 MG/DL (ref 70–110)
POCT GLUCOSE: 228 MG/DL (ref 70–110)
POTASSIUM SERPL-SCNC: 4.4 MMOL/L (ref 3.5–5.1)
RBC # BLD AUTO: 3.57 X10(6)/MCL (ref 4.7–6.1)
SODIUM SERPL-SCNC: 136 MMOL/L (ref 136–145)
WBC # BLD AUTO: 2.95 X10(3)/MCL (ref 4.5–11.5)

## 2024-08-23 PROCEDURE — 25000003 PHARM REV CODE 250

## 2024-08-23 PROCEDURE — 85025 COMPLETE CBC W/AUTO DIFF WBC: CPT | Performed by: INTERNAL MEDICINE

## 2024-08-23 PROCEDURE — 25000003 PHARM REV CODE 250: Performed by: STUDENT IN AN ORGANIZED HEALTH CARE EDUCATION/TRAINING PROGRAM

## 2024-08-23 PROCEDURE — 99232 SBSQ HOSP IP/OBS MODERATE 35: CPT | Mod: ,,, | Performed by: NURSE PRACTITIONER

## 2024-08-23 PROCEDURE — 80048 BASIC METABOLIC PNL TOTAL CA: CPT | Performed by: INTERNAL MEDICINE

## 2024-08-23 PROCEDURE — 36415 COLL VENOUS BLD VENIPUNCTURE: CPT | Performed by: INTERNAL MEDICINE

## 2024-08-23 PROCEDURE — 25000003 PHARM REV CODE 250: Performed by: NURSE PRACTITIONER

## 2024-08-23 RX ORDER — CARVEDILOL 3.12 MG/1
3.12 TABLET ORAL 2 TIMES DAILY
Qty: 180 TABLET | Refills: 3 | Status: SHIPPED | OUTPATIENT
Start: 2024-08-23 | End: 2025-08-23

## 2024-08-23 RX ORDER — INSULIN GLARGINE 100 [IU]/ML
10 INJECTION, SOLUTION SUBCUTANEOUS NIGHTLY
Qty: 3 ML | Refills: 11 | Status: SHIPPED | OUTPATIENT
Start: 2024-08-23 | End: 2025-08-23

## 2024-08-23 RX ORDER — DOCUSATE SODIUM 100 MG/1
200 CAPSULE, LIQUID FILLED ORAL 2 TIMES DAILY PRN
COMMUNITY
Start: 2024-08-23

## 2024-08-23 RX ORDER — QUETIAPINE FUMARATE 25 MG/1
25 TABLET, FILM COATED ORAL 2 TIMES DAILY
Qty: 60 TABLET | Refills: 11 | Status: SHIPPED | OUTPATIENT
Start: 2024-08-23 | End: 2025-08-23

## 2024-08-23 RX ORDER — CITALOPRAM 10 MG/1
10 TABLET ORAL DAILY
Qty: 30 TABLET | Refills: 11 | Status: SHIPPED | OUTPATIENT
Start: 2024-08-24 | End: 2025-08-24

## 2024-08-23 RX ADMIN — CARVEDILOL 3.12 MG: 3.12 TABLET, FILM COATED ORAL at 08:08

## 2024-08-23 RX ADMIN — QUETIAPINE FUMARATE 25 MG: 25 TABLET ORAL at 08:08

## 2024-08-23 RX ADMIN — METHOCARBAMOL 500 MG: 500 TABLET ORAL at 08:08

## 2024-08-23 RX ADMIN — CITALOPRAM HYDROBROMIDE 10 MG: 10 TABLET ORAL at 08:08

## 2024-08-23 NOTE — NURSING
Nurses Note -- 4 Eyes      8/22/2024   10:14 PM      Skin assessed during: Q Shift Change      [x] No Altered Skin Integrity Present    []Prevention Measures Documented      [] Yes- Altered Skin Integrity Present or Discovered   [] LDA Added if Not in Epic (Describe Wound)   [] New Altered Skin Integrity was Present on Admit and Documented in LDA   [] Wound Image Taken    Wound Care Consulted? No    Attending Nurse:  Shilpa Bennett RN/Staff Member:  juliet

## 2024-08-23 NOTE — PLAN OF CARE
08/23/24 1204   Final Note   Assessment Type Final Discharge Note   Anticipated Discharge Disposition Home-[facility]   Hospital Resources/Appts/Education Provided Post-Acute resouces added to AVS   Post-Acute Status   Discharge Delays None known at this time

## 2024-08-23 NOTE — CONSULTS
Consults    Patient Name: Kevin Horan Jr.   MRN: [id number]   Admission Date: 7/19/2024   Hospital Length of Stay: 35   Attending Provider: Armando Herrera MD   Consulting Provider: Bebe ANN  Reason for Consult: Goals of Care  Primary Care Physician:  Dimitrios Mack MD     Principal Problem: SDH (subdural hematoma)       Final diagnoses:  [W19.XXXA] Fall (Primary)  [R53.1] Generalized weakness  [S06.5XAA] Subdural hematoma  [S01.01XA] Laceration of scalp, initial encounter      Assessment/Plan:     I reviewed the patient and family's understanding of the seriousness of the illness and its expected prognosis. We discussed the patient's goals of care and treatment preferences.        Advance Care Planning     Date: 08/23/2024    [facility]  I engaged the family in a voluntary conversation about advance care planning and we specifically addressed what the goals of care would be moving forward, in light of the patient's change in clinical status, specifically current condition.  We did specifically address the patient's likely prognosis, which is fair .  We explored the patient's values and preferences for future care.  The family endorses that what is most important right now is to focus on improvement in condition but with limits to invasive therapies    Accordingly, we have decided that the best plan to meet the patient's goals includes continuing with treatment          Spoke to patient's brother Javad concerning plan to which he states patient was returning home, but family will continue to seek long-term placement for him.  States that his behavioral history has been an issue.  I discussed with brother that if patient should experienced further decline hospice would be appropriate option for him to which he verbalized understanding.  Offered support and encouraged to call for any questions/concerns.         Interval History:     Patient sitting up in bed feeding himself with one-to-one and  progress.  Plan is for patient to DC home with home health Palliative Medicine continuing to follow for goals of care.      Active Ambulatory Problems     Diagnosis Date Noted    No Active Ambulatory Problems     Resolved Ambulatory Problems     Diagnosis Date Noted    No Resolved Ambulatory Problems     No Additional Past Medical History        No past surgical history on file.     Review of patient's allergies indicates:  No Known Allergies       Current Facility-Administered Medications:     atorvastatin tablet 20 mg, 20 mg, Oral, QHS, Alyse Maharaj MD, 20 mg at 08/22/24 2056    carvediloL tablet 3.125 mg, 3.125 mg, Oral, BID, ReyesLoretta G, DO, 3.125 mg at 08/23/24 0840    citalopram tablet 10 mg, 10 mg, Oral, Daily, Manuel Bird NP, 10 mg at 08/23/24 0840    dextrose 10% bolus 125 mL 125 mL, 12.5 g, Intravenous, PRN, Selin Brumfield, AGACNP-BC    dextrose 10% bolus 250 mL 250 mL, 25 g, Intravenous, PRN, Selin Brumfield, AGACNP-BC    docusate sodium capsule 200 mg, 200 mg, Oral, BID PRN, Reyes, Thairy G, DO    glucagon (human recombinant) injection 1 mg, 1 mg, Intramuscular, PRN, Selin Brumfield, AGAEASTONP-BC    glucose chewable tablet 16 g, 16 g, Oral, PRN, Selin Brumfield, AGACNP-BC    glucose chewable tablet 24 g, 24 g, Oral, PRN, Selin Brumfield, AGACNP-BC    hydrALAZINE injection 20 mg, 20 mg, Intravenous, Q6H PRN, Alyse Maharaj MD    insulin aspart U-100 injection 0-5 Units, 0-5 Units, Subcutaneous, QID (AC + HS) PRN, Selin Brumfield, JONATHAN-BC, 3 Units at 08/20/24 1652    insulin glargine U-100 (Lantus) injection 10 Units, 10 Units, Subcutaneous, QHS, Alyse Maharaj MD, 10 Units at 08/22/24 2056    LIDOcaine 5 % patch 1 patch, 1 patch, Transdermal, Q24H, Selin Brumfield AGACNP-BC, 1 patch at 08/20/24 0909    magnesium hydroxide 400 mg/5 ml suspension 2,400 mg, 30 mL, Oral, Daily PRN, Selin Brumfield, JONATHAN-BC    melatonin tablet 9 mg, 9 mg, Oral, Nightly, Reyes, Thairy G, DO, 9 mg at  "08/22/24 2056    methocarbamoL tablet 500 mg, 500 mg, Oral, BID, Selin Brumfield, AGACNP-BC, 500 mg at 08/23/24 0840    oxyCODONE immediate release tablet 5 mg, 5 mg, Oral, Q4H PRN, Reyes, Thairy G, DO, 5 mg at 08/20/24 0909    polyethylene glycol packet 17 g, 17 g, Oral, BID PRN, Reyes, Thairy G, DO    QUEtiapine tablet 25 mg, 25 mg, Oral, BID, Manuel Bird NP, 25 mg at 08/23/24 0840       Current Facility-Administered Medications:     dextrose 10%, 12.5 g, Intravenous, PRN    dextrose 10%, 25 g, Intravenous, PRN    docusate sodium, 200 mg, Oral, BID PRN    glucagon (human recombinant), 1 mg, Intramuscular, PRN    glucose, 16 g, Oral, PRN    glucose, 24 g, Oral, PRN    hydrALAZINE, 20 mg, Intravenous, Q6H PRN    insulin aspart U-100, 0-5 Units, Subcutaneous, QID (AC + HS) PRN    magnesium hydroxide 400 mg/5 ml, 30 mL, Oral, Daily PRN    oxyCODONE, 5 mg, Oral, Q4H PRN    polyethylene glycol, 17 g, Oral, BID PRN     No family history on file.       Review of Systems   Unable to perform ROS: Mental status change            Objective:   /65   Pulse 62   Temp 98.6 °F (37 °C) (Oral)   Resp 18   Ht 6' 1" (1.854 m)   Wt 99.8 kg (220 lb 0.3 oz)   SpO2 97%   BMI 29.03 kg/m²      Physical Exam   Constitutional: He appears ill.   HENT:   Head: Normocephalic.   Eyes: Pupils are equal, round, and reactive to light.   Cardiovascular: An irregular rhythm present. Pulmonary:      Effort: Pulmonary effort is normal.     Abdominal: Soft.   Neurological:   To person and place   Skin: Skin is warm.            Review of Symptoms      Symptom Assessment (ESAS 0-10 Scale)  Pain:  0  Dyspnea:  0  Anxiety:  0  Nausea:  0  Depression:  0  Anorexia:  0  Fatigue:  0  Insomnia:  0  Restlessness:  0  Agitation:  0         Bowel Management Plan (BMP):  Yes      Psychosocial/Cultural:   See Palliative Psychosocial Note: Yes  **Primary  to Follow**  Palliative Care  Consult: No      Advance Care Planning "   Advance Care Planning        PAINAD: NA    Caregiver burden formerly assessed: Yes      No results displayed because visit has over 200 results.               > 50% of 25 min of encounter was spent in chart review, face to face discussion of goals of care, symptom assessment, coordination of care and emotional support.    Bebe ANN, [facility]  Palliative Medicine  [facility]

## 2024-08-23 NOTE — PLAN OF CARE
Problem: Adult Inpatient Plan of Care  Goal: Plan of Care Review  8/23/2024 1156 by Alta Parikh RN  Outcome: Met  8/23/2024 0726 by Alta Parikh RN  Outcome: Progressing  Goal: Patient-Specific Goal (Individualized)  8/23/2024 1156 by Alta Parikh RN  Outcome: Met  8/23/2024 0726 by Alta Parikh RN  Outcome: Progressing  Goal: Absence of Hospital-Acquired Illness or Injury  8/23/2024 1156 by Alta Parikh RN  Outcome: Met  8/23/2024 0726 by Alta Parikh RN  Outcome: Progressing  Goal: Optimal Comfort and Wellbeing  8/23/2024 1156 by Alta Parikh RN  Outcome: Met  8/23/2024 0726 by Alta Parikh RN  Outcome: Progressing  Goal: Readiness for Transition of Care  8/23/2024 1156 by Alta Parikh RN  Outcome: Met  8/23/2024 0726 by Alta Parikh RN  Outcome: Progressing     Problem: Wound  Goal: Optimal Coping  8/23/2024 1156 by Alta Parikh RN  Outcome: Met  8/23/2024 0726 by Alta Parikh RN  Outcome: Progressing  Goal: Optimal Functional Ability  8/23/2024 1156 by Alta Parikh RN  Outcome: Met  8/23/2024 0726 by Alta Parikh RN  Outcome: Progressing  Goal: Absence of Infection Signs and Symptoms  8/23/2024 1156 by Alta Parikh RN  Outcome: Met  8/23/2024 0726 by Alta Parikh RN  Outcome: Progressing  Goal: Improved Oral Intake  8/23/2024 1156 by Alta Parikh RN  Outcome: Met  8/23/2024 0726 by Alta Parikh RN  Outcome: Progressing  Goal: Optimal Pain Control and Function  8/23/2024 1156 by Alta Parikh RN  Outcome: Met  8/23/2024 0726 by Alta Parikh RN  Outcome: Progressing  Goal: Skin Health and Integrity  8/23/2024 1156 by Alta Parikh RN  Outcome: Met  8/23/2024 0726 by Alta Parikh RN  Outcome: Progressing  Goal: Optimal Wound Healing  8/23/2024 1156 by Alta Parikh RN  Outcome: Met  8/23/2024 0726 by Alta Parikh, RN  Outcome: Progressing     Problem: Fall Injury Risk  Goal: Absence of Fall and  Fall-Related Injury  8/23/2024 1156 by Alta Parikh RN  Outcome: Met  8/23/2024 0726 by Alta Parikh RN  Outcome: Progressing     Problem: Skin Injury Risk Increased  Goal: Skin Health and Integrity  8/23/2024 1156 by Alta Parikh RN  Outcome: Met  8/23/2024 0726 by Alta Parikh RN  Outcome: Progressing     Problem: Coping Ineffective  Goal: Effective Coping  8/23/2024 1156 by Alta Parikh RN  Outcome: Met  8/23/2024 0726 by Alta Parikh RN  Outcome: Progressing     Problem: Diabetes Comorbidity  Goal: Blood Glucose Level Within Targeted Range  8/23/2024 1156 by Alta Parikh RN  Outcome: Met  8/23/2024 0726 by Alta Parikh RN  Outcome: Progressing

## 2024-08-23 NOTE — DISCHARGE SUMMARY
[facility] Medicine Discharge Summary    Admit Date: 7/19/2024  Discharge Date and Time: 8/23/202411:27 AM  Admitting Physician:  Team  Discharging Physician: Armando Herrera MD.  Primary Care Physician: Dimitrios Mack MD      Discharge Diagnoses:  Acute metabolic encephalopathy with dilirium  Fall at home 4 steps down  Head injury with mild subdural hematoma along the falx- resolved   Right transverse process fractures of L3 and L4 due to fall  Small left pleural effusion   Cirrhosis of  liver with right hepatic lobe mass with negative AFP tumor marker   Dementia with  delirium   Physical debility   Chronic thrombocytopenia-stable     History of alcoholic cirrhosis, hepatitis-C, diabetes mellitus type 2, and prior CVA    Hospital Course:   74 y.o. male with a PMHx  of alcoholic cirrhosis, hepatitis-C, diabetes mellitus type 2, and CVA who presented to [facility] on 7/19/2024 via EMS after slipped and fell down 4 steps at home and striking head. EMS reported patient was hypoglycemic with CBG of 44 on scene.  C-collar was placed en route and 250 mL of D5W was given. Initial vital signs in ED were /77, pulse 72, respirations 16, temperature 36.9° C, and SpO2 90% on room air.  Labs revealed WBC 3.29, hemoglobin 9.8. Na 139, K 3.7, CO2 21 chloride 112, CO2 21, Cr 1.0, glucose 166, and undetectable troponin.  EKG revealed normal sinus rhythm. CT head questionable minimal subdural blood products along the falx without mass effect.  CT cervical spine  no acute injury.  CT chest abdomen and pelvis with IV contrast revealed right transverse process fractures of L3 and L4, small left pleural effusion, cirrhotic liver with right hepatic lobe mass suspicious for HCC, and small volume ascites.  Pelvis x-ray revealed no acute findings.  Chest x-ray revealed mild left basilar opacities with a small left pleural effusion.      Scalp laceration was repaired in ED.  Neurosurgery was  consulted and  was admitted to trauma services.  Neurosurgery recommended no surgical intervention, Keppra for seizure prophylaxis, and blood pressure less than 150/90.  Follow-up CT head on 07/19  with no subdural hematoma. Hospital medicine was consulted for transition of care and further medical management. Patient was noted to be agitated and restless attempting to climb out of bed for which p.r.n. IV Haldol was ordered and 1 to1 sitter placed.     Patient's  over all functional status was declining. He was needing full assist for ADLs. His mental status was very sluggish. Consulted palliative care. AFP tumor marker was neg. He was DNR status.      He had worsening mental status on 8/6, ct head showed 7mm sub dural hygroma with mass effect but no shift, neurosurgery informed- recommended repeat ct head on 8/7- which is the same, no new change. He was needing full assist for all ADLs. He had poor functional status. Palliative care team was discussing goals of care with family. They decided on taking him home with HH. Recommended palliative care to also following patient at home.     He continued to be confused and needed fulll assist for all ADLs. Rpt CT abdomen showed Cirrhotic liver with enhancing mass at inferior right lobe. His labs was at baseline. He was seen by Psych team and started on Quetiapine 25mg PO BID  and Celexa 10mg PO QD . Mood was better.     He was discharged home with .      Pt was seen and examined on the day of discharge  Vitals:  VITAL SIGNS: 24 HRS MIN & MAX LAST   Temp  Min: 97.3 °F (36.3 °C)  Max: 99 °F (37.2 °C) 97.9 °F (36.6 °C)   BP  Min: 108/64  Max: 133/81 126/67   Pulse  Min: 56  Max: 77  61   Resp  Min: 18  Max: 18 18   SpO2  Min: 97 %  Max: 99 % 99 %       Physical Exam:  General: In no acute distress, Confused   Chest: Clear to auscultation bilaterally  Heart: RRR, +S1, S2, no appreciable murmur  Abdomen: Soft, nontender, BS +  Neurologic: Moving extremities but unable to do  a full exam     Procedures Performed: No admission procedures for hospital encounter.     Significant Diagnostic Studies: See Full reports for all details    Recent Labs   Lab 08/17/24  0640 08/20/24  0341 08/23/24  0528   WBC 2.96* 4.07* 2.95*   RBC 3.66* 3.87* 3.57*   HGB 11.2* 11.9* 11.0*   HCT 33.7* 34.9* 31.7*   MCV 92.1 90.2 88.8   MCH 30.6 30.7 30.8   MCHC 33.2 34.1 34.7   RDW 13.7 13.8 13.6   PLT 63* 79* 64*   MPV 12.5* 11.5* 12.6*       Recent Labs   Lab 08/20/24  0341 08/21/24  1215 08/23/24  0528    138 136   K 5.2* 4.8 4.4   * 112* 112*   CO2 20* 18* 17*   BUN 39.1* 46.5* 32.6*   CREATININE 1.36* 1.50* 1.13   CALCIUM 9.4 9.3 8.5*        Microbiology Results (last 7 days)       ** No results found for the last 168 hours. **             CT Abdomen Pelvis w/o Contrast Plus Triphasic w/Contrast  Narrative: EXAMINATION:  CT ABDOMEN PELVIS W/O CONTRAST PLUS TRIPHASIC W/CONTRAST    CLINICAL HISTORY:  liver mass;liver mass;    TECHNIQUE:  Helically acquired images with axial, sagittal and coronal reformations were obtained from the lung bases to the pubic symphysis prior to and after the IV administration of contrast.  Triphasic post contrast imaging protocol.    Automated tube current modulation, weight-based exposure dosing, and/or iterative reconstruction technique utilized to reach lowest reasonably achievable exposure rate.    DLP: 4185 mGy*cm    COMPARISON:  CT chest abdomen pelvis 07/19/2024    FINDINGS:  HEART: There coronary artery calcifications.    LUNG BASES: Trace left pleural fluid.  Left basilar atelectasis similar to prior.    LIVER: Cirrhotic morphology of the liver.  Evaluation degraded by motion.  Enhancing mass at the inferior right lobe of the liver measures approximately 4.2 cm, not significantly changed from previous exam.  There is peripheral arterial hyperenhancement with central hypodensity.  On previous CT exam enhancement was more robust.  Is unclear if this is related to  differences in timing of the contrast bolus between the exams or interval necrosis.  No significant washout at the areas of enhancement.  Portal vein is patent.  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  There is a recanalized periumbilical vein.    BILIARY: Cholelithiasis.    PANCREAS: No inflammatory change.    SPLEEN: 15 cm.    ADRENALS: No mass.    KIDNEYS/URETERS: Ptotic right kidney.    GI TRACT/MESENTERY:  No evidence of bowel obstruction or inflammation. Appendix is normal.    PERITONEUM: No free fluid.No free air.    LYMPH NODES: No enlarged lymph nodes by size criteria.    VASCULATURE: Aortoiliac atherosclerosis.    BLADDER: Normal appearance given degree of distention.    REPRODUCTIVE ORGANS: Normal as visualized.    ABDOMINAL WALL: Unremarkable.    BONES: Grade 1 anterolisthesis L4 on L5 related to facet arthropathy.  Impression: Cirrhotic liver with enhancing mass at inferior right lobe .  LR 4    Motion degraded exam.    Electronically signed by: Claudine Shah  Date:    08/21/2024  Time:    16:11         Medication List        START taking these medications      carvediloL 3.125 MG tablet  Commonly known as: COREG  Take 1 tablet (3.125 mg total) by mouth 2 (two) times daily.     citalopram 10 MG tablet  Commonly known as: CeleXA  Take 1 tablet (10 mg total) by mouth once daily.  Start taking on: August 24, 2024     docusate sodium 100 MG capsule  Commonly known as: COLACE  Take 2 capsules (200 mg total) by mouth 2 (two) times daily as needed for Constipation.     insulin glargine U-100 (Lantus) 100 unit/mL injection  Inject 10 Units into the skin every evening.     QUEtiapine 25 MG Tab  Commonly known as: SEROQUEL  Take 1 tablet (25 mg total) by mouth 2 (two) times daily.            CONTINUE taking these medications      atorvastatin 40 MG tablet  Commonly known as: LIPITOR     omeprazole 40 MG capsule  Commonly known as: PRILOSEC     traMADoL 50 mg tablet  Commonly known as:  ULTRAM            STOP taking these medications      fludrocortisone 0.1 mg Tab  Commonly known as: FLORINEF     glipiZIDE 2.5 MG Tr24  Commonly known as: GLUCOTROL     pioglitazone 30 MG tablet  Commonly known as: ACTOS     sodium chloride 1,000 mg Tbso oral tablet     TRESIBA FLEXTOUCH U-100 100 unit/mL (3 mL) insulin pen  Generic drug: insulin degludec               Where to Get Your Medications        These medications were sent to [id number] - [place], [place] - [address] [facility] [id number]  [address], [place] [address]      Phone: [phone]   carvediloL 3.125 MG tablet  citalopram 10 MG tablet  insulin glargine U-100 (Lantus) 100 unit/mL injection  QUEtiapine 25 MG Tab       You can get these medications from any pharmacy    You don't need a prescription for these medications  docusate sodium 100 MG capsule          Explained in detail to the patient about the discharge plan, medications, and follow-up visits. Pt understands and agrees with the treatment plan  Discharge Disposition: Home or Self Care   Discharged Condition: stable  Diet-   Dietary Orders (From admission, onward)       Start     Ordered    07/26/24 1101  Diet diabetic Low Sodium,2gm; 2800 Calorie  Diet effective now        Question Answer Comment   Diet Modifier: Low Sodium,2gm    Total calories: 2800 Calorie        07/26/24 1100                   Medications Per DC med rec  Activities as tolerated   Follow-up Information       [facility] Follow up.    Specialties: Home Health Services, Home Therapy Services, Home Living Aide Services  Why: They will call you to set up date and time  Contact information:  [address] [place] [address]  [phone]             Dimitrios Mack MD. Go on 8/28/2024.    Specialty: Family Medicine  Why: New Address:::[address] [place] [address] [address] [place] [address]  Appt time is 10am  Contact information:  [address] [facility] [address]  [facility]  [place] [address]  [phone]                           For further questions contact hospitalist office    Discharge time 33 minutes    For worsening symptoms, chest pain, shortness of breath, increased abdominal pain, high grade fever, stroke or stroke like symptoms, immediately go to the nearest Emergency Room or call 911 as soon as possible.      Armando Chavez M.D, on 8/23/2024. at 11:27 AM.

## 2024-08-23 NOTE — NURSING
Nurses Note -- 4 Eyes      8/23/2024   7:27 AM      Skin assessed during: Q Shift Change      [x] No new  Altered Skin Integrity Present    []Prevention Measures Documented      [] Yes- Altered Skin Integrity Present or Discovered   [] LDA Added if Not in Epic (Describe Wound)   [] New Altered Skin Integrity was Present on Admit and Documented in LDA   [] Wound Image Taken    Wound Care Consulted? No    Attending Nurse:  ERWIN Holm    Second RN/Staff Member:  ERWIN Rodas

## 2024-08-23 NOTE — PLAN OF CARE
Discharge/AVS sent via careport to [facility], referral sent to [facility] palliative services.

## 2024-09-03 NOTE — PLAN OF CARE
Brother wild made aware of CT results, pt will discharge to [facility] @ [address] [place] [address] with  services   Vyvanse 50 mg #30  Take 1 capsule by mouth every morning    Asking for vyvanse instead of generic

## 2024-10-18 ENCOUNTER — LAB REQUISITION (OUTPATIENT)
Dept: LAB | Facility: HOSPITAL | Age: 74
End: 2024-10-18
Payer: MEDICARE

## 2024-10-18 DIAGNOSIS — Z29.9 ENCOUNTER FOR PROPHYLACTIC MEASURES, UNSPECIFIED: ICD-10-CM

## 2024-10-18 LAB
ALBUMIN SERPL-MCNC: 3 G/DL (ref 3.4–4.8)
ALBUMIN/GLOB SERPL: 1.2 RATIO (ref 1.1–2)
ALP SERPL-CCNC: 131 UNIT/L (ref 40–150)
ALT SERPL-CCNC: 20 UNIT/L (ref 0–55)
ANION GAP SERPL CALC-SCNC: 7 MEQ/L
AST SERPL-CCNC: 22 UNIT/L (ref 5–34)
BASOPHILS # BLD AUTO: 0.01 X10(3)/MCL
BASOPHILS NFR BLD AUTO: 0.2 %
BILIRUB SERPL-MCNC: 1.4 MG/DL
BUN SERPL-MCNC: 16.8 MG/DL (ref 8.4–25.7)
CALCIUM SERPL-MCNC: 8.3 MG/DL (ref 8.8–10)
CHLORIDE SERPL-SCNC: 108 MMOL/L (ref 98–107)
CHOLEST SERPL-MCNC: 112 MG/DL
CHOLEST/HDLC SERPL: 3 {RATIO} (ref 0–5)
CO2 SERPL-SCNC: 26 MMOL/L (ref 23–31)
CREAT SERPL-MCNC: 0.84 MG/DL (ref 0.72–1.25)
CREAT/UREA NIT SERPL: 20
EOSINOPHIL # BLD AUTO: 0.05 X10(3)/MCL (ref 0–0.9)
EOSINOPHIL NFR BLD AUTO: 1.1 %
ERYTHROCYTE [DISTWIDTH] IN BLOOD BY AUTOMATED COUNT: 14.2 % (ref 11.5–17)
GFR SERPLBLD CREATININE-BSD FMLA CKD-EPI: >60 ML/MIN/1.73/M2
GLOBULIN SER-MCNC: 2.6 GM/DL (ref 2.4–3.5)
GLUCOSE SERPL-MCNC: 156 MG/DL (ref 82–115)
HCT VFR BLD AUTO: 30.2 % (ref 42–52)
HDLC SERPL-MCNC: 35 MG/DL (ref 35–60)
HGB BLD-MCNC: 10.2 G/DL (ref 14–18)
IMM GRANULOCYTES # BLD AUTO: 0.01 X10(3)/MCL (ref 0–0.04)
IMM GRANULOCYTES NFR BLD AUTO: 0.2 %
LDLC SERPL CALC-MCNC: 63 MG/DL (ref 50–140)
LYMPHOCYTES # BLD AUTO: 0.75 X10(3)/MCL (ref 0.6–4.6)
LYMPHOCYTES NFR BLD AUTO: 16.1 %
MCH RBC QN AUTO: 30.9 PG (ref 27–31)
MCHC RBC AUTO-ENTMCNC: 33.8 G/DL (ref 33–36)
MCV RBC AUTO: 91.5 FL (ref 80–94)
MONOCYTES # BLD AUTO: 0.44 X10(3)/MCL (ref 0.1–1.3)
MONOCYTES NFR BLD AUTO: 9.4 %
NEUTROPHILS # BLD AUTO: 3.4 X10(3)/MCL (ref 2.1–9.2)
NEUTROPHILS NFR BLD AUTO: 73 %
NRBC BLD AUTO-RTO: 0 %
PLATELET # BLD AUTO: 74 X10(3)/MCL (ref 130–400)
PLATELETS.RETICULATED NFR BLD AUTO: 5.8 % (ref 0.9–11.2)
PMV BLD AUTO: 11.3 FL (ref 7.4–10.4)
POTASSIUM SERPL-SCNC: 4 MMOL/L (ref 3.5–5.1)
PREALB SERPL-MCNC: 8.7 MG/DL (ref 16–42)
PROT SERPL-MCNC: 5.6 GM/DL (ref 5.8–7.6)
RBC # BLD AUTO: 3.3 X10(6)/MCL (ref 4.7–6.1)
SODIUM SERPL-SCNC: 141 MMOL/L (ref 136–145)
TRIGL SERPL-MCNC: 68 MG/DL (ref 34–140)
VLDLC SERPL CALC-MCNC: 14 MG/DL
WBC # BLD AUTO: 4.66 X10(3)/MCL (ref 4.5–11.5)

## 2024-10-18 PROCEDURE — 84134 ASSAY OF PREALBUMIN: CPT | Performed by: INTERNAL MEDICINE

## 2024-10-18 PROCEDURE — 85025 COMPLETE CBC W/AUTO DIFF WBC: CPT | Performed by: INTERNAL MEDICINE

## 2024-10-18 PROCEDURE — 80061 LIPID PANEL: CPT | Performed by: INTERNAL MEDICINE

## 2024-10-18 PROCEDURE — 80053 COMPREHEN METABOLIC PANEL: CPT | Performed by: INTERNAL MEDICINE

## 2024-10-21 ENCOUNTER — HOSPITAL ENCOUNTER (INPATIENT)
Facility: HOSPITAL | Age: 74
LOS: 1 days | Discharge: HOSPICE | DRG: 085 | End: 2024-10-22
Attending: EMERGENCY MEDICINE | Admitting: STUDENT IN AN ORGANIZED HEALTH CARE EDUCATION/TRAINING PROGRAM
Payer: MEDICARE

## 2024-10-21 DIAGNOSIS — S01.01XA LACERATION OF SCALP, INITIAL ENCOUNTER: ICD-10-CM

## 2024-10-21 DIAGNOSIS — S06.5XAA TRAUMATIC SUBDURAL HEMORRHAGE WITH UNKNOWN LOSS OF CONSCIOUSNESS STATUS, INITIAL ENCOUNTER: Primary | ICD-10-CM
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
## 2024-10-21 LAB
ALBUMIN SERPL-MCNC: 3.2 G/DL (ref 3.4–4.8)
ALBUMIN/GLOB SERPL: 1.1 RATIO (ref 1.1–2)
ALP SERPL-CCNC: 158 UNIT/L (ref 40–150)
ALT SERPL-CCNC: 22 UNIT/L (ref 0–55)
ANION GAP SERPL CALC-SCNC: 8 MEQ/L
APTT PPP: 20.2 SECONDS (ref 23.2–33.7)
AST SERPL-CCNC: 37 UNIT/L (ref 5–34)
BASOPHILS # BLD AUTO: 0.01 X10(3)/MCL
BASOPHILS NFR BLD AUTO: 0.3 %
BILIRUB SERPL-MCNC: 1.4 MG/DL
BUN SERPL-MCNC: 20.4 MG/DL (ref 8.4–25.7)
CALCIUM SERPL-MCNC: 8.4 MG/DL (ref 8.8–10)
CHLORIDE SERPL-SCNC: 112 MMOL/L (ref 98–107)
CO2 SERPL-SCNC: 21 MMOL/L (ref 23–31)
CREAT SERPL-MCNC: 0.85 MG/DL (ref 0.72–1.25)
CREAT/UREA NIT SERPL: 24
EOSINOPHIL # BLD AUTO: 0.15 X10(3)/MCL (ref 0–0.9)
EOSINOPHIL NFR BLD AUTO: 3.8 %
ERYTHROCYTE [DISTWIDTH] IN BLOOD BY AUTOMATED COUNT: 14.7 % (ref 11.5–17)
EST. AVERAGE GLUCOSE BLD GHB EST-MCNC: 139.9 MG/DL
GFR SERPLBLD CREATININE-BSD FMLA CKD-EPI: >60 ML/MIN/1.73/M2
GLOBULIN SER-MCNC: 3 GM/DL (ref 2.4–3.5)
GLUCOSE SERPL-MCNC: 95 MG/DL (ref 82–115)
HBA1C MFR BLD: 6.5 %
HCT VFR BLD AUTO: 31.7 % (ref 42–52)
HGB BLD-MCNC: 10.3 G/DL (ref 14–18)
IMM GRANULOCYTES # BLD AUTO: 0.01 X10(3)/MCL (ref 0–0.04)
IMM GRANULOCYTES NFR BLD AUTO: 0.3 %
INR PPP: 1.1
LYMPHOCYTES # BLD AUTO: 0.55 X10(3)/MCL (ref 0.6–4.6)
LYMPHOCYTES NFR BLD AUTO: 14.1 %
MCH RBC QN AUTO: 30.9 PG (ref 27–31)
MCHC RBC AUTO-ENTMCNC: 32.5 G/DL (ref 33–36)
MCV RBC AUTO: 95.2 FL (ref 80–94)
MONOCYTES # BLD AUTO: 0.36 X10(3)/MCL (ref 0.1–1.3)
MONOCYTES NFR BLD AUTO: 9.2 %
NEUTROPHILS # BLD AUTO: 2.82 X10(3)/MCL (ref 2.1–9.2)
NEUTROPHILS NFR BLD AUTO: 72.3 %
NRBC BLD AUTO-RTO: 0 %
PLATELET # BLD AUTO: 67 X10(3)/MCL (ref 130–400)
PMV BLD AUTO: 11 FL (ref 7.4–10.4)
POCT GLUCOSE: 93 MG/DL (ref 70–110)
POTASSIUM SERPL-SCNC: 4.1 MMOL/L (ref 3.5–5.1)
PROT SERPL-MCNC: 6.2 GM/DL (ref 5.8–7.6)
PROTHROMBIN TIME: 14.2 SECONDS (ref 12.5–14.5)
RBC # BLD AUTO: 3.33 X10(6)/MCL (ref 4.7–6.1)
SODIUM SERPL-SCNC: 141 MMOL/L (ref 136–145)
TROPONIN I SERPL-MCNC: <0.01 NG/ML (ref 0–0.04)
WBC # BLD AUTO: 3.9 X10(3)/MCL (ref 4.5–11.5)

## 2024-10-21 PROCEDURE — 85025 COMPLETE CBC W/AUTO DIFF WBC: CPT | Performed by: EMERGENCY MEDICINE

## 2024-10-21 PROCEDURE — 25000003 PHARM REV CODE 250

## 2024-10-21 PROCEDURE — 11000001 HC ACUTE MED/SURG PRIVATE ROOM

## 2024-10-21 PROCEDURE — 85730 THROMBOPLASTIN TIME PARTIAL: CPT | Performed by: EMERGENCY MEDICINE

## 2024-10-21 PROCEDURE — 99285 EMERGENCY DEPT VISIT HI MDM: CPT | Mod: 25

## 2024-10-21 PROCEDURE — 80053 COMPREHEN METABOLIC PANEL: CPT | Performed by: EMERGENCY MEDICINE

## 2024-10-21 PROCEDURE — 63600175 PHARM REV CODE 636 W HCPCS

## 2024-10-21 PROCEDURE — 85610 PROTHROMBIN TIME: CPT | Performed by: EMERGENCY MEDICINE

## 2024-10-21 PROCEDURE — 83036 HEMOGLOBIN GLYCOSYLATED A1C: CPT

## 2024-10-21 PROCEDURE — 99223 1ST HOSP IP/OBS HIGH 75: CPT | Mod: FS,,, | Performed by: STUDENT IN AN ORGANIZED HEALTH CARE EDUCATION/TRAINING PROGRAM

## 2024-10-21 PROCEDURE — 84484 ASSAY OF TROPONIN QUANT: CPT

## 2024-10-21 RX ORDER — MORPHINE SULFATE 4 MG/ML
2 INJECTION, SOLUTION INTRAMUSCULAR; INTRAVENOUS EVERY 4 HOURS PRN
Status: DISCONTINUED | OUTPATIENT
Start: 2024-10-21 | End: 2024-10-22 | Stop reason: HOSPADM

## 2024-10-21 RX ORDER — IBUPROFEN 200 MG
16 TABLET ORAL
Status: DISCONTINUED | OUTPATIENT
Start: 2024-10-21 | End: 2024-10-22 | Stop reason: HOSPADM

## 2024-10-21 RX ORDER — INSULIN ASPART 100 [IU]/ML
0-5 INJECTION, SOLUTION INTRAVENOUS; SUBCUTANEOUS
Status: DISCONTINUED | OUTPATIENT
Start: 2024-10-21 | End: 2024-10-22 | Stop reason: HOSPADM

## 2024-10-21 RX ORDER — IBUPROFEN 200 MG
24 TABLET ORAL
Status: DISCONTINUED | OUTPATIENT
Start: 2024-10-21 | End: 2024-10-22 | Stop reason: HOSPADM

## 2024-10-21 RX ORDER — SODIUM CHLORIDE 9 MG/ML
INJECTION, SOLUTION INTRAVENOUS CONTINUOUS
Status: ACTIVE | OUTPATIENT
Start: 2024-10-21 | End: 2024-10-21

## 2024-10-21 RX ORDER — GLUCAGON 1 MG
1 KIT INJECTION
Status: DISCONTINUED | OUTPATIENT
Start: 2024-10-21 | End: 2024-10-22 | Stop reason: HOSPADM

## 2024-10-21 RX ORDER — ADHESIVE BANDAGE
30 BANDAGE TOPICAL DAILY PRN
Status: DISCONTINUED | OUTPATIENT
Start: 2024-10-21 | End: 2024-10-22 | Stop reason: HOSPADM

## 2024-10-21 RX ORDER — MUPIROCIN 20 MG/G
OINTMENT TOPICAL 2 TIMES DAILY
Status: DISCONTINUED | OUTPATIENT
Start: 2024-10-21 | End: 2024-10-22 | Stop reason: HOSPADM

## 2024-10-21 RX ORDER — OXYCODONE HYDROCHLORIDE 5 MG/1
5 TABLET ORAL EVERY 4 HOURS PRN
Status: DISCONTINUED | OUTPATIENT
Start: 2024-10-21 | End: 2024-10-21

## 2024-10-21 RX ORDER — LEVETIRACETAM 500 MG/1
500 TABLET ORAL 2 TIMES DAILY
Status: DISCONTINUED | OUTPATIENT
Start: 2024-10-21 | End: 2024-10-21

## 2024-10-21 RX ORDER — ACETAMINOPHEN 325 MG/1
650 TABLET ORAL EVERY 4 HOURS
Status: DISCONTINUED | OUTPATIENT
Start: 2024-10-21 | End: 2024-10-21

## 2024-10-21 RX ORDER — TALC
6 POWDER (GRAM) TOPICAL NIGHTLY PRN
Status: DISCONTINUED | OUTPATIENT
Start: 2024-10-21 | End: 2024-10-22 | Stop reason: HOSPADM

## 2024-10-21 RX ORDER — CEPHALEXIN 500 MG/1
500 CAPSULE ORAL EVERY 12 HOURS
Status: ON HOLD | COMMUNITY
End: 2024-10-22

## 2024-10-21 RX ORDER — POLYETHYLENE GLYCOL 3350 17 G/17G
17 POWDER, FOR SOLUTION ORAL 2 TIMES DAILY
Status: DISCONTINUED | OUTPATIENT
Start: 2024-10-21 | End: 2024-10-21

## 2024-10-21 RX ORDER — DOCUSATE SODIUM 100 MG/1
100 CAPSULE, LIQUID FILLED ORAL 2 TIMES DAILY
Status: DISCONTINUED | OUTPATIENT
Start: 2024-10-21 | End: 2024-10-21

## 2024-10-21 RX ADMIN — ACETAMINOPHEN 650 MG: 325 TABLET, FILM COATED ORAL at 10:10

## 2024-10-21 RX ADMIN — LEVETIRACETAM 500 MG: 100 INJECTION, SOLUTION INTRAVENOUS at 11:10

## 2024-10-21 RX ADMIN — SODIUM CHLORIDE: 9 INJECTION, SOLUTION INTRAVENOUS at 08:10

## 2024-10-21 RX ADMIN — LEVETIRACETAM 500 MG: 500 TABLET, FILM COATED ORAL at 10:10

## 2024-10-21 NOTE — NURSING
Admission documentation mostly completed by the admit nurse. Home med rec complete. Pt unsure about electing for meds to bed with [facility]. 4 Eyes and standing weight to be completed by the admitting floor nurse.

## 2024-10-21 NOTE — ED PROVIDER NOTES
Encounter Date: 10/21/2024       History     Chief Complaint   Patient presents with    Transfer     Transfer from [facility] s/p fall today. 3mm SDH. -BT. Pt GCS 14.      74-year-old male with a history of hypertension, diabetes, CAD, hepatitis-C, alcoholic cirrhosis, peripheral neuropathy presents to the emergency department as transfer from [facility] for neurosurgical evaluation.  He was seen there with fall at the nursing home.  Reportedly with a history of frequent falls.  CT of the head demonstrated a 3 mm subdural hemorrhage without mass effect.    No anticoagulant or antiplatelet medications in his home medication list.    Baseline GCS 14.    The history is provided by the patient, the EMS personnel and medical records.     Review of patient's allergies indicates:  No Known Allergies  No past medical history on file.  No past surgical history on file.  No family history on file.     Review of Systems   Constitutional:  Negative for fever.   Respiratory:  Negative for shortness of breath.    Cardiovascular:  Negative for chest pain.   Gastrointestinal:  Negative for abdominal pain, nausea and vomiting.   Musculoskeletal:  Negative for back pain.   Neurological:  Negative for headaches.       Physical Exam     Initial Vitals [10/21/24 0648]   BP Pulse Resp Temp SpO2   131/72 70 18 97.7 °F (36.5 °C) 100 %      MAP       --         Physical Exam    Nursing note and vitals reviewed.  Constitutional: He appears well-developed and well-nourished.   HENT:   Head: Normocephalic.   Wound to right parietal scalp with staples in place, appears old.  Crusted, scabbed lesion underlying it.  Skin tear/abrasion to left parietal scalp, no active bleeding   Eyes: Pupils are equal, round, and reactive to light. No scleral icterus.   Neck: Neck supple.   No point vertebral tenderness, no step-off deformity   Cardiovascular:  Normal rate, regular rhythm and intact distal pulses.           Pulmonary/Chest: No respiratory distress. He exhibits  no tenderness.   Abdominal: Abdomen is soft. He exhibits no distension. There is no abdominal tenderness.   Musculoskeletal:      Cervical back: Neck supple.      Comments: no thoracic or lumbar tenderness to palpation, no step-off deformity, no long bone deformity     Neurological: He is alert.   Oriented to self and location, off on time.  Patient pleasantly conversant.  No facial asymmetry.  Generalized weakness   Skin: Skin is warm and dry.         ED Course   Procedures  Labs Reviewed   COMPREHENSIVE METABOLIC PANEL - Abnormal       Result Value    Sodium 141      Potassium 4.1      Chloride 112 (*)     CO2 21 (*)     Glucose 95      Blood Urea Nitrogen 20.4      Creatinine 0.85      Calcium 8.4 (*)     Protein Total 6.2      Albumin 3.2 (*)     Globulin 3.0      Albumin/Globulin Ratio 1.1      Bilirubin Total 1.4       (*)     ALT 22      AST 37 (*)     eGFR >60      Anion Gap 8.0      BUN/Creatinine Ratio 24     APTT - Abnormal    PTT 20.2 (*)    CBC WITH DIFFERENTIAL - Abnormal    WBC 3.90 (*)     RBC 3.33 (*)     Hgb 10.3 (*)     Hct 31.7 (*)     MCV 95.2 (*)     MCH 30.9      MCHC 32.5 (*)     RDW 14.7      Platelet 67 (*)     MPV 11.0 (*)     Neut % 72.3      Lymph % 14.1      Mono % 9.2      Eos % 3.8      Basophil % 0.3      Lymph # 0.55 (*)     Neut # 2.82      Mono # 0.36      Eos # 0.15      Baso # 0.01      IG# 0.01      IG% 0.3      NRBC% 0.0     PROTIME-INR - Normal    PT 14.2      INR 1.1     TROPONIN I - Normal    Troponin-I <0.010     CBC W/ AUTO DIFFERENTIAL    Narrative:     The following orders were created for panel order CBC auto differential.  Procedure                               Abnormality         Status                     ---------                               -----------         ------                     CBC with Differential[[id number]]       Abnormal            Final result                 Please view results for these tests on the individual orders.   HEMOGLOBIN A1C     Hemoglobin A1c 6.5      Estimated Average Glucose 139.9     POCT GLUCOSE    POCT Glucose 93     POCT GLUCOSE MONITORING CONTINUOUS          Imaging Results         CT Head Without Contrast (Final result)  Result time 10/21/24 10:31:59      Final result by David Erazo MD (10/21/24 10:31:59)                   Impression:      1.  Left left cerebral convexity combination of chronic and subacute subdural hematoma with maximum thickness of 5 mm.  Subdural hematoma extends into the anterior interhemispheric falx with thickness of 4 mm    2.  Right anterior frontal convexity 3 mm thickness subacute subdural hematoma.  Right remaining subdural hematoma is chronic.    These findings are without significant interval change.      Electronically signed by: David Erazo  Date:    10/21/2024  Time:    10:31               Narrative:    EXAMINATION:  CT HEAD WITHOUT CONTRAST    CLINICAL HISTORY:  Subdural hematoma;    TECHNIQUE:  Sequential axial images were performed of the brain without contrast.    Dose product length of 1024 mGycm. Automated exposure control was utilized to minimize radiation dose.    COMPARISON:  Outside facility October 21, 2024.    FINDINGS:  There is left cerebral convexity mixed attenuation chronic and subacute hematoma combination with maximum thickness of 5 mm.  Subdural hematoma extends into the anterior interhemispheric falx with maximum thickness of 4 mm.  There is also right anterior frontal convexity 3 mm subacute subdural hematoma on image 27 series 2.  Remaining right cerebral convexity subdural hematoma is chronic with diffuse hypodensity and maximum thickness of 9 mm.  There is no significant mass effect or midline shift.  No hydrocephalus identified. Mild chronic microvascular ischemic changes and atrophy. There is no acute large vessel territory infarct.  Left scalp inflammations.  No depressed calvarial fracture.    There are bilateral fracture deformities of the nasal bones which is  displaced on the right.  Visualized paranasal sinuses are clear without mucosal thickening, polypoidal abnormality or air-fluid levels. Mastoid air cells aeration is optimal.                                       Medications - No data to display  Medical Decision Making  Problems Addressed:  Laceration of scalp, initial encounter: acute illness or injury  Traumatic subdural hemorrhage with unknown loss of consciousness status, initial encounter: acute illness or injury    Amount and/or Complexity of Data Reviewed  Labs: ordered.  Radiology: ordered.    Risk  Decision regarding hospitalization.      ED assessment:    Mr. Horan presented as transfer from [facility] for neurosurgical evaluation after diagnosed with traumatic subdural hemorrhage following fall today.  Reportedly with multiple recent falls at his nursing home.  No anticoagulant use.    Differential diagnosis (including but not limited to):   Closed head injury, intracranial hemorrhage, cerebral contusion, concussion, sitter electrolyte derangements, acute kidney injury, dehydration, chronic balance issues, senile deconditioning as etiology of multiple falls.    ED management:   I reviewed the workup from the previous facility, head CT as below with small subdural hemorrhage without significant mass effect.  Discussed with Neurosurgery on-call here, advised okay for admit to floor, recommended repeat head CT at 10:00 a.m..  Discussed with the Trauma Service who accepts for admit.  Baseline labs obtained demonstrate stable chronic thrombocytopenia, otherwise no clinically significant acute abnormalities.      Amount and/or Complexity of Data Reviewed  Independent historian: EMS   Summary of history:  History provided by EMS and review of medical records as documented in HPI above.  External data reviewed: transfer records, prior labs, and prior imaging  Summary of data reviewed:   CT head without IV contrast:  1.  Mild scalp soft tissue swelling is seen along  the left parietal bone.  This is consistent with a scalp contusion.  Subtle soft tissue swelling and staple wires were observed along the right frontal scalp.  No underlying bony injuries identified.    2. There was a 3 mm acute subdural hemorrhage along the left frontal convexity.  There is also a small amount of subdural blood along the left of the anterior as well as some blood along the left tentorial leaf.    CT cervical spine without IV contrast:  No acute cervical spine fracture dislocation or subluxation is seen.        Home medications reviewed.  Patient on Lipitor, omeprazole, Tresiba, carvedilol, citalopram, Seroquel, tramadol, Keflex and Lantus.  No anticoagulant medications.    Risk and benefits of testing: discussed   Labs: ordered and reviewed    Discussion of management or test interpretation with external provider(s): discussed with Trauma surgery, Neurosurgery consultant   Summary of discussion:  As below    Risk  Decision regarding hospitalization  Shared decision making     Critical Care  none    I, Mayra Harper MD personally performed the history, PE, MDM, and procedures as documented above and agree with the scribe's documentation.              ED Course as of 10/21/24 0809   Mon Oct 21, 2024   0736 Discussed with Neurosurgery on-call, Dr. Rudolph, advised okay for floor admission, repeat head CT at 10:00 a.m.. [KS]   0755 Discussed with Trauma NP, will evaluate for admit [KS]      ED Course User Index  [KS] Mayra Harper MD                           Clinical Impression:  Final diagnoses:  [S06.5XAA] Traumatic subdural hemorrhage with unknown loss of consciousness status, initial encounter (Primary)  [S01.01XA] Laceration of scalp, initial encounter          ED Disposition Condition    Observation Stable                Mayra Harper MD  10/21/24 9181

## 2024-10-21 NOTE — H&P
Trauma Surgery   History and Physical Note    Patient Name: Kevin Horan Jr.  YOB: 1950  Date: 10/21/2024 11:20 AM  Date of Admission: 10/21/2024  HD#0  POD#* No surgery found *    PRESENTING HISTORY   Chief Complaint/Reason for Admission: SDH (subdural hematoma)    History of Present Illness:  Patient is a 74 y.o. male with a PMHx of HTN, DM, CAD, Hep C, alcoholic cirrhosis and peripheral neuropathy who presents today as a transfer from [facility] after a fall out of his bed at the nursing home. Patient has had several falls lately, he states the stapled scalp laceration he has was from a fall last Monday. Patient has multiple abrasions to his scalp with a fresh skin tear to the left top scalp. Patient is disorientated to time and location but is aware of situation and states he somewhat remembers falling out of bed this morning, doesn't think he lost consciousness. According to medication rec he does not take blood thinners per ER physician.    Review of Systems:  12 point ROS negative except as stated in HPI    PAST HISTORY:   Past medical history:  No past medical history on file.    Past surgical history:  No past surgical history on file.    Family history:  No family history on file.    Social history:  Social History     Socioeconomic History    Marital status:      Social Drivers of Health     Financial Resource Strain: Medium Risk (7/19/2024)    Overall Financial Resource Strain (CARDIA)     Difficulty of Paying Living Expenses: Somewhat hard   Food Insecurity: No Food Insecurity (7/19/2024)    Hunger Vital Sign     Worried About Running Out of Food in the Last Year: Never true     Ran Out of Food in the Last Year: Never true   Transportation Needs: No Transportation Needs (7/19/2024)    TRANSPORTATION NEEDS     Transportation : No   Physical Activity: Insufficiently Active (7/19/2024)    Exercise Vital Sign     Days of Exercise per Week: 7 days     Minutes of  Exercise per Session: 10 min   Stress: Stress Concern Present (7/19/2024)    [employer] - Occupational Stress Questionnaire     Feeling of Stress : Very much   Housing Stability: Low Risk  (7/19/2024)    Housing Stability Vital Sign     Unable to Pay for Housing in the Last Year: No     Homeless in the Last Year: No     Social History     Tobacco Use   Smoking Status Not on file   Smokeless Tobacco Not on file      Social History     Substance and Sexual Activity   Alcohol Use Not on file        MEDICATIONS & ALLERGIES:   Allergies: Review of patient's allergies indicates:  No Known Allergies  Home Meds:   Current Outpatient Medications   Medication Instructions    atorvastatin (LIPITOR) 40 mg, Nightly    carvediloL (COREG) 3.125 mg, Oral, 2 times daily    cephALEXin (KEFLEX) 500 mg, Every 12 hours    citalopram (CELEXA) 10 mg, Oral, Daily    cyanocobalamin (vitamin B-12) (VITAMIN B-12) 50 mcg, Daily    docusate sodium (COLACE) 200 mg, Oral, 2 times daily PRN    insulin degludec (TRESIBA FLEXTOUCH U-100 SUBQ) 30 Units, Daily    insulin glargine U-100 (Lantus) 10 Units, Subcutaneous, Nightly    omeprazole (PRILOSEC) 40 mg, Daily    QUEtiapine (SEROQUEL) 25 mg, Oral, 2 times daily    traMADoL (ULTRAM) 50 mg, Every 4 hours PRN      No current facility-administered medications on file prior to encounter.     Current Outpatient Medications on File Prior to Encounter   Medication Sig Dispense Refill    atorvastatin (LIPITOR) 40 MG tablet Take 40 mg by mouth every evening.      carvediloL (COREG) 3.125 MG tablet Take 1 tablet (3.125 mg total) by mouth 2 (two) times daily. 180 tablet 3    citalopram (CELEXA) 10 MG tablet Take 1 tablet (10 mg total) by mouth once daily. 30 tablet 11    cyanocobalamin, vitamin B-12, (VITAMIN B-12) 50 mcg tablet Take 50 mcg by mouth once daily.      docusate sodium (COLACE) 100 MG capsule Take 2 capsules (200 mg total) by mouth 2 (two) times daily as needed for  Constipation.      insulin degludec (TRESIBA FLEXTOUCH U-100 SUBQ) Inject 30 Units into the skin once daily.      insulin glargine U-100, Lantus, 100 unit/mL injection Inject 10 Units into the skin every evening. 3 mL 11    omeprazole (PRILOSEC) 40 MG capsule Take 40 mg by mouth once daily.      QUEtiapine (SEROQUEL) 25 MG Tab Take 1 tablet (25 mg total) by mouth 2 (two) times daily. (Patient taking differently: Take 25 mg by mouth once daily.) 60 tablet 11    traMADoL (ULTRAM) 50 mg tablet Take 50 mg by mouth every 4 (four) hours as needed for Pain.      cephALEXin (KEFLEX) 500 MG capsule Take 500 mg by mouth every 12 (twelve) hours.        No current facility-administered medications on file prior to encounter.     Current Outpatient Medications on File Prior to Encounter   Medication Sig    atorvastatin (LIPITOR) 40 MG tablet Take 40 mg by mouth every evening.    carvediloL (COREG) 3.125 MG tablet Take 1 tablet (3.125 mg total) by mouth 2 (two) times daily.    citalopram (CELEXA) 10 MG tablet Take 1 tablet (10 mg total) by mouth once daily.    cyanocobalamin, vitamin B-12, (VITAMIN B-12) 50 mcg tablet Take 50 mcg by mouth once daily.    docusate sodium (COLACE) 100 MG capsule Take 2 capsules (200 mg total) by mouth 2 (two) times daily as needed for Constipation.    insulin degludec (TRESIBA FLEXTOUCH U-100 SUBQ) Inject 30 Units into the skin once daily.    insulin glargine U-100, Lantus, 100 unit/mL injection Inject 10 Units into the skin every evening.    omeprazole (PRILOSEC) 40 MG capsule Take 40 mg by mouth once daily.    QUEtiapine (SEROQUEL) 25 MG Tab Take 1 tablet (25 mg total) by mouth 2 (two) times daily. (Patient taking differently: Take 25 mg by mouth once daily.)    traMADoL (ULTRAM) 50 mg tablet Take 50 mg by mouth every 4 (four) hours as needed for Pain.    cephALEXin (KEFLEX) 500 MG capsule Take 500 mg by mouth every 12 (twelve) hours.     Scheduled Meds:   acetaminophen  650 mg Oral Q4H     "docusate sodium  100 mg Oral BID    levETIRAcetam  500 mg Oral BID    mupirocin   Nasal BID    polyethylene glycol  17 g Oral BID     Continuous Infusions:   0.9% NaCl   Intravenous Continuous 75 mL/hr at 10/21/24 0855 New Bag at 10/21/24 0855     PRN Meds:  Current Facility-Administered Medications:     dextrose 10%, 12.5 g, Intravenous, PRN    dextrose 10%, 25 g, Intravenous, PRN    glucagon (human recombinant), 1 mg, Intramuscular, PRN    glucose, 16 g, Oral, PRN    glucose, 24 g, Oral, PRN    insulin aspart U-100, 0-5 Units, Subcutaneous, QID (AC + HS) PRN    magnesium hydroxide 400 mg/5 ml, 30 mL, Oral, Daily PRN    melatonin, 6 mg, Oral, Nightly PRN    oxyCODONE, 5 mg, Oral, Q4H PRN    OBJECTIVE:   Vital Signs:  VITAL SIGNS: 24 HR MIN & MAX LAST   Temp  Min: 97.7 °F (36.5 °C)  Max: 97.7 °F (36.5 °C)  97.7 °F (36.5 °C)   BP  Min: 119/58  Max: 139/73  120/80    Pulse  Min: 58  Max: 70  (!) 59    Resp  Min: 15  Max: 19  15    SpO2  Min: 100 %  Max: 100 %  100 %      HT: 6' 1" (185.4 cm)  WT: 81.6 kg (180 lb)  BMI: 23.8   Intake/output: No intake/output data recorded.     Lines/drains/airway:       Peripheral IV - Single Lumen 10/21/24 0715 20 G Left Antecubital (Active)   Site Assessment Clean;Dry;Intact;No warmth 10/21/24 0715   Extremity Assessment Distal to IV No abnormal discoloration;No redness;No swelling;No warmth 10/21/24 0715   Line Status Blood return noted;Saline locked 10/21/24 0715   Dressing Status Clean;Dry;Intact 10/21/24 0715   Dressing Intervention First dressing 10/21/24 0715   Number of days: 0       Physical Exam:  General:  Well developed, well nourished, no acute distress  HEENT:  Normocephalic, multiple abrasions to scalp, skin tear top left scalp, stapled lac to right top scalp, healing  CV:  RR, 2+ DPs bilaterally  Resp/chest: NWOB  GI:  Abdomen soft, non-tender, non-distended  :  Deferred  MSK:  No muscle atrophy, cyanosis, peripheral edema, moving all extremities " "spontaneously  Neuro: GCS 14. CNII-XII grossly intact, alert and oriented to person and situation. Strength and motor function grossly intact to all extremities, sensation intact to all extremities.  Skin/Wounds:  warm, dry, bruising to bilateral arms, scalp with abrasion to top of scalp    Labs:  Troponin:  Recent Labs     10/21/24  1036   TROPONINI <0.010     CBC:  Recent Labs     10/21/24  0719   WBC 3.90*   RBC 3.33*   HGB 10.3*   HCT 31.7*   PLT 67*   MCV 95.2*   MCH 30.9   MCHC 32.5*     CMP:  Recent Labs     10/21/24  0719   CALCIUM 8.4*   ALBUMIN 3.2*      K 4.1   CO2 21*   *   BUN 20.4   CREATININE 0.85   ALKPHOS 158*   ALT 22   AST 37*   BILITOT 1.4     Lactic Acid:  No results for input(s): "LACTATE" in the last 72 hours.  ETOH:  No results for input(s): "ETHANOL" in the last 72 hours.   Urine Drug Screen:  No results for input(s): "COCAINE", "OPIATE", "BARBITURATE", "AMPHETAMINE", "FENTANYL", "CANNABINOIDS", "MDMA" in the last 72 hours.    Invalid input(s): "BENZODIAZEPINE", "PHENCYCLIDINE"   ABG  No results for input(s): "PH", "PO2", "PCO2", "HCO3", "BE" in the last 168 hours.      Diagnostic Results:  CT Head Without Contrast   Final Result      1.  Left left cerebral convexity combination of chronic and subacute subdural hematoma with maximum thickness of 5 mm.  Subdural hematoma extends into the anterior interhemispheric falx with thickness of 4 mm      2.  Right anterior frontal convexity 3 mm thickness subacute subdural hematoma.  Right remaining subdural hematoma is chronic.      These findings are without significant interval change.         Electronically signed by: David Erazo   Date:    10/21/2024   Time:    10:31      CT Previous   Final Result      CT Previous   Final Result          ASSESSMENT & PLAN:    Patient is a 74 y.o. male who fell out of the bed this morning at nursing home.    SDH  -admit to floor  -NSGY following  -BP < 149/90  -Keppra BID x 7 days  -MMPC  -Daily " labs  -Diabetic diet  -IS    Fall  -admit to floor  -MMPC  -PT/OT  -Syncope workup  -Echo  -EKG  -Carotid doppler    Giulia Baez NP  Trauma Surgery

## 2024-10-21 NOTE — TRAUMA COM
Called by nursing to advise patient found on floor in ED and was now unresponsive with unequal pupils. STAT CT head ordered. Patient evaluated in the ED, VSS, maintaining O2 sat mid 90s on RA. Placed on supplemental O2. Left pupil 5mm and fixed, right pupil 2 mm, sluggish. CT head with significant worsening. Spoke with patient's son to update on recent events and change in status. He reports patient is a DNR. Discussed wishes regarding surgical intervention if warranted. He wanted to speak with his uncle, the patient's brother, before final decision.     I spoke with Dr. Rudolph regarding change in status and worsening CT head imaging. He is aware family is making decision regarding surgery. I will update him once decision is made.    Spoke with patient's son who advised patient is a DNR and they do not wish for surgical intervention. He is aware of the seriousness of the patient's current situation and that patient will likely not survive this injury. He is requesting comfort care measures only at this time. He is on his way back to the hospital now.     Dr. Rudolph was updated that family does not want surgical intervention. Dr. Thorpe was made aware of events and updated on plan of care following discussions.     Padmini Reyna, [facility]-BC, FNP-BC  Trauma Surgery  [facility]  C: [phone]

## 2024-10-21 NOTE — ED NOTES
7 staples removed from top of head with minimal bleeding noted. Pt tolerated it well with minor discomfort.

## 2024-10-21 NOTE — ED NOTES
[facility] ED called to find out when head staples are due be removed. Placed in 10/7/2024.

## 2024-10-21 NOTE — PROGRESS NOTES
CM attempted to complete the discharge assessment. CM began the assessment, pt opened eyes and immediately closed his eyes. Pt appeared to be in pain as evidenced of squirming and blood on the top of his head due to a laceration. Assessment unable to be conducted at this time.

## 2024-10-21 NOTE — CARE UPDATE
Neurosurgery team notified that patient was found on the floor in ED  STAT CT head was obtained showing significant worsening of left-sided SDH with 13mm left to right midline shift as compared to CT head at 1034 today showing no shift    Padmini Reyna NP, with trauma spoke with the patient's son regarding worsening CT head and need for surgery if he wished to pursue aggressive treatment. The son states that he would not want any surgery and patient was made DNR.  Dr. Rudolph is aware  Recommend maximizing conservative measures

## 2024-10-22 VITALS
TEMPERATURE: 99 F | HEIGHT: 73 IN | HEART RATE: 80 BPM | RESPIRATION RATE: 20 BRPM | OXYGEN SATURATION: 99 % | DIASTOLIC BLOOD PRESSURE: 72 MMHG | BODY MASS INDEX: 23.43 KG/M2 | SYSTOLIC BLOOD PRESSURE: 164 MMHG | WEIGHT: 176.81 LBS

## 2024-10-22 LAB
ALBUMIN SERPL-MCNC: 3.2 G/DL (ref 3.4–4.8)
ALBUMIN/GLOB SERPL: 1.2 RATIO (ref 1.1–2)
ALP SERPL-CCNC: 159 UNIT/L (ref 40–150)
ALT SERPL-CCNC: 20 UNIT/L (ref 0–55)
ANION GAP SERPL CALC-SCNC: 6 MEQ/L
AST SERPL-CCNC: 40 UNIT/L (ref 5–34)
BASOPHILS # BLD AUTO: 0.02 X10(3)/MCL
BASOPHILS NFR BLD AUTO: 0.4 %
BILIRUB SERPL-MCNC: 1.5 MG/DL
BUN SERPL-MCNC: 18.2 MG/DL (ref 8.4–25.7)
CALCIUM SERPL-MCNC: 8.2 MG/DL (ref 8.8–10)
CHLORIDE SERPL-SCNC: 111 MMOL/L (ref 98–107)
CO2 SERPL-SCNC: 24 MMOL/L (ref 23–31)
CREAT SERPL-MCNC: 0.83 MG/DL (ref 0.72–1.25)
CREAT/UREA NIT SERPL: 22
EOSINOPHIL # BLD AUTO: 0.11 X10(3)/MCL (ref 0–0.9)
EOSINOPHIL NFR BLD AUTO: 2.5 %
ERYTHROCYTE [DISTWIDTH] IN BLOOD BY AUTOMATED COUNT: 14.8 % (ref 11.5–17)
GFR SERPLBLD CREATININE-BSD FMLA CKD-EPI: >60 ML/MIN/1.73/M2
GLOBULIN SER-MCNC: 2.7 GM/DL (ref 2.4–3.5)
GLUCOSE SERPL-MCNC: 77 MG/DL (ref 82–115)
HCT VFR BLD AUTO: 27.9 % (ref 42–52)
HGB BLD-MCNC: 9.5 G/DL (ref 14–18)
IMM GRANULOCYTES # BLD AUTO: 0.02 X10(3)/MCL (ref 0–0.04)
IMM GRANULOCYTES NFR BLD AUTO: 0.4 %
LYMPHOCYTES # BLD AUTO: 0.71 X10(3)/MCL (ref 0.6–4.6)
LYMPHOCYTES NFR BLD AUTO: 15.9 %
MAGNESIUM SERPL-MCNC: 2.1 MG/DL (ref 1.6–2.6)
MCH RBC QN AUTO: 30.5 PG (ref 27–31)
MCHC RBC AUTO-ENTMCNC: 34.1 G/DL (ref 33–36)
MCV RBC AUTO: 89.7 FL (ref 80–94)
MONOCYTES # BLD AUTO: 0.35 X10(3)/MCL (ref 0.1–1.3)
MONOCYTES NFR BLD AUTO: 7.8 %
NEUTROPHILS # BLD AUTO: 3.26 X10(3)/MCL (ref 2.1–9.2)
NEUTROPHILS NFR BLD AUTO: 73 %
NRBC BLD AUTO-RTO: 0 %
PHOSPHATE SERPL-MCNC: 3.3 MG/DL (ref 2.3–4.7)
PLATELET # BLD AUTO: 86 X10(3)/MCL (ref 130–400)
PMV BLD AUTO: 10.7 FL (ref 7.4–10.4)
POTASSIUM SERPL-SCNC: 4.4 MMOL/L (ref 3.5–5.1)
PROT SERPL-MCNC: 5.9 GM/DL (ref 5.8–7.6)
RBC # BLD AUTO: 3.11 X10(6)/MCL (ref 4.7–6.1)
SODIUM SERPL-SCNC: 141 MMOL/L (ref 136–145)
WBC # BLD AUTO: 4.47 X10(3)/MCL (ref 4.5–11.5)

## 2024-10-22 PROCEDURE — 83735 ASSAY OF MAGNESIUM: CPT

## 2024-10-22 PROCEDURE — 25000003 PHARM REV CODE 250: Performed by: STUDENT IN AN ORGANIZED HEALTH CARE EDUCATION/TRAINING PROGRAM

## 2024-10-22 PROCEDURE — 80053 COMPREHEN METABOLIC PANEL: CPT

## 2024-10-22 PROCEDURE — 36415 COLL VENOUS BLD VENIPUNCTURE: CPT

## 2024-10-22 PROCEDURE — 63600175 PHARM REV CODE 636 W HCPCS

## 2024-10-22 PROCEDURE — 84100 ASSAY OF PHOSPHORUS: CPT

## 2024-10-22 PROCEDURE — 85025 COMPLETE CBC W/AUTO DIFF WBC: CPT

## 2024-10-22 PROCEDURE — 25000003 PHARM REV CODE 250

## 2024-10-22 RX ADMIN — LEVETIRACETAM 500 MG: 100 INJECTION, SOLUTION INTRAVENOUS at 09:10

## 2024-10-22 RX ADMIN — MUPIROCIN: 20 OINTMENT TOPICAL at 09:10

## 2024-10-22 RX ADMIN — MUPIROCIN: 20 OINTMENT TOPICAL at 12:10

## 2024-10-22 NOTE — PLAN OF CARE
"   10/22/24 1318   Discharge Assessment   Assessment Type Discharge Planning Assessment   Source of Information facility verbal report   Reason For Admission SDH   People in Home facility resident   Facility Arrived From: [facility]   Do you expect to return to your current living situation? No  (Inpatient hospice)   Discharge Plan A Inpatient Hospice   Discharge Plan B Inpatient Hospice   OTHER   Name(s) of People in Home Facility Resident     Admitted from [facility] for SDH.  Patient transferring to [facility] .  Patient placed in "Will Call".  Report to be called to avinash at [phone]  "

## 2024-10-22 NOTE — H&P
[facility] Hospital Medicine History & Physical Examination       Patient Name: Kevin Horan Jr.  MRN: [id number]  Patient Class: IP- Inpatient   Admission Date: 10/21/2024   Admitting Physician: TOM Service   Length of Stay: 1  Attending Physician: Tabitha Moon MD  Primary Care Provider: Dimitrios Mack MD  Face-to-Face encounter date: 10/22/2024  Chief Complaint: Transfer (Transfer from [facility] s/p fall today. 3mm SDH. -BT. Pt GCS 14. )      Screening for Social Drivers for health:  Patient screened for food insecurity, housing instability, transportation needs, utility difficulties, and interpersonal safety (select all that apply as identified as concern)  []Housing or Food  []Transportation Needs  []Utility Difficulties  []Interpersonal safety  [x]None      Patient information was obtained from patient, patient's family, past medical records and ER records.  ED records were reviewed in detail and documented below    HISTORY OF PRESENT ILLNESS:   74-year-old male with significant history of HTN, type 2 diabetes mellitus, CAD, chronic hep C, alcoholic cirrhosis, peripheral neuropathy, patient is a nursing home resident.  Patient unfortunately suffered a fall at the nursing home.  Patient was brought to outlying facility where he was found to have a small SDH without mass effect and was transferred to our hospital for higher level of care.  While in the ED patient was found on the floor, repeat CT head showed worsening left-sided SDH with 13 mm left to right midline shift.  Neurology and Neurosurgery notified, family opted for DNR status and conservative measures, they did not want to pursue aggressive treatment.  Patient was initially admitted to Trauma surgery services and care later transitioned to hospital medicine services.  Family opted for hospice.  Patient is obtunded      PAST MEDICAL HISTORY:   HTN   Type 2 diabetes mellitus   History of CAD   Chronic hep C   Alcoholic  cirrhosis   Peripheral neuropathy    PAST SURGICAL HISTORY:   Reviewed and non pertinent    ALLERGIES:   Patient has no known allergies.    FAMILY HISTORY:   Reviewed and negative    SOCIAL HISTORY:     No active tobacco, alcohol, illicit drug use       HOME MEDICATIONS:     Prior to Admission medications    Medication Sig Start Date End Date Taking? Authorizing Provider   atorvastatin (LIPITOR) 40 MG tablet Take 40 mg by mouth every evening. 6/19/24  Yes Provider, Historical   carvediloL (COREG) 3.125 MG tablet Take 1 tablet (3.125 mg total) by mouth 2 (two) times daily. 8/23/24 8/23/25 Yes Armando Herrera MD   citalopram (CELEXA) 10 MG tablet Take 1 tablet (10 mg total) by mouth once daily. 8/24/24 8/24/25 Yes Armando Herrera MD   cyanocobalamin, vitamin B-12, (VITAMIN B-12) 50 mcg tablet Take 50 mcg by mouth once daily.   Yes Provider, Historical   docusate sodium (COLACE) 100 MG capsule Take 2 capsules (200 mg total) by mouth 2 (two) times daily as needed for Constipation. 8/23/24  Yes Armando Herrera MD   insulin degludec (TRESIBA FLEXTOUCH U-100 SUBQ) Inject 30 Units into the skin once daily.   Yes Provider, Historical   insulin glargine U-100, Lantus, 100 unit/mL injection Inject 10 Units into the skin every evening. 8/23/24 8/23/25 Yes Armando Herrera MD   omeprazole (PRILOSEC) 40 MG capsule Take 40 mg by mouth once daily. 5/3/24  Yes Provider, Historical   QUEtiapine (SEROQUEL) 25 MG Tab Take 1 tablet (25 mg total) by mouth 2 (two) times daily.  Patient taking differently: Take 25 mg by mouth once daily. 8/23/24 8/23/25 Yes Armando Herrera MD   traMADoL (ULTRAM) 50 mg tablet Take 50 mg by mouth every 4 (four) hours as needed for Pain. 3/4/24  Yes Provider, Historical   cephALEXin (KEFLEX) 500 MG capsule Take 500 mg by mouth every 12 (twelve) hours.    Provider, Historical       REVIEW OF SYSTEMS:   Except as documented, all other systems reviewed and negative     PHYSICAL  EXAM:     VITAL SIGNS: 24 HRS MIN & MAX LAST   Temp  Min: 97 °F (36.1 °C)  Max: 97.8 °F (36.6 °C) 97 °F (36.1 °C)   BP  Min: 124/71  Max: 176/94 (!) 149/69   Pulse  Min: 60  Max: 85  66   Resp  Min: 16  Max: 20 20   SpO2  Min: 97 %  Max: 100 % 99 %     General appearance:  No acute distress  HENT: Atraumatic   Lungs: Clear to auscultation bilaterally.   Heart: RRR,No edema  Abdomen: Soft, non tender   Extremities: warm  Neuro:  Obtunded   Psych/mental status:  Obtunded  LABS AND IMAGING:     Recent Labs   Lab 10/18/24  0300 10/21/24  0719 10/22/24  0537   WBC 4.66 3.90* 4.47*   RBC 3.30* 3.33* 3.11*   HGB 10.2* 10.3* 9.5*   HCT 30.2* 31.7* 27.9*   MCV 91.5 95.2* 89.7   MCH 30.9 30.9 30.5   MCHC 33.8 32.5* 34.1   RDW 14.2 14.7 14.8   PLT 74* 67* 86*   MPV 11.3* 11.0* 10.7*       Recent Labs   Lab 10/18/24  0300 10/21/24  0719 10/22/24  0537    141 141   K 4.0 4.1 4.4   * 112* 111*   CO2 26 21* 24   BUN 16.8 20.4 18.2   CREATININE 0.84 0.85 0.83   CALCIUM 8.3* 8.4* 8.2*   MG  --   --  2.10   ALBUMIN 3.0* 3.2* 3.2*   ALKPHOS 131 158* 159*   ALT 20 22 20   AST 22 37* 40*   BILITOT 1.4 1.4 1.5       Microbiology Results (last 7 days)       ** No results found for the last 168 hours. **             CT Head Without Contrast  Narrative: EXAMINATION:  CT HEAD WITHOUT CONTRAST    CLINICAL HISTORY:  fall, unresponsive;    TECHNIQUE:  Multiple axial images were obtained from the base of the brain to the vertex without contrast administration.  Sagittal and coronal reconstructions were performed. .Automatic exposure control  (AEC) is utilized to reduce patient radiation exposure.    COMPARISON:  10/21/2024 at 10:26    FINDINGS:  There is a subdural hematoma seen on the left side extending from the left frontal to the parietal and occipital region as well as the left temporal region.  Maximum thickness of the subdural hematoma is 2 cm.  This has significantly increased in size since the prior examination.  There is  mass effect and midline shift from left to right measuring 13 mm.  This is also acute when compared to the prior examination performed earlier today.  No intraparenchymal blood is seen.  The right-sided frontal subdural hematoma that was seen previously is not well visualized on this examination but appears similar.    Posterior fossa appears unremarkable.    The calvarium is intact.  There is some soft tissue swelling seen in the scalp on the left side at the cerebral convexity.  Impression: Significant worsening of the left-sided subdural hematoma as outlined above with interval development of mass effect and midline shift from left to right measuring 13 mm.    The right-sided frontal subdural hematoma seen on prior examination is not well visualized on this exam.  The visualized portions appear similar..    These findings were relayed to Dr. Harper in the emergency department at time of dictation    Electronically signed by: Robin Ellis  Date:    10/21/2024  Time:    13:35  CT Head Without Contrast  Narrative: EXAMINATION:  CT HEAD WITHOUT CONTRAST    CLINICAL HISTORY:  Subdural hematoma;    TECHNIQUE:  Sequential axial images were performed of the brain without contrast.    Dose product length of 1024 mGycm. Automated exposure control was utilized to minimize radiation dose.    COMPARISON:  Outside facility October 21, 2024.    FINDINGS:  There is left cerebral convexity mixed attenuation chronic and subacute hematoma combination with maximum thickness of 5 mm.  Subdural hematoma extends into the anterior interhemispheric falx with maximum thickness of 4 mm.  There is also right anterior frontal convexity 3 mm subacute subdural hematoma on image 27 series 2.  Remaining right cerebral convexity subdural hematoma is chronic with diffuse hypodensity and maximum thickness of 9 mm.  There is no significant mass effect or midline shift.  No hydrocephalus identified. Mild chronic microvascular ischemic changes and  atrophy. There is no acute large vessel territory infarct.  Left scalp inflammations.  No depressed calvarial fracture.    There are bilateral fracture deformities of the nasal bones which is displaced on the right.  Visualized paranasal sinuses are clear without mucosal thickening, polypoidal abnormality or air-fluid levels. Mastoid air cells aeration is optimal.  Impression: 1.  Left left cerebral convexity combination of chronic and subacute subdural hematoma with maximum thickness of 5 mm.  Subdural hematoma extends into the anterior interhemispheric falx with thickness of 4 mm    2.  Right anterior frontal convexity 3 mm thickness subacute subdural hematoma.  Right remaining subdural hematoma is chronic.    These findings are without significant interval change.    Electronically signed by: David Erazo  Date:    10/21/2024  Time:    10:31      ASSESSMENT & PLAN:     Acute left-sided SDH with mass effect/midline shift  Acute profound encephalopathy  Mechanical fall  Comfort care only measures  Accelerated HTN   Anemia of chronic disease  History of type 2 diabetes mellitus   History of CAD   History of alcoholic cirrhosis   Chronic hep C   Peripheral neuropathy     Plan of care discussed in detail with family members at bedside   They have decided to pursue inpatient hospice   Only focusing on comfort measures   Patient is on IV Keppra to prevent seizures   Case management consulted   Patient will be transferred to inpatient hospice today to continue comfort measures    __________________________________________________________________________  INPATIENT LIST OF MEDICATIONS     Scheduled Meds:   levETIRAcetam (Keppra) IV (PEDS and ADULTS)  500 mg Intravenous Q12H    mupirocin   Nasal BID     Continuous Infusions:  PRN Meds:.  Current Facility-Administered Medications:     dextrose 10%, 12.5 g, Intravenous, PRN    dextrose 10%, 25 g, Intravenous, PRN    glucagon (human recombinant), 1 mg, Intramuscular, PRN     glucose, 16 g, Oral, PRN    glucose, 24 g, Oral, PRN    insulin aspart U-100, 0-5 Units, Subcutaneous, QID (AC + HS) PRN    magnesium hydroxide 400 mg/5 ml, 30 mL, Oral, Daily PRN    melatonin, 6 mg, Oral, Nightly PRN    morphine, 2 mg, Intravenous, Q4H PRN          If patient was admitted under observational status it is with my approval/permission.        All diagnosis and differential diagnosis have been reviewed; assessment and plan has been documented; I have personally reviewed the labs and test results that are presently available; I have reviewed the patients medication list; I have reviewed the consulting providers response and recommendations. I have reviewed or attempted to review medical records based upon their availability.    All of the patient and family questions have been addressed and answered. Patient's is agreeable to the above stated plan. I will continue to monitor closely and make adjustments to medical management as needed.    If patient was admitted under observational status it is with my approval/permission.      Tabitha Moon MD   10/22/2024

## 2024-10-22 NOTE — PLAN OF CARE
Problem: Adult Inpatient Plan of Care  Goal: Plan of Care Review  Outcome: Progressing  Goal: Patient-Specific Goal (Individualized)  Outcome: Progressing  Goal: Optimal Comfort and Wellbeing  Outcome: Progressing  Goal: Readiness for Transition of Care  Outcome: Progressing     Problem: Skin Injury Risk Increased  Goal: Skin Health and Integrity  Outcome: Progressing     Problem: Fall Injury Risk  Goal: Absence of Fall and Fall-Related Injury  Outcome: Progressing

## 2024-10-22 NOTE — PLAN OF CARE
Ciera updated regarding trauma rounds, plan to dc to nursing home ( rtn) with hospice. Ciera will facilitate dc planning.

## 2024-10-22 NOTE — PT/OT/SLP PROGRESS
Occupational Therapy      Patient Name:  Kevin Horan Jr.   MRN:  [id number]    Orders received and chart reviewed. Patient is no longer appropriate for OT services as his medical team is recommending conservative measures and his family is seeking hospice services. OT orders discontinued.    10/22/2024

## 2024-10-22 NOTE — PLAN OF CARE
"    10/22/24 1437   Final Note   Assessment Type Final Discharge Note   Anticipated Discharge Disposition [facility] Resources/Appts/Education Provided Provided patient/caregiver with written discharge plan information   Post-Acute Status   Post-Acute Authorization Hospice   Hospice Status Set-up Complete/Auth obtained   Patient choice form signed by patient/caregiver List with quality metrics by geographic area provided   Discharge Delays None known at this time     Patient transferring to [facility] .  Patient placed in "Will Call".  Report to be called to avinash at [phone]  "

## 2024-10-22 NOTE — PROGRESS NOTES
"   Trauma Surgery   Progress Note  Admit Date: 10/21/2024  HD#1  POD#* No surgery found *    Subjective:   Interval history:  Afebrile. VSS, hypertensive. Patient resting, does not arouse to sternal rub. Pupils equal, 3 mm reactive to 2 mm.     Home Meds:   Current Outpatient Medications   Medication Instructions    atorvastatin (LIPITOR) 40 mg, Nightly    carvediloL (COREG) 3.125 mg, Oral, 2 times daily    cephALEXin (KEFLEX) 500 mg, Every 12 hours    citalopram (CELEXA) 10 mg, Oral, Daily    cyanocobalamin (vitamin B-12) (VITAMIN B-12) 50 mcg, Daily    docusate sodium (COLACE) 200 mg, Oral, 2 times daily PRN    insulin degludec (TRESIBA FLEXTOUCH U-100 SUBQ) 30 Units, Daily    insulin glargine U-100 (Lantus) 10 Units, Subcutaneous, Nightly    omeprazole (PRILOSEC) 40 mg, Daily    QUEtiapine (SEROQUEL) 25 mg, Oral, 2 times daily    traMADoL (ULTRAM) 50 mg, Every 4 hours PRN      Scheduled Meds:   levETIRAcetam (Keppra) IV (PEDS and ADULTS)  500 mg Intravenous Q12H    mupirocin   Nasal BID     Continuous Infusions:  PRN Meds:  Current Facility-Administered Medications:     dextrose 10%, 12.5 g, Intravenous, PRN    dextrose 10%, 25 g, Intravenous, PRN    glucagon (human recombinant), 1 mg, Intramuscular, PRN    glucose, 16 g, Oral, PRN    glucose, 24 g, Oral, PRN    insulin aspart U-100, 0-5 Units, Subcutaneous, QID (AC + HS) PRN    magnesium hydroxide 400 mg/5 ml, 30 mL, Oral, Daily PRN    melatonin, 6 mg, Oral, Nightly PRN    morphine, 2 mg, Intravenous, Q4H PRN     Objective:     VITAL SIGNS: 24 HR MIN & MAX LAST   Temp  Min: 97.6 °F (36.4 °C)  Max: 97.8 °F (36.6 °C)  97.8 °F (36.6 °C)   BP  Min: 119/58  Max: 176/94  (!) 143/70    Pulse  Min: 54  Max: 85  75    Resp  Min: 14  Max: 19  18    SpO2  Min: 97 %  Max: 100 %  100 %      HT: 6' 1" (185.4 cm)  WT: 81.6 kg (180 lb)  BMI: 23.8     Intake/output:  Intake/Output - Last 3 Shifts         10/20 0700  10/21 0659 10/21 0700  10/22 0659    P.O.  0    Total " "Intake(mL/kg)  0 (0)    Urine (mL/kg/hr)  300 (0.2)    Stool  0    Total Output  300    Net  -300                  Intake/Output Summary (Last 24 hours) at 10/22/2024 0649  Last data filed at 10/22/2024 0629  Gross per 24 hour   Intake 0 ml   Output 300 ml   Net -300 ml         Lines/drains/airway:       Peripheral IV - Single Lumen 10/21/24 0715 20 G Left Antecubital (Active)   Site Assessment Clean;Dry;Intact 10/22/24 0000   Extremity Assessment Distal to IV No abnormal discoloration 10/21/24 2100   Line Status Saline locked;Flushed;Capped 10/22/24 0000   Dressing Status Clean;Dry;Intact 10/22/24 0000   Dressing Intervention Integrity maintained 10/22/24 0000   Number of days: 0       Physical examination:  Gen: NAD, non arousable  HEENT: normocephalic,abrasions to scalp, skin tear to left scalp, staple lac to right scalp. Pupil equal 3mm, reactive to light to 2mm  CV: RR  Resp: NWOB  Abd: S/NT/ND  Msk:   Neuro: CN II-XII grossly intact  Skin/wounds: warm, dry, abrasions/skin tear as charted above    Labs:  Renal:  Recent Labs     10/21/24  0719 10/22/24  0537   BUN 20.4 18.2   CREATININE 0.85 0.83     No results for input(s): "LACTIC" in the last 72 hours.  FEN/GI:  Recent Labs     10/21/24  0719 10/22/24  0537    141   K 4.1 4.4   * 111*   CO2 21* 24   CALCIUM 8.4* 8.2*   MG  --  2.10   PHOS  --  3.3   ALBUMIN 3.2* 3.2*   BILITOT 1.4 1.5   AST 37* 40*   ALKPHOS 158* 159*   ALT 22 20     Heme:  Recent Labs     10/21/24  0719 10/22/24  0537   HGB 10.3* 9.5*   HCT 31.7* 27.9*   PLT 67* 86*   INR 1.1  --      ID:  Recent Labs     10/21/24  0719 10/22/24  0537   WBC 3.90* 4.47*     CBG:  Recent Labs     10/21/24  0719 10/22/24  0537   GLUCOSE 95 77*      Recent Labs     10/21/24  1227   POCTGLUCOSE 93      Cardiovascular:  Recent Labs   Lab 10/21/24  1036   TROPONINI <0.010     I have reviewed all pertinent lab results within the past 24 hours.    Imaging:  CT Head Without Contrast   Final Result    "   Significant worsening of the left-sided subdural hematoma as outlined above with interval development of mass effect and midline shift from left to right measuring 13 mm.      The right-sided frontal subdural hematoma seen on prior examination is not well visualized on this exam.  The visualized portions appear similar..      These findings were relayed to Dr. Harper in the emergency department at time of dictation         Electronically signed by: Robni Ellis   Date:    10/21/2024   Time:    13:35      CT Head Without Contrast   Final Result      1.  Left left cerebral convexity combination of chronic and subacute subdural hematoma with maximum thickness of 5 mm.  Subdural hematoma extends into the anterior interhemispheric falx with thickness of 4 mm      2.  Right anterior frontal convexity 3 mm thickness subacute subdural hematoma.  Right remaining subdural hematoma is chronic.      These findings are without significant interval change.         Electronically signed by: David Erazo   Date:    10/21/2024   Time:    10:31      CT Previous   Final Result      CT Previous   Final Result         I have reviewed all pertinent imaging results/findings within the past 24 hours.    Micro/Path/Other:  Microbiology Results (last 7 days)       ** No results found for the last 168 hours. **           Pathology Results  (Last 7 days)      None             Problems list:  Active Problem List with Overview Notes    Diagnosis Date Noted    Type 2 diabetes mellitus, without long-term current use of insulin 08/15/2024    Fall 07/20/2024    SDH (subdural hematoma) 07/20/2024    Fracture of transverse process of lumbar vertebra, closed, initial encounter 07/20/2024        Assessment & Plan:   SDH  -DNR  -NSGY following  -BP <140/90  -Keppra BID  -Daily labs  -NPO  -SCDs  -SSI    Giulia Baez NP  Trauma Surgery

## 2024-10-22 NOTE — DISCHARGE SUMMARY
[facility] Medicine Discharge Summary    Admit Date: 10/21/2024  Discharge Date and Time: 10/22/19535:28 PM  Admitting Physician:  Team  Discharging Physician: Tabitha Moon MD.  Primary Care Physician: Dimitrios Mack MD      Discharge Diagnoses:  Acute left-sided SDH with mass effect/midline shift  Acute profound encephalopathy  Mechanical fall  Comfort care only measures  Accelerated HTN   Anemia of chronic disease  History of type 2 diabetes mellitus   History of CAD   History of alcoholic cirrhosis   Chronic hep C     Hospital Course:   Please see history and physical from 10/22 for detailed summary, family decided to pursue inpatient hospice and this was arranged by case management, patient discharged to inpatient hospice on 10/22 to continue comfort measures  Vitals:  VITAL SIGNS: 24 HRS MIN & MAX LAST   Temp  Min: 97 °F (36.1 °C)  Max: 97.8 °F (36.6 °C) 97 °F (36.1 °C)   BP  Min: 124/71  Max: 176/94 (!) 149/69   Pulse  Min: 60  Max: 85  66   Resp  Min: 16  Max: 20 20   SpO2  Min: 97 %  Max: 100 % 99 %       Physical Exam:  General appearance:  No acute distress  HENT: Atraumatic   Lungs: Clear to auscultation bilaterally.   Heart: RRR,No edema  Abdomen: Soft, non tender   Extremities: warm  Neuro:  Obtunded   Psych/mental status:  Obtunded    Procedures Performed: No admission procedures for hospital encounter.     Significant Diagnostic Studies: See Full reports for all details    Recent Labs   Lab 10/18/24  0300 10/21/24  0719 10/22/24  0537   WBC 4.66 3.90* 4.47*   RBC 3.30* 3.33* 3.11*   HGB 10.2* 10.3* 9.5*   HCT 30.2* 31.7* 27.9*   MCV 91.5 95.2* 89.7   MCH 30.9 30.9 30.5   MCHC 33.8 32.5* 34.1   RDW 14.2 14.7 14.8   PLT 74* 67* 86*   MPV 11.3* 11.0* 10.7*       Recent Labs   Lab 10/18/24  0300 10/21/24  0719 10/22/24  0537    141 141   K 4.0 4.1 4.4   * 112* 111*   CO2 26 21* 24   BUN 16.8 20.4 18.2   CREATININE 0.84 0.85 0.83   CALCIUM 8.3* 8.4*  8.2*   MG  --   --  2.10   ALBUMIN 3.0* 3.2* 3.2*   ALKPHOS 131 158* 159*   ALT 20 22 20   AST 22 37* 40*   BILITOT 1.4 1.4 1.5        Microbiology Results (last 7 days)       ** No results found for the last 168 hours. **             CT Head Without Contrast  Narrative: EXAMINATION:  CT HEAD WITHOUT CONTRAST    CLINICAL HISTORY:  fall, unresponsive;    TECHNIQUE:  Multiple axial images were obtained from the base of the brain to the vertex without contrast administration.  Sagittal and coronal reconstructions were performed. .Automatic exposure control  (AEC) is utilized to reduce patient radiation exposure.    COMPARISON:  10/21/2024 at 10:26    FINDINGS:  There is a subdural hematoma seen on the left side extending from the left frontal to the parietal and occipital region as well as the left temporal region.  Maximum thickness of the subdural hematoma is 2 cm.  This has significantly increased in size since the prior examination.  There is mass effect and midline shift from left to right measuring 13 mm.  This is also acute when compared to the prior examination performed earlier today.  No intraparenchymal blood is seen.  The right-sided frontal subdural hematoma that was seen previously is not well visualized on this examination but appears similar.    Posterior fossa appears unremarkable.    The calvarium is intact.  There is some soft tissue swelling seen in the scalp on the left side at the cerebral convexity.  Impression: Significant worsening of the left-sided subdural hematoma as outlined above with interval development of mass effect and midline shift from left to right measuring 13 mm.    The right-sided frontal subdural hematoma seen on prior examination is not well visualized on this exam.  The visualized portions appear similar..    These findings were relayed to Dr. Harper in the emergency department at time of dictation    Electronically signed by: Robin  [facility]  Date:    10/21/2024  Time:    13:35  CT Head Without Contrast  Narrative: EXAMINATION:  CT HEAD WITHOUT CONTRAST    CLINICAL HISTORY:  Subdural hematoma;    TECHNIQUE:  Sequential axial images were performed of the brain without contrast.    Dose product length of 1024 mGycm. Automated exposure control was utilized to minimize radiation dose.    COMPARISON:  Outside facility October 21, 2024.    FINDINGS:  There is left cerebral convexity mixed attenuation chronic and subacute hematoma combination with maximum thickness of 5 mm.  Subdural hematoma extends into the anterior interhemispheric falx with maximum thickness of 4 mm.  There is also right anterior frontal convexity 3 mm subacute subdural hematoma on image 27 series 2.  Remaining right cerebral convexity subdural hematoma is chronic with diffuse hypodensity and maximum thickness of 9 mm.  There is no significant mass effect or midline shift.  No hydrocephalus identified. Mild chronic microvascular ischemic changes and atrophy. There is no acute large vessel territory infarct.  Left scalp inflammations.  No depressed calvarial fracture.    There are bilateral fracture deformities of the nasal bones which is displaced on the right.  Visualized paranasal sinuses are clear without mucosal thickening, polypoidal abnormality or air-fluid levels. Mastoid air cells aeration is optimal.  Impression: 1.  Left left cerebral convexity combination of chronic and subacute subdural hematoma with maximum thickness of 5 mm.  Subdural hematoma extends into the anterior interhemispheric falx with thickness of 4 mm    2.  Right anterior frontal convexity 3 mm thickness subacute subdural hematoma.  Right remaining subdural hematoma is chronic.    These findings are without significant interval change.    Electronically signed by: David Erazo  Date:    10/21/2024  Time:    10:31         Medication List        STOP taking these medications      atorvastatin 40 MG  tablet  Commonly known as: LIPITOR     carvediloL 3.125 MG tablet  Commonly known as: COREG     cephALEXin 500 MG capsule  Commonly known as: KEFLEX     citalopram 10 MG tablet  Commonly known as: CeleXA     docusate sodium 100 MG capsule  Commonly known as: COLACE     insulin glargine U-100 (Lantus) 100 unit/mL injection     omeprazole 40 MG capsule  Commonly known as: PRILOSEC     QUEtiapine 25 MG Tab  Commonly known as: SEROQUEL     traMADoL 50 mg tablet  Commonly known as: ULTRAM     TRESIBA FLEXTOUCH U-100 SUBQ     VITAMIN B-12 50 mcg tablet  Generic drug: cyanocobalamin (vitamin B-12)               Explained in detail to the patient about the discharge plan, medications, and follow-up visits. Pt understands and agrees with the treatment plan  Discharge Disposition: Home-Health Care [facility]   Discharged Condition: stable  Diet-   Dietary Orders (From admission, onward)       Start     Ordered    10/21/24 1442  Diet NPO Except for: Sips with Medication  Diet effective now        Question:  Except for:  Answer:  Sips with Medication    10/21/24 1441                   Medications Per PR med rec  Activities as tolerated   Follow-up Information       [facility] Follow up.                           For further questions contact hospitalist office    Discharge time 33 minutes    For worsening symptoms, chest pain, shortness of breath, increased abdominal pain, high grade fever, stroke or stroke like symptoms, immediately go to the nearest Emergency Room or call 911 as soon as possible.      Tabitha Vizcaino M.D on 10/22/2024. at 1:28 PM.

## 2024-10-22 NOTE — NURSING
Call report to avinash at [facility] @ 3027.  Ambulance arrived at 1850 & transported to [facility].  ERWIN Simons asked to leave Ivs in so left in.

## 2024-10-22 NOTE — PLAN OF CARE
Caregivers requesting informational visit from [facility].  Referral sent via [facility]

## 2024-10-23 LAB
LEFT CCA DIST DIAS: 10 CM/S
LEFT CCA DIST SYS: 65 CM/S
LEFT CCA PROX DIAS: 11 CM/S
LEFT CCA PROX SYS: 92 CM/S
LEFT ECA DIAS: 9 CM/S
LEFT ECA SYS: 64 CM/S
LEFT ICA DIST DIAS: 11 CM/S
LEFT ICA DIST SYS: 59 CM/S
LEFT ICA MID DIAS: 16 CM/S
LEFT ICA MID SYS: 79 CM/S
LEFT ICA PROX DIAS: 8 CM/S
LEFT ICA PROX SYS: 42 CM/S
LEFT VERTEBRAL DIAS: 6 CM/S
LEFT VERTEBRAL SYS: 65 CM/S
OHS CV CAROTID RIGHT ICA EDV HIGHEST: 18
OHS CV CAROTID ULTRASOUND LEFT ICA/CCA RATIO: 1.22
OHS CV CAROTID ULTRASOUND RIGHT ICA/CCA RATIO: 1.31
OHS CV PV CAROTID LEFT HIGHEST CCA: 92
OHS CV PV CAROTID LEFT HIGHEST ICA: 79
OHS CV PV CAROTID RIGHT HIGHEST CCA: 152
OHS CV PV CAROTID RIGHT HIGHEST ICA: 92
OHS CV US CAROTID LEFT HIGHEST EDV: 16
POCT GLUCOSE: 100 MG/DL (ref 70–110)
POCT GLUCOSE: 93 MG/DL (ref 70–110)
RIGHT CCA DIST DIAS: 12 CM/S
RIGHT CCA DIST SYS: 70 CM/S
RIGHT CCA PROX DIAS: 13 CM/S
RIGHT CCA PROX SYS: 152 CM/S
RIGHT ECA DIAS: 11 CM/S
RIGHT ECA SYS: 81 CM/S
RIGHT ICA DIST DIAS: 17 CM/S
RIGHT ICA DIST SYS: 74 CM/S
RIGHT ICA MID DIAS: 18 CM/S
RIGHT ICA MID SYS: 92 CM/S
RIGHT ICA PROX DIAS: 10 CM/S
RIGHT ICA PROX SYS: 49 CM/S
RIGHT VERTEBRAL DIAS: 10 CM/S
RIGHT VERTEBRAL SYS: 82 CM/S

## 2024-11-23 NOTE — PT/OT/SLP PROGRESS
Occupational Therapy   Treatment    Name: Kevin Horan Jr.  MRN: [id number]  Admitting Diagnosis:  Fall       Recommendations:     Recommended therapy intensity at discharge: Moderate Intensity Therapy   Discharge Equipment Recommendations:  walker, rolling  Barriers to discharge:       Assessment:     Kevin Horan Jr. is a 74 y.o. male with a medical diagnosis of Fall.  He presents with good participation. Performance deficits affecting function are weakness, impaired balance, decreased safety awareness, impaired endurance, impaired cognition, impaired self care skills, impaired functional mobility, gait instability.     Rehab Prognosis:  Good; patient would benefit from acute skilled OT services to address these deficits and reach maximum level of function.       Plan:     Patient to be seen 3 x/week to address the above listed problems via self-care/home management, therapeutic activities, therapeutic exercises  Plan of Care Expires: 08/22/24  Plan of Care Reviewed with: patient    Subjective     Pain/Comfort:  Pain Rating 1: 0/10    Objective:     Communicated with: nurse prior to session.  Patient found HOB elevated with telemetry upon OT entry to room.    General Precautions: Standard, fall    Orthopedic Precautions:N/A  Braces: N/A  Respiratory Status: Room air     Occupational Performance:     Bed Mobility:    Patient completed Scooting/Bridging with minimum assistance  Patient completed Supine to Sit with minimum assistance     Functional Mobility/Transfers:  Patient completed Sit <> Stand Transfer with minimum assistance  with  rolling walker   Functional Mobility: ambulate in room with RW with Min A, 2 instances of loss of balance    Activities of Daily Living:  Grooming: minimum assistance standing at sink with RW to complete face washing ax and brushing teeth    Therapeutic Positioning    OT interventions performed during the course of today's session in an effort to prevent and/or reduce  acquired pressure injuries:   Therapeutic positioning was provided at the conclusion of session to offload all bony prominences for the prevention and/or reduction of pressure injuries    [facility] 6 Click ADL: 15    Patient Education:  Patient provided with verbal education and demonstrations education regarding fall prevention and safety awareness.  Understanding was verbalized, however additional teaching warranted.      Patient left up in chair with all lines intact, call button in reach, and 1:1 CNAs present.    GOALS:   Multidisciplinary Problems       Occupational Therapy Goals          Problem: Occupational Therapy    Goal Priority Disciplines Outcome Interventions   Occupational Therapy Goal     OT, PT/OT Progressing    Description: Goals to be met by: in 1 month      Patient will increase functional independence with ADLs by performing:    LE Dressing with Supervision.  Grooming while standing at sink with Stand-by Assistance.  Toileting from toilet with Stand-by Assistance for hygiene and clothing management.   Toilet transfer to toilet with Supervision.                         Time Tracking:     OT Date of Treatment: 08/01/24  OT Start Time: 1005  OT Stop Time: 1030  OT Total Time (min): 25 min    Billable Minutes:Self Care/Home Management 10  Therapeutic Activity 15    OT/CHUN: CHUN     Number of CHUN visits since last OT visit: 4    8/1/2024       Previously Declined (within the last year)